# Patient Record
Sex: FEMALE | Race: BLACK OR AFRICAN AMERICAN | Employment: FULL TIME | URBAN - METROPOLITAN AREA
[De-identification: names, ages, dates, MRNs, and addresses within clinical notes are randomized per-mention and may not be internally consistent; named-entity substitution may affect disease eponyms.]

---

## 2017-03-03 ENCOUNTER — PATIENT MESSAGE (OUTPATIENT)
Dept: ENDOCRINOLOGY | Age: 37
End: 2017-03-03

## 2017-03-03 ENCOUNTER — TELEPHONE (OUTPATIENT)
Dept: ENDOCRINOLOGY | Age: 37
End: 2017-03-03

## 2017-03-03 RX ORDER — PHENTERMINE HYDROCHLORIDE 30 MG/1
CAPSULE ORAL
Qty: 30 CAP | Refills: 5 | Status: SHIPPED | OUTPATIENT
Start: 2017-03-03 | End: 2017-04-19

## 2017-03-03 RX ORDER — PHENTERMINE HYDROCHLORIDE 30 MG/1
CAPSULE ORAL
Qty: 30 CAP | Refills: 5 | Status: CANCELLED
Start: 2017-03-03

## 2017-03-03 NOTE — TELEPHONE ENCOUNTER
Patient is requesting a call back in regards to her medication phentermine. She stated that the pharmacy is stating that they never received the fax for phentermine. She can be reached at [phone]. Thank you.        Liza Glass

## 2017-03-03 NOTE — TELEPHONE ENCOUNTER
From: Brian Escobar  To: Susanna Yeung MD  Sent: 3/3/2017 2:59 PM EST  Subject: Prescription Question    Dr. Shazia Diaz,     I have been in touch with the [facility] and they don't have a prescription for me from December. The last one they have in file was written in May and filled in October. I still use the [facility] in [place] if you could and would please send the phentermine over. Thank you so much.      Bubba Hammond

## 2017-03-11 ENCOUNTER — HOSPITAL ENCOUNTER (EMERGENCY)
Age: 37
Discharge: HOME | End: 2017-03-11
Attending: EMERGENCY MEDICINE
Payer: COMMERCIAL

## 2017-03-11 ENCOUNTER — OFFICE VISIT (OUTPATIENT)
Dept: FAMILY MEDICINE CLINIC | Age: 37
End: 2017-03-11

## 2017-03-11 ENCOUNTER — APPOINTMENT (OUTPATIENT)
Dept: CT IMAGING | Age: 37
End: 2017-03-11
Attending: EMERGENCY MEDICINE
Payer: COMMERCIAL

## 2017-03-11 VITALS
OXYGEN SATURATION: 100 % | BODY MASS INDEX: 34.55 KG/M2 | RESPIRATION RATE: 18 BRPM | DIASTOLIC BLOOD PRESSURE: 96 MMHG | HEART RATE: 115 BPM | HEIGHT: 61 IN | SYSTOLIC BLOOD PRESSURE: 149 MMHG | TEMPERATURE: 98.1 F | WEIGHT: 183 LBS

## 2017-03-11 VITALS
HEIGHT: 61 IN | OXYGEN SATURATION: 100 % | TEMPERATURE: 98 F | HEART RATE: 102 BPM | DIASTOLIC BLOOD PRESSURE: 79 MMHG | SYSTOLIC BLOOD PRESSURE: 114 MMHG | BODY MASS INDEX: 34.36 KG/M2 | RESPIRATION RATE: 18 BRPM | WEIGHT: 182 LBS

## 2017-03-11 DIAGNOSIS — R10.9 FLANK PAIN: Primary | ICD-10-CM

## 2017-03-11 DIAGNOSIS — N20.0 KIDNEY STONE: Primary | ICD-10-CM

## 2017-03-11 DIAGNOSIS — R10.32 LLQ ABDOMINAL PAIN: ICD-10-CM

## 2017-03-11 LAB
ALBUMIN SERPL BCP-MCNC: 3.8 G/DL (ref 3.5–5)
ALBUMIN/GLOB SERPL: 0.9 {RATIO} (ref 1.1–2.2)
ALP SERPL-CCNC: 82 U/L (ref 45–117)
ALT SERPL-CCNC: 22 U/L (ref 12–78)
AMYLASE SERPL-CCNC: 57 U/L (ref 25–115)
ANION GAP BLD CALC-SCNC: 8 MMOL/L (ref 5–15)
AST SERPL W P-5'-P-CCNC: 15 U/L (ref 15–37)
BASOPHILS # BLD AUTO: 0 K/UL (ref 0–0.1)
BASOPHILS # BLD: 0 % (ref 0–1)
BILIRUB SERPL-MCNC: 0.3 MG/DL (ref 0.2–1)
BILIRUB UR QL STRIP: NORMAL
BUN SERPL-MCNC: 10 MG/DL (ref 6–20)
BUN/CREAT SERPL: 10 (ref 12–20)
CALCIUM SERPL-MCNC: 8.5 MG/DL (ref 8.5–10.1)
CHLORIDE SERPL-SCNC: 107 MMOL/L (ref 97–108)
CO2 SERPL-SCNC: 22 MMOL/L (ref 21–32)
CREAT SERPL-MCNC: 0.99 MG/DL (ref 0.55–1.02)
EOSINOPHIL # BLD: 0 K/UL (ref 0–0.4)
EOSINOPHIL NFR BLD: 0 % (ref 0–7)
ERYTHROCYTE [DISTWIDTH] IN BLOOD BY AUTOMATED COUNT: 13.1 % (ref 11.5–14.5)
GLOBULIN SER CALC-MCNC: 4.2 G/DL (ref 2–4)
GLUCOSE SERPL-MCNC: 162 MG/DL (ref 65–100)
GLUCOSE UR-MCNC: NEGATIVE MG/DL
HCG UR QL: NEGATIVE
HCT VFR BLD AUTO: 38.8 % (ref 35–47)
HGB BLD-MCNC: 12.8 G/DL (ref 11.5–16)
KETONES P FAST UR STRIP-MCNC: NORMAL MG/DL
LIPASE SERPL-CCNC: 151 U/L (ref 73–393)
LYMPHOCYTES # BLD AUTO: 9 % (ref 12–49)
LYMPHOCYTES # BLD: 1.4 K/UL (ref 0.8–3.5)
MCH RBC QN AUTO: 29.4 PG (ref 26–34)
MCHC RBC AUTO-ENTMCNC: 33 G/DL (ref 30–36.5)
MCV RBC AUTO: 89 FL (ref 80–99)
MONOCYTES # BLD: 0.9 K/UL (ref 0–1)
MONOCYTES NFR BLD AUTO: 6 % (ref 5–13)
NEUTS SEG # BLD: 13.4 K/UL (ref 1.8–8)
NEUTS SEG NFR BLD AUTO: 85 % (ref 32–75)
PH UR STRIP: 5.5 [PH] (ref 4.6–8)
PLATELET # BLD AUTO: 290 K/UL (ref 150–400)
POTASSIUM SERPL-SCNC: 3.4 MMOL/L (ref 3.5–5.1)
PROT SERPL-MCNC: 8 G/DL (ref 6.4–8.2)
PROT UR QL STRIP: NORMAL MG/DL
RBC # BLD AUTO: 4.36 M/UL (ref 3.8–5.2)
SODIUM SERPL-SCNC: 137 MMOL/L (ref 136–145)
SP GR UR STRIP: 1.03 (ref 1–1.03)
UA UROBILINOGEN AMB POC: NORMAL (ref 0.2–1)
URINALYSIS CLARITY POC: CLEAR
URINALYSIS COLOR POC: NORMAL
URINE BLOOD POC: NORMAL
URINE LEUKOCYTES POC: NEGATIVE
URINE NITRITES POC: NEGATIVE
WBC # BLD AUTO: 15.7 K/UL (ref 3.6–11)

## 2017-03-11 PROCEDURE — 80053 COMPREHEN METABOLIC PANEL: CPT | Performed by: EMERGENCY MEDICINE

## 2017-03-11 PROCEDURE — 96375 TX/PRO/DX INJ NEW DRUG ADDON: CPT

## 2017-03-11 PROCEDURE — 74011250637 HC RX REV CODE- 250/637: Performed by: EMERGENCY MEDICINE

## 2017-03-11 PROCEDURE — 87147 CULTURE TYPE IMMUNOLOGIC: CPT | Performed by: EMERGENCY MEDICINE

## 2017-03-11 PROCEDURE — 74176 CT ABD & PELVIS W/O CONTRAST: CPT

## 2017-03-11 PROCEDURE — 82150 ASSAY OF AMYLASE: CPT | Performed by: EMERGENCY MEDICINE

## 2017-03-11 PROCEDURE — 36415 COLL VENOUS BLD VENIPUNCTURE: CPT | Performed by: EMERGENCY MEDICINE

## 2017-03-11 PROCEDURE — 83690 ASSAY OF LIPASE: CPT | Performed by: EMERGENCY MEDICINE

## 2017-03-11 PROCEDURE — 85025 COMPLETE CBC W/AUTO DIFF WBC: CPT | Performed by: EMERGENCY MEDICINE

## 2017-03-11 PROCEDURE — 99284 EMERGENCY DEPT VISIT MOD MDM: CPT

## 2017-03-11 PROCEDURE — 74011250636 HC RX REV CODE- 250/636: Performed by: EMERGENCY MEDICINE

## 2017-03-11 PROCEDURE — 81025 URINE PREGNANCY TEST: CPT

## 2017-03-11 PROCEDURE — 96361 HYDRATE IV INFUSION ADD-ON: CPT

## 2017-03-11 PROCEDURE — 87086 URINE CULTURE/COLONY COUNT: CPT | Performed by: EMERGENCY MEDICINE

## 2017-03-11 PROCEDURE — 96374 THER/PROPH/DIAG INJ IV PUSH: CPT

## 2017-03-11 RX ORDER — IBUPROFEN 800 MG/1
800 TABLET ORAL
Qty: 20 TAB | Refills: 0 | Status: SHIPPED | OUTPATIENT
Start: 2017-03-11 | End: 2017-09-13

## 2017-03-11 RX ORDER — TAMSULOSIN HYDROCHLORIDE 0.4 MG/1
0.4 CAPSULE ORAL DAILY
Qty: 15 CAP | Refills: 0 | Status: SHIPPED | OUTPATIENT
Start: 2017-03-11 | End: 2017-03-26

## 2017-03-11 RX ORDER — TAMSULOSIN HYDROCHLORIDE 0.4 MG/1
0.4 CAPSULE ORAL
Status: COMPLETED | OUTPATIENT
Start: 2017-03-11 | End: 2017-03-11

## 2017-03-11 RX ORDER — TAMSULOSIN HYDROCHLORIDE 0.4 MG/1
0.4 CAPSULE ORAL DAILY
Qty: 15 CAP | Refills: 0 | Status: SHIPPED | OUTPATIENT
Start: 2017-03-11 | End: 2017-03-11

## 2017-03-11 RX ORDER — ONDANSETRON 2 MG/ML
8 INJECTION INTRAMUSCULAR; INTRAVENOUS
Status: COMPLETED | OUTPATIENT
Start: 2017-03-11 | End: 2017-03-11

## 2017-03-11 RX ORDER — KETOROLAC TROMETHAMINE 30 MG/ML
30 INJECTION, SOLUTION INTRAMUSCULAR; INTRAVENOUS
Status: COMPLETED | OUTPATIENT
Start: 2017-03-11 | End: 2017-03-11

## 2017-03-11 RX ORDER — HYDROCODONE BITARTRATE AND ACETAMINOPHEN 10; 325 MG/1; MG/1
1-2 TABLET ORAL
Qty: 20 TAB | Refills: 0 | Status: SHIPPED | OUTPATIENT
Start: 2017-03-11 | End: 2017-04-19

## 2017-03-11 RX ORDER — HYDROMORPHONE HYDROCHLORIDE 1 MG/ML
1 INJECTION, SOLUTION INTRAMUSCULAR; INTRAVENOUS; SUBCUTANEOUS
Status: COMPLETED | OUTPATIENT
Start: 2017-03-11 | End: 2017-03-11

## 2017-03-11 RX ORDER — ONDANSETRON 8 MG/1
8 TABLET, ORALLY DISINTEGRATING ORAL
Qty: 15 TAB | Refills: 0 | Status: SHIPPED | OUTPATIENT
Start: 2017-03-11 | End: 2017-04-19

## 2017-03-11 RX ADMIN — TAMSULOSIN HYDROCHLORIDE 0.4 MG: 0.4 CAPSULE ORAL at 15:23

## 2017-03-11 RX ADMIN — KETOROLAC TROMETHAMINE 30 MG: 30 INJECTION, SOLUTION INTRAMUSCULAR at 13:50

## 2017-03-11 RX ADMIN — ONDANSETRON 8 MG: 2 INJECTION INTRAMUSCULAR; INTRAVENOUS at 13:47

## 2017-03-11 RX ADMIN — HYDROMORPHONE HYDROCHLORIDE 1 MG: 1 INJECTION, SOLUTION INTRAMUSCULAR; INTRAVENOUS; SUBCUTANEOUS at 13:51

## 2017-03-11 RX ADMIN — SODIUM CHLORIDE 1000 ML: 900 INJECTION, SOLUTION INTRAVENOUS at 13:46

## 2017-03-11 NOTE — ED PROVIDER NOTES
HPI Comments: The patient is a 80-year-old female with a past medical history significant for diabetes, thyroid disease, dysphagia, GERD, chronic low back pain, anxiety and depression presents to the ED with a complaint of attempt at present of left flank pain x3 days, dull and throbbing in nature, severity 10, radiating to the left lower quadrant abdominal area, without any aggravating or live-in factors, accompanied by 2 episodes of nausea and vomiting this morning. The patient was seen by her gynecologist and sent to the ER for further evaluation. The patient denies any fever, cough, congestion, headache, neck pain, chest pain, shortness of breath, diarrhea, constipation, dysuria, hematuria, dizziness, weakness and numbness. Patient is a 29 y.o. female presenting with abdominal pain and flank pain. Abdominal Pain      Flank Pain    Associated symptoms include abdominal pain.         Past Medical History:   Diagnosis Date    Anxiety and depression 10/9/2014    10/2014; resolved as of 8/24/15 per pt    Chronic low back pain 10/9/2014    Muscular     Diabetes mellitus type II 10/07    Consulted w/ Dr. Ryan Chaparro    Diabetic eye exam ([facility] [address] 2016    Micheline Spaulding MD, [facility]    Dysphagia 2010    GERD (gastroesophageal reflux disease) 2011    H. pylori infection 2016    Dr Sree Byers    History of peptic ulcer disease 2016    16 yo    History of sinus tachycardia 10/5/2012    as of 8/24/15 pt states followed by PCP    Hypercholesterolemia 2016    ~     (normal spontaneous vaginal delivery) 1998    Son  9yo    Post partum depression 10/9/2014    10/2013: counseling and prn Valium    Spondylosis of lumbar & thoracic region without myelopathy or radiculopathy 10/26/2016    xrays 2010    Spondylosis, Thoracic & Lumbar 3/5/2015    xrays 2010    Thyromegaly 2010    US negative    Vitamin D deficiency        Past Surgical History: Procedure Laterality Date    HX CARPAL TUNNEL RELEASE Bilateral     HX  SECTION      HX CHOLECYSTECTOMY  10/09    LAP-Gallstones    HX GYN  2015    IUD placed    HX ORTHOPAEDIC Right 7/10/14    trigger thumb    HX ORTHOPAEDIC Left 7/10/14    wrist         Family History:   Problem Relation Age of Onset    Diabetes Mother       ESRD 45 yo in     Hypertension Mother     Stroke Mother      TIA    Heart Disease Mother     High Cholesterol Paternal Grandmother     Hypertension Maternal Grandfather          Diabetes Maternal Grandmother     Hypertension Maternal Grandmother     High Cholesterol Maternal Grandmother     Cancer Maternal Grandmother      Breast CA     Heart Disease Maternal Grandmother       age 79    Colon Cancer Maternal Uncle       at 62yo       Social History     Social History    Marital status:      Spouse name: N/A    Number of children: 1    Years of education: N/A     Occupational History    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[address], Elementary Education    [address] [facility]     Social History Main Topics    Smoking status: Never Smoker    Smokeless tobacco: Never Used    Alcohol use 0.0 oz/week     0 Standard drinks or equivalent per week      Comment: not weekly, every couple of months per pt    Drug use: No    Sexual activity: Yes     Partners: Male     Birth control/ protection: IUD     Other Topics Concern    Caffeine Concern No     seldom any at all    Special Diet No     has attended diabetes classes  and personal dietician in     Exercise Yes     5 x a week: 1 hr of cardio w/  since 3-2016     Social History Narrative    Got  ~ . Has 1 daughter; Long term relationship and has custody of her stepson; her biological son  at age 7yo in [birth year]. 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          ALLERGIES: Naprosyn [naproxen]    Review of Systems   Gastrointestinal: Positive for abdominal pain. Genitourinary: Positive for flank pain. All other systems reviewed and are negative. Vitals:    03/11/17 1132   BP: (!) 128/94   Pulse: (!) 127   Resp: 18   Temp: 98 °F (36.7 °C)   SpO2: 100%   Weight: 82.6 kg (182 lb)   Height: 5' 1\" (1.549 m)            Physical Exam   Nursing note and vitals reviewed. CONSTITUTIONAL: Well-appearing; well-nourished; in mild distress  HEAD: Normocephalic; atraumatic  EYES: PERRL; EOM intact; conjunctiva and sclera are clear bilaterally. ENT: No rhinorrhea; normal pharynx with no tonsillar hypertrophy; mucous membranes pink/moist, no erythema, no exudate. NECK: Supple; non-tender; no cervical lymphadenopathy  CARD: Normal S1, S2; no murmurs, rubs, or gallops. Regular rate and rhythm. RESP: Normal respiratory effort; breath sounds clear and equal bilaterally; no wheezes, rhonchi, or rales. ABD: Normal bowel sounds; non-distended; non-tender; no palpable organomegaly, no masses, no bruits. Back Exam: Normal inspection; no vertebral point tenderness, left CVA tenderness. Normal range of motion. EXT: Normal ROM in all four extremities; non-tender to palpation; no swelling or deformity; distal pulses are normal, no edema. SKIN: Warm; dry; no rash. NEURO:Alert and oriented x 3, coherent, GERMAN-XII grossly intact, sensory and motor are non-focal.      MDM  Number of Diagnoses or Management Options  Kidney stone:   Diagnosis management comments: Assessment: acute onset of left flank pain with hematuria. Differential diagnoses consist of obstructive uropathy/ UTI/ pyelonephritis/ diverticulitis/ GERD/ myofascial strain. Plan: lab/ IV fluid/ antiemetics and analgesia/ CT scan of the abdomen and pelvis/ Monitor and Reevaluate.          Amount and/or Complexity of Data Reviewed  Clinical lab tests: ordered and reviewed  Tests in the radiology section of CPT®: ordered and reviewed  Tests in the medicine section of CPT®: reviewed and ordered  Discussion of test results with the performing providers: yes  Decide to obtain previous medical records or to obtain history from someone other than the patient: yes  Obtain history from someone other than the patient: yes  Review and summarize past medical records: yes  Discuss the patient with other providers: yes  Independent visualization of images, tracings, or specimens: yes    Risk of Complications, Morbidity, and/or Mortality  Presenting problems: moderate  Diagnostic procedures: moderate  Management options: moderate    Patient Progress  Patient progress: stable    ED Course       Procedures     PROGRESS NOTE:  I discussed the radiographic findings with the oncall radiologist Dr. Bishop Loyd, states that findings in the left kidney are consistent with kidney stones. Will discussed with Urology 02:55 PM    .     CONSULT NOTE:  Tye Weber MD spoke with Dr. Mikayla George of [facility] Discussed patient's presentation, history, physical assessment, and available diagnostic results. Wants patient discharged home and will follow up in the office for outpatient reevaluation and work-up as deemed appropriate. 03:00 PM    Progress Note:   Pt has been reexamined by Tye Weber MD. Pt is feeling much better. Symptoms have improved. All available results have been reviewed with pt and any available family. Leukocytosis is likely the result of stress. The patient remained afebrile and does not have any urinary symptoms. Pt understands sx, dx, and tx in ED. Care plan has been outlined and questions have been answered. Pt is ready to go home. Will send home on kidney stone instructions. Prescription of Norco, Flomax, Zofran and Motrin. The patient was given an appointment with [facility] on Monday morning at 8 AM at the [facility] location. Outpatient referral with PCP as needed. Written by Tye Weber MD,3:17 PM    .   .

## 2017-03-11 NOTE — PROGRESS NOTES
HISTORY OF PRESENT ILLNESS  Niya Dailey is a 29 y.o. female here c/o back and abdominal pain. Blood pressure (!) 149/96, pulse (!) 115, temperature 98.1 °F (36.7 °C), temperature source Oral, resp. rate 18, height 5' 1\" (1.549 m), weight 183 lb (83 kg), SpO2 100 %. Body mass index is 34.58 kg/(m^2). Chief Complaint   Patient presents with    Back Pain    Abdominal Pain      Back Pain    Associated symptoms include abdominal pain. Pertinent negatives include no chest pain, no fever and no headaches. Abdominal Pain   Associated symptoms include abdominal pain. Pertinent negatives include no chest pain, no headaches and no shortness of breath. Back and Abdominal pain: Pt c/o back pain and abdomen 8/10 x yesterday. Pt states the pain is dull and shooting. Pt endorses taking Advil and Tyelnol. Pt reports emesis at 12am and 7 am. Pt notes she has not eaten anything. Pt claims her menstrual cycle stopped yesterday, but the back and flank pain has increased since then. Pt's UA was reviewed today noting trace hematuria without bacteria. Pt reports having an IUD. Pt notes she has not had a menstrual cycle in two years. Pt reports heavy menstrual cycle x 2 days. Pt denies constipation. Current Outpatient Prescriptions   Medication Sig Dispense Refill    phentermine 30 mg capsule TAKE ONE CAPSULE BY MOUTH ONCE DAILY IN THE MORNING 30 Cap 5    DEXILANT 60 mg CpDB TAKE ONE CAPSULE BY MOUTH ONCE DAILY FOR  REFLUX 30 Cap 5    metaxalone (SKELAXIN) 800 mg tablet Take 1 Tab by mouth every eight (8) hours as needed (muscle spasms in back). 30 Tab 2    Liraglutide (VICTOZA) 0.6 mg/0.1 mL (18 mg/3 mL) sub-q pen Inject 1.8 mg daily 3 Syringe 11    NOVOFINE PLUS 32 gauge x 1/6\" ndle 1 Each by SubCUTAneous route daily.  With victoza 100 Pen Needle 11    levothyroxine (SYNTHROID) 75 mcg tablet Take 1 tablet daily 90 Tab 3    topiramate (TOPAMAX) 50 mg tablet Take 2 tabs with breakfast 90 Tab 3    metFORMIN (GLUCOPHAGE) 1,000 mg tablet TAKE ONE TABLET BY MOUTH TWICE DAILY WITH MEALS 180 Tab 3    cholecalciferol, vitamin D3, 2,000 unit tab Take  by mouth daily.  levonorgestrel (MIRENA) 20 mcg/24 hr (5 years) IUD 1 Each by IntraUTERine route once.  ONE TOUCH ULTRA TEST strip Test 3 times daily due to fluctuating sugars.   Dx: 250.00 100 Strip 11    ONE TOUCH ULTRAMINI monitoring kit Use as directed 1 Kit 0     Facility-Administered Medications Ordered in Other Visits   Medication Dose Route Frequency Provider Last Rate Last Dose    HYDROmorphone (PF) (DILAUDID) injection 1 mg  1 mg IntraVENous NOW Valeria Quintana MD        ketorolac (TORADOL) injection 30 mg  30 mg IntraVENous NOW Valeria Quintana MD        ondansetron [facility]) injection 8 mg  8 mg IntraVENous NOW Valeria Quintana MD        sodium chloride 0.9 % bolus infusion 1,000 mL  1,000 mL IntraVENous NOW Valeria Quintana MD         Allergies   Allergen Reactions    Naprosyn [Naproxen] Nausea Only     Past Medical History:   Diagnosis Date    Anxiety and depression 10/9/2014    10/2014; resolved as of 8/24/15 per pt    Chronic low back pain 10/9/2014    Muscular     Diabetes mellitus type II 10/07    Consulted w/ Dr. Keila Shore    Diabetic eye exam ([facility] [address] 2016    Tamia Alcala MD, [facility]    Dysphagia 2010    GERD (gastroesophageal reflux disease) 2011    H. pylori infection 2016    Dr Tang Beltran    History of peptic ulcer disease 2016    17 yo    History of sinus tachycardia 10/5/2012    as of 8/24/15 pt states followed by PCP    Hypercholesterolemia 2016    ~     (normal spontaneous vaginal delivery) 1998    Son  9yo    Post partum depression 10/9/2014    10/2013: counseling and prn Valium    Spondylosis of lumbar & thoracic region without myelopathy or radiculopathy 10/26/2016    xrays 2010    Spondylosis, Thoracic & Lumbar 3/5/2015    xrays 2010    Thyromegaly 2010    US negative    Vitamin D deficiency      Past Surgical History:   Procedure Laterality Date    HX CARPAL TUNNEL RELEASE Bilateral     HX  SECTION      HX CHOLECYSTECTOMY  10/09    LAP-Gallstones    HX GYN  2015    IUD placed    HX ORTHOPAEDIC Right 7/10/14    trigger thumb    HX ORTHOPAEDIC Left 7/10/14    wrist     Family History   Problem Relation Age of Onset    Diabetes Mother       ESRD 45 yo in     Hypertension Mother     Stroke Mother      TIA    Heart Disease Mother     High Cholesterol Paternal Grandmother     Hypertension Maternal Grandfather          Diabetes Maternal Grandmother     Hypertension Maternal Grandmother     High Cholesterol Maternal Grandmother     Cancer Maternal Grandmother      Breast CA     Heart Disease Maternal Grandmother       age 79    Colon Cancer Maternal Uncle       at 64yo     Social History   Substance Use Topics    Smoking status: Never Smoker    Smokeless tobacco: Never Used    Alcohol use 0.0 oz/week     0 Standard drinks or equivalent per week      Comment: not weekly, every couple of months per pt      Review of Systems   Constitutional: Negative. Negative for chills, fever and malaise/fatigue. Eyes: Negative for blurred vision. Respiratory: Negative for shortness of breath. Cardiovascular: Negative for chest pain and leg swelling. Gastrointestinal: Positive for abdominal pain. Negative for blood in stool, constipation, diarrhea, heartburn, melena, nausea and vomiting. Musculoskeletal: Positive for back pain. Neurological: Negative. Negative for dizziness and headaches. All other systems reviewed and are negative. Physical Exam   Constitutional: She is oriented to person, place, and time. She appears well-developed and well-nourished. No distress. HENT:   Head: Normocephalic and atraumatic. Neck: Carotid bruit is not present.    Cardiovascular: Normal rate, regular rhythm, normal heart sounds and intact distal pulses. Exam reveals no gallop and no friction rub. No murmur heard. Pulmonary/Chest: Effort normal and breath sounds normal. No respiratory distress. She has no wheezes. She has no rales. Abdominal: She exhibits no distension and no mass. There is tenderness in the left lower quadrant. There is rebound and CVA tenderness. There is no guarding. Musculoskeletal: She exhibits no edema. Neurological: She is alert and oriented to person, place, and time. Skin: She is not diaphoretic. Psychiatric: She has a normal mood and affect. Her behavior is normal. Judgment and thought content normal.   Nursing note and vitals reviewed. ASSESSMENT and PLAN  Daphne Jackson was seen today for back pain and abdominal pain. Diagnoses and all orders for this visit:    Flank pain  -     AMB POC URINALYSIS DIP STICK AUTO W/O MICRO  Results for orders placed or performed in visit on 03/11/17   AMB POC URINALYSIS DIP STICK AUTO W/O MICRO   Result Value Ref Range    Color (UA POC) Dark Yellow     Clarity (UA POC) Clear     Glucose (UA POC) Negative Negative    Bilirubin (UA POC) 1+ Negative    Ketones (UA POC) Trace Negative    Specific gravity (UA POC) 1.030 1.001 - 1.035    Blood (UA POC) 2+ Negative    pH (UA POC) 5.5 4.6 - 8.0    Protein (UA POC) 2+ Negative mg/dL    Urobilinogen (UA POC) 0.2 mg/dL 0.2 - 1    Nitrites (UA POC) Negative Negative    Leukocyte esterase (UA POC) Negative Negative       LLQ abdominal pain  Likely renal stone, advised pt that she needs to go to the ER to get a CT and/pelvis. I ordered a POC UA for investigation of flank pain. Pt was instructed to go to the ED for immediate imaging and pain medication. Pt states she can call someone to take her to the ED. Derrek Qureshi was called to let them know pt is heading over. Follow-up Disposition:  Return if symptoms worsen or fail to improve, for  advised to go to ER.      Medication risks/benefits/costs/interactions/alternatives discussed with patient. Advised patient to call back or return to office if symptoms worsen/change/persist.  If patient cannot reach us or should anything more severe/urgent arise he/she should proceed directly to the nearest emergency department. Discussed expected course/resolution/complications of diagnosis in detail with patient. Patient given a written after visit summary which includes her diagnoses, current medications and vitals. Patient expressed understanding with the diagnosis and plan. Written by Ingrid Dunham, as dictated by Dr. Gertrude Rojas.

## 2017-03-11 NOTE — PROGRESS NOTES
Chief Complaint   Patient presents with    Back Pain    Abdominal Pain     Pt presents in office today with c/o left flank pain and lower abdominal pain,   Pt states flank pain started on 03/08/2017, pain radiates to her LUQ,    Pt states she woke up yesterday morning with lower abdominal pain  Reports urinary urgency, denies urinary pain or frequency     Pt has IUD in place, states she had heavy bleeding x2 days on 03/08/2017    1. Have you been to the ER, urgent care clinic since your last visit? Hospitalized since your last visit? No    2. Have you seen or consulted any other health care providers outside of the [address] [place] [address] since your last visit? Include any pap smears or colon screening.  No

## 2017-03-11 NOTE — ED TRIAGE NOTES
Pt arrives with L sided abd pain & L sided flank pain that started on Wednesday. + nausea/vomiiting twice today.

## 2017-03-11 NOTE — MR AVS SNAPSHOT
Visit Information Date & Time Provider Department Dept. Phone Encounter #  
 3/11/2017  9:45 AM Shweta Wellington MD [address] [place] [address] [phone] [id number] Follow-up Instructions Return if symptoms worsen or fail to improve, for  advised to go to ER. Your Appointments 4/19/2017 11:30 AM  
ROUTINE CARE with Robyn Monet MD  
[facility]) Appt Note: 4m f/u  
 [address]  
[address] [facility] [address] [id number] Upcoming Health Maintenance Date Due  
 PAP AKA CERVICAL CYTOLOGY 3/3/2017 MICROALBUMIN Q1 3/18/2017 HEMOGLOBIN A1C Q6M 6/16/2017 LIPID PANEL Q1 7/12/2017 FOOT EXAM Q1 7/15/2017 EYE EXAM RETINAL OR DILATED Q1 8/29/2017 DTaP/Tdap/Td series (2 - Td) 9/4/2023 Allergies as of 3/11/2017  Review Complete On: 12/21/2016 By: Robyn Monet MD  
  
 Severity Noted Reaction Type Reactions Naprosyn [Naproxen]  03/25/2010   Intolerance Nausea Only Current Immunizations  Reviewed on 11/20/2013 Name Date DTaP 9/4/2013 Hepatitis B Vaccine 2/16/2010, 9/25/2009, 8/25/2009 Influenza Vaccine 10/16/2015, 10/23/2014, 10/9/2013 Influenza Vaccine (Quad) PF 10/26/2016 Influenza Vaccine Split 10/5/2012, 9/21/2011, 9/23/2010 Pneumococcal Vaccine (Unspecified Type) 9/18/2013, 3/25/2007 TB Skin Test (PPD) Intradermal 8/29/2013 TD Vaccine 3/25/2007 Not reviewed this visit You Were Diagnosed With   
  
 Codes Comments Flank pain    -  Primary ICD-10-CM: R10.9 ICD-9-CM: 789.09   
 LLQ abdominal pain     ICD-10-CM: R10.32 
ICD-9-CM: 789.04 Vitals BP Pulse Temp Resp Height(growth percentile) Weight(growth percentile) (!) 149/96 (!) 115 98.1 °F (36.7 °C) (Oral) 18 5' 1\" (1.549 m) 183 lb (83 kg) SpO2 BMI OB Status Smoking Status 100% 34.58 kg/m2 IUD Never Smoker Vitals History BMI and BSA Data Body Mass Index Body Surface Area 34.58 kg/m 2 1.89 m 2 Preferred Pharmacy Pharmacy Name [facility] [address] [address] Your Updated Medication List  
  
   
This list is accurate as of: 3/11/17 10:26 AM.  Always use your most recent med list.  
  
  
  
  
 cholecalciferol (vitamin D3) 2,000 unit Tab Take  by mouth daily. DEXILANT 60 mg Cpdb Generic drug:  Dexlansoprazole TAKE ONE CAPSULE BY MOUTH ONCE DAILY FOR  REFLUX  
  
 levothyroxine 75 mcg tablet Commonly known as:  SYNTHROID Take 1 tablet daily Liraglutide 0.6 mg/0.1 mL (18 mg/3 mL) sub-q pen Commonly known as:  Lemond Damme Inject 1.8 mg daily  
  
 metaxalone 800 mg tablet Commonly known as:  SKELAXIN Take 1 Tab by mouth every eight (8) hours as needed (muscle spasms in back). metFORMIN 1,000 mg tablet Commonly known as:  GLUCOPHAGE  
TAKE ONE TABLET BY MOUTH TWICE DAILY WITH MEALS  
  
 MIRENA 20 mcg/24 hr (5 years) IUD Generic drug:  levonorgestrel 1 Each by IntraUTERine route once. NOVOFINE PLUS 32 gauge x 1/6\" Ndle Generic drug:  pen needle, diabetic 1 Each by SubCUTAneous route daily. With victoza ONETOUCH ULTRA TEST strip Generic drug:  glucose blood VI test strips Test 3 times daily due to fluctuating sugars. Dx: 250.00 ONETOUCH ULTRAMINI monitoring kit Generic drug:  Blood-Glucose Meter Use as directed  
  
 phentermine 30 mg capsule TAKE ONE CAPSULE BY MOUTH ONCE DAILY IN THE MORNING  
  
 topiramate 50 mg tablet Commonly known as:  TOPAMAX Take 2 tabs with breakfast  
  
  
  
  
We Performed the Following AMB POC URINALYSIS DIP STICK AUTO W/O MICRO [28971 CPT(R)] Follow-up Instructions Return if symptoms worsen or fail to improve, for  advised to go to ER. [facility]! Dear Veda Robles: Thank you for requesting a [employer] account. Our records indicate that you already have an active [employer] account. You can access your account anytime at [email or web address] Did you know that you can access your hospital and ER discharge instructions at any time in [employer]? You can also review all of your test results from your hospital stay or ER visit. Additional Information If you have questions, please visit the Frequently Asked Questions section of the [employer] website at [email or web address] Remember, [employer] is NOT to be used for urgent needs. For medical emergencies, dial 911. Now available from your iPhone and Android! Please provide this summary of care documentation to your next provider. Your primary care clinician is listed as VIDHI ORELLANA. If you have any questions after today's visit, please call [phone].

## 2017-03-11 NOTE — DISCHARGE INSTRUCTIONS
Kidney Stone: Care Instructions  Your Care Instructions    Kidney stones are formed when salts, minerals, and other substances normally found in the urine clump together. They can be as small as grains of sand or, rarely, as large as golf balls. While the stone is traveling through the ureter, which is the tube that carries urine from the kidney to the bladder, you will probably feel pain. The pain may be mild or very severe. You may also have some blood in your urine. As soon as the stone reaches the bladder, any intense pain should go away. If a stone is too large to pass on its own, you may need a medical procedure to help you pass the stone. The doctor has checked you carefully, but problems can develop later. If you notice any problems or new symptoms, get medical treatment right away. Follow-up care is a key part of your treatment and safety. Be sure to make and go to all appointments, and call your doctor if you are having problems. It's also a good idea to know your test results and keep a list of the medicines you take. How can you care for yourself at home? · Drink plenty of fluids, enough so that your urine is light yellow or clear like water. If you have kidney, heart, or liver disease and have to limit fluids, talk with your doctor before you increase the amount of fluids you drink. · Take pain medicines exactly as directed. Call your doctor if you think you are having a problem with your medicine. ¨ If the doctor gave you a prescription medicine for pain, take it as prescribed. ¨ If you are not taking a prescription pain medicine, ask your doctor if you can take an over-the-counter medicine. Read and follow all instructions on the label. · Your doctor may ask you to strain your urine so that you can collect your kidney stone when it passes. You can use a kitchen strainer or a tea strainer to catch the stone. Store it in a plastic bag until you see your doctor again.   Preventing future kidney stones  Some changes in your diet may help prevent kidney stones. Depending on the cause of your stones, your doctor may recommend that you:  · Drink plenty of fluids, enough so that your urine is light yellow or clear like water. If you have kidney, heart, or liver disease and have to limit fluids, talk with your doctor before you increase the amount of fluids you drink. · Limit coffee, tea, and alcohol. Also avoid grapefruit juice. · Do not take more than the recommended daily dose of vitamins C and D.  · Avoid antacids such as Gaviscon, Maalox, Mylanta, or Tums. · Limit the amount of salt (sodium) in your diet. · Eat a balanced diet that is not too high in protein. · Limit foods that are high in a substance called oxalate, which can cause kidney stones. These foods include dark green vegetables, rhubarb, chocolate, wheat bran, nuts, cranberries, and beans. When should you call for help? Call your doctor now or seek immediate medical care if:  · You cannot keep down fluids. · Your pain gets worse. · You have a fever or chills. · You have new or worse pain in your back just below your rib cage (the flank area). · You have new or more blood in your urine. Watch closely for changes in your health, and be sure to contact your doctor if:  · You do not get better as expected. Where can you learn more? Go to http://elizabeth-korina.info/. Enter B571 in the search box to learn more about \"Kidney Stone: Care Instructions. \"  Current as of: November 20, 2015  Content Version: 11.1  © 0365-8996 [employer]. Care instructions adapted under license by [employer] (which disclaims liability or warranty for this information). If you have questions about a medical condition or this instruction, always ask your healthcare professional. [address] any warranty or liability for your use of this information.          We hope that we have addressed all of your medical concerns. The examination and treatment you received in the Emergency Department were for an emergent problem and were not intended as complete care. It is important that you follow up with your healthcare provider(s) for ongoing care. If your symptoms worsen or do not improve as expected, and you are unable to reach your usual health care provider(s), you should return to the Emergency Department. Today's healthcare is undergoing tremendous change, and patient satisfaction surveys are one of the many tools to assess the quality of medical care. You may receive a survey from the [employer] regarding your experience in the Emergency Department. I hope that your experience has been completely positive, particularly the medical care that I provided. As such, please participate in the survey; anything less than excellent does not meet my expectations or intentions. [address] [place] [address] and [address] [place] [address] participate in nationally recognized quality of care measures. If your blood pressure is greater than 120/80, as reported below, we urge that you seek medical care to address the potential of high blood pressure, commonly known as hypertension. Hypertension can be hereditary or can be caused by certain medical conditions, pain, stress, or \"white coat syndrome. \"       Please make an appointment with your health care provider(s) for follow up of your Emergency Department visit. VITALS:   Patient Vitals for the past 8 hrs:   Temp Pulse Resp BP SpO2   03/11/17 1132 98 °F (36.7 °C) (!) 127 18 (!) 128/94 100 %          Thank you for allowing us to provide you with medical care today. We realize that you have many choices for your emergency care needs. Please choose us in the future for any continued health care needs. Maya Soto MD    [facility].   Office: [phone]            Recent Results (from the past 24 hour(s))   AMB POC URINALYSIS DIP STICK AUTO W/O MICRO    Collection Time: 03/11/17 10:04 AM   Result Value Ref Range    Color (UA POC) Dark Yellow     Clarity (UA POC) Clear     Glucose (UA POC) Negative Negative    Bilirubin (UA POC) 1+ Negative    Ketones (UA POC) Trace Negative    Specific gravity (UA POC) 1.030 1.001 - 1.035    Blood (UA POC) 2+ Negative    pH (UA POC) 5.5 4.6 - 8.0    Protein (UA POC) 2+ Negative mg/dL    Urobilinogen (UA POC) 0.2 mg/dL 0.2 - 1    Nitrites (UA POC) Negative Negative    Leukocyte esterase (UA POC) Negative Negative   CBC WITH AUTOMATED DIFF    Collection Time: 03/11/17 12:58 PM   Result Value Ref Range    WBC 15.7 (H) 3.6 - 11.0 K/uL    RBC 4.36 3.80 - 5.20 M/uL    HGB 12.8 11.5 - 16.0 g/dL    HCT 38.8 35.0 - 47.0 %    MCV 89.0 80.0 - 99.0 FL    MCH 29.4 26.0 - 34.0 PG    MCHC 33.0 30.0 - 36.5 g/dL    RDW 13.1 11.5 - 14.5 %    PLATELET 589 480 - 618 K/uL    NEUTROPHILS 85 (H) 32 - 75 %    LYMPHOCYTES 9 (L) 12 - 49 %    MONOCYTES 6 5 - 13 %    EOSINOPHILS 0 0 - 7 %    BASOPHILS 0 0 - 1 %    ABS. NEUTROPHILS 13.4 (H) 1.8 - 8.0 K/UL    ABS. LYMPHOCYTES 1.4 0.8 - 3.5 K/UL    ABS. MONOCYTES 0.9 0.0 - 1.0 K/UL    ABS. EOSINOPHILS 0.0 0.0 - 0.4 K/UL    ABS. BASOPHILS 0.0 0.0 - 0.1 K/UL   METABOLIC PANEL, COMPREHENSIVE    Collection Time: 03/11/17 12:58 PM   Result Value Ref Range    Sodium 137 136 - 145 mmol/L    Potassium 3.4 (L) 3.5 - 5.1 mmol/L    Chloride 107 97 - 108 mmol/L    CO2 22 21 - 32 mmol/L    Anion gap 8 5 - 15 mmol/L    Glucose 162 (H) 65 - 100 mg/dL    BUN 10 6 - 20 MG/DL    Creatinine 0.99 0.55 - 1.02 MG/DL    BUN/Creatinine ratio 10 (L) 12 - 20      GFR est AA >60 >60 ml/min/1.73m2    GFR est non-AA >60 >60 ml/min/1.73m2    Calcium 8.5 8.5 - 10.1 MG/DL    Bilirubin, total 0.3 0.2 - 1.0 MG/DL    ALT (SGPT) 22 12 - 78 U/L    AST (SGOT) 15 15 - 37 U/L    Alk.  phosphatase 82 45 - 117 U/L    Protein, total 8.0 6.4 - 8.2 g/dL    Albumin 3.8 3.5 - 5.0 g/dL    Globulin 4.2 (H) 2.0 - 4.0 g/dL    A-G Ratio 0.9 (L) 1.1 - 2.2     LIPASE    Collection Time: 03/11/17 12:58 PM   Result Value Ref Range    Lipase 151 73 - 393 U/L   AMYLASE    Collection Time: 03/11/17 12:58 PM   Result Value Ref Range    Amylase 57 25 - 115 U/L   HCG URINE, QL. - POC    Collection Time: 03/11/17  1:44 PM   Result Value Ref Range    Pregnancy test,urine (POC) NEGATIVE  NEG         Ct Abd Pelv Wo Cont    Result Date: 3/11/2017  INDICATION: Left abdominal/flank pain x 3 days COMPARISON: CT angiography of chest 11/16/2007 TECHNIQUE: Thin axial images were obtained through the abdomen and pelvis. Coronal and sagittal reconstructions were generated. Oral contrast was not administered. CT dose reduction was achieved through use of a standardized protocol tailored for this examination and automatic exposure control for dose modulation. The absence of intravenous contrast material reduces the sensitivity for evaluation of the solid parenchymal organs of the abdomen. FINDINGS: BREASTS: Stable since 11/16/2007, 10 years, 1.2 x 2.3 cm right breast nodule possible fibroadenoma. LUNG BASES: Clear. INCIDENTALLY IMAGED HEART AND MEDIASTINUM: Unremarkable. LIVER: Unremarkable GALLBLADDER: Surgically absent. SPLEEN: Normal size. PANCREAS: Unremarkable. ADRENALS: Unremarkable. KIDNEYS: Swollen left kidney with left pelvocaliectasis/hydronephrosis with slight perinephric fluid secondary to a 0.7 x 0.8 x 0.8 cm proximal ureteral calculus. No right hydronephrosis STOMACH: Unremarkable. SMALL BOWEL: Normal in caliber. COLON: Fecal stasis ascending colon. Scattered sigmoid and left colonic diverticula. APPENDIX: Normal in configuration. PERITONEUM: No pneumoperitoneum. RETROPERITONEUM: Normal caliber abdominal aorta. No lymphadenopathy. REPRODUCTIVE ORGANS: Normal uterine size with intrauterine device. URINARY BLADDER: Nondistended.  BONES: Stable slightly bifid compression deformity T11 vertebral body may be development. Facet arthropathy L5-S1. No destructive bone lesion. ADDITIONAL COMMENTS: N/A     IMPRESSION: Swollen left kidney with left pelvocaliectasis/hydronephrosis and with slight perinephric fluid secondary to large 0.7 x 0.8 x 0.8 cm left proximal ureteral calculus.

## 2017-03-13 LAB
BACTERIA SPEC CULT: ABNORMAL
CC UR VC: ABNORMAL
SERVICE CMNT-IMP: ABNORMAL

## 2017-04-19 ENCOUNTER — OFFICE VISIT (OUTPATIENT)
Dept: ENDOCRINOLOGY | Age: 37
End: 2017-04-19

## 2017-04-19 ENCOUNTER — TELEPHONE (OUTPATIENT)
Dept: ENDOCRINOLOGY | Age: 37
End: 2017-04-19

## 2017-04-19 VITALS
DIASTOLIC BLOOD PRESSURE: 87 MMHG | BODY MASS INDEX: 35.16 KG/M2 | WEIGHT: 186.2 LBS | HEART RATE: 115 BPM | HEIGHT: 61 IN | SYSTOLIC BLOOD PRESSURE: 133 MMHG

## 2017-04-19 DIAGNOSIS — E66.9 OBESITY, CLASS I, BMI 30-34.9: ICD-10-CM

## 2017-04-19 DIAGNOSIS — E78.00 HYPERCHOLESTEROLEMIA: ICD-10-CM

## 2017-04-19 DIAGNOSIS — E04.9 GOITER: ICD-10-CM

## 2017-04-19 DIAGNOSIS — E55.9 VITAMIN D DEFICIENCY: ICD-10-CM

## 2017-04-19 RX ORDER — LEVOTHYROXINE SODIUM 88 UG/1
TABLET ORAL
Qty: 90 TAB | Refills: 3 | Status: SHIPPED | OUTPATIENT
Start: 2017-04-19 | End: 2018-06-15 | Stop reason: SDUPTHER

## 2017-04-19 NOTE — MR AVS SNAPSHOT
Visit Information Date & Time Provider Department Dept. Phone Encounter #  
 4/19/2017 11:30 AM Katie Cuellar, [facility] [phone] Follow-up Instructions Return in about 5 months (around 9/19/2017). Upcoming Health Maintenance Date Due  
 PAP AKA CERVICAL CYTOLOGY 3/3/2017 FOOT EXAM Q1 7/15/2017 EYE EXAM RETINAL OR DILATED Q1 8/29/2017 HEMOGLOBIN A1C Q6M 10/13/2017 MICROALBUMIN Q1 4/13/2018 LIPID PANEL Q1 4/13/2018 DTaP/Tdap/Td series (2 - Td) 9/4/2023 Allergies as of 4/19/2017  Review Complete On: 4/19/2017 By: Katie Cuellar MD  
  
 Severity Noted Reaction Type Reactions Naprosyn [Naproxen]  03/25/2010   Intolerance Nausea Only Topamax [Topiramate]  03/15/2017    Other (comments) Kidney stone Current Immunizations  Reviewed on 11/20/2013 Name Date DTaP 9/4/2013 Hepatitis B Vaccine 2/16/2010, 9/25/2009, 8/25/2009 Influenza Vaccine 10/16/2015, 10/23/2014, 10/9/2013 Influenza Vaccine (Quad) PF 10/26/2016 Influenza Vaccine Split 10/5/2012, 9/21/2011, 9/23/2010 Pneumococcal Vaccine (Unspecified Type) 9/18/2013, 3/25/2007 TB Skin Test (PPD) Intradermal 8/29/2013 TD Vaccine 3/25/2007 Not reviewed this visit You Were Diagnosed With   
  
 Codes Comments Uncontrolled type 2 diabetes mellitus without complication, without long-term current use of insulin ([facility] [address]    -  Primary ICD-10-CM: E11.65 ICD-9-CM: 250.02 Goiter     ICD-10-CM: E04.9 ICD-9-CM: 240.9 Obesity, Class I, BMI 30-34.9     ICD-10-CM: E66.9 ICD-9-CM: 278.00 Vitamin D deficiency     ICD-10-CM: E55.9 ICD-9-CM: 268.9 Hypercholesterolemia     ICD-10-CM: E78.00 ICD-9-CM: 272.0 Vitals BP Pulse Height(growth percentile) Weight(growth percentile) BMI OB Status  133/87 (BP 1 Location: Right arm, BP Patient Position: Sitting) (!) 115 5' 1\" (1.549 m) 186 lb 3.2 oz (84.5 kg) 35.18 kg/m2 IUD Smoking Status Never Smoker Vitals History BMI and BSA Data Body Mass Index Body Surface Area  
 35.18 kg/m 2 1.91 m 2 Preferred Pharmacy Pharmacy Name Phone [facility] [phone] Your Updated Medication List  
  
   
This list is accurate as of: 4/19/17  1:08 PM.  Always use your most recent med list.  
  
  
  
  
 cholecalciferol (vitamin D3) 2,000 unit Tab Take  by mouth daily. DEXILANT 60 mg Cpdb Generic drug:  Dexlansoprazole TAKE ONE CAPSULE BY MOUTH ONCE DAILY FOR  REFLUX  
  
 ibuprofen 800 mg tablet Commonly known as:  MOTRIN Take 1 Tab by mouth every six (6) hours as needed for Pain.  
  
 levothyroxine 88 mcg tablet Commonly known as:  SYNTHROID Take 1 tablet daily Liraglutide 0.6 mg/0.1 mL (18 mg/3 mL) sub-q pen Commonly known as:  [employer] Inject 1.8 mg daily  
  
 metaxalone 800 mg tablet Commonly known as:  SKELAXIN Take 1 Tab by mouth every eight (8) hours as needed (muscle spasms in back). metFORMIN 1,000 mg tablet Commonly known as:  GLUCOPHAGE  
TAKE ONE TABLET BY MOUTH TWICE DAILY WITH MEALS  
  
 MIRENA 20 mcg/24 hr (5 years) IUD Generic drug:  levonorgestrel 1 Each by IntraUTERine route once. NOVOFINE PLUS 32 gauge x 1/6\" Ndle Generic drug:  pen needle, diabetic 1 Each by SubCUTAneous route daily. With victoza ONETOUCH ULTRA TEST strip Generic drug:  glucose blood VI test strips Test 3 times daily due to fluctuating sugars. Dx: 250.00 ONETOUCH ULTRAMINI monitoring kit Generic drug:  Blood-Glucose Meter Use as directed PROBIOTIC PO Take  by mouth. VITAMIN B-12 PO Take  by mouth. Prescriptions Sent to Pharmacy Refills  
 levothyroxine (SYNTHROID) 88 mcg tablet 3 Sig: Take 1 tablet daily  Class: Normal  
 Pharmacy: [address], [address] [place] [address] #: [phone] We Performed the Following HEMOGLOBIN A1C WITH EAG [36899 CPT(R)] LIPID PANEL [31874 CPT(R)] METABOLIC PANEL, COMPREHENSIVE [06350 CPT(R)] T4, FREE [id number] CPT(R)] TSH 3RD GENERATION [08197 CPT(R)] VITAMIN D, 25 HYDROXY [id number] CPT(R)] Follow-up Instructions Return in about 5 months (around 9/19/2017). Patient Instructions 1) Stop the phentermine. 2) Increase the victoza back up to 1.8 mg at night. 3) I think it's fine to restart the advocare and the protein shakes as I truly think the topamax was more likely the reason for the kidney stone. 4) We'll increase your levothyroxine to 88 mcg daily to get your TSH back down closer to 1 or less. I'll send this to mail order. Feel free to use up the 75 mcg tabs by taking 1 tab on Monday-Saturday and 2 tabs on Sunday. Introducing [facility]! Dear David Hall: Thank you for requesting a [employer] account. Our records indicate that you already have an active [employer] account. You can access your account anytime at [email or web address]. [email or web address] Did you know that you can access your hospital and ER discharge instructions at any time in [employer]? You can also review all of your test results from your hospital stay or ER visit. Additional Information If you have questions, please visit the Frequently Asked Questions section of the [employer] website at [email or web address] Remember, [employer] is NOT to be used for urgent needs. For medical emergencies, dial 911. Now available from your iPhone and Android! Please provide this summary of care documentation to your next provider. Your primary care clinician is listed as VIDHI ORELLANA. If you have any questions after today's visit, please call [phone].

## 2017-04-19 NOTE — TELEPHONE ENCOUNTER
4/19/2017  10:25 AM      I spoke with MsChasity David Pak and she stated that she will arrive around 12:15ish.         Thanks

## 2017-04-19 NOTE — PATIENT INSTRUCTIONS
1) Stop the phentermine. 2) Increase the victoza back up to 1.8 mg at night. 3) I think it's fine to restart the advocare and the protein shakes as I truly think the topamax was more likely the reason for the kidney stone. 4) We'll increase your levothyroxine to 88 mcg daily to get your TSH back down closer to 1 or less. I'll send this to mail order. Feel free to use up the 75 mcg tabs by taking 1 tab on Monday-Saturday and 2 tabs on Sunday.

## 2017-04-19 NOTE — PROGRESS NOTES
Chief Complaint   Patient presents with    Diabetes     pcp and pharmacy confirmed     History of Present Illness: Segundo De La Paz is a 29 y.o. female here for follow up of diabetes. Weight up 6 lbs since last visit in 12/16. We had restarted topamax but then she developed a kidney stone in 3/17 and needed lithoripsy and I told her that she should stop this and she did. She states the urologist thought that maybe the stone was from using advocare spark powder and a protein shake that she has previously used a year ago to help with weight loss but had only been on these again for about 7-10 days before she developed the stone and was told it was possible she had the stone in the past and then finally started to pass. Has remained on the phentermine and victoza 1.2 mg daily. Currently her GERD is doing better and has been watching her coffee intake and other foods that may be trigger her symptoms and has continued the dexilant and the GERD hasn't been as much an issue. Has has more constipation since the lithotripsy. Has started gummy probiotics to help and is having BMs every 2 days when she was going everyday. Still taking metformin 1g bid. Has been prescribed a topical steroid for her scalp alopecia and is using this daily. Checking sugars in the morning and are up to 115 at the highest but after meals can be 160-185 range. When she ate pizza it did go to 250. Still taking levothyroxine 75 mcg daily and hasn't missed any doses. Having some fatigue. Current Outpatient Prescriptions   Medication Sig    LACTOBACILLUS ACIDOPHILUS (PROBIOTIC PO) Take  by mouth.  CYANOCOBALAMIN, VITAMIN B-12, (VITAMIN B-12 PO) Take  by mouth.  ibuprofen (MOTRIN) 800 mg tablet Take 1 Tab by mouth every six (6) hours as needed for Pain.     phentermine 30 mg capsule TAKE ONE CAPSULE BY MOUTH ONCE DAILY IN THE MORNING    DEXILANT 60 mg CpDB TAKE ONE CAPSULE BY MOUTH ONCE DAILY FOR  REFLUX    metaxalone (SKELAXIN) 800 mg tablet Take 1 Tab by mouth every eight (8) hours as needed (muscle spasms in back).  Liraglutide (VICTOZA) 0.6 mg/0.1 mL (18 mg/3 mL) sub-q pen Inject 1.8 mg daily    NOVOFINE PLUS 32 gauge x 1/6\" ndle 1 Each by SubCUTAneous route daily. With victoza    levothyroxine (SYNTHROID) 75 mcg tablet Take 1 tablet daily    metFORMIN (GLUCOPHAGE) 1,000 mg tablet TAKE ONE TABLET BY MOUTH TWICE DAILY WITH MEALS    cholecalciferol, vitamin D3, 2,000 unit tab Take  by mouth daily.  levonorgestrel (MIRENA) 20 mcg/24 hr (5 years) IUD 1 Each by IntraUTERine route once.  ONE TOUCH ULTRA TEST strip Test 3 times daily due to fluctuating sugars. Dx: 250.00    ONE TOUCH ULTRAMINI monitoring kit Use as directed     No current facility-administered medications for this visit. Allergies   Allergen Reactions    Naprosyn [Naproxen] Nausea Only    Topamax [Topiramate] Other (comments)     Kidney stone     Review of Systems:  - Eyes: no blurry vision or double vision  - Cardiovascular: no chest pain  - Respiratory: no shortness of breath  - Musculoskeletal: no myalgias  - Neurological: no numbness/tingling in extremities    Physical Examination:  Blood pressure 133/87, pulse (!) 115, height 5' 1\" (1.549 m), weight 186 lb 3.2 oz (84.5 kg).   - General: pleasant, no distress, good eye contact   - Neck: no carotid bruits  - Cardiovascular: regular, (+) tachycardic, nl s1 and s2, no m/r/g,   - Respiratory: clear bilaterally  - Integumentary: no edema,   - Psychiatric: normal mood and affect    Data Reviewed:   Component      Latest Ref Rng & Units 4/13/2017 4/13/2017 4/13/2017 4/13/2017           7:58 AM  7:58 AM  7:58 AM  7:58 AM   Glucose      65 - 99 mg/dL    111 (H)   BUN      6 - 20 mg/dL    10   Creatinine      0.57 - 1.00 mg/dL    0.66   GFR est non-AA      >59 mL/min/1.73    115   GFR est AA      >59 mL/min/1.73    132   BUN/Creatinine ratio      9 - 23    15   Sodium      134 - 144 mmol/L    138   Potassium 3.5 - 5.2 mmol/L    4.1   Chloride      96 - 106 mmol/L    96   CO2      18 - 29 mmol/L    24   Calcium      8.7 - 10.2 mg/dL    9.3   Protein, total      6.0 - 8.5 g/dL    7.6   Albumin      3.5 - 5.5 g/dL    4.6   GLOBULIN, TOTAL      1.5 - 4.5 g/dL    3.0   A-G Ratio      1.2 - 2.2    1.5   Bilirubin, total      0.0 - 1.2 mg/dL    0.3   Alk. phosphatase      39 - 117 IU/L    83   AST      0 - 40 IU/L    12   ALT      0 - 32 IU/L    17   Creatinine, urine      Not Estab. mg/dL 92.4      Microalbumin, urine      Not Estab. ug/mL 15.4      Microalbumin/Creat. Ratio      0.0 - 30.0 mg/g creat 16.7      T4, Free      0.82 - 1.77 ng/dL   1.59    TSH      0.450 - 4.500 uIU/mL  2.110       Component      Latest Ref Rng & Units 4/13/2017 4/13/2017 4/13/2017           7:58 AM  7:58 AM  7:58 AM   Cholesterol, total      100 - 199 mg/dL 191     Triglyceride      0 - 149 mg/dL 88     HDL Cholesterol      >39 mg/dL 59     VLDL, calculated      5 - 40 mg/dL 18     LDL, calculated      0 - 99 mg/dL 114 (H)     Hemoglobin A1c, (calculated)      4.8 - 5.6 %   7.9 (H)   Estimated average glucose      mg/dL   180   VITAMIN D, 25-HYDROXY      30.0 - 100.0 ng/mL  35.1        Assessment/Plan:     1. Diabetes mellitus type II: her most recent Hgb A1c was 7.9% in 4/17 down from 8.1% in 12/16 up from 7% in 7/16 down from 7.7% in 3/16 down from 7.8% in 11/15 down from 8.3% in 7/15 down from 8.8% in 3/15 up from 7.6% in 11/14 up from 7.3% in 7/14 down from 8% in 3/14 up from 6.1% in 11/13 down from 6.7% in 9/13 up from 6.3% in 8/13 up slightly from 6.1% in 7/13 down from 6.3% in 5/13 down from 6.7% in 1/13 up from 6.5% in Oct 2012. A1c is still up so will max out the victoza.   - increase victoza to 1.8 mg daily  - cont metformin 1g bid  - check bs up to 3 times daily due to fluctuating sugars  - foot exam done 7/16  - microalbumin nl 10/12--was taken off lisinopril 10 mg daily due to trying to conceive and repeat normal in 3/14 and 3/15 and 3/16  - optho UTD 8/16  - check Hgb A1c and cmp prior to next visit     2. Goiter: Had a TSH of 2.68 in May 2012. Level was 2.4 in 1/13 and I started her on a a low dose of levothyroxine 25 mcg daily to keep her TSH < 2.5 to improve her chance of conception and interestingly she became pregnant shortly after starting this. TSH 2.1 in 5/13 and 1.6 in 7/13 and 1.39 in 9/13. Stopped levothyroxine after delivery in 10/13 and TSH up to 5.5 in 3/14 so restarted low dose of 25 mcg daily to keep her TSH < 2.5 as she has hypothyroid symptoms and high cholesterol. However only 2.2 in 7/14 so increased to 50 mcg daily and TSH 2.4 in 11/14 but had missed a dose on the weekends on occasion so increased to 62.5 mcg daily and TSH 1.87 in 3/15 and 1.46 in 11/15 and 1.04 in 3/16. Up to 2.06 in 7/16 so increased to 75 mcg daily and down to 1.5 in 12/16. Up to 2.11 in 4/17 so will increase her dose to 88 mcg daily  - increase levothyroxine to 88 mcg daily  - check TSH and free T4 prior to next visit      3. Hypercholesterolemia: Given DM, Goal LDL < 100, non-HDL < 130, and TG < 150.  in 10/12 off simva 10 due to trying to conceive, down to 109 in 1/13 and 92 in 5/13. Up to 127 in 11/13 and down to 111 in 3/14. Up to 124 in 7/14. Down to 114 in 11/14. Up to 140 in 3/15 possibly due to [address] as with stopping this it was down to 92 in 7/15. Up to 114 in 11/15 with diet. Was started on lipitor in 1/16 but had more nausea with this so stopped this and with 17 lb wt loss, LDL down to 84 in 3/16 and still 98 in 7/16. Up to 114 in 4/17 but will hold on any other meds for now. - diet control  - check lipids prior to next visit      4. Obesity: I started topamax in 11/15 and Dr. Arianna Bull added phentermine in 1/16 and changed her trulicity to [address] and her weight had come down 26 lbs by 7/16. Had more nausea and GERD and ended up stopping all her meds in 10/16 and weight up 7 lbs by 12/16.   Restarted topamax for 2 weeks and then added back victoza at a lower dose of 1.2 mg daily. Eventually added back phentermine in 3/17. Then developed a kidney stone in 3/17 and stopped the topamax and weight up 6 lbs by 4/17. Will stay off the topamax due to the kidney stone and stop the phentermine as I don't think this is helping her weight. I told her it's fine to restart the advocare spark and protein shakes as these are not as likely to cause the kidney stone as the topamax was. Will max out the victoza as above. - stop phentermine   - stay off topamax   - increase victoza as above    5. Vitamin D deficiency: level was 24 in 12/15 and is currently taking 2000 units of D3 and level up to 32 in 7/16 and 35 in 4/17  - cont vitamin D 2000 units daily  - check Vitamin D 25-OH level prior to next visit       Patient Instructions   1) Stop the phentermine. 2) Increase the victoza back up to 1.8 mg at night. 3) I think it's fine to restart the advocare and the protein shakes as I truly think the topamax was more likely the reason for the kidney stone. 4) We'll increase your levothyroxine to 88 mcg daily to get your TSH back down closer to 1 or less. I'll send this to mail order. Feel free to use up the 75 mcg tabs by taking 1 tab on Monday-Saturday and 2 tabs on Sunday. Follow-up Disposition:  Return in about 5 months (around 9/19/2017).     Copy sent to:  Dr. Ida Swain via [facility] care  Dr. Abner Mcpherson

## 2017-08-06 ENCOUNTER — HOSPITAL ENCOUNTER (EMERGENCY)
Age: 37
Discharge: HOME | End: 2017-08-07
Attending: STUDENT IN AN ORGANIZED HEALTH CARE EDUCATION/TRAINING PROGRAM
Payer: COMMERCIAL

## 2017-08-06 ENCOUNTER — APPOINTMENT (OUTPATIENT)
Dept: CT IMAGING | Age: 37
End: 2017-08-06
Attending: EMERGENCY MEDICINE
Payer: COMMERCIAL

## 2017-08-06 DIAGNOSIS — R19.7 DIARRHEA OF PRESUMED INFECTIOUS ORIGIN: Primary | ICD-10-CM

## 2017-08-06 LAB
ALBUMIN SERPL BCP-MCNC: 3.5 G/DL (ref 3.5–5)
ALBUMIN/GLOB SERPL: 0.8 {RATIO} (ref 1.1–2.2)
ALP SERPL-CCNC: 84 U/L (ref 45–117)
ALT SERPL-CCNC: 22 U/L (ref 12–78)
ANION GAP BLD CALC-SCNC: 10 MMOL/L (ref 5–15)
APPEARANCE UR: ABNORMAL
AST SERPL W P-5'-P-CCNC: 11 U/L (ref 15–37)
BACTERIA URNS QL MICRO: NEGATIVE /HPF
BASOPHILS # BLD AUTO: 0 K/UL (ref 0–0.1)
BASOPHILS # BLD: 0 % (ref 0–1)
BILIRUB SERPL-MCNC: 0.4 MG/DL (ref 0.2–1)
BILIRUB UR QL CFM: POSITIVE
BUN SERPL-MCNC: 7 MG/DL (ref 6–20)
BUN/CREAT SERPL: 8 (ref 12–20)
CALCIUM SERPL-MCNC: 8.4 MG/DL (ref 8.5–10.1)
CHLORIDE SERPL-SCNC: 105 MMOL/L (ref 97–108)
CO2 SERPL-SCNC: 22 MMOL/L (ref 21–32)
COLOR UR: ABNORMAL
CREAT SERPL-MCNC: 0.87 MG/DL (ref 0.55–1.02)
EOSINOPHIL # BLD: 0 K/UL (ref 0–0.4)
EOSINOPHIL NFR BLD: 0 % (ref 0–7)
EPITH CASTS URNS QL MICRO: ABNORMAL /LPF
ERYTHROCYTE [DISTWIDTH] IN BLOOD BY AUTOMATED COUNT: 13 % (ref 11.5–14.5)
GLOBULIN SER CALC-MCNC: 4.5 G/DL (ref 2–4)
GLUCOSE SERPL-MCNC: 198 MG/DL (ref 65–100)
GLUCOSE UR STRIP.AUTO-MCNC: NEGATIVE MG/DL
HCG UR QL: NEGATIVE
HCT VFR BLD AUTO: 38.3 % (ref 35–47)
HGB BLD-MCNC: 13 G/DL (ref 11.5–16)
HGB UR QL STRIP: ABNORMAL
HYALINE CASTS URNS QL MICRO: ABNORMAL /LPF (ref 0–5)
KETONES UR QL STRIP.AUTO: ABNORMAL MG/DL
LACTATE SERPL-SCNC: 0.9 MMOL/L (ref 0.4–2)
LEUKOCYTE ESTERASE UR QL STRIP.AUTO: NEGATIVE
LIPASE SERPL-CCNC: 110 U/L (ref 73–393)
LYMPHOCYTES # BLD AUTO: 8 % (ref 12–49)
LYMPHOCYTES # BLD: 1 K/UL (ref 0.8–3.5)
MCH RBC QN AUTO: 30.1 PG (ref 26–34)
MCHC RBC AUTO-ENTMCNC: 33.9 G/DL (ref 30–36.5)
MCV RBC AUTO: 88.7 FL (ref 80–99)
MONOCYTES # BLD: 0.9 K/UL (ref 0–1)
MONOCYTES NFR BLD AUTO: 7 % (ref 5–13)
MUCOUS THREADS URNS QL MICRO: ABNORMAL /LPF
NEUTS SEG # BLD: 11 K/UL (ref 1.8–8)
NEUTS SEG NFR BLD AUTO: 85 % (ref 32–75)
NITRITE UR QL STRIP.AUTO: NEGATIVE
PH UR STRIP: 6 [PH] (ref 5–8)
PLATELET # BLD AUTO: 227 K/UL (ref 150–400)
POTASSIUM SERPL-SCNC: 3.2 MMOL/L (ref 3.5–5.1)
PROT SERPL-MCNC: 8 G/DL (ref 6.4–8.2)
PROT UR STRIP-MCNC: 100 MG/DL
RBC # BLD AUTO: 4.32 M/UL (ref 3.8–5.2)
RBC #/AREA URNS HPF: ABNORMAL /HPF (ref 0–5)
SODIUM SERPL-SCNC: 137 MMOL/L (ref 136–145)
SP GR UR REFRACTOMETRY: 1.03 (ref 1–1.03)
UR CULT HOLD, URHOLD: NORMAL
UROBILINOGEN UR QL STRIP.AUTO: 0.2 EU/DL (ref 0.2–1)
WBC # BLD AUTO: 12.9 K/UL (ref 3.6–11)
WBC URNS QL MICRO: ABNORMAL /HPF (ref 0–4)

## 2017-08-06 PROCEDURE — 83605 ASSAY OF LACTIC ACID: CPT | Performed by: STUDENT IN AN ORGANIZED HEALTH CARE EDUCATION/TRAINING PROGRAM

## 2017-08-06 PROCEDURE — 87045 FECES CULTURE AEROBIC BACT: CPT | Performed by: EMERGENCY MEDICINE

## 2017-08-06 PROCEDURE — 74011000258 HC RX REV CODE- 258: Performed by: STUDENT IN AN ORGANIZED HEALTH CARE EDUCATION/TRAINING PROGRAM

## 2017-08-06 PROCEDURE — 81025 URINE PREGNANCY TEST: CPT

## 2017-08-06 PROCEDURE — 83690 ASSAY OF LIPASE: CPT | Performed by: EMERGENCY MEDICINE

## 2017-08-06 PROCEDURE — 74011250637 HC RX REV CODE- 250/637: Performed by: STUDENT IN AN ORGANIZED HEALTH CARE EDUCATION/TRAINING PROGRAM

## 2017-08-06 PROCEDURE — 96375 TX/PRO/DX INJ NEW DRUG ADDON: CPT

## 2017-08-06 PROCEDURE — 87493 C DIFF AMPLIFIED PROBE: CPT | Performed by: EMERGENCY MEDICINE

## 2017-08-06 PROCEDURE — 81001 URINALYSIS AUTO W/SCOPE: CPT | Performed by: EMERGENCY MEDICINE

## 2017-08-06 PROCEDURE — 87186 SC STD MICRODIL/AGAR DIL: CPT | Performed by: EMERGENCY MEDICINE

## 2017-08-06 PROCEDURE — 87077 CULTURE AEROBIC IDENTIFY: CPT | Performed by: EMERGENCY MEDICINE

## 2017-08-06 PROCEDURE — 87040 BLOOD CULTURE FOR BACTERIA: CPT | Performed by: STUDENT IN AN ORGANIZED HEALTH CARE EDUCATION/TRAINING PROGRAM

## 2017-08-06 PROCEDURE — 74011636320 HC RX REV CODE- 636/320: Performed by: STUDENT IN AN ORGANIZED HEALTH CARE EDUCATION/TRAINING PROGRAM

## 2017-08-06 PROCEDURE — 80053 COMPREHEN METABOLIC PANEL: CPT | Performed by: EMERGENCY MEDICINE

## 2017-08-06 PROCEDURE — 74011250637 HC RX REV CODE- 250/637: Performed by: EMERGENCY MEDICINE

## 2017-08-06 PROCEDURE — 85025 COMPLETE CBC W/AUTO DIFF WBC: CPT | Performed by: EMERGENCY MEDICINE

## 2017-08-06 PROCEDURE — 96361 HYDRATE IV INFUSION ADD-ON: CPT

## 2017-08-06 PROCEDURE — 74011250636 HC RX REV CODE- 250/636: Performed by: EMERGENCY MEDICINE

## 2017-08-06 PROCEDURE — 36415 COLL VENOUS BLD VENIPUNCTURE: CPT | Performed by: STUDENT IN AN ORGANIZED HEALTH CARE EDUCATION/TRAINING PROGRAM

## 2017-08-06 PROCEDURE — 96376 TX/PRO/DX INJ SAME DRUG ADON: CPT

## 2017-08-06 PROCEDURE — 96374 THER/PROPH/DIAG INJ IV PUSH: CPT

## 2017-08-06 PROCEDURE — 93005 ELECTROCARDIOGRAM TRACING: CPT

## 2017-08-06 PROCEDURE — 99285 EMERGENCY DEPT VISIT HI MDM: CPT

## 2017-08-06 PROCEDURE — 74177 CT ABD & PELVIS W/CONTRAST: CPT

## 2017-08-06 RX ORDER — ACETAMINOPHEN 500 MG
1000 TABLET ORAL
Status: COMPLETED | OUTPATIENT
Start: 2017-08-06 | End: 2017-08-06

## 2017-08-06 RX ORDER — CIPROFLOXACIN 500 MG/1
500 TABLET ORAL
Status: COMPLETED | OUTPATIENT
Start: 2017-08-06 | End: 2017-08-06

## 2017-08-06 RX ORDER — KETOROLAC TROMETHAMINE 30 MG/ML
30 INJECTION, SOLUTION INTRAMUSCULAR; INTRAVENOUS
Status: COMPLETED | OUTPATIENT
Start: 2017-08-06 | End: 2017-08-06

## 2017-08-06 RX ORDER — ONDANSETRON 2 MG/ML
4 INJECTION INTRAMUSCULAR; INTRAVENOUS ONCE
Status: COMPLETED | OUTPATIENT
Start: 2017-08-06 | End: 2017-08-06

## 2017-08-06 RX ORDER — SODIUM CHLORIDE 0.9 % (FLUSH) 0.9 %
10 SYRINGE (ML) INJECTION
Status: COMPLETED | OUTPATIENT
Start: 2017-08-06 | End: 2017-08-06

## 2017-08-06 RX ADMIN — ACETAMINOPHEN 1000 MG: 500 TABLET, FILM COATED ORAL at 20:37

## 2017-08-06 RX ADMIN — Medication 10 ML: at 21:53

## 2017-08-06 RX ADMIN — ONDANSETRON 4 MG: 2 INJECTION INTRAMUSCULAR; INTRAVENOUS at 23:27

## 2017-08-06 RX ADMIN — SODIUM CHLORIDE 100 ML: 900 INJECTION, SOLUTION INTRAVENOUS at 21:53

## 2017-08-06 RX ADMIN — KETOROLAC TROMETHAMINE 30 MG: 30 INJECTION, SOLUTION INTRAMUSCULAR at 21:13

## 2017-08-06 RX ADMIN — CIPROFLOXACIN HYDROCHLORIDE 500 MG: 500 TABLET, FILM COATED ORAL at 23:44

## 2017-08-06 RX ADMIN — IOPAMIDOL 100 ML: 755 INJECTION, SOLUTION INTRAVENOUS at 21:53

## 2017-08-06 RX ADMIN — ONDANSETRON 4 MG: 2 INJECTION INTRAMUSCULAR; INTRAVENOUS at 21:11

## 2017-08-07 VITALS
TEMPERATURE: 98.9 F | DIASTOLIC BLOOD PRESSURE: 75 MMHG | RESPIRATION RATE: 20 BRPM | OXYGEN SATURATION: 99 % | HEART RATE: 124 BPM | SYSTOLIC BLOOD PRESSURE: 113 MMHG

## 2017-08-07 LAB
ATRIAL RATE: 153 BPM
C DIFF TOX GENS STL QL NAA+PROBE: NEGATIVE
CALCULATED P AXIS, ECG09: 56 DEGREES
CALCULATED R AXIS, ECG10: -9 DEGREES
CALCULATED T AXIS, ECG11: 41 DEGREES
DIAGNOSIS, 93000: NORMAL
P-R INTERVAL, ECG05: 118 MS
Q-T INTERVAL, ECG07: 246 MS
QRS DURATION, ECG06: 68 MS
QTC CALCULATION (BEZET), ECG08: 392 MS
VENTRICULAR RATE, ECG03: 153 BPM

## 2017-08-07 PROCEDURE — 74011250636 HC RX REV CODE- 250/636: Performed by: EMERGENCY MEDICINE

## 2017-08-07 PROCEDURE — 74011250637 HC RX REV CODE- 250/637: Performed by: EMERGENCY MEDICINE

## 2017-08-07 RX ORDER — CIPROFLOXACIN 500 MG/1
500 TABLET ORAL 2 TIMES DAILY
Qty: 14 TAB | Refills: 0 | Status: SHIPPED | OUTPATIENT
Start: 2017-08-07 | End: 2017-08-14

## 2017-08-07 RX ORDER — LOPERAMIDE HYDROCHLORIDE 2 MG/1
4 CAPSULE ORAL
Status: COMPLETED | OUTPATIENT
Start: 2017-08-07 | End: 2017-08-07

## 2017-08-07 RX ADMIN — SODIUM CHLORIDE 1000 ML: 900 INJECTION, SOLUTION INTRAVENOUS at 00:42

## 2017-08-07 RX ADMIN — LOPERAMIDE HYDROCHLORIDE 4 MG: 2 CAPSULE ORAL at 00:51

## 2017-08-07 NOTE — ED PROVIDER NOTES
HPI Comments: 78-year-old female presents to the emergency room for evaluation of abdominal pain, nausea, diarrhea, fever. Patient began with symptoms yesterday. Patient returned from [facility] on Friday. Patient describes a sharp epigastric pain that does not radiate. Patient has had too numerous to count episodes of diarrhea. Diarrhea is watery describes as yellow and green. This evening the patient had an episode of blood in her stool. Patient reports associated fever of 102. She has no dysuria frequency or urgency. No cough congestion runny nose. Patient does admit to a headache. Denies dizziness and lightheadedness. She has not taken anything for her symptoms. No alleviating factors. Patient has been also was sick. Pain rated 6/10. Social hx  Nonsmoker     Occasional alcohol    The history is provided by the patient.         Past Medical History:   Diagnosis Date    Anxiety and depression 10/9/2014    10/2014; resolved as of 8/24/15 per pt    Chronic low back pain 10/9/2014    Muscular     Diabetes mellitus type II 10/07    Consulted w/ Dr. José Santana    Diabetic eye exam ([facility] [address] 2016    Prabhu Kelly MD, [facility]    Dysphagia 2010    GERD (gastroesophageal reflux disease) 2011    H. pylori infection 2016    Dr Margarita العلي    History of peptic ulcer disease 2016    18 yo    History of sinus tachycardia 10/5/2012    as of 8/24/15 pt states followed by PCP    Hypercholesterolemia 2016    ~2007    Kidney stone 2017     (normal spontaneous vaginal delivery) 1998    Son  7yo    Post partum depression 10/9/2014    10/2013: counseling and prn Valium    Spondylosis of lumbar & thoracic region without myelopathy or radiculopathy 10/26/2016    xrays 2010    Spondylosis, Thoracic & Lumbar 3/5/2015    xrays 2010    Thyromegaly 2010    US negative    Vitamin D deficiency        Past Surgical History:   Procedure Laterality Date    HX CARPAL TUNNEL RELEASE Bilateral     HX  SECTION      HX CHOLECYSTECTOMY  10/09    LAP-Gallstones    HX GYN  2015    IUD placed    HX LITHOTRIPSY  2017    HX ORTHOPAEDIC Right 7/10/14    trigger thumb    HX ORTHOPAEDIC Left 7/10/14    wrist         Family History:   Problem Relation Age of Onset    Diabetes Mother       ESRD 43 yo in     Hypertension Mother     Stroke Mother      TIA    Heart Disease Mother     High Cholesterol Paternal Grandmother     Hypertension Maternal Grandfather          Diabetes Maternal Grandmother     Hypertension Maternal Grandmother     High Cholesterol Maternal Grandmother     Cancer Maternal Grandmother      Breast CA     Heart Disease Maternal Grandmother       age 79    Colon Cancer Maternal Uncle       at 64yo       Social History     Social History    Marital status:      Spouse name: N/A    Number of children: 1    Years of education: N/A     Occupational History    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[employer], Elementary Education    [address] [facility]     Social History Main Topics    Smoking status: Never Smoker    Smokeless tobacco: Never Used    Alcohol use 0.0 oz/week     0 Standard drinks or equivalent per week      Comment: not weekly, every couple of months per pt    Drug use: No    Sexual activity: Yes     Partners: Male     Birth control/ protection: IUD     Other Topics Concern    Caffeine Concern No     seldom any at all    Special Diet No     has attended diabetes classes  and personal dietician in     Exercise Yes     5 x a week: 1 hr of cardio w/  since 3-2016     Social History Narrative    Got  ~ . Has 1 daughter; Long term relationship and has custody of her stepson; her biological son  at age 9yo in 66. 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          ALLERGIES: Naprosyn [naproxen] and Topamax [topiramate]    Review of Systems   Constitutional: Positive for chills and fever. Negative for appetite change. HENT: Negative for congestion, rhinorrhea and sore throat. Respiratory: Negative for cough and shortness of breath. Cardiovascular: Negative for chest pain and palpitations. Gastrointestinal: Positive for abdominal pain, diarrhea and nausea. Negative for blood in stool and vomiting. Genitourinary: Negative for dysuria, flank pain, frequency and urgency. Musculoskeletal: Negative for arthralgias, back pain, myalgias, neck pain and neck stiffness. Skin: Negative for color change, rash and wound. Neurological: Positive for dizziness, light-headedness and headaches. Negative for numbness. All other systems reviewed and are negative. Vitals:    08/06/17 2022   BP: 118/84   Pulse: (!) 152   Resp: 18   Temp: (!) 101.2 °F (38.4 °C)   SpO2: 97%            Physical Exam   Constitutional: She is oriented to person, place, and time. She appears well-developed and well-nourished. No distress. HENT:   Head: Normocephalic and atraumatic. Right Ear: External ear normal.   Left Ear: External ear normal.   Eyes: Conjunctivae and EOM are normal. Pupils are equal, round, and reactive to light. Neck: Normal range of motion. Neck supple. Cardiovascular: Regular rhythm. Tachycardia present. Pulmonary/Chest: Effort normal and breath sounds normal. No respiratory distress. She has no wheezes. Abdominal: Soft. Normal appearance and bowel sounds are normal. She exhibits no shifting dullness, no distension, no pulsatile liver, no abdominal bruit, no pulsatile midline mass and no mass. There is no hepatosplenomegaly, splenomegaly or hepatomegaly. There is tenderness. There is no rigidity, no rebound, no guarding, no CVA tenderness, no tenderness at McBurney's point and negative Del Valle's sign. Abdomen exposed for exam.  Soft, no peritoneal signs. Pt tender epigastrically.    Musculoskeletal: Normal range of motion. She exhibits no edema or tenderness. Neurological: She is alert and oriented to person, place, and time. She has normal reflexes. Coordination normal.   Skin: Skin is warm and dry. No rash noted. No erythema. Psychiatric: She has a normal mood and affect. Her behavior is normal. Judgment and thought content normal.   Nursing note and vitals reviewed. MDM  Number of Diagnoses or Management Options  Diarrhea of presumed infectious origin:   Diagnosis management comments: 28 yo female presents to ER for evaluation of diarrhea with 1 episode of blood, abdominal pain, fever, nausea and headache. Pt tender epigastrically. No peritoneal signs. Pt is febrile in ER. Lungs clear bilaterally. P: iv fluid, pain control, CT, labs, stool culture. 1:57 AM  CT unremarkable. Nausea has improved. Pt continues with diarrhea. Pt tolerating po fluids. With travel, fever and diarrhea. Will treat with cipro as presumed infectious diarrhea pending stool cultures. Patient's results have been reviewed with them. Patient and/or family have verbally conveyed their understanding and agreement of the patient's signs, symptoms, diagnosis, treatment and prognosis and additionally agree to follow up as recommended or return to the Emergency Room should their condition change prior to follow-up. Discharge instructions have also been provided to the patient with some educational information regarding their diagnosis as well a list of reasons why they would want to return to the ER prior to their follow-up appointment should their condition change. Amount and/or Complexity of Data Reviewed  Discuss the patient with other providers: yes (ER attending-Lazaro)    Patient Progress  Patient progress: stable    ED Course       Procedures      Pt has been seen and evaluated by attending physician who has reviewed lab work and imaging tests. He agrees with discharge and prescription plan.

## 2017-08-07 NOTE — DISCHARGE INSTRUCTIONS
We hope that we have addressed all of your medical concerns. The examination and treatment you received in the Emergency Department were for an emergent problem and were not intended as complete care. It is important that you follow up with your healthcare provider(s) for ongoing care. If your symptoms worsen or do not improve as expected, and you are unable to reach your usual health care provider(s), you should return to the Emergency Department. Today's healthcare is undergoing tremendous change, and patient satisfaction surveys are one of the many tools to assess the quality of medical care. You may receive a survey from the [employer] regarding your experience in the Emergency Department. I hope that your experience has been completely positive, particularly the medical care that I provided. As such, please participate in the survey; anything less than excellent does not meet my expectations or intentions. [facility] and 8 [facility] participate in nationally recognized quality of care measures. If your blood pressure is greater than 120/80, as reported below, we urge that you seek medical care to address the potential of high blood pressure, commonly known as hypertension. Hypertension can be hereditary or can be caused by certain medical conditions, pain, stress, or \"white coat syndrome. \"       Please make an appointment with your health care provider(s) for follow up of your Emergency Department visit. VITALS:   Patient Vitals for the past 8 hrs:   Temp Pulse Resp BP SpO2   08/06/17 2330 - (!) 128 17 124/82 99 %   08/06/17 2130 - - - 113/68 -   08/06/17 2100 - (!) 138 20 122/79 98 %   08/06/17 2022 (!) 101.2 °F (38.4 °C) (!) 152 18 118/84 97 %          Thank you for allowing us to provide you with medical care today. We realize that you have many choices for your emergency care needs.   Please choose us in the future for any continued health care needs. [facility], [address]   Office: [phone]            Recent Results (from the past 24 hour(s))   EKG, 12 LEAD, INITIAL    Collection Time: 08/06/17  8:28 PM   Result Value Ref Range    Ventricular Rate 153 BPM    Atrial Rate 153 BPM    P-R Interval 118 ms    QRS Duration 68 ms    Q-T Interval 246 ms    QTC Calculation (Bezet) 392 ms    Calculated P Axis 56 degrees    Calculated R Axis -9 degrees    Calculated T Axis 41 degrees    Diagnosis Sinus tachycardia  No previous ECGs available      LACTIC ACID    Collection Time: 08/06/17  8:34 PM   Result Value Ref Range    Lactic acid 0.9 0.4 - 2.0 MMOL/L   CBC WITH AUTOMATED DIFF    Collection Time: 08/06/17  8:34 PM   Result Value Ref Range    WBC 12.9 (H) 3.6 - 11.0 K/uL    RBC 4.32 3.80 - 5.20 M/uL    HGB 13.0 11.5 - 16.0 g/dL    HCT 38.3 35.0 - 47.0 %    MCV 88.7 80.0 - 99.0 FL    MCH 30.1 26.0 - 34.0 PG    MCHC 33.9 30.0 - 36.5 g/dL    RDW 13.0 11.5 - 14.5 %    PLATELET 146 884 - 807 K/uL    NEUTROPHILS 85 (H) 32 - 75 %    LYMPHOCYTES 8 (L) 12 - 49 %    MONOCYTES 7 5 - 13 %    EOSINOPHILS 0 0 - 7 %    BASOPHILS 0 0 - 1 %    ABS. NEUTROPHILS 11.0 (H) 1.8 - 8.0 K/UL    ABS. LYMPHOCYTES 1.0 0.8 - 3.5 K/UL    ABS. MONOCYTES 0.9 0.0 - 1.0 K/UL    ABS. EOSINOPHILS 0.0 0.0 - 0.4 K/UL    ABS.  BASOPHILS 0.0 0.0 - 0.1 K/UL   METABOLIC PANEL, COMPREHENSIVE    Collection Time: 08/06/17  8:34 PM   Result Value Ref Range    Sodium 137 136 - 145 mmol/L    Potassium 3.2 (L) 3.5 - 5.1 mmol/L    Chloride 105 97 - 108 mmol/L    CO2 22 21 - 32 mmol/L    Anion gap 10 5 - 15 mmol/L    Glucose 198 (H) 65 - 100 mg/dL    BUN 7 6 - 20 MG/DL    Creatinine 0.87 0.55 - 1.02 MG/DL    BUN/Creatinine ratio 8 (L) 12 - 20      GFR est AA >60 >60 ml/min/1.73m2    GFR est non-AA >60 >60 ml/min/1.73m2    Calcium 8.4 (L) 8.5 - 10.1 MG/DL    Bilirubin, total 0.4 0.2 - 1.0 MG/DL    ALT (SGPT) 22 12 - 78 U/L AST (SGOT) 11 (L) 15 - 37 U/L    Alk. phosphatase 84 45 - 117 U/L    Protein, total 8.0 6.4 - 8.2 g/dL    Albumin 3.5 3.5 - 5.0 g/dL    Globulin 4.5 (H) 2.0 - 4.0 g/dL    A-G Ratio 0.8 (L) 1.1 - 2.2     LIPASE    Collection Time: 08/06/17  8:34 PM   Result Value Ref Range    Lipase 110 73 - 393 U/L   HCG URINE, QL. - POC    Collection Time: 08/06/17  9:31 PM   Result Value Ref Range    Pregnancy test,urine (POC) NEGATIVE  NEG     URINALYSIS W/MICROSCOPIC    Collection Time: 08/06/17 10:40 PM   Result Value Ref Range    Color DARK YELLOW      Appearance CLOUDY (A) CLEAR      Specific gravity 1.030 1.003 - 1.030      pH (UA) 6.0 5.0 - 8.0      Protein 100 (A) NEG mg/dL    Glucose NEGATIVE  NEG mg/dL    Ketone TRACE (A) NEG mg/dL    Blood SMALL (A) NEG      Urobilinogen 0.2 0.2 - 1.0 EU/dL    Nitrites NEGATIVE  NEG      Leukocyte Esterase NEGATIVE  NEG      WBC 0-4 0 - 4 /hpf    RBC 0-5 0 - 5 /hpf    Epithelial cells MODERATE (A) FEW /lpf    Bacteria NEGATIVE  NEG /hpf    Mucus 2+ (A) NEG /lpf    Hyaline cast 2-5 0 - 5 /lpf   URINE CULTURE HOLD SAMPLE    Collection Time: 08/06/17 10:40 PM   Result Value Ref Range    Urine culture hold URINE ON HOLD IN MICROBIOLOGY DEPT FOR 3 DAYS     BILIRUBIN, CONFIRM    Collection Time: 08/06/17 10:40 PM   Result Value Ref Range    Bilirubin UA, confirm POSITIVE (A) NEG         Ct Abd Pelv W Cont    Result Date: 8/6/2017  EXAM:  CT ABD PELV W CONT INDICATION: epigastric pain, bloody diarrhea COMPARISON: CT 3/11/2017 CONTRAST:  100 mL of Isovue-370. TECHNIQUE: Following the uneventful intravenous administration of contrast, thin axial images were obtained through the abdomen and pelvis. Coronal and sagittal reconstructions were generated. Oral contrast was not administered. CT dose reduction was achieved through use of a standardized protocol tailored for this examination and automatic exposure control for dose modulation. FINDINGS: LUNG BASES: Clear.  INCIDENTALLY IMAGED HEART AND MEDIASTINUM: Unremarkable. A mass in the right breast has not significantly changed, measuring approximately 2 cm. LIVER: No mass or biliary dilatation. GALLBLADDER: Status post hysterectomy SPLEEN: No mass. PANCREAS: No mass or ductal dilatation. ADRENALS: Unremarkable. KIDNEYS: No mass, calculus, or hydronephrosis. STOMACH: Unremarkable. SMALL BOWEL: No dilatation or wall thickening. COLON: No dilatation or wall thickening. APPENDIX: Unremarkable. PERITONEUM: No ascites or pneumoperitoneum. RETROPERITONEUM: No lymphadenopathy or aortic aneurysm. REPRODUCTIVE ORGANS: Uterus is noted, and contains an intrauterine device. There is trace free fluid in the pelvis. URINARY BLADDER: No mass or calculus. BONES: No destructive bone lesion. ADDITIONAL COMMENTS: There is a chronic compression deformity involving a lower thoracic spine vertebral body. IMPRESSION: No acute abdominal or pelvic pathology. Traveler's Diarrhea: Care Instructions  Your Care Instructions  Traveler's diarrhea is loose, watery bowel movements you can get when you travel. It also can cause vomiting and belly cramps. This kind of diarrhea is usually caused by bacteria. But sometimes it is caused by a parasite or virus. Most people get it when they eat undercooked, raw, or contaminated foods. You can also get it if you drink contaminated water or if you drink something that has contaminated ice cubes in it. In some cases, new foods can cause diarrhea. In other cases, the stress and anxiety of travel can cause it. Traveler's diarrhea usually isn't serious. Most of the time, bowel movements return to normal quickly. The most important thing is to prevent dehydration. Make sure to drink a lot of fluids. Follow-up care is a key part of your treatment and safety. Be sure to make and go to all appointments, and call your doctor if you are having problems.  It's also a good idea to know your test results and keep a list of the medicines you take.  How can you care for yourself at home? · Watch for signs of dehydration. This means your body has lost too much water. Dehydration is serious and needs to be treated right away. Signs of dehydration are:  ¨ Feeling more thirsty than usual.  ¨ Dry eyes and mouth. ¨ Feeling faint or lightheaded. ¨ Darker urine, and a smaller amount of urine than normal.  · To prevent dehydration, drink plenty of fluids, enough so that your urine is light yellow or clear like water. Choose water and other caffeine-free clear liquids until you feel better. If you have kidney, heart, or liver disease and have to limit fluids, talk with your doctor before you increase the amount of fluids you drink. · Start to eat small amounts of mild foods the next day, if you feel like it. ¨ Avoid spicy foods, fruits, alcohol, and caffeine until 48 hours after all symptoms go away. ¨ Avoid chewing gum that has sorbitol. ¨ Try yogurt that has live cultures of Lactobacillus. You can check the label for this. Avoid other dairy products while you have diarrhea and for 3 days after symptoms go away. · Your doctor may recommend an over-the-counter medicine. These may include bismuth subsalicylate (Pepto-Bismol) or loperamide (Imodium). Read and follow all instructions on the label. Do not use these medicines if your doctor does not recommend them. · Be safe with medicines. If your doctor recommends prescription medicine, take it as prescribed. Call your doctor if you think you are having a problem with your medicine. You will get more details on the specific medicines your doctor prescribes. · If your doctor prescribes antibiotics, take them as directed. Do not stop taking them just because you feel better. You need to take the full course of antibiotics. When should you call for help? Call 911 anytime you think you may need emergency care. For example, call if:  · You passed out (lost consciousness).   · Your stools are maroon or very bloody. Call your doctor now or seek immediate medical care if:  · You are dizzy or lightheaded, or you feel like you may faint. · Your stools are black and look like tar, or they have streaks of blood. · You have diarrhea and your belly pain or cramps are worse. · You have signs of needing more fluids. You have sunken eyes, a dry mouth, and pass only a little dark urine. Watch closely for changes in your health, and be sure to contact your doctor if:  · You have 12 or more loose stools in 24 hours. · You see pus in the diarrhea. · You have a new or higher fever. · Your diarrhea does not get better or is more frequent. Where can you learn more? Go to http://elizabeth-korina.info/. Enter L368 in the search box to learn more about \"Traveler's Diarrhea: Care Instructions. \"  Current as of: March 3, 2017  Content Version: 11.3  © 4537-5908 [employer]. Care instructions adapted under license by [employer] (which disclaims liability or warranty for this information). If you have questions about a medical condition or this instruction, always ask your healthcare professional. [address] any warranty or liability for your use of this information.

## 2017-08-07 NOTE — ED TRIAGE NOTES
Arrived via ems from [facility] reporting fever, bloody diarrhea, and nausea that started yesterday. Abdominal pain 6/10 with a twisting sensation. Patient came back from [facility], on Friday.

## 2017-08-09 LAB
BACTERIA SPEC CULT: ABNORMAL
C JEJUNI+C COLI AG STL QL: NEGATIVE
E COLI SXT1+2 STL IA: NEGATIVE
Lab: NORMAL
REFERENCE LAB,REFLB: NORMAL
SERVICE CMNT-IMP: ABNORMAL
TEST DESCRIPTION:,ATST: NORMAL

## 2017-08-09 NOTE — PROGRESS NOTES
Pt treated with cipro. Attempted to reach patient.  Message left for call back concerning culture result

## 2017-08-09 NOTE — PROGRESS NOTES
Pt returned call. Informed patient of culture result. She is feeling better. She has returned to work.

## 2017-08-11 LAB
BACTERIA SPEC CULT: NORMAL
SERVICE CMNT-IMP: NORMAL

## 2017-08-14 ENCOUNTER — TELEPHONE (OUTPATIENT)
Dept: FAMILY MEDICINE CLINIC | Age: 37
End: 2017-08-14

## 2017-08-15 NOTE — TELEPHONE ENCOUNTER
Looks like pt is already scheduled, see below:  Future Appointments:  8/16/2017  12:15 PM   Fortunato Bland MD  [facility]  9/13/2017  11:30 AM   Jeremías Rider MD RDE [facility]

## 2017-08-15 NOTE — TELEPHONE ENCOUNTER
----- Message from Vanda Montero LPN sent at 7/40/4038  3:20 PM EDT -----  Regarding: FW: Prescription Question  Contact: [phone]  Let me know if you want me to work her in tomorrow @ 10:30  ----- Message -----     From: Fely Arreguin     Sent: 8/14/2017   1:30 PM       To: [facility] Nurse Pool  Subject: Prescription Question                            Dr. Amish Marks,     I have a follow up the schedule with you on Wednesday following an ER visit. The doctors are concerned about sinus tachycardia which I have now drawn concern because this past weekend or maybe the last four or five days I have been having really bad heart palpitations. I attempted to call to get an appointment with you today or tomorrow and nothing is available until I'm scheduled on Wednesday. My EKG was normal at the ER so I assume I'll be OK until I can see you Wednesday. If you think I need to get in sooner if someone would please call me back with an appointment before Wednesday. [employer] please let me know if there is anything I can do in the meantime.

## 2017-08-16 ENCOUNTER — OFFICE VISIT (OUTPATIENT)
Dept: FAMILY MEDICINE CLINIC | Age: 37
End: 2017-08-16

## 2017-08-16 VITALS
HEIGHT: 61 IN | BODY MASS INDEX: 36.59 KG/M2 | DIASTOLIC BLOOD PRESSURE: 90 MMHG | RESPIRATION RATE: 18 BRPM | WEIGHT: 193.8 LBS | OXYGEN SATURATION: 97 % | TEMPERATURE: 98.2 F | HEART RATE: 110 BPM | SYSTOLIC BLOOD PRESSURE: 120 MMHG

## 2017-08-16 DIAGNOSIS — F41.0 PANIC ATTACKS: ICD-10-CM

## 2017-08-16 DIAGNOSIS — R00.0 SINUS TACHYCARDIA: ICD-10-CM

## 2017-08-16 DIAGNOSIS — F41.8 DEPRESSION WITH ANXIETY: Primary | ICD-10-CM

## 2017-08-16 DIAGNOSIS — R00.2 HEART PALPITATIONS: ICD-10-CM

## 2017-08-16 RX ORDER — MOMETASONE FUROATE 1 MG/G
CREAM TOPICAL
COMMUNITY
Start: 2017-07-05 | End: 2017-09-13

## 2017-08-16 RX ORDER — FLUOXETINE 10 MG/1
10 TABLET ORAL DAILY
Qty: 30 TAB | Refills: 1 | Status: SHIPPED | OUTPATIENT
Start: 2017-08-16 | End: 2017-11-19 | Stop reason: SDUPTHER

## 2017-08-16 RX ORDER — DICLOFENAC POTASSIUM 25 MG/1
25 CAPSULE, LIQUID FILLED ORAL AS NEEDED
COMMUNITY
End: 2017-11-07 | Stop reason: SDUPTHER

## 2017-08-16 RX ORDER — DILTIAZEM HYDROCHLORIDE 120 MG/1
120 CAPSULE, COATED, EXTENDED RELEASE ORAL DAILY
Qty: 30 CAP | Refills: 1 | Status: SHIPPED | OUTPATIENT
Start: 2017-08-16 | End: 2017-10-03 | Stop reason: SDUPTHER

## 2017-08-16 RX ORDER — FLUCONAZOLE 150 MG/1
TABLET ORAL
COMMUNITY
Start: 2017-07-15 | End: 2017-09-13

## 2017-08-16 RX ORDER — VALACYCLOVIR HYDROCHLORIDE 500 MG/1
TABLET, FILM COATED ORAL
COMMUNITY
Start: 2017-07-15 | End: 2017-09-13

## 2017-08-16 NOTE — PROGRESS NOTES
HISTORY OF PRESENT ILLNESS   HPI  Patient presents to discuss anxiety and panic attacks. She has h/o depression w/ anxiety which has been recurrent the past few years. She was treated for mild post partum depression and anxiety after her daughter was born back in ~ . She went through counseling during that time and thinks she was treated briefly w/ Zoloft and prn Valium  She thinks the Zoloft made her sleepy, so she stopped it after a short time. Then back in 2014 she was treated w/ Lexapro which she took for about 2-3 months. But that started making her feel really groggy and tired as well and caused her wt to go up significantly. So she weaned off that and hasnt really been on anything regularly since then. She had been trying to just maintain on her own up until things started getting stressful for her again and her feelings of sadness, being overwhelmed and depression/anxiety started to re-surface. She states she has been having some increased stress and anxiety since ~ May. She started seeing a counselor in June and has been going ~ q 2 weeks. She often times feels stressed at work. When her  talks to her about why she is feeling the way she is feeling, she states she cant quite explain it. Her  seems to be very supportive and she feels things on the home front are overall going pretty well. She feels her anxiety was then compounded by a recent illness which she feels but her over the edge. She recently traveled back from [facility] on 8/4. The next day she began having generalized abd cramps, nausea, dry heaves, fever 104, and bloody diarrhea. She initially went to [facility]. She was told that her BP was mildly elevated but her heart rate was in the 160's. That made her extremely upset and anxious hearing that. Her EKG was negative otherwise. They called EMS and had her transported to [facility] [place] [facility].  In the ED she states she started having feelings of panic and nervousness in addition to being ill from the GI infection. Her HR's were initially in the 150-160 range. She was given Tylenol, Toradol and IVF's and her heart rate came down to the 130's. Her EKG was stable there as well. She was held there for several hours under observation and receiving IVF's. She was dc'd home prophylactically on Cipro and her final stool cultures came back + for Salmonella. Since treatment, the diarrhea has completely dissipated and her stools have been back to normal x the past 5 days. She physically is feeling much better, but the nervousness and anxiety persist, now even more so than before. She now feels like even the slightest of things make her feel anxious. She has been having panic attacks periodically ever since then. Aside from the panic attack she had in the ED, she can also feel them when she feels stressed/overwhelmed at work. While waiting in the lobby here, she could feel her anxiety rise as she was checking in. She describes the feeling as a tightness/lump in her throat, heart racing, pounding, sweaty, nervous. Her mind often times wont shut down. Racing thoughts and nervousness at night. Has to take Tylenol PM qhs lately which does help some. REVIEW OF SYMPTOMS     Review of Systems   Constitutional: Negative. Respiratory: Negative. Cardiovascular: Negative for chest pain. Gastrointestinal: Negative for vomiting. Doing well on Dexilant   Neurological: Negative. Psychiatric/Behavioral: Positive for depression. Negative for suicidal ideas.  The patient is nervous/anxious and has insomnia.            PROBLEM LIST/MEDICAL HISTORY      Problem List  Date Reviewed: 8/16/2017          Codes Class Noted    Heart palpitations ICD-10-CM: R00.2  ICD-9-CM: 785.1  8/16/2017        Spondylosis of lumbar & thoracic region without myelopathy or radiculopathy ICD-10-CM: M47.816  ICD-9-CM: 721.3  10/26/2016    Overview Signed 10/26/2016  8:50 AM by Erinn Cage MD xrays 2010             Hypercholesterolemia ICD-10-CM: E78.00  ICD-9-CM: 272.0  8/18/2016        History of peptic ulcer disease ICD-10-CM: Z87.11  ICD-9-CM: V12.71  8/18/2016    Overview Signed 8/18/2016 10:16 AM by Rina Rosario MD     16 yo             Vitamin D deficiency ICD-10-CM: E55.9  ICD-9-CM: 268.9  Unknown        Obesity, Class I, BMI 30-34.9 ICD-10-CM: E66.9  ICD-9-CM: 278.00  11/18/2015        Post partum depression ICD-10-CM: F53  ICD-9-CM: 648.44, 949  10/9/2014    Overview Signed 10/9/2014 12:24 PM by Rina Rosario MD     10/2013: counseling and prn Valium             Chronic low back pain & Muscle Spasms ICD-10-CM: M54.5, G89.29  ICD-9-CM: 724.2, 338.29  10/9/2014    Overview Signed 10/9/2014 12:36 PM by Rina Rosario MD     Muscular              Anxiety and depression ICD-10-CM: F41.9, F32.9  ICD-9-CM: 300.00, 311  10/9/2014    Overview Signed 10/9/2014  1:22 PM by Rina Rosario MD     10/2014             Bilateral carpal tunnel syndrome ICD-10-CM: G56.03  ICD-9-CM: 354.0  12/31/2013    Overview Addendum 12/31/2013 10:28 AM by Rina Rosario MD     10/2013; wrist splints               Goiter ICD-10-CM: E04.9  ICD-9-CM: 240.9  1/11/2013        History of sinus tachycardia ICD-10-CM: Z86.79  ICD-9-CM: V12.59  10/5/2012    Overview Signed 10/5/2012  9:13 AM by Rina Rosario MD     Since her early 19's             GERD (gastroesophageal reflux disease) ICD-10-CM: K21.9  ICD-9-CM: 530.81  6/23/2011    Overview Signed 6/23/2011  9:20 AM by Rina Rosario MD     2011             Dysphagia, workup negative except GERD ICD-10-CM: R13.10  ICD-9-CM: 787.20  12/16/2010    Overview Addendum 6/23/2011  9:21 AM by Rina Rosario MD     7/2010: Thyroid US normal  ENT eval;   Barium Swallow =GERD only             Anemia ICD-10-CM: D64.9  ICD-9-CM: 285.9  6/24/2010        Type II diabetes mellitus, uncontrolled ([facility] ICD-10-CM: E11.65  ICD-9-CM: 250.02  Unknown    Overview Signed 10/5/2012  9:18 AM by Katiana Jenkins MD     Eye exams: YINGI  Podiatrist: anabel Velez             Gallstones ICD-10-CM: K80.20  ICD-9-CM: 574.20  Unknown    Overview Signed 3/25/2010 10:31 AM by Katiana Jenkins MD     Lap Cholecystectomy-Dr. Keys [facility] (normal spontaneous vaginal delivery) ICD-10-CM: O80  ICD-9-CM: 196  Unknown    Overview Signed 3/25/2010 10:31 AM by Katiana Jenkins MD     Son  7yo                       PAST SURGICAL HISTORY       Past Surgical History:   Procedure Laterality Date    HX CARPAL TUNNEL RELEASE Bilateral     HX  SECTION      HX CHOLECYSTECTOMY  10/09    LAP-Gallstones    HX GYN  2015    IUD placed    HX LITHOTRIPSY  2017    HX ORTHOPAEDIC Right 7/10/14    trigger thumb    HX ORTHOPAEDIC Left 7/10/14    wrist         MEDICATIONS      Current Outpatient Prescriptions   Medication Sig    fluconazole (DIFLUCAN) 150 mg tablet     mometasone (ELOCON) 0.1 % topical cream     valACYclovir (VALTREX) 500 mg tablet     diclofenac potassium (ZIPSOR) 25 mg capsule Take 25 mg by mouth as needed.  ACETAMINOPHEN/DIPHENHYDRAMINE (TYLENOL PM PO) Take  by mouth.  DEXILANT 60 mg CpDB TAKE ONE CAPSULE BY MOUTH ONCE DAILY FOR  REFLUX    CYANOCOBALAMIN, VITAMIN B-12, (VITAMIN B-12 PO) Take  by mouth.  levothyroxine (SYNTHROID) 88 mcg tablet Take 1 tablet daily    ibuprofen (MOTRIN) 800 mg tablet Take 1 Tab by mouth every six (6) hours as needed for Pain.  metaxalone (SKELAXIN) 800 mg tablet Take 1 Tab by mouth every eight (8) hours as needed (muscle spasms in back).  Liraglutide (VICTOZA) 0.6 mg/0.1 mL (18 mg/3 mL) sub-q pen Inject 1.8 mg daily    NOVOFINE PLUS 32 gauge x \" ndle 1 Each by SubCUTAneous route daily.  With victoza    metFORMIN (GLUCOPHAGE) 1,000 mg tablet TAKE ONE TABLET BY MOUTH TWICE DAILY WITH MEALS    cholecalciferol, vitamin D3, 2,000 unit tab Take  by mouth daily.  levonorgestrel (MIRENA) 20 mcg/24 hr (5 years) IUD 1 Each by IntraUTERine route once.  ONE TOUCH ULTRA TEST strip Test 3 times daily due to fluctuating sugars. Dx: 250.00    ONE TOUCH ULTRAMINI monitoring kit Use as directed     No current facility-administered medications for this visit. 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[employer],[address], Elementary Education    [address] [facility]     Social History Main Topics    Smoking status: Never Smoker    Smokeless tobacco: Never Used    Alcohol use 0.0 oz/week     0 Standard drinks or equivalent per week      Comment: not weekly, every couple of months per pt    Drug use: No    Sexual activity: Yes     Partners: Male     Birth control/ protection: IUD     Other Topics Concern    Caffeine Concern No     seldom any at all    Special Diet No     has attended diabetes classes  and personal dietician in     Exercise No     Social History Narrative    Got  ~ . Has 1 daughter; Long term relationship and has custody of her stepson; her biological son  at age 10yo in 66.   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        IMMUNIZATIONS  Immunization History   Administered Date(s) Administered    DTaP 2013    Hepatitis B Vaccine 2009, 2009, 2010    Influenza Vaccine 10/09/2013, 10/23/2014, 10/16/2015    Influenza Vaccine (Quad) PF 10/26/2016    Influenza Vaccine Split 2010, 2011, 10/05/2012    Pneumococcal Vaccine (Unspecified Type) 2013    TB Skin Test (PPD) Intradermal 2013    TD Vaccine 2007    ZZZ-RETIRED (DO NOT USE) Pneumococcal Vaccine (Unspecified Type) 2007         FAMILY HISTORY     Family History   Problem Relation Age of Onset    Diabetes Mother       ESRD 43 yo in     Hypertension Mother     Stroke Mother      TIA    Heart Disease Mother     High Cholesterol Paternal Grandmother     Hypertension Maternal Grandfather          Diabetes Maternal Grandmother     Hypertension Maternal Grandmother     High Cholesterol Maternal Grandmother     Cancer Maternal Grandmother      Breast CA     Heart Disease Maternal Grandmother       age 79    Colon Cancer Maternal Uncle       at 64yo         VITALS     Visit Vitals    /90 (BP 1 Location: Left arm, BP Patient Position: Sitting)    Pulse (!) 110    Temp 98.2 °F (36.8 °C) (Oral)    Resp 18    Ht 5' 1\" (1.549 m)    Wt 193 lb 12.8 oz (87.9 kg)    SpO2 97%    BMI 36.62 kg/m2          PHYSICAL EXAMINATION     Physical Exam   Constitutional: She is oriented to person, place, and time and well-developed, well-nourished, and in no distress. Cardiovascular: Regular rhythm. Exam reveals no gallop and no friction rub. No murmur heard. Rate 110   Pulmonary/Chest: Effort normal and breath sounds normal.   Abdominal: Soft. There is no tenderness. Neurological: She is alert and oriented to person, place, and time. Psychiatric: Mood, affect and judgment normal.   Vitals reviewed.              ASSESSMENT & PLAN       ICD-10-CM ICD-9-CM    1. Depression with anxiety F41.8 300.4 FLUoxetine (PROZAC) 10 mg tablet   2. Panic attacks F41.0 300.01 FLUoxetine (PROZAC) 10 mg tablet   3. Heart palpitations R00.2 785.1 dilTIAZem CD (CARDIZEM CD) 120 mg ER capsule   4.  Sinus tachycardia R00.0 427.89 dilTIAZem CD (CARDIZEM CD) 120 mg ER capsule     Add trial of low dose Prozac 10 mg daily for now  Diltiazem  mg daily w/ interim BP/heart rate monitoring  Reviewed medications, effects, risks/benefits, indications and side effects in detail  Continue counseling w/ her therapist which she has been doing since ~ June every few weeks.   Follow up w/ me in ~ 6 weeks, sooner prn

## 2017-08-16 NOTE — PROGRESS NOTES
Chief Complaint   Patient presents with   [facility] Follow Up and anxiety     [facility] from 08/06/17 - diarrhea due to Salmonella     1. Have you been to the ER, urgent care clinic since your last visit? Hospitalized since your last visit? Yes, [facility] ER 08/06/17 for diarrhea after a trip to [facility].    2. Have you seen or consulted any other health care providers outside of the [facility] [place] [address] since your last visit? Include any pap smears or colon screening. Yes, see above.

## 2017-08-16 NOTE — PATIENT INSTRUCTIONS
Anxiety Disorder: Care Instructions  Your Care Instructions  Anxiety is a normal reaction to stress. Difficult situations can cause you to have symptoms such as sweaty palms and a nervous feeling. In an anxiety disorder, the symptoms are far more severe. Constant worry, muscle tension, trouble sleeping, nausea and diarrhea, and other symptoms can make normal daily activities difficult or impossible. These symptoms may occur for no reason, and they can affect your work, school, or social life. Medicines, counseling, and self-care can all help. Follow-up care is a key part of your treatment and safety. Be sure to make and go to all appointments, and call your doctor if you are having problems. It's also a good idea to know your test results and keep a list of the medicines you take. How can you care for yourself at home? · Take medicines exactly as directed. Call your doctor if you think you are having a problem with your medicine. · Go to your counseling sessions and follow-up appointments. · Recognize and accept your anxiety. Then, when you are in a situation that makes you anxious, say to yourself, \"This is not an emergency. I feel uncomfortable, but I am not in danger. I can keep going even if I feel anxious. \"  · Be kind to your body:  ¨ Relieve tension with exercise or a massage. ¨ Get enough rest.  ¨ Avoid alcohol, caffeine, nicotine, and illegal drugs. They can increase your anxiety level and cause sleep problems. ¨ Learn and do relaxation techniques. See below for more about these techniques. · Engage your mind. Get out and do something you enjoy. Go to a funny movie, or take a walk or hike. Plan your day. Having too much or too little to do can make you anxious. · Keep a record of your symptoms. Discuss your fears with a good friend or family member, or join a support group for people with similar problems. Talking to others sometimes relieves stress.   · Get involved in social groups, or volunteer to help others. Being alone sometimes makes things seem worse than they are. · Get at least 30 minutes of exercise on most days of the week to relieve stress. Walking is a good choice. You also may want to do other activities, such as running, swimming, cycling, or playing tennis or team sports. Relaxation techniques  Do relaxation exercises 10 to 20 minutes a day. You can play soothing, relaxing music while you do them, if you wish. · Tell others in your house that you are going to do your relaxation exercises. Ask them not to disturb you. · Find a comfortable place, away from all distractions and noise. · Lie down on your back, or sit with your back straight. · Focus on your breathing. Make it slow and steady. · Breathe in through your nose. Breathe out through either your nose or mouth. · Breathe deeply, filling up the area between your navel and your rib cage. Breathe so that your belly goes up and down. · Do not hold your breath. · Breathe like this for 5 to 10 minutes. Notice the feeling of calmness throughout your whole body. As you continue to breathe slowly and deeply, relax by doing the following for another 5 to 10 minutes:  · Tighten and relax each muscle group in your body. You can begin at your toes and work your way up to your head. · Imagine your muscle groups relaxing and becoming heavy. · Empty your mind of all thoughts. · Let yourself relax more and more deeply. · Become aware of the state of calmness that surrounds you. · When your relaxation time is over, you can bring yourself back to alertness by moving your fingers and toes and then your hands and feet and then stretching and moving your entire body. Sometimes people fall asleep during relaxation, but they usually wake up shortly afterward. · Always give yourself time to return to full alertness before you drive a car or do anything that might cause an accident if you are not fully alert.  Never play a relaxation tape while you drive a car. When should you call for help? Call 911 anytime you think you may need emergency care. For example, call if:  · You feel you cannot stop from hurting yourself or someone else. Keep the numbers for these national suicide hotlines: 2-726-450-TALK (8-566.967.2106) and 5-321-CJXBUBQ (3-271.548.2618). If you or someone you know talks about suicide or feeling hopeless, get help right away. Watch closely for changes in your health, and be sure to contact your doctor if:  · You have anxiety or fear that affects your life. · You have symptoms of anxiety that are new or different from those you had before. Where can you learn more? Go to http://elizabeth5211gamekorina.info/. Enter P754 in the search box to learn more about \"Anxiety Disorder: Care Instructions. \"  Current as of: July 26, 2016  Content Version: 11.3  © 8483-2971 [employer]. Care instructions adapted under license by [employer] (which disclaims liability or warranty for this information). If you have questions about a medical condition or this instruction, always ask your healthcare professional. [place] [address] any warranty or liability for your use of this information. Palpitations: Care Instructions  Your Care Instructions    Heart palpitations are the uncomfortable sensation that your heart is beating fast or irregularly. You might feel pounding or fluttering in your chest. It might feel like your heart is skipping a beat. Although palpitations may be caused by a heart problem, they also occur because of stress, fatigue, or use of alcohol, caffeine, or nicotine. Many medicines, including diet pills, antihistamines, decongestants, and some herbal products, can cause heart palpitations. Nearly everyone has palpitations from time to time. Depending on your symptoms, your doctor may need to do more tests to try to find the cause of your palpitations.   Follow-up care is a key part of your treatment and safety. Be sure to make and go to all appointments, and call your doctor if you are having problems. It's also a good idea to know your test results and keep a list of the medicines you take. How can you care for yourself at home? · Avoid caffeine, nicotine, and excess alcohol. · Do not take illegal drugs, such as methamphetamines and cocaine. · Do not take weight loss or diet medicines unless you talk with your doctor first.  · Get plenty of sleep. · Do not overeat. · If you have palpitations again, take deep breaths and try to relax. This may slow a racing heart. · If you start to feel lightheaded, lie down to avoid injuries that might result if you pass out and fall down. · Keep a record of your palpitations and bring it to your next doctor's appointment. Write down:  ¨ The date and time. ¨ Your pulse. (If your heart is beating fast, it may be hard to count your pulse.)  ¨ What you were doing when the palpitations started. ¨ How long the palpitations lasted. ¨ Any other symptoms. · If an activity causes palpitations, slow down or stop. Talk to your doctor before you do that activity again. · Take your medicines exactly as prescribed. Call your doctor if you think you are having a problem with your medicine. When should you call for help? Call 911 anytime you think you may need emergency care. For example, call if:  · You passed out (lost consciousness). · You have symptoms of a heart attack. These may include:  ¨ Chest pain or pressure, or a strange feeling in the chest.  ¨ Sweating. ¨ Shortness of breath. ¨ Pain, pressure, or a strange feeling in the back, neck, jaw, or upper belly or in one or both shoulders or arms. ¨ Lightheadedness or sudden weakness. ¨ A fast or irregular heartbeat. After you call 911, the  may tell you to chew 1 adult-strength or 2 to 4 low-dose aspirin. Wait for an ambulance. Do not try to drive yourself.   · You have symptoms of a stroke. These may include:  ¨ Sudden numbness, tingling, weakness, or loss of movement in your face, arm, or leg, especially on only one side of your body. ¨ Sudden vision changes. ¨ Sudden trouble speaking. ¨ Sudden confusion or trouble understanding simple statements. ¨ Sudden problems with walking or balance. ¨ A sudden, severe headache that is different from past headaches. Call your doctor now or seek immediate medical care if:  · You have heart palpitations and:  ¨ Are dizzy or lightheaded, or you feel like you may faint. ¨ Have new or increased shortness of breath. Watch closely for changes in your health, and be sure to contact your doctor if:  · You continue to have heart palpitations. Where can you learn more? Go to http://elizabeth-korina.info/. Enter R508 in the search box to learn more about \"Palpitations: Care Instructions. \"  Current as of: April 3, 2017  Content Version: 11.3  © 8192-8123 [employer]. Care instructions adapted under license by [employer] (which disclaims liability or warranty for this information). If you have questions about a medical condition or this instruction, always ask your healthcare professional. [address] any warranty or liability for your use of this information.

## 2017-08-28 ENCOUNTER — TELEPHONE (OUTPATIENT)
Dept: FAMILY MEDICINE CLINIC | Age: 37
End: 2017-08-28

## 2017-08-28 DIAGNOSIS — M47.816 LUMBAR FACET ARTHROPATHY: ICD-10-CM

## 2017-08-28 DIAGNOSIS — M47.816 SPONDYLOSIS OF LUMBAR REGION WITHOUT MYELOPATHY OR RADICULOPATHY: ICD-10-CM

## 2017-08-28 DIAGNOSIS — G89.29 CHRONIC LOW BACK PAIN WITHOUT SCIATICA, UNSPECIFIED BACK PAIN LATERALITY: Primary | ICD-10-CM

## 2017-08-28 DIAGNOSIS — M54.50 CHRONIC LOW BACK PAIN WITHOUT SCIATICA, UNSPECIFIED BACK PAIN LATERALITY: Primary | ICD-10-CM

## 2017-08-29 NOTE — TELEPHONE ENCOUNTER
----- Message from Dinesh Moore LPN sent at 8/88/7423  9:23 AM EDT -----  Regarding: FW: Non-Urgent Medical Question  Contact: [phone]  I was going to tell her she needs OV. Wanted to run it by you.    ----- Message -----     From: Tito Muro     Sent: 8/28/2017   9:10 AM       To: Sandra Leon Pool  Subject: Non-Urgent Medical Question                      Dr. Connie George,    May I have an order for physical therapy for my low back. [facility]. But I would prefer to see the [facility] group here at [facility]. My CT scan from my last 2 ER visits shows facet arthropy at L5-S1. But I really think it's muscular. Please let me know if we are able to get this going.

## 2017-08-29 NOTE — TELEPHONE ENCOUNTER
I placed order for PT. Make sure went through.  I will let pt know that it has been done and to follow up w/ me after PT.

## 2017-09-06 LAB
25(OH)D3+25(OH)D2 SERPL-MCNC: 30.2 NG/ML (ref 30–100)
ALBUMIN SERPL-MCNC: 3.9 G/DL (ref 3.5–5.5)
ALBUMIN/GLOB SERPL: 1.4 {RATIO} (ref 1.2–2.2)
ALP SERPL-CCNC: 76 IU/L (ref 39–117)
ALT SERPL-CCNC: 12 IU/L (ref 0–32)
AST SERPL-CCNC: 14 IU/L (ref 0–40)
BILIRUB SERPL-MCNC: 0.2 MG/DL (ref 0–1.2)
BUN SERPL-MCNC: 8 MG/DL (ref 6–20)
BUN/CREAT SERPL: 15 (ref 9–23)
CALCIUM SERPL-MCNC: 8.8 MG/DL (ref 8.7–10.2)
CHLORIDE SERPL-SCNC: 101 MMOL/L (ref 96–106)
CHOLEST SERPL-MCNC: 183 MG/DL (ref 100–199)
CO2 SERPL-SCNC: 22 MMOL/L (ref 18–29)
CREAT SERPL-MCNC: 0.54 MG/DL (ref 0.57–1)
EST. AVERAGE GLUCOSE BLD GHB EST-MCNC: 186 MG/DL
GLOBULIN SER CALC-MCNC: 2.8 G/DL (ref 1.5–4.5)
GLUCOSE SERPL-MCNC: 123 MG/DL (ref 65–99)
HBA1C MFR BLD: 8.1 % (ref 4.8–5.6)
HDLC SERPL-MCNC: 51 MG/DL
LDLC SERPL CALC-MCNC: 119 MG/DL (ref 0–99)
POTASSIUM SERPL-SCNC: 4.2 MMOL/L (ref 3.5–5.2)
PROT SERPL-MCNC: 6.7 G/DL (ref 6–8.5)
SODIUM SERPL-SCNC: 138 MMOL/L (ref 134–144)
T4 FREE SERPL-MCNC: 1.49 NG/DL (ref 0.82–1.77)
TRIGL SERPL-MCNC: 67 MG/DL (ref 0–149)
TSH SERPL DL<=0.005 MIU/L-ACNC: 0.97 UIU/ML (ref 0.45–4.5)
VLDLC SERPL CALC-MCNC: 13 MG/DL (ref 5–40)

## 2017-09-13 ENCOUNTER — OFFICE VISIT (OUTPATIENT)
Dept: ENDOCRINOLOGY | Age: 37
End: 2017-09-13

## 2017-09-13 VITALS
HEIGHT: 61 IN | WEIGHT: 193.3 LBS | SYSTOLIC BLOOD PRESSURE: 112 MMHG | DIASTOLIC BLOOD PRESSURE: 80 MMHG | HEART RATE: 96 BPM | BODY MASS INDEX: 36.5 KG/M2

## 2017-09-13 DIAGNOSIS — E55.9 VITAMIN D DEFICIENCY: ICD-10-CM

## 2017-09-13 DIAGNOSIS — E66.9 OBESITY, CLASS I, BMI 30-34.9: ICD-10-CM

## 2017-09-13 DIAGNOSIS — E04.9 GOITER: ICD-10-CM

## 2017-09-13 DIAGNOSIS — E78.00 HYPERCHOLESTEROLEMIA: ICD-10-CM

## 2017-09-13 RX ORDER — DIAPER,BRIEF,INFANT-TODD,DISP
EACH MISCELLANEOUS
COMMUNITY
End: 2018-11-28

## 2017-09-13 NOTE — PATIENT INSTRUCTIONS
1) I will make a referral to the diabetes treatment center who will contact you for an appointment for further education. 2) TSH is a thyroid test.  Your level is 0.96 which is normal and at goal of 0.5-2.0. This test goes opposite of your thyroid dose and suggests your dose of levothyroxine is perfect so I will keep your dose the same. 3) I think it's reasonable to see the effect of the prozac and diltiazem on your blood sugars and stress level over the next few months. 4) Your vitamin D level is 30 which is normal.  Goal is over 30.   Please continue to take 2000 units of vitamin D daily to keep your levels at goal.

## 2017-09-13 NOTE — PROGRESS NOTES
Chief Complaint   Patient presents with    Diabetes     pcp and pharmacy confirmed    Diabetic Foot Exam     due     History of Present Illness: Klever Osborne is a 29 y.o. female here for follow up of diabetes. Weight up 7 lbs since last visit in 4/17. Had salmonella poisoning when she was in [facility] earlier this month and had an ER visit for this. Her HR was elevated in the 130-150s and she was seen by Dr. Hermelindo Morris on 8/16/17 and was started on diltiazem and also was started on low dose prozac 10 mg daily to help with any underlying anxiety that could be rising her pulse and blood sugar and over the past 3-4 weeks since starting this regimen, has started feeling better and resting HR on her fit bit has been in the 80-90s at times. Has been taking 1.8 mg daily in the morning of victoza and is tolerating without nausea. Still on metformin 1g bid. Fasting sugars are in the 100-120 range. After lunch and after dinner are frequently close to 180 but has seen some down to the 140s. 4-7 nights per week will take tylenol PM to help with sleep. No further kidney stones. Has been off the phentermine. Has been on the higher dose of levothyroxine 88 mcg daily. Not necessarily more energy on the higher dose of med. Will be starting PT tomorrow for low back pain which has limited her ability to exercise. Constipation is a little better on the higher dose of thyroid. Still drinking plenty of water to help. Taking biotin to help with hair loss and has noticed some improvement. Nails are very strong. Would like to see a nutritionist for further education so will refer to DTC at [facility]    Current Outpatient Prescriptions   Medication Sig    biotin 10,000 mcg cap Take  by mouth.  diclofenac potassium (ZIPSOR) 25 mg capsule Take 25 mg by mouth as needed.  ACETAMINOPHEN/DIPHENHYDRAMINE (TYLENOL PM PO) Take  by mouth.  dilTIAZem CD (CARDIZEM CD) 120 mg ER capsule Take 1 Cap by mouth daily.     FLUoxetine (PROZAC) 10 mg tablet Take 1 Tab by mouth daily. Indications: ANXIETY WITH DEPRESSION, PANIC DISORDER    DEXILANT 60 mg CpDB TAKE ONE CAPSULE BY MOUTH ONCE DAILY FOR  REFLUX    CYANOCOBALAMIN, VITAMIN B-12, (VITAMIN B-12 PO) Take  by mouth.  levothyroxine (SYNTHROID) 88 mcg tablet Take 1 tablet daily    metaxalone (SKELAXIN) 800 mg tablet Take 1 Tab by mouth every eight (8) hours as needed (muscle spasms in back).  Liraglutide (VICTOZA) 0.6 mg/0.1 mL (18 mg/3 mL) sub-q pen Inject 1.8 mg daily    NOVOFINE PLUS 32 gauge x 1/6\" ndle 1 Each by SubCUTAneous route daily. With victoza    metFORMIN (GLUCOPHAGE) 1,000 mg tablet TAKE ONE TABLET BY MOUTH TWICE DAILY WITH MEALS    cholecalciferol, vitamin D3, 2,000 unit tab Take  by mouth daily.  levonorgestrel (MIRENA) 20 mcg/24 hr (5 years) IUD 1 Each by IntraUTERine route once.  ONE TOUCH ULTRA TEST strip Test 3 times daily due to fluctuating sugars. Dx: 250.00    ONE TOUCH ULTRAMINI monitoring kit Use as directed     No current facility-administered medications for this visit. Allergies   Allergen Reactions    Lexapro [Escitalopram] Other (comments)     Fatigue, wt gain    Naprosyn [Naproxen] Nausea Only    Topamax [Topiramate] Other (comments)     Kidney stone    Zoloft [Sertraline] Other (comments)     Fatigue      Review of Systems:  - Eyes: no blurry vision or double vision  - Cardiovascular: no chest pain  - Respiratory: no shortness of breath  - Musculoskeletal: no myalgias  - Neurological: no numbness/tingling in extremities    Physical Examination:  Blood pressure 112/80, pulse 96, height 5' 1\" (1.549 m), weight 193 lb 4.8 oz (87.7 kg).   - General: pleasant, no distress, good eye contact   - Neck: no carotid bruits, small goiter  - Cardiovascular: regular, normal rate, nl s1 and s2, no m/r/g,   - Respiratory: clear bilaterally  - Integumentary: no edema,   - Psychiatric: normal mood and affect         Diabetic foot exam performed by Arsalan Claudio MD     Measurement  Response Nurse Comment Physician Comment   Monofilament  R - normal sensation with micro filament  L - normal sensation with micro filament     Pulse DP R - 2+ (normal)  L - 2+ (normal)     Pulse TP R - normal  L - normal     Structural deformity R - None  L - None     Skin Integrity / Deformity R - Mild - callus  L - Mild - callus        Reviewed by:                   Data Reviewed: Component      Latest Ref Rng & Units 9/5/2017 9/5/2017 9/5/2017 9/5/2017           7:58 AM  7:58 AM  7:58 AM  7:58 AM   Glucose      65 - 99 mg/dL   123 (H)    BUN      6 - 20 mg/dL   8    Creatinine      0.57 - 1.00 mg/dL   0.54 (L)    GFR est non-AA      >59 mL/min/1.73   123    GFR est AA      >59 mL/min/1.73   141    BUN/Creatinine ratio      9 - 23   15    Sodium      134 - 144 mmol/L   138    Potassium      3.5 - 5.2 mmol/L   4.2    Chloride      96 - 106 mmol/L   101    CO2      18 - 29 mmol/L   22    Calcium      8.7 - 10.2 mg/dL   8.8    Protein, total      6.0 - 8.5 g/dL   6.7    Albumin      3.5 - 5.5 g/dL   3.9    GLOBULIN, TOTAL      1.5 - 4.5 g/dL   2.8    A-G Ratio      1.2 - 2.2   1.4    Bilirubin, total      0.0 - 1.2 mg/dL   0.2    Alk. phosphatase      39 - 117 IU/L   76    AST      0 - 40 IU/L   14    ALT (SGPT)      0 - 32 IU/L   12    Cholesterol, total      100 - 199 mg/dL    183   Triglyceride      0 - 149 mg/dL    67   HDL Cholesterol      >39 mg/dL    51   VLDL, calculated      5 - 40 mg/dL    13   LDL, calculated      0 - 99 mg/dL    119 (H)   Hemoglobin A1c, (calculated)      4.8 - 5.6 %  8.1 (H)     Estimated average glucose      mg/dL  186     VITAMIN D, 25-HYDROXY      30.0 - 100.0 ng/mL 30.2        Component      Latest Ref Rng & Units 9/5/2017 9/5/2017           7:58 AM  7:58 AM   T4, Free      0.82 - 1.77 ng/dL  1.49   TSH      0.450 - 4.500 uIU/mL 0.968        Assessment/Plan:     1.  Diabetes mellitus type II: her most recent Hgb A1c was 8.1% in 9/17 up from 7.9% in 4/17 down from 8.1% in 12/16 up from 7% in 7/16 down from 7.7% in 3/16 down from 7.8% in 11/15 down from 8.3% in 7/15 down from 8.8% in 3/15 up from 7.6% in 11/14 up from 7.3% in 7/14 down from 8% in 3/14 up from 6.1% in 11/13 down from 6.7% in 9/13 up from 6.3% in 8/13 up slightly from 6.1% in 7/13 down from 6.3% in 5/13 down from 6.7% in 1/13 up from 6.5% in Oct 2012. A1c is still up but I agree with Dr. Connie George that some of her higher post-meal sugars could actually be from stress rather than food so will not make any changes to her regimen but will refer to DTC per her request for further education. - cont victoza 1.8 mg daily  - cont metformin 1g bid  - check bs up to 3 times daily due to fluctuating sugars  - foot exam done 9/17  - microalbumin nl 10/12--was taken off lisinopril 10 mg daily due to trying to conceive and repeat normal ever since, most recently in 4/17  - optho UTD 4/17  - check Hgb A1c and cmp and microalbumin prior to next visit     2. Goiter: Had a TSH of 2.68 in May 2012. Level was 2.4 in 1/13 and I started her on a a low dose of levothyroxine 25 mcg daily to keep her TSH < 2.5 to improve her chance of conception and interestingly she became pregnant shortly after starting this. TSH 2.1 in 5/13 and 1.6 in 7/13 and 1.39 in 9/13. Stopped levothyroxine after delivery in 10/13 and TSH up to 5.5 in 3/14 so restarted low dose of 25 mcg daily to keep her TSH < 2.5 as she has hypothyroid symptoms and high cholesterol. However only 2.2 in 7/14 so increased to 50 mcg daily and TSH 2.4 in 11/14 but had missed a dose on the weekends on occasion so increased to 62.5 mcg daily and TSH 1.87 in 3/15 and 1.46 in 11/15 and 1.04 in 3/16. Up to 2.06 in 7/16 so increased to 75 mcg daily and down to 1.5 in 12/16. Up to 2.11 in 4/17 so increased her dose to 88 mcg daily and down to 0.96 in 9/17 so will keep her dose the same. - cont levothyroxine 88 mcg daily  - check TSH prior to next visit      3. Hypercholesterolemia: Given DM, Goal LDL < 100, non-HDL < 130, and TG < 150.  in 10/12 off simva 10 due to trying to conceive, down to 109 in 1/13 and 92 in 5/13. Up to 127 in 11/13 and down to 111 in 3/14. Up to 124 in 7/14. Down to 114 in 11/14. Up to 140 in 3/15 possibly due to [address] as with stopping this it was down to 92 in 7/15. Up to 114 in 11/15 with diet. Was started on lipitor in 1/16 but had more nausea with this so stopped this and with 17 lb wt loss, LDL down to 84 in 3/16 and still 98 in 7/16. Up to 114 in 4/17 and still 119 in 9/17 but will hold on any other meds for now. - diet control  - check lipids prior to next visit      4. Obesity: I started topamax in 11/15 and Dr. Dede Gómez added phentermine in 1/16 and changed her trulicity to [address] [facility] [address] and her weight had come down 26 lbs by 7/16. Had more nausea and GERD and ended up stopping all her meds in 10/16 and weight up 7 lbs by 12/16. Restarted topamax for 2 weeks and then added back victoza at a lower dose of 1.2 mg daily. Eventually added back phentermine in 3/17. Then developed a kidney stone in 3/17 and stopped the topamax and weight up 6 lbs by 4/17. Will stay off the topamax due to the kidney stone and stop the phentermine as I don't think this is helping her weight. I told her it's fine to restart the advocare spark and protein shakes as these are not as likely to cause the kidney stone as the topamax was. Weight up 7 lbs by 9/17 possibly due to stress  - cont victoza as above    5. Vitamin D deficiency: level was 24 in 12/15 and is currently taking 2000 units of D3 and level up to 32 in 7/16 and 35 in 4/17 and 30 in 9/17  - cont vitamin D 2000 units daily  - check Vitamin D 25-OH level in 9/18    Patient Instructions   1) I will make a referral to the diabetes treatment center who will contact you for an appointment for further education.     2) TSH is a thyroid test.  Your level is 0.96 which is normal and at goal of 0.5-2.0. This test goes opposite of your thyroid dose and suggests your dose of levothyroxine is perfect so I will keep your dose the same. 3) I think it's reasonable to see the effect of the prozac and diltiazem on your blood sugars and stress level over the next few months. 4) Your vitamin D level is 30 which is normal.  Goal is over 30. Please continue to take 2000 units of vitamin D daily to keep your levels at goal.      Follow-up Disposition:  Return in about 5 months (around 2/13/2018).     Copy sent to:  Dr. Courtney Wing via [facility]  Dr. Romayne Bennetts

## 2017-09-13 NOTE — MR AVS SNAPSHOT
Visit Information Date & Time Provider Department Dept. Phone Encounter #  
 9/13/2017 11:30 AM Cristy Barakat, [address] [facility] [phone] [id number] Follow-up Instructions Return in about 5 months (around 2/13/2018). Your Appointments 9/28/2017 12:00 PM  
SAME DAY with Edel Chicas MD  
[facility]) Appt Note: f/u  
 [address] [phone]  
[phone]  
  
   
 [address] [address] Upcoming Health Maintenance Date Due  
 PAP AKA CERVICAL CYTOLOGY 3/3/2017 FOOT EXAM Q1 7/15/2017 INFLUENZA AGE 9 TO ADULT 8/1/2017 HEMOGLOBIN A1C Q6M 3/5/2018 MICROALBUMIN Q1 4/13/2018 EYE EXAM RETINAL OR DILATED Q1 4/21/2018 LIPID PANEL Q1 9/5/2018 DTaP/Tdap/Td series (2 - Td) 9/4/2023 Allergies as of 9/13/2017  Review Complete On: 9/13/2017 By: Cristy Barakat MD  
  
 Severity Noted Reaction Type Reactions Lexapro [Escitalopram]  08/16/2017    Other (comments) Fatigue, wt gain Naprosyn [Naproxen]  03/25/2010   Intolerance Nausea Only Topamax [Topiramate]  03/15/2017    Other (comments) Kidney stone Zoloft [Sertraline]  08/16/2017    Other (comments) Fatigue Current Immunizations  Reviewed on 11/20/2013 Name Date DTaP 9/4/2013 Hepatitis B Vaccine 2/16/2010, 9/25/2009, 8/25/2009 Influenza Vaccine 10/16/2015, 10/23/2014, 10/9/2013 Influenza Vaccine (Quad) PF 10/26/2016 Influenza Vaccine Split 10/5/2012, 9/21/2011, 9/23/2010 Pneumococcal Vaccine (Unspecified Type) 9/18/2013 TB Skin Test (PPD) Intradermal 8/29/2013 TD Vaccine 3/25/2007 ZZZ-RETIRED (DO NOT USE) Pneumococcal Vaccine (Unspecified Type) 3/25/2007 Not reviewed this visit You Were Diagnosed With   
  
 Codes Comments  Uncontrolled type 2 diabetes mellitus without complication, without long-term current use of insulin ([facility]    -  Primary ICD-10-CM: E11.65 ICD-9-CM: 250.02 Goiter     ICD-10-CM: E04.9 ICD-9-CM: 240.9 Hypercholesterolemia     ICD-10-CM: E78.00 ICD-9-CM: 272.0 Vitamin D deficiency     ICD-10-CM: E55.9 ICD-9-CM: 268.9 Obesity, Class I, BMI 30-34.9     ICD-10-CM: E66.9 ICD-9-CM: 278.00 Vitals BP Pulse Height(growth percentile) Weight(growth percentile) BMI OB Status 112/80 96 5' 1\" (1.549 m) 193 lb 4.8 oz (87.7 kg) 36.52 kg/m2 IUD Smoking Status Never Smoker Vitals History BMI and BSA Data Body Mass Index Body Surface Area  
 36.52 kg/m 2 1.94 m 2 Preferred Pharmacy Pharmacy Name Phone [facility] [address] [place] [address] Your Updated Medication List  
  
   
This list is accurate as of: 9/13/17 12:58 PM.  Always use your most recent med list.  
  
  
  
  
 biotin 10,000 mcg Cap Take  by mouth. cholecalciferol (vitamin D3) 2,000 unit Tab Take  by mouth daily. DEXILANT 60 mg Cpdb Generic drug:  Dexlansoprazole TAKE ONE CAPSULE BY MOUTH ONCE DAILY FOR  REFLUX  
  
 dilTIAZem  mg ER capsule Commonly known as:  CARDIZEM CD Take 1 Cap by mouth daily. FLUoxetine 10 mg tablet Commonly known as:  PROzac Take 1 Tab by mouth daily. Indications: ANXIETY WITH DEPRESSION, PANIC DISORDER  
  
 levothyroxine 88 mcg tablet Commonly known as:  SYNTHROID Take 1 tablet daily Liraglutide 0.6 mg/0.1 mL (18 mg/3 mL) Pnij Commonly known as:  [place] Inject 1.8 mg daily  
  
 metaxalone 800 mg tablet Commonly known as:  SKELAXIN Take 1 Tab by mouth every eight (8) hours as needed (muscle spasms in back). metFORMIN 1,000 mg tablet Commonly known as:  GLUCOPHAGE  
TAKE ONE TABLET BY MOUTH TWICE DAILY WITH MEALS  
  
 MIRENA 20 mcg/24 hr (5 years) IUD Generic drug:  levonorgestrel 1 Each by IntraUTERine route once. NOVOFINE PLUS 32 gauge x 1/6\" Ndle Generic drug:  pen needle, diabetic 1 Each by SubCUTAneous route daily. With victoza ONETOUCH ULTRA TEST strip Generic drug:  glucose blood VI test strips Test 3 times daily due to fluctuating sugars. Dx: 250.00 ONETOUCH ULTRAMINI monitoring kit Generic drug:  Blood-Glucose Meter Use as directed TYLENOL PM PO Take  by mouth. VITAMIN B-12 PO Take  by mouth. ZIPSOR 25 mg capsule Generic drug:  diclofenac potassium Take 25 mg by mouth as needed. We Performed the Following HEMOGLOBIN A1C WITH EAG [58929 CPT(R)] LIPID PANEL [27766 CPT(R)] METABOLIC PANEL, COMPREHENSIVE [54155 CPT(R)] MICROALBUMIN, UR, RAND W/ MICROALBUMIN/CREA RATIO [id number] CPT(R)] TSH 3RD GENERATION [85579 CPT(R)] Follow-up Instructions Return in about 5 months (around 2/13/2018). To-Do List   
 09/14/2017 1:00 PM  
  Appointment with Kayden Stoll PT at [facility] ([phone]) Patient Instructions 1) I will make a referral to the diabetes treatment center who will contact you for an appointment for further education. 2) TSH is a thyroid test.  Your level is 0.96 which is normal and at goal of 0.5-2.0. This test goes opposite of your thyroid dose and suggests your dose of levothyroxine is perfect so I will keep your dose the same. 3) I think it's reasonable to see the effect of the prozac and diltiazem on your blood sugars and stress level over the next few months. 4) Your vitamin D level is 30 which is normal.  Goal is over 30. Please continue to take 2000 units of vitamin D daily to keep your levels at goal. 
 
 
 
  
Introducing [facility]! Dear Jerrica Womack: Thank you for requesting a [email or web address] account. Our records indicate that you already have an active [email or web address] account. You can access your account anytime at [email or web address] Did you know that you can access your hospital and ER discharge instructions at any time in [employer]? You can also review all of your test results from your hospital stay or ER visit. Additional Information If you have questions, please visit the Frequently Asked Questions section of the [employer] website at [email or web address] Remember, [employer] is NOT to be used for urgent needs. For medical emergencies, dial 911. Now available from your iPhone and Android! Please provide this summary of care documentation to your next provider. Your primary care clinician is listed as VIDHI ORELLANA. If you have any questions after today's visit, please call [phone].

## 2017-09-14 ENCOUNTER — TELEPHONE (OUTPATIENT)
Dept: DIABETES SERVICES | Age: 37
End: 2017-09-14

## 2017-09-14 ENCOUNTER — HOSPITAL ENCOUNTER (OUTPATIENT)
Dept: PHYSICAL THERAPY | Age: 37
Discharge: HOME | End: 2017-09-14
Payer: COMMERCIAL

## 2017-09-14 PROCEDURE — 97162 PT EVAL MOD COMPLEX 30 MIN: CPT | Performed by: PHYSICAL THERAPIST

## 2017-09-14 PROCEDURE — 97140 MANUAL THERAPY 1/> REGIONS: CPT | Performed by: PHYSICAL THERAPIST

## 2017-09-14 NOTE — PROGRESS NOTES
[facility] [place] [facility]  [address], [place], [address]  Phone: [phone]  Fax: [phone]    PT INITIAL EVALUATION NOTE - [facility] 2-15    Patient Name: Katherine Alcazar  Date:2017  : 1980  [x]  Patient  Verified   Payor: [employer] / Plan: Vishal Melgar / Product Type: HMO /    In time:1:00 PM  Out time: 1:50 PM  Total Treatment Time (min): 50  Total Timed Codes (min): 15  1:1 Treatment Time ([facility] only): --   Visit #: 1     Treatment Area: Low back pain [M54.5]    SUBJECTIVE    Any medication changes, allergies to medications, adverse drug reactions, diagnosis change, or new procedure performed?: [] No    [x] Yes (see summary sheet for update)    Current symptoms/chief complaint:    Pt presents with low back, hip pain. She had this same pain during pregnancy 4 years ago- \"I went to [address] [place] [address] close to my office and they treated me up until I had my daughter. \" Pain has come back recently and is worse than before. She is no longer exercising. She states pain is worse with lifting daughter, bending, standing, and pain with lying supine. She has had to sleep sidelying with a pillow between her legs at night which relieves the pain. She has LSO brace that she wears when the pain is bad- \"I'll put it on in the morning, wear it for a while, and when I take it off its better. \" She states putting pressure over spot helps relieve the pain at work. She had kidney stones in March-- had CT scan which showed L5/S1 facet arthropathy. X-ray in - \"looked okay. \" Ice>heat, no relief. 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 She wants to resume exercise, however pain is limiting factor at this time. Prior level of function: Pt states she was very active until recently. She had same pain while pregnant 4 years ago    Patient goal(s): \"decrease pain and increase strength\"     Objective:      Posture:   Kyphosis: [] Increased [] Decreased   [x]  WNL  Lordosis:  [] Increased [] Decreased   [] WNL     Active Movements:  ROM  AROM Comments:pain, area   Forward flexion  Fingers to ankles Symptoms when returning to upright position   Extension  WNL p! Seated Rotation right NT --   Seated Rotation left NT --   SB right Superior to patella No change   SB left Superior to patella p! Strength:      L(0-5) R (0-5) Comments   Hip Flexion (L1,2) 4 4    Knee Extension (L3,4) 5 5    Knee Flexion (S1,2) 5 5    Ankle Dorsiflexion (L4) 5 5          SLR 4- 4- P! On L   Sidelying hip abduction 4- 4-        Palpation: Mod to severe TTP L PSIS    Special Tests:    Dural Mobility:  SLR Supine: [] R    [x] L    [x] +    [] -    Crossed SLR  [] R    [] L    [] +    [x] -       Long axis hip distraction on L + relief    Joint mobility:  Complete relief of symptoms with PA mob bilat LUDIN         Outcome Measure: Patient presents with a FOTO score of 61/100.      5 min Therapeutic Exercise:  [x] See flow sheet : TrA brace, piriformis stretch   Rationale: increase ROM and increase strength to improve the patients ability to bend, lift    10 min Manual Therapy: Long axis hip distraction on L  PA teresas bilat LUDIN to correct sacral torsion  STM L piriformis  MET to correct L anterior pelvic rotation    Rationale: decrease pain and increase tissue extensibility to improve the patients ability to bend, lift, stand    10 min-- ice-- SI joint/lumbar spine. Supine, LE's elevated.           With   [x] TE   [] TA   [] neuro   [] other: Patient Education: [x] Review HEP    [] Progressed/Changed HEP based on:   [] positioning   [] body mechanics   [] transfers   [x] heat/ice application    [x] other: PT POC: mikhail/phys; pt to avoid strenuous activity and ice 2-3x/day for next 24-48 hrs. Pain Level (0-10 scale) post treatment: \"a little achy, but much better.  It's not pinching anymore\"    Assessment:   [x] See POC  [] Other:  Plan:   [x] See POC  [] Other  [] Discharge due to:     Fatou Rasheed, PT, DPT     9/14/2017     12:54 PM

## 2017-09-14 NOTE — PROGRESS NOTES
[facility]  [address]  [place], [address]  Phone: [phone]  Fax: [phone]    Plan of Care/Statement of Necessity for Physical Therapy Services  2-15    Patient name: Shari Orr  : 1980  Provider#: [id number]  Referral source: Evita Thompson,*      Medical/Treatment Diagnosis: Low back pain [M54.5]     Prior Hospitalization: see medical history     Comorbidities: see evaluation  Prior Level of Function: see evaluation  Medications: Verified on Patient Summary List    Start of Care: 18      Onset Date: Onset 4 years ago during pregnancy; recent episode of worsening symptoms       The Plan of Care and following information is based on the information from the initial evaluation. Assessment/ key information: Pt is a 29year old female presenting with signs and symptoms consistent with L sided SI pain and will benefit from PT to address problem list below. Evaluation Complexity History MEDIUM  Complexity : 1-2 comorbidities / personal factors will impact the outcome/ POC ; Examination MEDIUM Complexity : 3 Standardized tests and measures addressing body structure, function, activity limitation and / or participation in recreation  ;Presentation MEDIUM Complexity : Evolving with changing characteristics  ; Clinical Decision Making MEDIUM Complexity : FOTO score of 26-74  Overall Complexity Rating: MEDIUM    Problem List: pain affecting function, decrease ROM, decrease strength, decrease ADL/ functional abilitiies, decrease activity tolerance and decrease flexibility/ joint mobility   Treatment Plan may include any combination of the following: Therapeutic exercise, Therapeutic activities, Neuromuscular re-education, Physical agent/modality, Gait/balance training, Manual therapy, Patient education, Self Care training and Functional mobility training  Patient / Family readiness to learn indicated by: asking questions, trying to perform skills and interest  Persons(s) to be included in education: patient (P)  Barriers to Learning/Limitations: None  Patient Goal (s): see evaluation  Patient Self Reported Health Status: fair  Rehabilitation Potential: good    Short Term Goals: To be accomplished in 2-3 weeks:  1) Pt will be independent in initial HEP  2) Pt will report > or =25% decrease in exacerbation of symptoms    Long Term Goals: To be accomplished in 6-8 weeks:  1) Pt will improve bilat hip abd strength > or = 4/5 in order to aide in exericse  2) Pt will perform R SLS for at least 10 sec without hip drop and without symptoms  3) Pt will increase FOTO score to at least 73/100 in order to demonstrate improvement in functional mobility  4) Pt will report return to exercising     Frequency / Duration: Patient to be seen 1-2 times per week for 6-8 weeks. Patient/ Caregiver education and instruction: self care, activity modification and exercises    [x]  Plan of care has been reviewed with YENNI Kim, PT 9/14/2017 12:54 PM    ________________________________________________________________________    I certify that the above Therapy Services are being furnished while the patient is under my care. I agree with the treatment plan and certify that this therapy is necessary.     [de-identified] Signature:____________________  Date:____________Time: _________

## 2017-09-18 RX ORDER — PEN NEEDLE, DIABETIC 32 GX 1/6"
NEEDLE, DISPOSABLE MISCELLANEOUS
Qty: 100 PEN NEEDLE | Refills: 11 | Status: SHIPPED | OUTPATIENT
Start: 2017-09-18 | End: 2017-09-28

## 2017-09-22 ENCOUNTER — HOSPITAL ENCOUNTER (OUTPATIENT)
Dept: PHYSICAL THERAPY | Age: 37
Discharge: HOME | End: 2017-09-22
Payer: COMMERCIAL

## 2017-09-22 ENCOUNTER — PATIENT MESSAGE (OUTPATIENT)
Dept: ENDOCRINOLOGY | Age: 37
End: 2017-09-22

## 2017-09-22 PROCEDURE — 97140 MANUAL THERAPY 1/> REGIONS: CPT | Performed by: PHYSICAL THERAPIST

## 2017-09-22 PROCEDURE — 97110 THERAPEUTIC EXERCISES: CPT | Performed by: PHYSICAL THERAPIST

## 2017-09-22 RX ORDER — BLOOD SUGAR DIAGNOSTIC
STRIP MISCELLANEOUS
Qty: 300 STRIP | Refills: 3 | Status: SHIPPED | OUTPATIENT
Start: 2017-09-22 | End: 2017-09-28

## 2017-09-22 RX ORDER — LANCETS 33 GAUGE
EACH MISCELLANEOUS
Qty: 300 LANCET | Refills: 3 | Status: SHIPPED | OUTPATIENT
Start: 2017-09-22 | End: 2017-09-28

## 2017-09-22 NOTE — PROGRESS NOTES
PT DAILY TREATMENT NOTE 2-15    Patient Name: John Hall  Date:2017  : 1980  [x]  Patient  Verified  Payor: Juani Valverde / Plan: Annabella Raines / Product Type: HMO /    In time:7:55 AM  Out time:8:40 AM  Total Treatment Time (min): 45 (35 timed)  Visit #: 2     Treatment Area: Low back pain [M54.5]    SUBJECTIVE  Pain Level (0-10 scale): 4/10  Any medication changes, allergies to medications, adverse drug reactions, diagnosis change, or new procedure performed?: [x] No    [] Yes (see summary sheet for update)  Subjective functional status/changes:   [] No changes reported  \"I felt so good when I left here! The pain was completely gone until 2 days ago it started coming back. I think this is really helping\"  She has been doing her exercises in the evening and then icing     OBJECTIVE    Modality rationale: decrease inflammation and decrease pain to improve the patients ability to lift, exercise   Min Type Additional Details    [] Estim: []Att   []Unatt        []TENS instruct                  []IFC  []Premod   []NMES                     []Other:  []w/US   []w/ice   []w/heat  Position:  Location:    []  Traction: [] Cervical       []Lumbar                       [] Prone          []Supine                       []Intermittent   []Continuous Lbs:  [] before manual  [] after manual  []w/heat    []  Ultrasound: []Continuous   [] Pulsed at:                            []1MHz   []3MHz Location:  W/cm2:    []  Paraffin         Location:  []w/heat   10 [x]  Ice     []  Heat  []  Ice massage Position: supine, LE's elevated  Location: lumbar spine    []  Laser  []  Other: Position:  Location:    []  Vasopneumatic Device Pressure:       [] lo [] med [] hi   Temperature:    [x] Skin assessment post-treatment:  [x]intact []redness- no adverse reaction    []redness  adverse reaction:     25 min Therapeutic Exercise:  [x] See flow sheet : Reviewed HEP.  Added seated HS stretch, clamshells, iso hip add; TB hip abd Rationale: increase ROM and increase strength to improve the patients ability to perform ADL's without LBP      10 min Manual Therapy:  Long axis hip distraction on L  PA mobs bilat LUDIN to correct sacral torsion  STM L piriformis  MET to correct L anterior pelvic rotation    Rationale: decrease pain, increase tissue extensibility and decrease trigger points  to improve the patients ability to exercise          With   [x] TE   [] TA   [] neuro   [] other: Patient Education: [x] Review HEP    [] Progressed/Changed HEP based on:   [] positioning   [] body mechanics   [] transfers   [x] heat/ice application    [] other:      Other Objective/Functional Measures: n/a     Pain Level (0-10 scale) post treatment: 0/10    ASSESSMENT/Changes in Function:   Dec'd symptoms after manual techniques. Pt encouraged to continue with HEP, updated to include clamshells and seated HS stretch. Patient will continue to benefit from skilled PT services to modify and progress therapeutic interventions, address functional mobility deficits, address ROM deficits, address strength deficits, analyze and address soft tissue restrictions, analyze and cue movement patterns and analyze and modify body mechanics/ergonomics to attain remaining goals. []  See Plan of Care  []  See progress note/recertification  []  See Discharge Summary         Progress towards goals / Updated goals:  Short Term Goals: To be accomplished in 2-3 weeks:  1) Pt will be independent in initial HEP  2) Pt will report > or =25% decrease in exacerbation of symptoms     Long Term Goals:  To be accomplished in 6-8 weeks:  1) Pt will improve bilat hip abd strength > or = 4/5 in order to aide in exericse  2) Pt will perform R SLS for at least 10 sec without hip drop and without symptoms  3) Pt will increase FOTO score to at least 73/100 in order to demonstrate improvement in functional mobility  4) Pt will report return to exercising     PLAN  []  Upgrade activities as tolerated     [x]  Continue plan of care  []  Update interventions per flow sheet       []  Discharge due to:_  []  Other:_      Taylor Tompkins, SARAH 9/22/2017  8:09 AM

## 2017-09-22 NOTE — TELEPHONE ENCOUNTER
From: Jesica Escobar  To: Tj Ornelas MD  Sent: 9/22/2017 2:17 PM EDT  Subject: Prescription Question    Dr. Ruel Saldana. Can I have you send me a 90day supply of One Touch Ultra test strips and lancets to [employer] please and thank you.

## 2017-09-27 ENCOUNTER — HOSPITAL ENCOUNTER (OUTPATIENT)
Dept: PHYSICAL THERAPY | Age: 37
Discharge: HOME | End: 2017-09-27
Payer: COMMERCIAL

## 2017-09-27 PROCEDURE — 97140 MANUAL THERAPY 1/> REGIONS: CPT | Performed by: PHYSICAL THERAPIST

## 2017-09-27 PROCEDURE — 97110 THERAPEUTIC EXERCISES: CPT | Performed by: PHYSICAL THERAPIST

## 2017-09-27 NOTE — PROGRESS NOTES
PT DAILY TREATMENT NOTE 2-15    Patient Name: Cori Primer  Date:2017  : 1980  [x]  Patient  Verified  Payor: Quique Oliveira / Plan: Marilyn Mirza / Product Type: HMO /    In time: 5:30 PM  Out time: 620 PM  Total Treatment Time (min): 50 (40 timed)  Visit #: 3    Treatment Area: Low back pain [M54.5]    SUBJECTIVE  Pain Level (0-10 scale): 6/10  Any medication changes, allergies to medications, adverse drug reactions, diagnosis change, or new procedure performed?: [x] No    [] Yes (see summary sheet for update)  Subjective functional status/changes:   [] No changes reported  Pt states she felt great when leaving PT on Friday. She went to the gym and cleaned around the house over the weekend-- \"it was killing me on Monday night and its been hurting since\"  Pt stats f/u with MD tomorrow     OBJECTIVE    Modality rationale: decrease inflammation and decrease pain to improve the patients ability to lift, exercise   Min Type Additional Details    [] Estim: []Att   []Unatt        []TENS instruct                  []IFC  []Premod   []NMES                     []Other:  []w/US   []w/ice   []w/heat  Position:  Location:    []  Traction: [] Cervical       []Lumbar                       [] Prone          []Supine                       []Intermittent   []Continuous Lbs:  [] before manual  [] after manual  []w/heat    []  Ultrasound: []Continuous   [] Pulsed at:                            []1MHz   []3MHz Location:  W/cm2:    []  Paraffin         Location:  []w/heat   10 [x]  Ice     []  Heat  []  Ice massage Position: supine, LE's elevated  Location: lumbar spine    []  Laser  []  Other: Position:  Location:    []  Vasopneumatic Device Pressure:       [] lo [] med [] hi   Temperature:    [x] Skin assessment post-treatment:  [x]intact []redness- no adverse reaction    []redness  adverse reaction:     25 min Therapeutic Exercise:  [x] See flow sheet : Reviewed HEP.  Added LTR   Rationale: increase ROM and increase strength to improve the patients ability to perform ADL's without LBP      15 min Manual Therapy:  Long axis hip distraction on L  PA mobs bilat LUDIN to correct sacral torsion  STM L QL   [id number]: decrease pain, increase tissue extensibility and decrease trigger points  to improve the patients ability to exercise          With   [x] TE   [] TA   [] neuro   [] other: Patient Education: [x] Review HEP    [] Progressed/Changed HEP based on:   [] positioning   [] body mechanics   [] transfers   [x] heat/ice application    [] other:      Other Objective/Functional Measures: Mod TTP L>R QL  P! Lumbar AROM L SBing     Pain Level (0-10 scale) post treatment: 1-2/10    ASSESSMENT/Changes in Function:   Dec'd symptoms after manual techniques-- mod TTP over L QL today. Pain seems to be originating from SI joint at this time. Patient will continue to benefit from skilled PT services to modify and progress therapeutic interventions, address functional mobility deficits, address ROM deficits, address strength deficits, analyze and address soft tissue restrictions, analyze and cue movement patterns and analyze and modify body mechanics/ergonomics to attain remaining goals. []  See Plan of Care  []  See progress note/recertification  []  See Discharge Summary         Progress towards goals / Updated goals:  Short Term Goals: To be accomplished in 2-3 weeks:  1) Pt will be independent in initial HEP  2) Pt will report > or =25% decrease in exacerbation of symptoms     Long Term Goals:  To be accomplished in 6-8 weeks:  1) Pt will improve bilat hip abd strength > or = 4/5 in order to aide in exericse  2) Pt will perform R SLS for at least 10 sec without hip drop and without symptoms  3) Pt will increase FOTO score to at least 73/100 in order to demonstrate improvement in functional mobility  4) Pt will report return to exercising     PLAN  []  Upgrade activities as tolerated     [x]  Continue plan of care  [] Update interventions per flow sheet       []  Discharge due to:_  []  Other:_      Alysia Found, PT 9/27/2017  5:37 PM

## 2017-09-28 ENCOUNTER — TELEPHONE (OUTPATIENT)
Dept: FAMILY MEDICINE CLINIC | Age: 37
End: 2017-09-28

## 2017-09-28 ENCOUNTER — OFFICE VISIT (OUTPATIENT)
Dept: FAMILY MEDICINE CLINIC | Age: 37
End: 2017-09-28

## 2017-09-28 VITALS
HEART RATE: 91 BPM | RESPIRATION RATE: 18 BRPM | SYSTOLIC BLOOD PRESSURE: 116 MMHG | BODY MASS INDEX: 37.04 KG/M2 | WEIGHT: 196.2 LBS | TEMPERATURE: 98.4 F | HEIGHT: 61 IN | OXYGEN SATURATION: 98 % | DIASTOLIC BLOOD PRESSURE: 82 MMHG

## 2017-09-28 DIAGNOSIS — R00.0 SINUS TACHYCARDIA: ICD-10-CM

## 2017-09-28 DIAGNOSIS — F41.0 PANIC ATTACKS: ICD-10-CM

## 2017-09-28 DIAGNOSIS — R00.2 HEART PALPITATIONS: ICD-10-CM

## 2017-09-28 DIAGNOSIS — F41.8 DEPRESSION WITH ANXIETY: Primary | ICD-10-CM

## 2017-09-28 RX ORDER — CLOTRIMAZOLE AND BETAMETHASONE DIPROPIONATE 10; .64 MG/G; MG/G
CREAM TOPICAL
COMMUNITY
Start: 2017-09-18 | End: 2018-01-08

## 2017-09-28 RX ORDER — PIMECROLIMUS 10 MG/G
CREAM TOPICAL AS NEEDED
COMMUNITY
Start: 2017-09-19 | End: 2021-08-04 | Stop reason: ALTCHOICE

## 2017-09-28 RX ORDER — CLINDAMYCIN PHOSPHATE 20 MG/G
1 CREAM VAGINAL AS NEEDED
COMMUNITY
Start: 2017-09-19 | End: 2018-01-08

## 2017-09-28 RX ORDER — CYCLOBENZAPRINE HCL 10 MG
5-10 TABLET ORAL
Qty: 90 TAB | Refills: 0 | Status: SHIPPED | OUTPATIENT
Start: 2017-09-28 | End: 2018-02-03 | Stop reason: SDUPTHER

## 2017-09-28 NOTE — TELEPHONE ENCOUNTER
I called [employer] to check status of the dexilant authorization request from 9/22. Spoke to Jeremy Romero who says that he doesn't know what happened but tried running a calim and it wouldn't go thru. He has placed a call to the  to find out what is happening. He said that the manager will have to call me back and I gave my direct #.  I also gave him the case # from last year [id number]. He thinks that effective 10/1 that a prior auth may not be needed but he will let me know about that. PI of above. Call from [employer] in the prior auth dept and the dexiliant should go thru know with out any problem. I let pt know.

## 2017-09-28 NOTE — PROGRESS NOTES
HISTORY OF PRESENT ILLNESS   HPI   6 week follow up depression, anxiety, panic attacks. At her last visit 8-16-17, started her on low dose Prozac 10 mg daily and Diltiazem for sinus tachycardia & heart palpitations triggered by panic attacks. She started on the Diltiazem the day after her visit and tolerated that well, then added the Prozac ~ 9/13/17 and has also been tolerating that well. She has been feeling about 80% better and in general feels she is getting along much better. Decreased mood swings, irritability. Not having the crying spells. Sleeping better. Not feeling as bhargavi, racy or restless. People around her have noticed a more pleasant demeanor. Does not get as emotional or stressed out over things like she was before. No more panic attacks. Heart not feeling racy anymore. Her resting heart rates have come down from the 110-120 range to the 80-90 range. REVIEW OF SYMPTOMS     Review of Systems   Constitutional: Negative. Respiratory: Negative. Cardiovascular: Negative for chest pain and palpitations. Gastrointestinal: Negative. Musculoskeletal:        She is doing PT 2 x a week for her back and she feels it is helping so far. Her insurance will no longer cover the Skelaxin so she is requesting Flexeril which was suggested as an alternative. She only wants it to use at bedtime if needed for the back spasms. Fortunately they are getting a bit better overall w/ PT.  We will reassess this after completion of therapy   Neurological: Negative.            PROBLEM LIST/MEDICAL HISTORY      Problem List  Date Reviewed: 9/28/2017          Codes Class Noted    Depression with anxiety ICD-10-CM: F41.8  ICD-9-CM: 300.4  9/28/2017    Overview Signed 9/28/2017 12:42 PM by Ludivina Torres MD     10/2014             Lumbar facet arthropathy ICD-10-CM: M12.88  ICD-9-CM: 721.3  8/28/2017    Overview Signed 8/28/2017  9:20 PM by Ludivina Torres MD     On CT scan             Heart palpitations ICD-10-CM: R00.2  ICD-9-CM: 785.1  8/16/2017        Spondylosis of lumbar & thoracic region without myelopathy or radiculopathy ICD-10-CM: M47.816  ICD-9-CM: 721.3  10/26/2016    Overview Signed 10/26/2016  8:50 AM by Tristan Andersen MD     xrays 2010             Hypercholesterolemia ICD-10-CM: E78.00  ICD-9-CM: 272.0  8/18/2016        History of peptic ulcer disease ICD-10-CM: Z87.11  ICD-9-CM: V12.71  8/18/2016    Overview Signed 8/18/2016 10:16 AM by Tristan Andersen MD     18 yo             Vitamin D deficiency ICD-10-CM: E55.9  ICD-9-CM: 268.9  Unknown        Obesity, Class I, BMI 30-34.9 ICD-10-CM: E66.9  ICD-9-CM: 278.00  11/18/2015        Post partum depression ICD-10-CM: F53  ICD-9-CM: 648.44, 268  10/9/2014    Overview Signed 10/9/2014 12:24 PM by Tristan Andersen MD     10/2013: counseling and prn Valium             Chronic low back pain & Muscle Spasms ICD-10-CM: M54.5, G89.29  ICD-9-CM: 724.2, 338.29  10/9/2014    Overview Signed 10/9/2014 12:36 PM by Tristan Andersen MD     Muscular              Bilateral carpal tunnel syndrome ICD-10-CM: G56.03  ICD-9-CM: 354.0  12/31/2013    Overview Addendum 12/31/2013 10:28 AM by Tristan Andersen MD     10/2013; wrist splints               Goiter ICD-10-CM: E04.9  ICD-9-CM: 240.9  1/11/2013        History of sinus tachycardia ICD-10-CM: Z86.79  ICD-9-CM: V12.59  10/5/2012    Overview Signed 10/5/2012  9:13 AM by Tristan Andersen MD     Since her early 19's             GERD (gastroesophageal reflux disease) ICD-10-CM: K21.9  ICD-9-CM: 530.81  6/23/2011    Overview Signed 6/23/2011  9:20 AM by Tristan Andersen MD     2011             Dysphagia, workup negative except GERD ICD-10-CM: R13.10  ICD-9-CM: 787.20  12/16/2010    Overview Addendum 6/23/2011  9:21 AM by Tristan Andersen MD     7/2010: Thyroid US normal  ENT eval;   Barium Swallow =GERD only             Anemia ICD-10-CM: D64.9  ICD-9-CM: 285.9 2010        Type II diabetes mellitus, uncontrolled ([facility] [address] ICD-10-CM: E11.65  ICD-9-CM: 250.02  Unknown    Overview Signed 10/5/2012  9:18 AM by Cristi Nicholas MD     Eye exams: VEI  Podiatrist: anabel Velez             Gallstones ICD-10-CM: K80.20  ICD-9-CM: 574.20  Unknown    Overview Signed 3/25/2010 10:31 AM by Cristi Nicholas MD     [facility]normal spontaneous [facility] delivery) ICD-10-CM: O80  ICD-9-CM: 0  Unknown    Overview Signed 3/25/2010 10:31 AM by Cristi Nicholas MD     Son  7yo                       PAST SURGICAL HISTORY       Past Surgical History:   Procedure Laterality Date    HX CARPAL TUNNEL RELEASE Bilateral     HX  SECTION      HX CHOLECYSTECTOMY  10/09    LAP-Gallstones    HX GYN  2015    IUD placed    HX LITHOTRIPSY  2017    HX ORTHOPAEDIC Right 7/10/14    trigger thumb    HX ORTHOPAEDIC Left 7/10/14    wrist         MEDICATIONS      Current Outpatient Prescriptions   Medication Sig    clotrimazole-betamethasone (LOTRISONE) topical cream     ELIDEL 1 % topical cream Apply  to affected area as needed.  Liraglutide (VICTOZA) 0.6 mg/0.1 mL (18 mg/3 mL) pnij 1.8 mg by SubCUTAneous route daily.  biotin 10,000 mcg cap Take  by mouth.  diclofenac potassium (ZIPSOR) 25 mg capsule Take 25 mg by mouth as needed.  ACETAMINOPHEN/DIPHENHYDRAMINE (TYLENOL PM PO) Take  by mouth as needed.  dilTIAZem CD (CARDIZEM CD) 120 mg ER capsule Take 1 Cap by mouth daily.  FLUoxetine (PROZAC) 10 mg tablet Take 1 Tab by mouth daily. Indications: ANXIETY WITH DEPRESSION, PANIC DISORDER    DEXILANT 60 mg CpDB TAKE ONE CAPSULE BY MOUTH ONCE DAILY FOR  REFLUX    levothyroxine (SYNTHROID) 88 mcg tablet Take 1 tablet daily    metFORMIN (GLUCOPHAGE) 1,000 mg tablet TAKE ONE TABLET BY MOUTH TWICE DAILY WITH MEALS    cholecalciferol, vitamin D3, 2,000 unit tab Take  by mouth daily.     levonorgestrel (MIRENA) 20 mcg/24 hr (5 years) IUD 1 Each by IntraUTERine route once.  clindamycin (CLEOCIN) 2 % vaginal cream Insert 1 Applicator into vagina as needed.  CYANOCOBALAMIN, VITAMIN B-12, (VITAMIN B-12 PO) Take  by mouth. No current facility-administered medications for this visit. 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[employer], [employer]    [id number] [place] [address]     Social History Main Topics    Smoking status: Never Smoker    Smokeless tobacco: Never Used    Alcohol use 0.0 oz/week     0 Standard drinks or equivalent per week      Comment: not weekly, every couple of months per pt    Drug use: No    Sexual activity: Yes     Partners: Male     Birth control/ protection: IUD     Other Topics Concern    Caffeine Concern No     seldom any at all    Special Diet No     has attended diabetes classes  and personal dietician in     Exercise No     Social History Narrative    Got  ~ . Has 1 daughter; Long term relationship and has custody of her stepson; her biological son  at age 7yo in 66.   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        IMMUNIZATIONS  Immunization History   Administered Date(s) Administered    DTaP 2013    Hepatitis B Vaccine 2009, 2009, 2010    Influenza Vaccine 10/09/2013, 10/23/2014, 10/16/2015    Influenza Vaccine (Quad) PF 10/26/2016    Influenza Vaccine Split 2010, 2011, 10/05/2012    Pneumococcal Vaccine (Unspecified Type) 2013    TB Skin Test (PPD) Intradermal 2013    TD Vaccine 2007    ZZZ-RETIRED (DO NOT USE) Pneumococcal Vaccine (Unspecified Type) 2007         FAMILY HISTORY     Family History   Problem Relation Age of Onset    Diabetes Mother       ESRD 45 yo in     Hypertension Mother     Stroke Mother      TIA    Heart Disease Mother     High Cholesterol Paternal Grandmother     Hypertension Maternal Grandfather          Diabetes Maternal Grandmother     Hypertension Maternal Grandmother     High Cholesterol Maternal Grandmother     Cancer Maternal Grandmother      Breast CA     Heart Disease Maternal Grandmother       age 79    Colon Cancer Maternal Uncle       at 62yo         VITALS     Visit Vitals    /82 (BP 1 Location: Left arm, BP Patient Position: Sitting)    Pulse 91    Temp 98.4 °F (36.9 °C) (Oral)    Resp 18    Ht 5' 1\" (1.549 m)    Wt 196 lb 3.2 oz (89 kg)    SpO2 98%    BMI 37.07 kg/m2          PHYSICAL EXAMINATION     Physical Exam   Constitutional: She is oriented to person, place, and time and well-developed, well-nourished, and in no distress. Cardiovascular: Regular rhythm. No murmur heard. Pulmonary/Chest: Effort normal and breath sounds normal.   Neurological: She is alert and oriented to person, place, and time. Psychiatric: Mood, affect and judgment normal.   Vitals reviewed.              ASSESSMENT & PLAN       ICD-10-CM ICD-9-CM    1. Depression with anxiety F41.8 300.4    2. Panic attacks F41.0 300.01    3. Sinus tachycardia R00.0 427.89    4.  Heart palpitations R00.2 785.1    Doing much better on new regimen of Prozac 10 mg daily and Diltiazem  mg daily  Reviewed medications and side effects, tolerating current regimen well  No changes for now, making good progress  Follow up 4-6 months, sooner prn

## 2017-09-28 NOTE — PATIENT INSTRUCTIONS
Anxiety Disorder: Care Instructions  Your Care Instructions  Anxiety is a normal reaction to stress. Difficult situations can cause you to have symptoms such as sweaty palms and a nervous feeling. In an anxiety disorder, the symptoms are far more severe. Constant worry, muscle tension, trouble sleeping, nausea and diarrhea, and other symptoms can make normal daily activities difficult or impossible. These symptoms may occur for no reason, and they can affect your work, school, or social life. Medicines, counseling, and self-care can all help. Follow-up care is a key part of your treatment and safety. Be sure to make and go to all appointments, and call your doctor if you are having problems. It's also a good idea to know your test results and keep a list of the medicines you take. How can you care for yourself at home? · Take medicines exactly as directed. Call your doctor if you think you are having a problem with your medicine. · Go to your counseling sessions and follow-up appointments. · Recognize and accept your anxiety. Then, when you are in a situation that makes you anxious, say to yourself, \"This is not an emergency. I feel uncomfortable, but I am not in danger. I can keep going even if I feel anxious. \"  · Be kind to your body:  ¨ Relieve tension with exercise or a massage. ¨ Get enough rest.  ¨ Avoid alcohol, caffeine, nicotine, and illegal drugs. They can increase your anxiety level and cause sleep problems. ¨ Learn and do relaxation techniques. See below for more about these techniques. · Engage your mind. Get out and do something you enjoy. Go to a funny movie, or take a walk or hike. Plan your day. Having too much or too little to do can make you anxious. · Keep a record of your symptoms. Discuss your fears with a good friend or family member, or join a support group for people with similar problems. Talking to others sometimes relieves stress.   · Get involved in social groups, or volunteer to help others. Being alone sometimes makes things seem worse than they are. · Get at least 30 minutes of exercise on most days of the week to relieve stress. Walking is a good choice. You also may want to do other activities, such as running, swimming, cycling, or playing tennis or team sports. Relaxation techniques  Do relaxation exercises 10 to 20 minutes a day. You can play soothing, relaxing music while you do them, if you wish. · Tell others in your house that you are going to do your relaxation exercises. Ask them not to disturb you. · Find a comfortable place, away from all distractions and noise. · Lie down on your back, or sit with your back straight. · Focus on your breathing. Make it slow and steady. · Breathe in through your nose. Breathe out through either your nose or mouth. · Breathe deeply, filling up the area between your navel and your rib cage. Breathe so that your belly goes up and down. · Do not hold your breath. · Breathe like this for 5 to 10 minutes. Notice the feeling of calmness throughout your whole body. As you continue to breathe slowly and deeply, relax by doing the following for another 5 to 10 minutes:  · Tighten and relax each muscle group in your body. You can begin at your toes and work your way up to your head. · Imagine your muscle groups relaxing and becoming heavy. · Empty your mind of all thoughts. · Let yourself relax more and more deeply. · Become aware of the state of calmness that surrounds you. · When your relaxation time is over, you can bring yourself back to alertness by moving your fingers and toes and then your hands and feet and then stretching and moving your entire body. Sometimes people fall asleep during relaxation, but they usually wake up shortly afterward. · Always give yourself time to return to full alertness before you drive a car or do anything that might cause an accident if you are not fully alert.  Never play a relaxation tape while you drive a car. When should you call for help? Call 911 anytime you think you may need emergency care. For example, call if:  · You feel you cannot stop from hurting yourself or someone else. Keep the numbers for these national suicide hotlines: 9-876-442-TALK (0-862.537.1687) and 6-561-OZVJXBG (6-128.604.7443). If you or someone you know talks about suicide or feeling hopeless, get help right away. Watch closely for changes in your health, and be sure to contact your doctor if:  · You have anxiety or fear that affects your life. · You have symptoms of anxiety that are new or different from those you had before. Where can you learn more? Go to http://elizabethg-Nosticskorina.info/. Enter P754 in the search box to learn more about \"Anxiety Disorder: Care Instructions. \"  Current as of: July 26, 2016  Content Version: 11.3  © 1634-6751 [email or web address] Care instructions adapted under license by [employer] (which disclaims liability or warranty for this information). If you have questions about a medical condition or this instruction, always ask your healthcare professional. [place] [address] any warranty or liability for your use of this information. Palpitations: Care Instructions  Your Care Instructions    Heart palpitations are the uncomfortable sensation that your heart is beating fast or irregularly. You might feel pounding or fluttering in your chest. It might feel like your heart is skipping a beat. Although palpitations may be caused by a heart problem, they also occur because of stress, fatigue, or use of alcohol, caffeine, or nicotine. Many medicines, including diet pills, antihistamines, decongestants, and some herbal products, can cause heart palpitations. Nearly everyone has palpitations from time to time. Depending on your symptoms, your doctor may need to do more tests to try to find the cause of your palpitations.   Follow-up care is a key part of your treatment and safety. Be sure to make and go to all appointments, and call your doctor if you are having problems. It's also a good idea to know your test results and keep a list of the medicines you take. How can you care for yourself at home? · Avoid caffeine, nicotine, and excess alcohol. · Do not take illegal drugs, such as methamphetamines and cocaine. · Do not take weight loss or diet medicines unless you talk with your doctor first.  · Get plenty of sleep. · Do not overeat. · If you have palpitations again, take deep breaths and try to relax. This may slow a racing heart. · If you start to feel lightheaded, lie down to avoid injuries that might result if you pass out and fall down. · Keep a record of your palpitations and bring it to your next doctor's appointment. Write down:  ¨ The date and time. ¨ Your pulse. (If your heart is beating fast, it may be hard to count your pulse.)  ¨ What you were doing when the palpitations started. ¨ How long the palpitations lasted. ¨ Any other symptoms. · If an activity causes palpitations, slow down or stop. Talk to your doctor before you do that activity again. · Take your medicines exactly as prescribed. Call your doctor if you think you are having a problem with your medicine. When should you call for help? Call 911 anytime you think you may need emergency care. For example, call if:  · You passed out (lost consciousness). · You have symptoms of a heart attack. These may include:  ¨ Chest pain or pressure, or a strange feeling in the chest.  ¨ Sweating. ¨ Shortness of breath. ¨ Pain, pressure, or a strange feeling in the back, neck, jaw, or upper belly or in one or both shoulders or arms. ¨ Lightheadedness or sudden weakness. ¨ A fast or irregular heartbeat. After you call 911, the  may tell you to chew 1 adult-strength or 2 to 4 low-dose aspirin. Wait for an ambulance. Do not try to drive yourself.   · You have symptoms of a stroke. These may include:  ¨ Sudden numbness, tingling, weakness, or loss of movement in your face, arm, or leg, especially on only one side of your body. ¨ Sudden vision changes. ¨ Sudden trouble speaking. ¨ Sudden confusion or trouble understanding simple statements. ¨ Sudden problems with walking or balance. ¨ A sudden, severe headache that is different from past headaches. Call your doctor now or seek immediate medical care if:  · You have heart palpitations and:  ¨ Are dizzy or lightheaded, or you feel like you may faint. ¨ Have new or increased shortness of breath. Watch closely for changes in your health, and be sure to contact your doctor if:  · You continue to have heart palpitations. Where can you learn more? Go to http://elizabeth-korina.info/. Enter R508 in the search box to learn more about \"Palpitations: Care Instructions. \"  Current as of: April 3, 2017  Content Version: 11.3  © 1371-2831 [employer]. Care instructions adapted under license by [employer] (which disclaims liability or warranty for this information). If you have questions about a medical condition or this instruction, always ask your healthcare professional. [address] any warranty or liability for your use of this information.

## 2017-09-28 NOTE — PROGRESS NOTES
Chief Complaint   Patient presents with    Anxiety     6 week follow up - doing well on med - states she has her days but is doing better for the most part    Depression     1. Have you been to the ER, urgent care clinic since your last visit? Hospitalized since your last visit? No    2. Have you seen or consulted any other health care providers outside of the [address] [place] [address] since your last visit? Include any pap smears or colon screening.  No

## 2017-09-29 ENCOUNTER — HOSPITAL ENCOUNTER (OUTPATIENT)
Dept: PHYSICAL THERAPY | Age: 37
Discharge: HOME | End: 2017-09-29
Payer: COMMERCIAL

## 2017-09-29 PROCEDURE — 97140 MANUAL THERAPY 1/> REGIONS: CPT | Performed by: PHYSICAL THERAPIST

## 2017-09-29 PROCEDURE — 97110 THERAPEUTIC EXERCISES: CPT | Performed by: PHYSICAL THERAPIST

## 2017-09-29 NOTE — PROGRESS NOTES
PT DAILY TREATMENT NOTE 2-15    Patient Name: Marie Reeder  Date:2017  : 1980  [x]  Patient  Verified  Payor: Carlitos Henry / Plan: Elif Coulter / Product Type: HMO /    In time: 8:00 AM  Out time: 9:05  AM  Total Treatment Time (min): 65 (55 timed)  Visit #: 4    Treatment Area: Low back pain [M54.5]    SUBJECTIVE  Pain Level (0-10 scale): 2/10  Any medication changes, allergies to medications, adverse drug reactions, diagnosis change, or new procedure performed?: [x] No    [] Yes (see summary sheet for update)  Subjective functional status/changes:   [] No changes reported  Pt states she is feeling better- \"its still bad at night\"- she slept on her back last night which she feels helped. OBJECTIVE    Modality rationale: decrease inflammation and decrease pain to improve the patients ability to lift, exercise   Min Type Additional Details    [] Estim: []Att   []Unatt        []TENS instruct                  []IFC  []Premod   []NMES                     []Other:  []w/US   []w/ice   []w/heat  Position:  Location:    []  Traction: [] Cervical       []Lumbar                       [] Prone          []Supine                       []Intermittent   []Continuous Lbs:  [] before manual  [] after manual  []w/heat    []  Ultrasound: []Continuous   [] Pulsed at:                            []1MHz   []3MHz Location:  W/cm2:    []  Paraffin         Location:  []w/heat   10 [x]  Ice     []  Heat  []  Ice massage Position: supine, LE's elevated  Location: lumbar spine    []  Laser  []  Other: Position:  Location:    []  Vasopneumatic Device Pressure:       [] lo [] med [] hi   Temperature:    [x] Skin assessment post-treatment:  [x]intact []redness- no adverse reaction    []redness  adverse reaction:     40 min Therapeutic Exercise:  [x] See flow sheet : Reviewed HEP.  Added Recumbent elliptical, TG squat with ab bracing, mod lat pulldown, SKTC   Rationale: increase ROM and increase strength to improve the patients ability to perform ADL's without LBP      15 min Manual Therapy:  Long axis hip distraction on L  PA mobs bilat LUDIN to correct sacral torsion  STM L QL, L piriformis   [id number]: decrease pain, increase tissue extensibility and decrease trigger points  to improve the patients ability to exercise          With   [x] TE   [] TA   [] neuro   [] other: Patient Education: [x] Review HEP    [] Progressed/Changed HEP based on:   [] positioning   [] body mechanics   [] transfers   [x] heat/ice application    [] other:      Other Objective/Functional Measures: Mod TTP L>R QL     Pain Level (0-10 scale) post treatment: 2/10    ASSESSMENT/Changes in Function:   Pt with inc'd TTP over piriformis and QL today. Reviewed with pt appropriate exercises to be performing at the gym and which machines to avoid at this time. Patient will continue to benefit from skilled PT services to modify and progress therapeutic interventions, address functional mobility deficits, address ROM deficits, address strength deficits, analyze and address soft tissue restrictions, analyze and cue movement patterns and analyze and modify body mechanics/ergonomics to attain remaining goals. []  See Plan of Care  []  See progress note/recertification  []  See Discharge Summary         Progress towards goals / Updated goals:  Short Term Goals: To be accomplished in 2-3 weeks:  1) Pt will be independent in initial HEP  2) Pt will report > or =25% decrease in exacerbation of symptoms     Long Term Goals:  To be accomplished in 6-8 weeks:  1) Pt will improve bilat hip abd strength > or = 4/5 in order to aide in exericse  2) Pt will perform R SLS for at least 10 sec without hip drop and without symptoms  3) Pt will increase FOTO score to at least 73/100 in order to demonstrate improvement in functional mobility  4) Pt will report return to exercising     PLAN  []  Upgrade activities as tolerated     [x]  Continue plan of care  []  Update interventions per flow sheet       []  Discharge due to:_  []  Other:_      Matthew Julian, PT 9/29/2017  8:02 AM

## 2017-10-03 ENCOUNTER — HOSPITAL ENCOUNTER (OUTPATIENT)
Dept: PHYSICAL THERAPY | Age: 37
Discharge: HOME | End: 2017-10-03
Payer: COMMERCIAL

## 2017-10-03 DIAGNOSIS — R00.2 HEART PALPITATIONS: ICD-10-CM

## 2017-10-03 DIAGNOSIS — R00.0 SINUS TACHYCARDIA: ICD-10-CM

## 2017-10-03 PROCEDURE — 97140 MANUAL THERAPY 1/> REGIONS: CPT | Performed by: PHYSICAL THERAPIST

## 2017-10-03 PROCEDURE — 97110 THERAPEUTIC EXERCISES: CPT | Performed by: PHYSICAL THERAPIST

## 2017-10-03 NOTE — PROGRESS NOTES
PT DAILY TREATMENT NOTE 2-15    Patient Name: Marie Reeder  Date:10/3/2017  : 1980  [x]  Patient  Verified  Payor: Carlitos Henry / Plan: Elif Coulter / Product Type: HMO /    In time: 7:35 AM  Out time: 8:40 AM  Total Treatment Time (min): 65 (55 timed)  Visit #: 5    Treatment Area: Low back pain [M54.5]    SUBJECTIVE  Pain Level (0-10 scale): 1/10  Any medication changes, allergies to medications, adverse drug reactions, diagnosis change, or new procedure performed?: [x] No    [] Yes (see summary sheet for update)  Subjective functional status/changes:   [] No changes reported  \"I've only have two bad nights since I saw you last.\" She states she went to the gym on Saturday- \"I was sore Saturday night but was okay\". She has been doing her stretches prior to going to work which has helped.       OBJECTIVE    Modality rationale: decrease inflammation and decrease pain to improve the patients ability to lift, exercise   Min Type Additional Details    [] Estim: []Att   []Unatt        []TENS instruct                  []IFC  []Premod   []NMES                     []Other:  []w/US   []w/ice   []w/heat  Position:  Location:    []  Traction: [] Cervical       []Lumbar                       [] Prone          []Supine                       []Intermittent   []Continuous Lbs:  [] before manual  [] after manual  []w/heat    []  Ultrasound: []Continuous   [] Pulsed at:                            []1MHz   []3MHz Location:  W/cm2:    []  Paraffin         Location:  []w/heat   10 [x]  Ice     []  Heat  []  Ice massage Position: supine, LE's elevated  Location: lumbar spine    []  Laser  []  Other: Position:  Location:    []  Vasopneumatic Device Pressure:       [] lo [] med [] hi   Temperature:    [x] Skin assessment post-treatment:  [x]intact []redness- no adverse reaction    []redness  adverse reaction:     40 min Therapeutic Exercise:  [x] See flow sheet :    Rationale: increase ROM and increase strength to improve the patients ability to perform ADL's without LBP      15 min Manual Therapy:  Long axis hip distraction on L  PA mobs bilat LUDIN to correct sacral torsion  STM L QL, L piriformis   [id number]: decrease pain, increase tissue extensibility and decrease trigger points  to improve the patients ability to exercise          With   [x] TE   [] TA   [] neuro   [] other: Patient Education: [x] Review HEP    [] Progressed/Changed HEP based on:   [] positioning   [] body mechanics   [] transfers   [x] heat/ice application    [] other:      Other Objective/Functional Measures: Mod TTP L>R QL     Pain Level (0-10 scale) post treatment: 4/10    ASSESSMENT/Changes in Function:   Pt demonstrates soreness after manual therapy over L QL today. Encouraged compliance with ice and stretching. Patient will continue to benefit from skilled PT services to modify and progress therapeutic interventions, address functional mobility deficits, address ROM deficits, address strength deficits, analyze and address soft tissue restrictions, analyze and cue movement patterns and analyze and modify body mechanics/ergonomics to attain remaining goals. []  See Plan of Care  []  See progress note/recertification  []  See Discharge Summary         Progress towards goals / Updated goals:  Short Term Goals: To be accomplished in 2-3 weeks:  1) Pt will be independent in initial HEP  2) Pt will report > or =25% decrease in exacerbation of symptoms     Long Term Goals:  To be accomplished in 6-8 weeks:  1) Pt will improve bilat hip abd strength > or = 4/5 in order to aide in exericse  2) Pt will perform R SLS for at least 10 sec without hip drop and without symptoms  3) Pt will increase FOTO score to at least 73/100 in order to demonstrate improvement in functional mobility  4) Pt will report return to exercising     PLAN  []  Upgrade activities as tolerated     [x]  Continue plan of care  []  Update interventions per flow sheet       [] Discharge due to:_  []  Other:_      Alysia Found, PT 10/3/2017  7:49 AM

## 2017-10-04 RX ORDER — DILTIAZEM HYDROCHLORIDE 120 MG/1
120 CAPSULE, COATED, EXTENDED RELEASE ORAL DAILY
Qty: 90 CAP | Refills: 1 | Status: SHIPPED | OUTPATIENT
Start: 2017-10-04 | End: 2018-02-07 | Stop reason: DRUGHIGH

## 2017-10-06 ENCOUNTER — HOSPITAL ENCOUNTER (OUTPATIENT)
Dept: PHYSICAL THERAPY | Age: 37
Discharge: HOME | End: 2017-10-06
Payer: COMMERCIAL

## 2017-10-06 PROCEDURE — 97140 MANUAL THERAPY 1/> REGIONS: CPT | Performed by: PHYSICAL THERAPIST

## 2017-10-06 PROCEDURE — 97110 THERAPEUTIC EXERCISES: CPT | Performed by: PHYSICAL THERAPIST

## 2017-10-06 NOTE — PROGRESS NOTES
PT DAILY TREATMENT NOTE 2-15    Patient Name: Natalia Crum  Date:10/6/2017  : 1980  [x]  Patient  Verified  Payor: Magdiel Avila / Plan: Norma Benjamin / Product Type: HMO /    In time: 7:35 AM  Out time: 8:25 AM  Total Treatment Time (min): 50 (50 timed)  Visit #: 6    Treatment Area: Low back pain [M54.5]    SUBJECTIVE  Pain Level (0-10 scale): 0/10 \"only when I touch it\"  Any medication changes, allergies to medications, adverse drug reactions, diagnosis change, or new procedure performed?: [x] No    [] Yes (see summary sheet for update)  Subjective functional status/changes:   [] No changes reported  \"I'm so glad she changed the muscle relaxer- it doesn't make me tired\". She states that doing the stretches and icing has helped.  \"It just feels like a muscle ache when I touch it\"    OBJECTIVE    Modality rationale: decrease inflammation and decrease pain to improve the patients ability to lift, exercise   Min Type Additional Details    [] Estim: []Att   []Unatt        []TENS instruct                  []IFC  []Premod   []NMES                     []Other:  []w/US   []w/ice   []w/heat  Position:  Location:    []  Traction: [] Cervical       []Lumbar                       [] Prone          []Supine                       []Intermittent   []Continuous Lbs:  [] before manual  [] after manual  []w/heat    []  Ultrasound: []Continuous   [] Pulsed at:                            []1MHz   []3MHz Location:  W/cm2:    []  Paraffin         Location:  []w/heat   Pt declined [x]  Ice     []  Heat  []  Ice massage Position: supine, LE's elevated  Location: lumbar spine    []  Laser  []  Other: Position:  Location:    []  Vasopneumatic Device Pressure:       [] lo [] med [] hi   Temperature:    [x] Skin assessment post-treatment:  [x]intact []redness- no adverse reaction    []redness  adverse reaction:     40 min Therapeutic Exercise:  [x] See flow sheet :    Rationale: increase ROM and increase strength to improve the patients ability to perform ADL's without LBP      10 min Manual Therapy:  Long axis hip distraction on L  PA mobs bilat LUDIN to correct sacral torsion  STM L QL, L piriformis   [id number]: decrease pain, increase tissue extensibility and decrease trigger points  to improve the patients ability to exercise          With   [x] TE   [] TA   [] neuro   [] other: Patient Education: [x] Review HEP    [] Progressed/Changed HEP based on:   [] positioning   [] body mechanics   [] transfers   [x] heat/ice application    [] other:      Other Objective/Functional Measures: MinTTP L>R QL     Pain Level (0-10 scale) post treatment: 0/10    ASSESSMENT/Changes in Function:   Minimal TTP over L QL today-- significant improvement compared to past visits. She has been compliant with HEP. Reviewed appropriate exercises at the gym. Overall progressing well. Patient will continue to benefit from skilled PT services to modify and progress therapeutic interventions, address functional mobility deficits, address ROM deficits, address strength deficits, analyze and address soft tissue restrictions, analyze and cue movement patterns and analyze and modify body mechanics/ergonomics to attain remaining goals. []  See Plan of Care  []  See progress note/recertification  []  See Discharge Summary         Progress towards goals / Updated goals:  Short Term Goals: To be accomplished in 2-3 weeks:  1) Pt will be independent in initial HEP  2) Pt will report > or =25% decrease in exacerbation of symptoms     Long Term Goals:  To be accomplished in 6-8 weeks:  1) Pt will improve bilat hip abd strength > or = 4/5 in order to aide in exericse  2) Pt will perform R SLS for at least 10 sec without hip drop and without symptoms  3) Pt will increase FOTO score to at least 73/100 in order to demonstrate improvement in functional mobility  4) Pt will report return to exercising     PLAN  []  Upgrade activities as tolerated     [x]  Continue plan of care  []  Update interventions per flow sheet       []  Discharge due to:_  []  Other:_      Jamie Medina PT 10/6/2017  7:39 AM

## 2017-10-10 ENCOUNTER — HOSPITAL ENCOUNTER (OUTPATIENT)
Dept: PHYSICAL THERAPY | Age: 37
Discharge: HOME | End: 2017-10-10
Payer: COMMERCIAL

## 2017-10-10 PROCEDURE — 97140 MANUAL THERAPY 1/> REGIONS: CPT | Performed by: PHYSICAL THERAPIST

## 2017-10-10 PROCEDURE — 97110 THERAPEUTIC EXERCISES: CPT | Performed by: PHYSICAL THERAPIST

## 2017-10-10 NOTE — PROGRESS NOTES
PT DAILY TREATMENT NOTE 2-15    Patient Name: Ben Garcia  Date:10/10/2017  : 1980  [x]  Patient  Verified  Payor: Cecilio Bills / Plan: Sandeep Pat / Product Type: HMO /    In time: 7:30 AM  Out time: 8:25 AM  Total Treatment Time (min): 55 (55 timed)  Visit #: 7    Treatment Area: Low back pain [M54.5]    SUBJECTIVE  Pain Level (0-10 scale): 2/10 \"only when I touch it\"  Any medication changes, allergies to medications, adverse drug reactions, diagnosis change, or new procedure performed?: [x] No    [] Yes (see summary sheet for update)  Subjective functional status/changes:   [] No changes reported  Pt states she has not taken the muscle relaxer. She reports 85% improvement in symptoms.  \"The only bad day I've had since I saw you last was Saturday evening, not sure what I did to aggravate it\"    OBJECTIVE    Modality rationale: decrease inflammation and decrease pain to improve the patients ability to lift, exercise   Min Type Additional Details    [] Estim: []Att   []Unatt        []TENS instruct                  []IFC  []Premod   []NMES                     []Other:  []w/US   []w/ice   []w/heat  Position:  Location:    []  Traction: [] Cervical       []Lumbar                       [] Prone          []Supine                       []Intermittent   []Continuous Lbs:  [] before manual  [] after manual  []w/heat    []  Ultrasound: []Continuous   [] Pulsed at:                            []1MHz   []3MHz Location:  W/cm2:    []  Paraffin         Location:  []w/heat   Pt declined [x]  Ice     []  Heat  []  Ice massage Position: supine, LE's elevated  Location: lumbar spine    []  Laser  []  Other: Position:  Location:    []  Vasopneumatic Device Pressure:       [] lo [] med [] hi   Temperature:    [x] Skin assessment post-treatment:  [x]intact []redness- no adverse reaction    []redness  adverse reaction:     45 min Therapeutic Exercise:  [x] See flow sheet :    Rationale: increase ROM and increase strength to improve the patients ability to perform ADL's without LBP      10 min Manual Therapy:  Long axis hip distraction on L  PA mobs bilat LUDIN to correct sacral torsion  STM L QL, L piriformis   [id number]: decrease pain, increase tissue extensibility and decrease trigger points  to improve the patients ability to exercise          With   [x] TE   [] TA   [] neuro   [] other: Patient Education: [x] Review HEP    [] Progressed/Changed HEP based on:   [] positioning   [] body mechanics   [] transfers   [x] heat/ice application    [] other:      Other Objective/Functional Measures: MinTTP L>R QL     Pain Level (0-10 scale) post treatment: 1/10    ASSESSMENT/Changes in Function:   Dec'd TTP over QL today. Pt tolerated all exercises today without inc'd symptoms. Updated HEP to include sidestepping with yellow TB. Patient will continue to benefit from skilled PT services to modify and progress therapeutic interventions, address functional mobility deficits, address ROM deficits, address strength deficits, analyze and address soft tissue restrictions, analyze and cue movement patterns and analyze and modify body mechanics/ergonomics to attain remaining goals. []  See Plan of Care  []  See progress note/recertification  []  See Discharge Summary         Progress towards goals / Updated goals:  Short Term Goals: To be accomplished in 2-3 weeks:  1) Pt will be independent in initial HEP MET  2) Pt will report > or =25% decrease in exacerbation of symptoms MET     Long Term Goals:  To be accomplished in 6-8 weeks:  1) Pt will improve bilat hip abd strength > or = 4/5 in order to aide in exericse  2) Pt will perform R SLS for at least 10 sec without hip drop and without symptoms  3) Pt will increase FOTO score to at least 73/100 in order to demonstrate improvement in functional mobility  4) Pt will report return to exercising     PLAN  []  Upgrade activities as tolerated     [x]  Continue plan of care  []  Update interventions per flow sheet       []  Discharge due to:_  []  Other:_      Telma Hathaway, SARAH 10/10/2017  7:44 AM

## 2017-10-11 ENCOUNTER — TELEPHONE (OUTPATIENT)
Dept: ENDOCRINOLOGY | Age: 37
End: 2017-10-11

## 2017-10-11 NOTE — TELEPHONE ENCOUNTER
----- Message from Char Mendez MD sent at 10/11/2017  2:27 PM EDT -----  That's perfect. Thanks [facility].  ----- Message -----     From: Vicente Umair     Sent: 10/11/2017   2:03 PM       To: MD Dr. Neli Trujillo,  I am so sorry this patient was not scheduled. Thank you for letting me know. I can see notes that show that [facility] staff left messages for this patient on 9/14/17, 10/2/17, and 10/6/2017 with no response back from the patient. I was able to reach this patient today and she is scheduled at [facility] on 10/16/17 at 3:30pm.  Please let me know if you have any other questions or concerns. [facility]   ----- Message -----     From: Char Mendez MD     Sent: 10/10/2017  10:49 PM       To: Char Mendez MD, [facility],    I had put a referral in the system for this patient to be seen for nutritional counseling and she said she never received a call for an appointment. Can you look into this? I know I put her in for the [facility] location but I don't know who to contact there.      Thanks,  Jered Rawls

## 2017-10-13 ENCOUNTER — APPOINTMENT (OUTPATIENT)
Dept: PHYSICAL THERAPY | Age: 37
End: 2017-10-13
Payer: COMMERCIAL

## 2017-10-17 ENCOUNTER — HOSPITAL ENCOUNTER (OUTPATIENT)
Dept: PHYSICAL THERAPY | Age: 37
Discharge: HOME | End: 2017-10-17
Payer: COMMERCIAL

## 2017-10-17 PROCEDURE — 97110 THERAPEUTIC EXERCISES: CPT | Performed by: PHYSICAL THERAPIST

## 2017-10-17 PROCEDURE — 97140 MANUAL THERAPY 1/> REGIONS: CPT | Performed by: PHYSICAL THERAPIST

## 2017-10-17 NOTE — PROGRESS NOTES
PT DAILY TREATMENT NOTE 2-15    Patient Name: Pradip Rivera  Date:10/17/2017  : 1980  [x]  Patient  Verified  Payor: Miguel Lora  / Plan: Syed Netters / Product Type: HMO /    In time: 7:35 AM  Out time: 8:25 AM  Total Treatment Time (min): 50 (50 timed)  Visit #: 8    Treatment Area: Low back pain [M54.5]    SUBJECTIVE  Pain Level (0-10 scale): \"stiff\"/10  Any medication changes, allergies to medications, adverse drug reactions, diagnosis change, or new procedure performed?: [x] No    [] Yes (see summary sheet for update)  Subjective functional status/changes:   [] No changes reported  \"I shouldn't have missed my appointment on Friday. I was crawling out of bed on Saturday with tears it was hurting so bad. But today its just the normal morning stiffness.  I've been really busy running around the past week so maybe that flared it up\"  \"It's still better than it was\"    OBJECTIVE    Modality rationale: decrease inflammation and decrease pain to improve the patients ability to lift, exercise   Min Type Additional Details    [] Estim: []Att   []Unatt        []TENS instruct                  []IFC  []Premod   []NMES                     []Other:  []w/US   []w/ice   []w/heat  Position:  Location:    []  Traction: [] Cervical       []Lumbar                       [] Prone          []Supine                       []Intermittent   []Continuous Lbs:  [] before manual  [] after manual  []w/heat    []  Ultrasound: []Continuous   [] Pulsed at:                            []1MHz   []3MHz Location:  W/cm2:    []  Paraffin         Location:  []w/heat   Pt declined [x]  Ice     []  Heat  []  Ice massage Position: supine, LE's elevated  Location: lumbar spine    []  Laser  []  Other: Position:  Location:    []  Vasopneumatic Device Pressure:       [] lo [] med [] hi   Temperature:    [x] Skin assessment post-treatment:  [x]intact []redness- no adverse reaction    []redness  adverse reaction:     40 min Therapeutic Exercise:  [x] See flow sheet :    Rationale: increase ROM and increase strength to improve the patients ability to perform ADL's without LBP      10 min Manual Therapy:  Long axis hip distraction on L  PA mobs bilat LUDIN to correct sacral torsion  STM L QL, L piriformis  MET to correct anterior rotation on L   [id number]: decrease pain, increase tissue extensibility and decrease trigger points  to improve the patients ability to exercise          With   [x] TE   [] TA   [] neuro   [] other: Patient Education: [x] Review HEP    [] Progressed/Changed HEP based on:   [] positioning   [] body mechanics   [] transfers   [x] heat/ice application    [] other:      Other Objective/Functional Measures: Mod TTP L>R QL     Pain Level (0-10 scale) post treatment: 0/10    ASSESSMENT/Changes in Function:   Pt with dec'd pain levels after manual therapy. Reassessment next visit. Patient will continue to benefit from skilled PT services to modify and progress therapeutic interventions, address functional mobility deficits, address ROM deficits, address strength deficits, analyze and address soft tissue restrictions, analyze and cue movement patterns and analyze and modify body mechanics/ergonomics to attain remaining goals. []  See Plan of Care  []  See progress note/recertification  []  See Discharge Summary         Progress towards goals / Updated goals:  Short Term Goals: To be accomplished in 2-3 weeks:  1) Pt will be independent in initial HEP MET  2) Pt will report > or =25% decrease in exacerbation of symptoms MET     Long Term Goals:  To be accomplished in 6-8 weeks:  1) Pt will improve bilat hip abd strength > or = 4/5 in order to aide in exericse  2) Pt will perform R SLS for at least 10 sec without hip drop and without symptoms  3) Pt will increase FOTO score to at least 73/100 in order to demonstrate improvement in functional mobility  4) Pt will report return to exercising     PLAN  []  Upgrade activities as tolerated     [x]  Continue plan of care  []  Update interventions per flow sheet       []  Discharge due to:_  []  Other:_      Carlos Hall, SARAH 10/17/2017  7:41 AM

## 2017-10-20 ENCOUNTER — HOSPITAL ENCOUNTER (OUTPATIENT)
Dept: PHYSICAL THERAPY | Age: 37
Discharge: HOME | End: 2017-10-20
Payer: COMMERCIAL

## 2017-10-20 PROCEDURE — 97110 THERAPEUTIC EXERCISES: CPT | Performed by: PHYSICAL THERAPIST

## 2017-10-20 PROCEDURE — 97140 MANUAL THERAPY 1/> REGIONS: CPT | Performed by: PHYSICAL THERAPIST

## 2017-10-20 NOTE — PROGRESS NOTES
PT DAILY TREATMENT/Progress NOTE 2-15    Patient Name: Christiane Mao  Date:10/20/2017  : 1980  [x]  Patient  Verified  Payor: Zach Vinson / Plan: Judge Roberto / Product Type: HMO /    In time: 7:40 AM  Out time: 8:35 AM  Total Treatment Time (min): 55 (55 timed)  Visit #: 9    Treatment Area: Low back pain [M54.5]    SUBJECTIVE  Pain Level (0-10 scale): 2/10  Any medication changes, allergies to medications, adverse drug reactions, diagnosis change, or new procedure performed?: [x] No    [] Yes (see summary sheet for update)  Subjective functional status/changes:   [] No changes reported  \"It's much better than it was. I don't have the pain during work anymore and I haven't had to use the ice at work. \" She states the pain/ache is still there in the morning and she will occasionally wake up in the middle of the night with high pain-- \"on Tuesday night I was lying in bed with my daughter and she has a twin bed so maybe that was the reason but it was really hurting\"    OBJECTIVE    Modality rationale: decrease inflammation and decrease pain to improve the patients ability to lift, exercise   Min Type Additional Details    [] Estim: []Att   []Unatt        []TENS instruct                  []IFC  []Premod   []NMES                     []Other:  []w/US   []w/ice   []w/heat  Position:  Location:    []  Traction: [] Cervical       []Lumbar                       [] Prone          []Supine                       []Intermittent   []Continuous Lbs:  [] before manual  [] after manual  []w/heat    []  Ultrasound: []Continuous   [] Pulsed at:                            []1MHz   []3MHz Location:  W/cm2:    []  Paraffin         Location:  []w/heat   Pt declined [x]  Ice     []  Heat  []  Ice massage Position: supine, LE's elevated  Location: lumbar spine    []  Laser  []  Other: Position:  Location:    []  Vasopneumatic Device Pressure:       [] lo [] med [] hi   Temperature:    [x] Skin assessment post-treatment:  [x]intact []redness- no adverse reaction    []redness  adverse reaction:     45 min Therapeutic Exercise:  [x] See flow sheet : Reassessment performed. Reviewed HEP   Rationale: increase ROM and increase strength to improve the patients ability to perform ADL's without LBP      10 min Manual Therapy:    ERNESTO hauserat LUDIN to correct sacral torsion  STM L QL, L piriformis  MET to correct anterior rotation on L   [id number]: decrease pain, increase tissue extensibility and decrease trigger points  to improve the patients ability to exercise          With   [x] TE   [] TA   [] neuro   [] other: Patient Education: [x] Review HEP    [] Progressed/Changed HEP based on:   [] positioning   [] body mechanics   [] transfers   [x] heat/ice application    [] other:      Other Objective/Functional Measures: Mod TTP L>R QL    Strength:       L(0-5) R (0-5) Comments   Hip Flexion (L1,2) 4+ 4+ P! With R hip flexion    Knee Extension (L3,4) 5 5     Knee Flexion (S1,2) 5 5     Ankle Dorsiflexion (L4) 5 5               SLR 4 4 P! On L   Sidelying hip abduction 4 4        Active Movements:  ROM  AROM Comments:pain, area   Forward flexion  Fingers to ankles \"stretch\"   Extension  WNL No symptoms   Seated Rotation right NT --   Seated Rotation left NT --   SB right Superior to patella No change   SB left Superior to patella p! Pain Level (0-10 scale) post treatment: 0/10    ASSESSMENT/Changes in Function:   After manual therapy, retested lumbar AROM-- no symptoms with forward flexion or L sidebending. Pt has completed 9 skilled PT visits. She reports 80% improvement since beginning therapy, however pain persists AM>PM and at night. Encouraged pt to trial with sleeping on R side and to monitor symptoms. Pt demonstrates improvement in LE strength. Pt to continue therapy 1x/wk for additional 3-4 weeks to meet remaining PT goals. Patient will continue to benefit from skilled PT services to modify and progress therapeutic interventions, address functional mobility deficits, address ROM deficits, address strength deficits, analyze and address soft tissue restrictions, analyze and cue movement patterns and analyze and modify body mechanics/ergonomics to attain remaining goals. []  See Plan of Care  []  See progress note/recertification  []  See Discharge Summary         Progress towards goals / Updated goals:  Short Term Goals: To be accomplished in 2-3 weeks:  1) Pt will be independent in initial HEP MET  2) Pt will report > or =25% decrease in exacerbation of symptoms MET     Long Term Goals:  To be accomplished in 6-8 weeks:  1) Pt will improve bilat hip abd strength > or = 4/5 in order to aide in exericse MET  2) Pt will perform R SLS for at least 10 sec without hip drop and without symptoms not assessed  3) Pt will increase FOTO score to at least 73/100 in order to demonstrate improvement in functional mobility not assessed  4) Pt will report return to exercising  progressing    PLAN  []  Upgrade activities as tolerated     [x]  Continue plan of care  []  Update interventions per flow sheet       []  Discharge due to:_  [x]  Other:_   1x/wk for additional 3-4 weeks    Kanwal Vogel, PT 10/20/2017  8:00 AM

## 2017-10-26 ENCOUNTER — HOSPITAL ENCOUNTER (OUTPATIENT)
Dept: DIABETES SERVICES | Age: 37
Discharge: HOME | End: 2017-10-26
Payer: COMMERCIAL

## 2017-10-26 PROCEDURE — G0108 DIAB MANAGE TRN  PER INDIV: HCPCS | Performed by: DIETITIAN, REGISTERED

## 2017-10-26 NOTE — DIABETES MGMT
DTC Note  10/26/2017      Your patient attended an individual meal planning education session at [facility] where the following topics were covered today:  *Incorporating nutritional management into lifestyle  *Incorporating physical activity into lifestyle  *Developing personal strategies to address psychosocial issues and concerns  *Developing personal strategies to promote health and behavior change  *Monitoring blood glucose and other parametners and interpreting and using the results   for self-management decision making  *Preventing, dectecting and treating acute complications  *Using medication(s) safely and for maximum therapeutic effectiveness    Comments: Pt seen one on one for help with meal planning. Current weight is 201#. Pt reports she is on Victoza once a day and Metformin 1000 mg BID. She had an A1c on 9-5-17 of 8.1%. She is testing blood sugars once a day. She wants help with meal planning for weight loss. She is currently in PT for her back so is limited with her exercise. She is currently eating:  B- breakfast sandwich from a fast food restaurant and coffee with sugar sub  L- usually skips and snacks on chips, chocolate and nuts  D- eat a bowl of sugary cereal with 2% milk and usually eats it late at night  Beverages- coffee with sugar sub, water and diet soda  We discussed the importance of eating 3 meals per day and not skipping meals. We discussed going no longer then 4-5 hours between meals. She is already only drinking sugar free beverages choices. We discussed which foods impact blood sugars and which do not and reviewed how to read a nutrition label. I provided her with a meal planning book which we reviewed to use when a nutrition label is not available. We discussed aiming for about 45 grams of carbs at meals and 15-30 grams if she eats a snack.  We discussed using the plate method to plan meals with 1/2 of the plate as non-starchy vegetables, 1/4 of the plate as lean protein and 1/4 of the plate as starch/carbs. We discussed the importance of having lean protein at every meal.  Offered to schedule pt a follow up appointment but she wants to see how she does and she will call to schedule if she feels like she needs follow. Pt was provided with my name and phone number for questions. We look forward to assisting your patient in meeting their self-management goals. If you have any questions, please do not hesitate to call the [facility] at [phone].   Sincerely,     Chandler Santos RD, [address]  Phone: [phone] Fax: [phone]

## 2017-10-31 ENCOUNTER — HOSPITAL ENCOUNTER (OUTPATIENT)
Dept: PHYSICAL THERAPY | Age: 37
Discharge: HOME | End: 2017-10-31
Payer: COMMERCIAL

## 2017-10-31 PROCEDURE — 97140 MANUAL THERAPY 1/> REGIONS: CPT | Performed by: PHYSICAL THERAPIST

## 2017-10-31 PROCEDURE — 97110 THERAPEUTIC EXERCISES: CPT | Performed by: PHYSICAL THERAPIST

## 2017-10-31 NOTE — PROGRESS NOTES
PT DAILY TREATMENT NOTE 2-15    Patient Name: Christiane Mao  Date:10/31/2017  : 1980  [x]  Patient  Verified  Payor: Zach Vinson / Plan: Judge Roberto / Product Type: HMO /    In time: 8:30 AM  Out time: 9:20 AM  Total Treatment Time (min): 50 (50 timed)  Visit #: 10    Treatment Area: Low back pain [M54.5]    SUBJECTIVE  Pain Level (0-10 scale): 2/10 \"sore\"  Any medication changes, allergies to medications, adverse drug reactions, diagnosis change, or new procedure performed?: [x] No    [] Yes (see summary sheet for update)  Subjective functional status/changes:   [] No changes reported  Pt states that she has had 3 \"bad nights\" since last PT visit. \"I don't know what triggers it, but sometimes I don't have any pain at all\". She states one day she traveled in a car and then walked all day- \"it was bad that night, but yesterday I had the day off of work and barely did anything and I had another bad night last night. \" She states overall the pain is much better than when she initially started PT but it will come and go.     OBJECTIVE    Modality rationale: decrease inflammation and decrease pain to improve the patients ability to lift, exercise   Min Type Additional Details    [] Estim: []Att   []Unatt        []TENS instruct                  []IFC  []Premod   []NMES                     []Other:  []w/US   []w/ice   []w/heat  Position:  Location:    []  Traction: [] Cervical       []Lumbar                       [] Prone          []Supine                       []Intermittent   []Continuous Lbs:  [] before manual  [] after manual  []w/heat    []  Ultrasound: []Continuous   [] Pulsed at:                            []1MHz   []3MHz Location:  W/cm2:    []  Paraffin         Location:  []w/heat   Pt declined [x]  Ice     []  Heat  []  Ice massage Position: supine, LE's elevated  Location: lumbar spine    []  Laser  []  Other: Position:  Location:    []  Vasopneumatic Device Pressure:       [] lo [] med [] hi Temperature:    [x] Skin assessment post-treatment:  [x]intact []redness- no adverse reaction    []redness  adverse reaction:     40 min Therapeutic Exercise:  [x] See flow sheet : added SWB multifidus alt UE, sidelying hip abduction. Reviewed HEP   Rationale: increase ROM and increase strength to improve the patients ability to perform ADL's without LBP      10 min Manual Therapy:    PA mobs bilat LUDIN to correct sacral torsion  STM L QL, L piriformis  MET to correct anterior rotation on L  L hip long axis distraction; L hip lateral mobs    [id number]: decrease pain, increase tissue extensibility and decrease trigger points  to improve the patients ability to exercise          With   [x] TE   [] TA   [] neuro   [] other: Patient Education: [x] Review HEP    [] Progressed/Changed HEP based on:   [] positioning   [] body mechanics   [] transfers   [x] heat/ice application    [] other:      Other Objective/Functional Measures: na    Pain Level (0-10 scale) post treatment: 0/10    ASSESSMENT/Changes in Function:   Dec'd symptoms after manual techniques. Overall tolerated therapy session well. Encouraged pt to keep journal in order to help determine aggravating factors. Patient will continue to benefit from skilled PT services to modify and progress therapeutic interventions, address functional mobility deficits, address ROM deficits, address strength deficits, analyze and address soft tissue restrictions, analyze and cue movement patterns and analyze and modify body mechanics/ergonomics to attain remaining goals. []  See Plan of Care  []  See progress note/recertification  []  See Discharge Summary         Progress towards goals / Updated goals:  Short Term Goals: To be accomplished in 2-3 weeks:  1) Pt will be independent in initial HEP MET  2) Pt will report > or =25% decrease in exacerbation of symptoms MET     Long Term Goals:  To be accomplished in 6-8 weeks:  1) Pt will improve bilat hip abd strength > or = 4/5 in order to aide in exericse MET  2) Pt will perform R SLS for at least 10 sec without hip drop and without symptoms not assessed  3) Pt will increase FOTO score to at least 73/100 in order to demonstrate improvement in functional mobility not assessed  4) Pt will report return to exercising  progressing    PLAN  []  Upgrade activities as tolerated     [x]  Continue plan of care  []  Update interventions per flow sheet       []  Discharge due to:_  [x]  Other:_   1x/wk for additional 3-4 weeks    Bella Mitchell PT 10/31/2017  8:43 AM

## 2017-11-02 ENCOUNTER — OFFICE VISIT (OUTPATIENT)
Dept: FAMILY MEDICINE CLINIC | Age: 37
End: 2017-11-02

## 2017-11-02 VITALS
SYSTOLIC BLOOD PRESSURE: 126 MMHG | RESPIRATION RATE: 15 BRPM | DIASTOLIC BLOOD PRESSURE: 84 MMHG | TEMPERATURE: 98.8 F | BODY MASS INDEX: 37.72 KG/M2 | WEIGHT: 199.8 LBS | HEIGHT: 61 IN | HEART RATE: 99 BPM | OXYGEN SATURATION: 94 %

## 2017-11-02 DIAGNOSIS — Z23 ENCOUNTER FOR IMMUNIZATION: ICD-10-CM

## 2017-11-02 DIAGNOSIS — R22.32 MASS OF ARM, LEFT: Primary | ICD-10-CM

## 2017-11-02 RX ORDER — FLUOCINONIDE 0.5 MG/G
CREAM TOPICAL
COMMUNITY
Start: 2017-11-01 | End: 2017-11-02

## 2017-11-02 RX ORDER — BLOOD SUGAR DIAGNOSTIC
STRIP MISCELLANEOUS
COMMUNITY
Start: 2017-09-22 | End: 2018-01-08

## 2017-11-02 RX ORDER — CICLOPIROX 1 G/100ML
SHAMPOO TOPICAL
COMMUNITY
Start: 2017-10-31 | End: 2018-01-08

## 2017-11-02 RX ORDER — PEN NEEDLE, DIABETIC 32 GX 1/6"
NEEDLE, DISPOSABLE MISCELLANEOUS
COMMUNITY
Start: 2017-09-18 | End: 2018-01-08

## 2017-11-02 RX ORDER — LANCETS 33 GAUGE
EACH MISCELLANEOUS
COMMUNITY
Start: 2017-09-22 | End: 2018-01-08

## 2017-11-02 NOTE — PROGRESS NOTES
HISTORY OF PRESENT ILLNESS   HPI  Lump left arm x 4 days. About 4 days ago mid afternoon, she noticed some pain and swelling above left medial elbow. No injury or trauma that she can recall. She is right handed and cannot think of any precipitating/aggravating factors. The area has been enlarging through the week and is becoming more painful and sore to touch. She rates it a 5/10 but up to a 9/10 when palpated or lying on it. She has been taking Zipsor (Diclofenac) 2 tabs qpm which does help some at night. No redness. The discomfort and puffiness feels like it is migrating up the inside of her arm into her armpit. She does not feel any lumps or masses in the armpit or breast and she reports having a negative mammogram this summer. Denies fevers, chills, sweats. No pets. No bites. REVIEW OF SYMPTOMS     Review of Systems   Constitutional: Negative for chills and fever. Respiratory: Negative.     Cardiovascular: Negative.          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 ICD-10-CM: M47.816  ICD-9-CM: 721.3  10/26/2016    Overview Signed 10/26/2016  8:50 AM by Daisy Suazo MD     xrays 2010             Hypercholesterolemia ICD-10-CM: E78.00  ICD-9-CM: 272.0  8/18/2016        History of peptic ulcer disease ICD-10-CM: Z87.11  ICD-9-CM: V12.71  8/18/2016    Overview Signed 8/18/2016 10:16 AM by Daisy Suazo MD     16 yo             Vitamin D deficiency ICD-10-CM: E55.9  ICD-9-CM: 268.9  Unknown        Obesity, Class I, BMI 30-34.9 ICD-10-CM: E66.9  ICD-9-CM: 278.00  2015        Post partum depression ICD-10-CM: F53  ICD-9-CM: 648.44, 364  10/9/2014    Overview Signed 10/9/2014 12:24 PM by Philip Magaña MD     10/2013: counseling and prn Valium             Chronic low back pain & Muscle Spasms ICD-10-CM: M54.5, G89.29  ICD-9-CM: 724.2, 338.29  10/9/2014    Overview Signed 10/9/2014 12:36 PM by Philip Magaña MD     Muscular              Bilateral carpal tunnel syndrome ICD-10-CM: G56.03  ICD-9-CM: 354.0  2013    Overview Addendum 2013 10:28 AM by Philip Magaña MD     10/2013; wrist splints               Goiter ICD-10-CM: E04.9  ICD-9-CM: 240.9  2013        History of sinus tachycardia ICD-10-CM: Z86.79  ICD-9-CM: V12.59  10/5/2012    Overview Signed 10/5/2012  9:13 AM by Philip Magaña MD     Since her early              GERD (gastroesophageal reflux disease) ICD-10-CM: K21.9  ICD-9-CM: 530.81  2011    Overview Signed 2011  9:20 AM by Philip Magaña MD                  Dysphagia, workup negative except GERD ICD-10-CM: R13.10  ICD-9-CM: 787.20  2010    Overview Addendum 2011  9:21 AM by Philip Magaña MD     2010: Thyroid US normal  ENT eval;   Barium Swallow =GERD only             Anemia ICD-10-CM: D64.9  ICD-9-CM: 285.9  2010        Type II diabetes mellitus, uncontrolled ([address] ICD-10-CM: E11.65  ICD-9-CM: 250.02  Unknown    Overview Signed 10/5/2012  9:18 AM by Philip Magaña MD     Eye exams: VEI  Podiatrist: Rosie, anabel             Gallstones ICD-10-CM: K80.20  ICD-9-CM: 574.20  Unknown    Overview Signed 3/25/2010 10:31 AM by Philip Magaña MD     Lap Cholecystectomy-Dr. Bowman Kayser [facility] (normal spontaneous vaginal delivery) ICD-10-CM: O80  ICD-9-CM: 402  Unknown    Overview Signed 3/25/2010 10:31 AM by Candy Collier Zuly Bosch MD     Son  9yo                       PAST SURGICAL HISTORY       Past Surgical History:   Procedure Laterality Date    HX CARPAL TUNNEL RELEASE Bilateral     HX  SECTION      HX CHOLECYSTECTOMY  10/09    LAP-Gallstones    HX GYN  2015    IUD placed    HX LITHOTRIPSY  2017    HX ORTHOPAEDIC Right 7/10/14    trigger thumb    HX ORTHOPAEDIC Left 7/10/14    wrist         MEDICATIONS      Current Outpatient Prescriptions   Medication Sig    ciclopirox (LOPROX) 1 % sham     ONETOUCH ULTRA TEST strip     ONE TOUCH DELICA 33 gauge misc     NOVOFINE PLUS 32 gauge x \" ndle     dilTIAZem CD (CARDIZEM CD) 120 mg ER capsule Take 1 Cap by mouth daily.  Liraglutide (VICTOZA) 0.6 mg/0.1 mL (18 mg/3 mL) pnij 1.8 mg by SubCUTAneous route daily.  clindamycin (CLEOCIN) 2 % vaginal cream Insert 1 Applicator into vagina as needed.  clotrimazole-betamethasone (LOTRISONE) topical cream     ELIDEL 1 % topical cream Apply  to affected area as needed.  cyclobenzaprine (FLEXERIL) 10 mg tablet Take 0.5-1 Tabs by mouth nightly as needed for Muscle Spasm(s). Indications: MUSCLE SPASM    biotin 10,000 mcg cap Take  by mouth.  diclofenac potassium (ZIPSOR) 25 mg capsule Take 25 mg by mouth as needed.  ACETAMINOPHEN/DIPHENHYDRAMINE (TYLENOL PM PO) Take  by mouth as needed.  FLUoxetine (PROZAC) 10 mg tablet Take 1 Tab by mouth daily. Indications: ANXIETY WITH DEPRESSION, PANIC DISORDER    DEXILANT 60 mg CpDB TAKE ONE CAPSULE BY MOUTH ONCE DAILY FOR  REFLUX    CYANOCOBALAMIN, VITAMIN B-12, (VITAMIN B-12 PO) Take  by mouth.  levothyroxine (SYNTHROID) 88 mcg tablet Take 1 tablet daily    metFORMIN (GLUCOPHAGE) 1,000 mg tablet TAKE ONE TABLET BY MOUTH TWICE DAILY WITH MEALS    cholecalciferol, vitamin D3, 2,000 unit tab Take  by mouth daily.  levonorgestrel (MIRENA) 20 mcg/24 hr (5 years) IUD 1 Each by IntraUTERine route once.      No current facility-administered medications for this visit. 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[employer], Elementary Education    [id number] [facility]     Social History Main Topics    Smoking status: Never Smoker    Smokeless tobacco: Never Used    Alcohol use 0.0 oz/week     0 Standard drinks or equivalent per week      Comment: not weekly, every couple of months per pt    Drug use: No    Sexual activity: Yes     Partners: Male     Birth control/ protection: IUD     Other Topics Concern    Caffeine Concern No     seldom any at all    Special Diet No     has attended diabetes classes  and personal dietician in     Exercise No     Social History Narrative    Got  ~ . Has 1 daughter; Long term relationship and has custody of her stepson; her biological son  at age 9yo in 66.   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        IMMUNIZATIONS  Immunization History   Administered Date(s) Administered    DTaP 2013    Hepatitis B Vaccine 2009, 2009, 2010    Influenza Vaccine 10/09/2013, 10/23/2014, 10/16/2015    Influenza Vaccine (Quad) PF 10/26/2016, 2017    Influenza Vaccine Split 2010, 2011, 10/05/2012    Pneumococcal Vaccine (Unspecified Type) 2013    TB Skin Test (PPD) Intradermal 2013    TD Vaccine 2007    ZZZ-RETIRED (DO NOT USE) Pneumococcal Vaccine (Unspecified Type) 2007         FAMILY HISTORY     Family History   Problem Relation Age of Onset    Diabetes Mother       ESRD 43 yo in     Hypertension Mother     Stroke Mother      TIA    Heart Disease Mother     High Cholesterol Paternal Grandmother     Hypertension Maternal Grandfather          Diabetes Maternal Grandmother     Hypertension Maternal Grandmother     High Cholesterol Maternal Grandmother     Cancer Maternal Grandmother      Breast CA     Heart Disease Maternal Grandmother       age 79    Colon Cancer Maternal Uncle       at 62yo         VITALS     Visit Vitals    /84 (BP 1 Location: Right arm, BP Patient Position: Sitting)    Pulse 99    Temp 98.8 °F (37.1 °C) (Oral)    Resp 15    Ht 5' 1\" (1.549 m)    Wt 199 lb 12.8 oz (90.6 kg)    SpO2 94%    BMI 37.75 kg/m2          PHYSICAL EXAMINATION     Physical Exam   Constitutional: She is well-developed, well-nourished, and in no distress. Cardiovascular: Regular rhythm. No murmur heard. Pulmonary/Chest: Effort normal and breath sounds normal. No respiratory distress. She has no wheezes. She has no rales. Musculoskeletal:        Arms:  Illustration denotes area along left medial upper arm above the elbow: egg sized, smooth, fatty/fleshy sq mass. Warm. Tender. No redness. ROM intact. Lymphadenopathy:     She has no cervical adenopathy. She has no axillary adenopathy. Right: No supraclavicular adenopathy present. Left: No supraclavicular adenopathy present. Vitals reviewed.              ASSESSMENT & PLAN       ICD-10-CM ICD-9-CM    1. Mass of arm, left R22.32 782.2 US EXT NONVAS LT COMP   2. Encounter for immunization Z23 V03.89 INFLUENZA VIRUS VAC QUAD,SPLIT,PRESV FREE SYRINGE IM      IA IMMUNIZ ADMIN,1 SINGLE/COMB VAC/TOXOID     Check US of SQ mass LUE.   Cyst vs Lipoma vs Bursitis/swelling  Alternate ice/heat as directed for symptomatic relief and may cont the Zipsor (Diclofenac prn as directed taken w/ food for now)  Further recs pending US results and how she is coming along clinically

## 2017-11-02 NOTE — PROGRESS NOTES
Chief Complaint   Patient presents with    Elbow Swelling     started 10/30/17- left elbow swelling- pain that radiates under left armpit; 5/10 pain- 9/10 pain when palpated-Tried OTC motrin- slightly effective     1. Have you been to the ER, urgent care clinic since your last visit? Hospitalized since your last visit? No    2. Have you seen or consulted any other health care providers outside of the [address] [place] [address] since your last visit? Include any pap smears or colon screening.  [facility] Dr. Laz Christianson- 10/05/17

## 2017-11-02 NOTE — PATIENT INSTRUCTIONS
Lipoma: Care Instructions  Your Care Instructions  A lipoma is a growth of fat just below the skin. It may feel soft and rubbery. Lipomas can occur anywhere on the body. But they are most common on the torso, neck, upper thighs, upper arms, and armpits. A lipoma does not turn into cancer. Lipomas usually are not treated, because most of them don't hurt or cause problems. But your doctor may remove a lipoma if it is painful, gets infected, or bothers you. Follow-up care is a key part of your treatment and safety. Be sure to make and go to all appointments, and call your doctor if you are having problems. It's also a good idea to know your test results and keep a list of the medicines you take. How can you care for yourself at home? · Lipomas usually need no care at home. · If your doctor removes a lipoma, follow directions for taking care of the cut (incision). When should you call for help? Call your doctor now or seek immediate medical care if:  ? · You have signs of infection, such as:  ¨ Increased pain, swelling, warmth, or redness. ¨ Red streaks leading from the lipoma. ¨ Pus draining from the lipoma. ¨ A fever. ? Watch closely for changes in your health, and be sure to contact your doctor if:  ? · The lipoma is growing or changing. ? · You do not get better as expected. Where can you learn more? Go to http://elizabeth-korina.info/. Enter I187 in the search box to learn more about \"Lipoma: Care Instructions. \"  Current as of: October 13, 2016  Content Version: 11.4  © 9548-0159 [employer]. Care instructions adapted under license by [employer] (which disclaims liability or warranty for this information). If you have questions about a medical condition or this instruction, always ask your healthcare professional. [address] any warranty or liability for your use of this information.

## 2017-11-03 ENCOUNTER — HOSPITAL ENCOUNTER (OUTPATIENT)
Dept: ULTRASOUND IMAGING | Age: 37
Discharge: HOME | End: 2017-11-03
Attending: FAMILY MEDICINE
Payer: COMMERCIAL

## 2017-11-03 ENCOUNTER — TELEPHONE (OUTPATIENT)
Dept: FAMILY MEDICINE CLINIC | Age: 37
End: 2017-11-03

## 2017-11-03 DIAGNOSIS — R22.32 MASS OF ARM, LEFT: ICD-10-CM

## 2017-11-03 PROCEDURE — 76882 US LMTD JT/FCL EVL NVASC XTR: CPT

## 2017-11-04 ENCOUNTER — TELEPHONE (OUTPATIENT)
Dept: FAMILY MEDICINE CLINIC | Age: 37
End: 2017-11-04

## 2017-11-04 NOTE — TELEPHONE ENCOUNTER
Advise pt US shows the following:    IMPRESSION: Inflammatory process with prominent lymph nodes noted in the area of  clinical concern (largest measures 11 x 14 x 8 mm). There is associated soft  tissue edema. Follow-up as indicated by persistence of any palpable abnormality. It is not clear the source of the lymph node that is causing this reaction. Findings are suggestive that it is something inflammatory but she had no obvious sign/source of infection anywhere unless she can ID something that was not mentioned/seen at recent OV. She also reported having a mammogram a few months ago sometime earlier this year and no palpable breast masses or lumps. I do not have her mammogram report but I believe she mentioned she needed to have follow up on the right breast, however her symptoms are on the left side. Please review w/ pt and see if she is having any new signs or symptoms of infection? Skin? Fevers? How is the area doing since her last visit? Where was her mammogram done so we can get report and is she supposed to be having any follow up on it?

## 2017-11-06 ENCOUNTER — TELEPHONE (OUTPATIENT)
Dept: FAMILY MEDICINE CLINIC | Age: 37
End: 2017-11-06

## 2017-11-06 RX ORDER — DICLOFENAC SODIUM 50 MG/1
50 TABLET, DELAYED RELEASE ORAL 2 TIMES DAILY
Qty: 30 TAB | Refills: 0 | Status: CANCELLED | OUTPATIENT
Start: 2017-11-06

## 2017-11-06 RX ORDER — AMOXICILLIN AND CLAVULANATE POTASSIUM 875; 125 MG/1; MG/1
1 TABLET, FILM COATED ORAL 2 TIMES DAILY
Qty: 20 TAB | Refills: 0 | Status: SHIPPED | OUTPATIENT
Start: 2017-11-06 | End: 2017-11-16

## 2017-11-06 NOTE — TELEPHONE ENCOUNTER
PI of Dr Stella Witt note/rec. She isn't running a fever ans hasn't run one. She has been taking OTC motrin and would like diclofenac sent to pharmacy.

## 2017-11-06 NOTE — TELEPHONE ENCOUNTER
Patient would like to know the results from her ultrasound, the patient states her left arm is swelling and its painful.       Best call back # for patient until 4:30  [phone]  After 4:30 [phone]

## 2017-11-06 NOTE — TELEPHONE ENCOUNTER
ROSETTE for return call   Spoke to pt ans gave her results. She states that [place] into Sat the area got really hard more painful, it was red hot and swollen. Her  had 2- 500mg tabs of amoxicillin she took both of them Sat. The pain is the same area but worse. The nodule is harder. It hurts to left her arm. The area isn't as red today. She is going to call the [facility] and have them fax the mammo.

## 2017-11-06 NOTE — TELEPHONE ENCOUNTER
Patient is calling in regards to a missed call from Ariel Hearn, tried contacting nurse, no success.     Best call back # for patient: [phone] (please only call this number to avoid playing phone tag with the patient)

## 2017-11-06 NOTE — TELEPHONE ENCOUNTER
Is she running any fevers? Advise I will empirically treat her w/ high dose Amox as Augmentin bid x 10 days (sent to pharm on file). I want her to use moist heat 2-3 x a day, keep arm elevated as much as possible and take the Diclofenac bid for now. I will want to repeat the US in 2-3 weeks but she needs to let me know if it is getting worse before then.

## 2017-11-07 ENCOUNTER — HOSPITAL ENCOUNTER (OUTPATIENT)
Dept: PHYSICAL THERAPY | Age: 37
Discharge: HOME | End: 2017-11-07
Payer: COMMERCIAL

## 2017-11-07 PROCEDURE — 97035 APP MDLTY 1+ULTRASOUND EA 15: CPT | Performed by: PHYSICAL THERAPIST

## 2017-11-07 PROCEDURE — 97110 THERAPEUTIC EXERCISES: CPT | Performed by: PHYSICAL THERAPIST

## 2017-11-07 RX ORDER — DICLOFENAC POTASSIUM 25 MG/1
25 CAPSULE, LIQUID FILLED ORAL
Qty: 30 CAP | Refills: 2 | Status: SHIPPED | OUTPATIENT
Start: 2017-11-07 | End: 2018-01-08

## 2017-11-07 NOTE — TELEPHONE ENCOUNTER
[id number] Yana Eugene for her to use the Zipsor in place of the Motrin. I believe she had been using samples of this from Dr Roberson [facility] office and it has been working well.

## 2017-11-07 NOTE — PROGRESS NOTES
PT DAILY TREATMENT NOTE 2-15    Patient Name: Tiffani Escoto  Date:2017  : 1980  [x]  Patient  Verified  Payor: Ramo Fraser / Plan: Jamar Greene / Product Type: HMO /    In time: 7:55 AM  Out time: 9:00 AM  Total Treatment Time (min): 65 (65 timed)  Visit #: 11    Treatment Area: Low back pain [M54.5]    SUBJECTIVE  Pain Level (0-10 scale): 1/10  Any medication changes, allergies to medications, adverse drug reactions, diagnosis change, or new procedure performed?: [x] No    [] Yes (see summary sheet for update)  Subjective functional status/changes:   [] No changes reported  Pt has large nodule in medial, distal portion of L upper arm. She states she noticed a small bump in the area on Wednesday night and when she woke up in the next day it had gotten much bigger and it was painful to touch. She states it was red and \"hot\". She called MD office and left messages to be called back however did not hear from MD. \"I had to do something so I took antibiotics that my  had\" she states the \"hotness\" decreased. She heard back from MD office on Thursday and Dr. Mali Spears ordered an ultrasound for Friday. Shellie Fuentes said it wasn't malignant but they don't know what it is. \" She was given an antibiotic and states that she no longer has pain with raising her arm overhead, it is no longer painful to touch, however it has not decreased in size.      \"With this going on I haven't noticed my back very much\"    OBJECTIVE    Modality rationale: decrease inflammation and decrease pain to improve the patients ability to lift, exercise   Min Type Additional Details    [] Estim: []Att   []Unatt        []TENS instruct                  []IFC  []Premod   []NMES                     []Other:  []w/US   []w/ice   []w/heat  Position:  Location:    []  Traction: [] Cervical       []Lumbar                       [] Prone          []Supine                       []Intermittent   []Continuous Lbs:  [] before manual  [] after manual  []w/heat   10 [x]  Ultrasound: [x]Continuous   [] Pulsed at:                            [x]1MHz   []3MHz Location: L QL  W/cm2: 1.2    []  Paraffin         Location:  []w/heat   Pt declined [x]  Ice     []  Heat  []  Ice massage Position: supine, LE's elevated  Location: lumbar spine    []  Laser  []  Other: Position:  Location:    []  Vasopneumatic Device Pressure:       [] lo [] med [] hi   Temperature:    [x] Skin assessment post-treatment:  [x]intact []redness- no adverse reaction    []redness  adverse reaction:     50 min Therapeutic Exercise:  [x] See flow sheet : added SWB multifidus alt UE, sidelying hip abduction. Reviewed HEP   Rationale: increase ROM and increase strength to improve the patients ability to perform ADL's without LBP      5 min Manual Therapy:    PA mobs bilat LUDIN to correct sacral torsion   [id number]: decrease pain, increase tissue extensibility and decrease trigger points  to improve the patients ability to exercise          With   [x] TE   [] TA   [] neuro   [] other: Patient Education: [x] Review HEP    [] Progressed/Changed HEP based on:   [] positioning   [] body mechanics   [] transfers   [x] heat/ice application    [] other:      Other Objective/Functional Measures: na    Pain Level (0-10 scale) post treatment: 0/10    ASSESSMENT/Changes in Function:   Strongly encouraged pt to call MD office if any worsening change in L UE nodule. Trialed with ultrasound today. Progressed core strengthening. Patient will continue to benefit from skilled PT services to modify and progress therapeutic interventions, address functional mobility deficits, address ROM deficits, address strength deficits, analyze and address soft tissue restrictions, analyze and cue movement patterns and analyze and modify body mechanics/ergonomics to attain remaining goals.      []  See Plan of Care  []  See progress note/recertification  []  See Discharge Summary         Progress towards goals / Updated goals:  Short Term Goals: To be accomplished in 2-3 weeks:  1) Pt will be independent in initial HEP MET  2) Pt will report > or =25% decrease in exacerbation of symptoms MET     Long Term Goals:  To be accomplished in 6-8 weeks:  1) Pt will improve bilat hip abd strength > or = 4/5 in order to aide in exericse MET  2) Pt will perform R SLS for at least 10 sec without hip drop and without symptoms not assessed  3) Pt will increase FOTO score to at least 73/100 in order to demonstrate improvement in functional mobility not assessed  4) Pt will report return to exercising  progressing    PLAN  []  Upgrade activities as tolerated     [x]  Continue plan of care  []  Update interventions per flow sheet       []  Discharge due to:_  [x]  Other:_   1x/wk for additional 3-4 weeks    Jackie Alex, PT 11/7/2017  8:41 AM

## 2017-11-09 ENCOUNTER — TELEPHONE (OUTPATIENT)
Dept: FAMILY MEDICINE CLINIC | Age: 37
End: 2017-11-09

## 2017-11-09 DIAGNOSIS — R22.32 MASS OF LEFT UPPER EXTREMITY: Primary | ICD-10-CM

## 2017-11-10 NOTE — TELEPHONE ENCOUNTER
She can either cont to get samples where she was before or I can send in the low dose Diclofenac which is going to be a little different.  Up to her

## 2017-11-10 NOTE — TELEPHONE ENCOUNTER
Called pt to let her know that zipsor not covered. Pt states that she has been taking OTC motrin 800 mg tid and that has been working for her. She said that since being on the ABX her pain level has been decreasing.

## 2017-11-14 ENCOUNTER — HOSPITAL ENCOUNTER (OUTPATIENT)
Dept: PHYSICAL THERAPY | Age: 37
Discharge: HOME | End: 2017-11-14
Payer: COMMERCIAL

## 2017-11-14 PROCEDURE — 97110 THERAPEUTIC EXERCISES: CPT | Performed by: PHYSICAL THERAPIST

## 2017-11-14 PROCEDURE — 97140 MANUAL THERAPY 1/> REGIONS: CPT | Performed by: PHYSICAL THERAPIST

## 2017-11-14 NOTE — PROGRESS NOTES
PT DAILY TREATMENT/Progress NOTE 2-15    Patient Name: Gianni Mckeon  Date:2017  : 1980  [x]  Patient  Verified  Payor: Darryle Blander / Plan: Luis Enrique Yu / Product Type: HMO /    In time: 7:55 AM  Out time: 8:35 AM  Total Treatment Time (min): 40 (40 timed)  Visit #: 12    Treatment Area: Low back pain [M54.5]    SUBJECTIVE  Pain Level (0-10 scale): 1/10  Any medication changes, allergies to medications, adverse drug reactions, diagnosis change, or new procedure performed?: [x] No    [] Yes (see summary sheet for update)  Subjective functional status/changes:   [] No changes reported  Pt states 95% improvement since beginning therapy. She states \"the ultrasound didn't help at all last time\". She continues to only have pain at night-- she states approx 3-4 \"bad nights\" per week. \"last night I had no pain and I woke up this morning feeling completely fine\". She is unsure what aggravates her symptoms  She states the nodule on her arm is better.  It doesn't hurt to touch or lift her arm anymore    OBJECTIVE    Modality rationale: decrease inflammation and decrease pain to improve the patients ability to lift, exercise   Min Type Additional Details    [] Estim: []Att   []Unatt        []TENS instruct                  []IFC  []Premod   []NMES                     []Other:  []w/US   []w/ice   []w/heat  Position:  Location:    []  Traction: [] Cervical       []Lumbar                       [] Prone          []Supine                       []Intermittent   []Continuous Lbs:  [] before manual  [] after manual  []w/heat    []  Ultrasound: []Continuous   [] Pulsed at:                            []1MHz   []3MHz Location:  W/cm2:     []  Paraffin         Location:  []w/heat   Pt declined [x]  Ice     []  Heat  []  Ice massage Position: supine, LE's elevated  Location: lumbar spine    []  Laser  []  Other: Position:  Location:    []  Vasopneumatic Device Pressure:       [] lo [] med [] hi   Temperature: [x] Skin assessment post-treatment:  [x]intact []redness- no adverse reaction    []redness  adverse reaction:     30 min Therapeutic Exercise:  [x] See flow sheet : Reviewed HEP. Rationale: increase ROM and increase strength to improve the patients ability to perform ADL's without LBP      10 min Manual Therapy:    PA mobs bilat LUDIN to correct sacral torsion  L hip long axis distraction  STM L QL   [id number]: decrease pain, increase tissue extensibility and decrease trigger points  to improve the patients ability to exercise          With   [x] TE   [] TA   [] neuro   [] other: Patient Education: [x] Review HEP    [] Progressed/Changed HEP based on:   [] positioning   [] body mechanics   [] transfers   [x] heat/ice application    [] other:      Other Objective/Functional Measures:   Slight p! With L SBing    Pain Level (0-10 scale) post treatment: 0/10    ASSESSMENT/Changes in Function:   Pt has completed 12 skilled PT visits. She has met 2/2 STG's and 2/4 LTG's. She reports 95% improvement in symptoms. She has returned to working out at the gym 3x/week. Her pain continues to persist at night>during the day and is intermittent-- reports approx 3 nights/wk of pain. She is compliant with HEP. Encouraged pt to trial independently with HEP and to f/u with MD if symptoms persist. Pt ready for D/C at this time. []  See Plan of Care  []  See progress note/recertification  [x]  See Discharge Summary         Progress towards goals / Updated goals:  Short Term Goals: To be accomplished in 2-3 weeks:  1) Pt will be independent in initial HEP MET  2) Pt will report > or =25% decrease in exacerbation of symptoms MET     Long Term Goals:  To be accomplished in 6-8 weeks:  1) Pt will improve bilat hip abd strength > or = 4/5 in order to aide in exericse MET  2) Pt will perform R SLS for at least 10 sec without hip drop and without symptoms not assessed  3) Pt will increase FOTO score to at least 73/100 in order to demonstrate improvement in functional mobility not assessed  4) Pt will report return to exercising  MET    PLAN  D/C to HEP.     Ninfa Casey, PT 11/14/2017  8:42 AM

## 2017-12-01 ENCOUNTER — HOSPITAL ENCOUNTER (OUTPATIENT)
Dept: ULTRASOUND IMAGING | Age: 37
Discharge: HOME | End: 2017-12-01
Attending: FAMILY MEDICINE
Payer: COMMERCIAL

## 2017-12-01 DIAGNOSIS — R22.32 MASS OF LEFT UPPER EXTREMITY: ICD-10-CM

## 2017-12-01 PROCEDURE — 76882 US LMTD JT/FCL EVL NVASC XTR: CPT

## 2017-12-02 ENCOUNTER — TELEPHONE (OUTPATIENT)
Dept: FAMILY MEDICINE CLINIC | Age: 37
End: 2017-12-02

## 2017-12-02 DIAGNOSIS — R22.32 MASS OF LEFT UPPER EXTREMITY: Primary | ICD-10-CM

## 2017-12-03 NOTE — TELEPHONE ENCOUNTER
Advise pt follow up US shows persistence of soft tissue mass in the left arm. I would like to refer her to [facility] [place] [facility] for excisional biopsy.

## 2017-12-13 ENCOUNTER — OFFICE VISIT (OUTPATIENT)
Dept: SURGERY | Age: 37
End: 2017-12-13

## 2017-12-13 VITALS
DIASTOLIC BLOOD PRESSURE: 90 MMHG | RESPIRATION RATE: 18 BRPM | TEMPERATURE: 97.8 F | HEART RATE: 114 BPM | HEIGHT: 61 IN | OXYGEN SATURATION: 98 % | BODY MASS INDEX: 37.57 KG/M2 | WEIGHT: 199 LBS | SYSTOLIC BLOOD PRESSURE: 130 MMHG

## 2017-12-13 DIAGNOSIS — L04.2: Primary | ICD-10-CM

## 2017-12-13 NOTE — MR AVS SNAPSHOT
Visit Information Date & Time Provider Department Dept. Phone Encounter #  
 12/13/2017  3:40 PM Abbiraymon TobarSavi [address] [phone] [id number] Your Appointments 2/7/2018 11:50 AM  
ROUTINE CARE with MD [facility]) Appt Note: F/U DIABETES CP0.00  
 [address] [place] [address]  
[id number] [place] [id number] Upcoming Health Maintenance Date Due  
 PAP AKA CERVICAL CYTOLOGY 4/18/2018* HEMOGLOBIN A1C Q6M 3/5/2018 MICROALBUMIN Q1 4/13/2018 EYE EXAM RETINAL OR DILATED Q1 4/21/2018 LIPID PANEL Q1 9/5/2018 FOOT EXAM Q1 9/13/2018 DTaP/Tdap/Td series (2 - Td) 9/4/2023 *Topic was postponed. The date shown is not the original due date. Allergies as of 12/13/2017  Review Complete On: 12/13/2017 By: Michelet Tobar MD  
  
 Severity Noted Reaction Type Reactions Lexapro [Escitalopram]  08/16/2017    Other (comments) Fatigue, wt gain Naprosyn [Naproxen]  03/25/2010   Intolerance Nausea Only Topamax [Topiramate]  03/15/2017    Other (comments) Kidney stone Zoloft [Sertraline]  08/16/2017    Other (comments) Fatigue Current Immunizations  Reviewed on 11/2/2017 Name Date DTaP 9/4/2013 Hepatitis B Vaccine 2/16/2010, 9/25/2009, 8/25/2009 Influenza Vaccine 10/16/2015, 10/23/2014, 10/9/2013 Influenza Vaccine (Quad) PF 11/2/2017, 10/26/2016 Influenza Vaccine Split 10/5/2012, 9/21/2011, 9/23/2010 Pneumococcal Vaccine (Unspecified Type) 9/18/2013 TB Skin Test (PPD) Intradermal 8/29/2013 TD Vaccine 3/25/2007 ZZZ-RETIRED (DO NOT USE) Pneumococcal Vaccine (Unspecified Type) 3/25/2007 Not reviewed this visit You Were Diagnosed With   
  
 Codes Comments Acute lymphadenitis of arm    -  Primary ICD-10-CM: L04.2 ICD-9-CM: 520 Vitals BP Pulse Temp Resp Height(growth percentile) Weight(growth percentile) 130/90 (BP 1 Location: Right arm, BP Patient Position: Sitting) (!) 114 97.8 °F (36.6 °C) (Oral) 18 5' 1\" (1.549 m) 199 lb (90.3 kg) SpO2 BMI OB Status Smoking Status 98% 37.6 kg/m2 IUD Never Smoker Vitals History BMI and BSA Data Body Mass Index Body Surface Area  
 37.6 kg/m 2 1.97 m 2 Preferred Pharmacy Pharmacy Name Phone [facility] [address] [place] [address] Your Updated Medication List  
  
   
This list is accurate as of: 12/13/17  4:57 PM.  Always use your most recent med list.  
  
  
  
  
 biotin 10,000 mcg Cap Take  by mouth. cholecalciferol (vitamin D3) 2,000 unit Tab Take  by mouth daily. ciclopirox 1 % Sham  
Commonly known as:  LOPROX  
  
 clindamycin 2 % vaginal cream  
Commonly known as:  CLEOCIN Insert 1 Applicator into vagina as needed. clotrimazole-betamethasone topical cream  
Commonly known as:  LOTRISONE  
  
 cyclobenzaprine 10 mg tablet Commonly known as:  FLEXERIL Take 0.5-1 Tabs by mouth nightly as needed for Muscle Spasm(s). Indications: MUSCLE SPASM DEXILANT 60 mg Cpdb Generic drug:  Dexlansoprazole TAKE ONE CAPSULE BY MOUTH ONCE DAILY FOR  REFLUX  
  
 diclofenac potassium 25 mg capsule Commonly known as:  [employer] Take 1 Cap by mouth daily as needed. Take with food  
  
 dilTIAZem  mg ER capsule Commonly known as:  CARDIZEM CD Take 1 Cap by mouth daily. ELIDEL 1 % topical cream  
Generic drug:  pimecrolimus Apply  to affected area as needed. FLUoxetine 10 mg tablet Commonly known as:  PROzac TAKE ONE TABLET BY MOUTH ONCE DAILY  
  
 levothyroxine 88 mcg tablet Commonly known as:  SYNTHROID Take 1 tablet daily Liraglutide 0.6 mg/0.1 mL (18 mg/3 mL) Pnij Commonly known as:  VICTOZA  
1.8 mg by SubCUTAneous route daily. metFORMIN 1,000 mg tablet Commonly known as:  GLUCOPHAGE  
TAKE ONE TABLET BY MOUTH TWICE DAILY WITH MEALS  
  
 MIRENA 20 mcg/24 hr (5 years) Iud  
Generic drug:  levonorgestrel 1 Each by IntraUTERine route once. NOVOFINE PLUS 32 gauge x 1/6\" Ndle Generic drug:  pen needle, diabetic One Touch Delica 33 gauge Misc Generic drug:  lancets ONETOUCH ULTRA TEST strip Generic drug:  glucose blood VI test strips TYLENOL PM PO Take  by mouth as needed. VITAMIN B-12 PO Take  by mouth. Introducing [facility]! Dear Bubba Hammond: Thank you for requesting a [employer] account. Our records indicate that you already have an active [employer]Match account. You can access your account anytime at [email or web address] Did you know that you can access your hospital and ER discharge instructions at any time in [employer]Match? You can also review all of your test results from your hospital stay or ER visit. Additional Information If you have questions, please visit the Frequently Asked Questions section of the RealMatch website at [email or web address] Remember, RealMatch is NOT to be used for urgent needs. For medical emergencies, dial 911. Now available from your iPhone and Android! Please provide this summary of care documentation to your next provider. Your primary care clinician is listed as VIDHI ORELLANA. If you have any questions after today's visit, please call [phone].

## 2017-12-13 NOTE — PROGRESS NOTES
Surgery Consult    Subjective:      Remedios Tijerina is a 40 y.o.  female who was referred by Dr Iliana Cole for evaluation of left upper extremity mass. A small mess on the interior aspect of her left brachium came about a couple of weeks ago associated with pain, tightness in the area, swelling, and some discoloration of the overlying skin. She saw her PCP who put her on antibiotics and it was slowly getting better--it immediately felt better upon starting the antibiotics. It continues to resolve. US EXT NONVAS LT LTD from 2017:    US EXT NONVAS LT LTD from 2017: I independently reviewed these images.        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 10/26/2016    Hypercholesterolemia 2016    History of peptic ulcer disease 2016    Vitamin D deficiency     Obesity, Class I, BMI 30-34.9 2015    Post partum depression 10/09/2014    Chronic low back pain & Muscle Spasms 10/09/2014    Bilateral carpal tunnel syndrome 2013    Goiter 2013    History of sinus tachycardia 10/05/2012    GERD (gastroesophageal reflux disease) 2011    Dysphagia, workup negative except GERD 2010    Anemia 2010    Type II diabetes mellitus, uncontrolled ([address]     Gallstones      (normal spontaneous vaginal delivery)      Past Medical History:   Diagnosis Date    Acute lymphadenitis of arm, left epitrochlear 2017    Anxiety and depression 10/9/2014    10/2014; resolved as of 8/24/15 per pt    Chronic low back pain 10/9/2014    Muscular     Depression with anxiety 2017    10/2014    Diabetes mellitus type II 10/07    Consulted w/ Dr. Jeanette Kumar Diabetic eye exam ([facility] [address] 2016    Suha Ramos MD, [facility]    Dysphagia 2010    GERD (gastroesophageal reflux disease) 2011    H. pylori infection 2016    Dr Savage     History of peptic ulcer disease 2016    18 yo    History of sinus tachycardia 10/5/2012    as of 8/24/15 pt states followed by PCP    Hypercholesterolemia 2016    ~    Kidney stone 2017     (normal spontaneous vaginal delivery) 1998    Son  7yo    Post partum depression 10/9/2014    10/2013: counseling and prn Valium    Spondylosis of lumbar & thoracic region without myelopathy or radiculopathy 10/26/2016    xrays 2010    Spondylosis, Thoracic & Lumbar 3/5/2015    xrays     Thyromegaly 2010    US negative    Vitamin D deficiency       Past Surgical History:   Procedure Laterality Date    HX CARPAL TUNNEL RELEASE Bilateral     HX  SECTION      HX CHOLECYSTECTOMY  10/09    LAP-Gallstones    HX GYN  2015    IUD placed    HX LITHOTRIPSY  2017    HX ORTHOPAEDIC Right 7/10/14    trigger thumb    HX ORTHOPAEDIC Left 7/10/14    wrist      Social History   Substance Use Topics    Smoking status: Never Smoker    Smokeless tobacco: Never Used    Alcohol use 0.0 oz/week     0 Standard drinks or equivalent per week      Comment: not weekly, every couple of months per pt      Family History   Problem Relation Age of Onset    Diabetes Mother       ESRD 43 yo in     Hypertension Mother     Stroke Mother      TIA    Heart Disease Mother     High Cholesterol Paternal Grandmother     Hypertension Maternal Grandfather          Diabetes Maternal Grandmother     Hypertension Maternal Grandmother     High Cholesterol Maternal Grandmother     Cancer Maternal Grandmother      Breast CA     Heart Disease Maternal Grandmother       age 70    Colon Cancer Maternal Uncle       at 64yo      Current Outpatient Prescriptions   Medication Sig    ONETOUCH ULTRA TEST strip     ONE TOUCH DELICA 33 gauge misc     NOVOFINE PLUS 32 gauge x /\" ndle     dilTIAZem CD (CARDIZEM CD) 120 mg ER capsule Take 1 Cap by mouth daily.  Liraglutide (VICTOZA) 0.6 mg/0.1 mL (18 mg/3 mL) pnij 1.8 mg by SubCUTAneous route daily.  clindamycin (CLEOCIN) 2 % vaginal cream Insert 1 Applicator into vagina as needed.  clotrimazole-betamethasone (LOTRISONE) topical cream     ELIDEL 1 % topical cream Apply  to affected area as needed.  cyclobenzaprine (FLEXERIL) 10 mg tablet Take 0.5-1 Tabs by mouth nightly as needed for Muscle Spasm(s). Indications: MUSCLE SPASM    biotin 10,000 mcg cap Take  by mouth.  ACETAMINOPHEN/DIPHENHYDRAMINE (TYLENOL PM PO) Take  by mouth as needed.  DEXILANT 60 mg CpDB TAKE ONE CAPSULE BY MOUTH ONCE DAILY FOR  REFLUX    CYANOCOBALAMIN, VITAMIN B-12, (VITAMIN B-12 PO) Take  by mouth.  levothyroxine (SYNTHROID) 88 mcg tablet Take 1 tablet daily    metFORMIN (GLUCOPHAGE) 1,000 mg tablet TAKE ONE TABLET BY MOUTH TWICE DAILY WITH MEALS    cholecalciferol, vitamin D3, 2,000 unit tab Take  by mouth daily.  levonorgestrel (MIRENA) 20 mcg/24 hr (5 years) IUD 1 Each by IntraUTERine route once.  FLUoxetine (PROZAC) 10 mg tablet TAKE ONE TABLET BY MOUTH ONCE DAILY    diclofenac potassium (ZIPSOR) 25 mg capsule Take 1 Cap by mouth daily as needed. Take with food    ciclopirox (LOPROX) 1 % sham      No current facility-administered medications for this visit.        Allergies   Allergen Reactions    Lexapro [Escitalopram] Other (comments)     Fatigue, wt gain    Naprosyn [Naproxen] Nausea Only    Topamax [Topiramate] Other (comments)     Kidney stone    Zoloft [Sertraline] Other (comments)     Fatigue         Review of Systems:    A comprehensive review of systems was negative except for that written in the History of Present Illness. Objective:     Visit Vitals    /90 (BP 1 Location: Right arm, BP Patient Position: Sitting)    Pulse (!) 114    Temp 97.8 °F (36.6 °C) (Oral)    Resp 18    Ht 5' 1\" (1.549 m)    Wt 199 lb (90.3 kg)    SpO2 98%    BMI 37.6 kg/m2         Physical Exam:  General appearance: alert, cooperative, no distress, appears stated age  Head: Normocephalic, without obvious abnormality, atraumatic  Neurologic: Grossly normal  Lymph nodes: I could feel two lymph nodes, one about a 1.5 cm and one about 1 cm in the epitrochlear space. No axillary adenopathy. IMPRESSION: Resolving, acute lymphadenitis of uncertain etiology. Assessment:       ICD-10-CM ICD-9-CM    1. Acute lymphadenitis of arm, left epitrochlear L04.2 683          Plan:     Pt will f/u PRN. Written by brittaney Uribe, as dictated by Robert Fenton MD.    Total face to face time with patient: 10 minutes. Greater than 50% of the time was spent in counseling. Signed By: Robert Fenton MD     December 13, 2017

## 2017-12-13 NOTE — PROGRESS NOTES
1. Have you been to the ER, urgent care clinic since your last visit? Hospitalized since your last visit? No    2. Have you seen or consulted any other health care providers outside of the [address] [place] [address] since your last visit? Include any pap smears or colon screening. No    Patient states she was put on antibiotic and the pain stopped and the area decreased in size. After that bruising started.

## 2018-01-04 NOTE — ANCILLARY DISCHARGE INSTRUCTIONS
[facility]  [address]  [place], [address]  Phone: [phone]  Fax: [phone]    Discharge Summary  2-15    Patient name: Lenin Shelby  : 1980  Provider#:[id number]  Referral source: Víctor Murguia      Medical/Treatment Diagnosis: Low back pain [M54.5]     Prior Hospitalization: see medical history     Comorbidities: see evaluation  Prior Level of Function:see evaluation  Medications: Verified on Patient Summary List    Start of Care: 17     Onset Date: see evaluation   Visits from Start of Care: 12     Missed Visits: see CC  Reporting Period : 17 to 17    Progress towards goals  Short Term Goals:   1) Pt will be independent in initial HEP MET  2) Pt will report > or =25% decrease in exacerbation of symptoms MET      Long Term Goals:   1) Pt will improve bilat hip abd strength > or = 4/5 in order to aide in exericse MET  2) Pt will perform R SLS for at least 10 sec without hip drop and without symptoms not assessed  3) Pt will increase FOTO score to at least 73/100 in order to demonstrate improvement in functional mobility not assessed  4) Pt will report return to exercising  MET    ASSESSMENT  Pt has completed 12 skilled PT visits. She has met 2/2 STG's and 2/4 LTG's. She reports 95% improvement in symptoms. She has returned to working out at the gym 3x/week. Her pain continues to persist at night>during the day and is intermittent-- reports approx 3 nights/wk of pain. She is compliant with HEP. Encouraged pt to trial independently with HEP and to f/u with MD if symptoms persist. Pt ready for D/C at this time.       RECOMMENDATIONS:  [x]Discontinue therapy: [x]Patient has reached or is progressing toward set goals      []Patient is non-compliant or has abdicated      []Due to lack of appreciable progress towards set [address], PT 2018 12:29 PM

## 2018-01-08 ENCOUNTER — HOSPITAL ENCOUNTER (OUTPATIENT)
Age: 38
Setting detail: OBSERVATION
Discharge: HOME | DRG: 871 | End: 2018-01-11
Attending: EMERGENCY MEDICINE | Admitting: INTERNAL MEDICINE
Payer: COMMERCIAL

## 2018-01-08 ENCOUNTER — APPOINTMENT (OUTPATIENT)
Dept: CT IMAGING | Age: 38
DRG: 871 | End: 2018-01-08
Attending: EMERGENCY MEDICINE
Payer: COMMERCIAL

## 2018-01-08 ENCOUNTER — APPOINTMENT (OUTPATIENT)
Dept: GENERAL RADIOLOGY | Age: 38
DRG: 871 | End: 2018-01-08
Attending: EMERGENCY MEDICINE
Payer: COMMERCIAL

## 2018-01-08 DIAGNOSIS — J18.9 PNEUMONIA OF LEFT LOWER LOBE DUE TO INFECTIOUS ORGANISM: Primary | ICD-10-CM

## 2018-01-08 DIAGNOSIS — S39.012A BACK STRAIN, INITIAL ENCOUNTER: ICD-10-CM

## 2018-01-08 DIAGNOSIS — R51.9 NONINTRACTABLE EPISODIC HEADACHE, UNSPECIFIED HEADACHE TYPE: ICD-10-CM

## 2018-01-08 PROBLEM — N39.0 UTI (URINARY TRACT INFECTION): Status: ACTIVE | Noted: 2018-01-08

## 2018-01-08 PROBLEM — M51.36 BULGING OF INTERVERTEBRAL DISC BETWEEN L4 AND L5: Status: ACTIVE | Noted: 2018-01-08

## 2018-01-08 PROBLEM — A41.9 SEPSIS (HCC): Status: ACTIVE | Noted: 2018-01-08

## 2018-01-08 LAB
ALBUMIN SERPL-MCNC: 3.5 G/DL (ref 3.5–5)
ALBUMIN/GLOB SERPL: 0.7 {RATIO} (ref 1.1–2.2)
ALP SERPL-CCNC: 158 U/L (ref 45–117)
ALT SERPL-CCNC: 71 U/L (ref 12–78)
ANION GAP SERPL CALC-SCNC: 10 MMOL/L (ref 5–15)
APPEARANCE UR: CLEAR
AST SERPL-CCNC: 48 U/L (ref 15–37)
BACTERIA URNS QL MICRO: ABNORMAL /HPF
BASOPHILS # BLD: 0 K/UL (ref 0–0.1)
BASOPHILS NFR BLD: 0 % (ref 0–1)
BILIRUB SERPL-MCNC: 0.5 MG/DL (ref 0.2–1)
BILIRUB UR QL: NEGATIVE
BUN SERPL-MCNC: 8 MG/DL (ref 6–20)
BUN/CREAT SERPL: 9 (ref 12–20)
CALCIUM SERPL-MCNC: 8.8 MG/DL (ref 8.5–10.1)
CHLORIDE SERPL-SCNC: 98 MMOL/L (ref 97–108)
CO2 SERPL-SCNC: 23 MMOL/L (ref 21–32)
COLOR UR: ABNORMAL
CREAT SERPL-MCNC: 0.86 MG/DL (ref 0.55–1.02)
EOSINOPHIL # BLD: 0 K/UL (ref 0–0.4)
EOSINOPHIL NFR BLD: 0 % (ref 0–7)
EPITH CASTS URNS QL MICRO: ABNORMAL /LPF
ERYTHROCYTE [DISTWIDTH] IN BLOOD BY AUTOMATED COUNT: 12.9 % (ref 11.5–14.5)
FLUAV AG NPH QL IA: NEGATIVE
FLUBV AG NOSE QL IA: NEGATIVE
GLOBULIN SER CALC-MCNC: 5.2 G/DL (ref 2–4)
GLUCOSE BLD STRIP.AUTO-MCNC: 247 MG/DL (ref 65–100)
GLUCOSE SERPL-MCNC: 285 MG/DL (ref 65–100)
GLUCOSE UR STRIP.AUTO-MCNC: >1000 MG/DL
HCT VFR BLD AUTO: 40.4 % (ref 35–47)
HGB BLD-MCNC: 13.8 G/DL (ref 11.5–16)
HGB UR QL STRIP: NEGATIVE
KETONES UR QL STRIP.AUTO: 40 MG/DL
LACTATE SERPL-SCNC: 1.8 MMOL/L (ref 0.4–2)
LEUKOCYTE ESTERASE UR QL STRIP.AUTO: NEGATIVE
LYMPHOCYTES # BLD: 1.6 K/UL (ref 0.8–3.5)
LYMPHOCYTES NFR BLD: 12 % (ref 12–49)
MCH RBC QN AUTO: 29.7 PG (ref 26–34)
MCHC RBC AUTO-ENTMCNC: 34.2 G/DL (ref 30–36.5)
MCV RBC AUTO: 86.9 FL (ref 80–99)
MONOCYTES # BLD: 1 K/UL (ref 0–1)
MONOCYTES NFR BLD: 7 % (ref 5–13)
MUCOUS THREADS URNS QL MICRO: ABNORMAL /LPF
NEUTS SEG # BLD: 11.1 K/UL (ref 1.8–8)
NEUTS SEG NFR BLD: 81 % (ref 32–75)
NITRITE UR QL STRIP.AUTO: NEGATIVE
PH UR STRIP: 6 [PH] (ref 5–8)
PLATELET # BLD AUTO: 262 K/UL (ref 150–400)
POTASSIUM SERPL-SCNC: 3.5 MMOL/L (ref 3.5–5.1)
PROT SERPL-MCNC: 8.7 G/DL (ref 6.4–8.2)
PROT UR STRIP-MCNC: 100 MG/DL
RBC # BLD AUTO: 4.65 M/UL (ref 3.8–5.2)
RBC #/AREA URNS HPF: ABNORMAL /HPF (ref 0–5)
SERVICE CMNT-IMP: ABNORMAL
SODIUM SERPL-SCNC: 131 MMOL/L (ref 136–145)
SP GR UR REFRACTOMETRY: 1.02 (ref 1–1.03)
UA: UC IF INDICATED,UAUC: ABNORMAL
UROBILINOGEN UR QL STRIP.AUTO: 1 EU/DL (ref 0.2–1)
WBC # BLD AUTO: 13.8 K/UL (ref 3.6–11)
WBC URNS QL MICRO: ABNORMAL /HPF (ref 0–4)

## 2018-01-08 PROCEDURE — 96375 TX/PRO/DX INJ NEW DRUG ADDON: CPT

## 2018-01-08 PROCEDURE — 99218 HC RM OBSERVATION: CPT

## 2018-01-08 PROCEDURE — 96361 HYDRATE IV INFUSION ADD-ON: CPT

## 2018-01-08 PROCEDURE — 71046 X-RAY EXAM CHEST 2 VIEWS: CPT

## 2018-01-08 PROCEDURE — 96374 THER/PROPH/DIAG INJ IV PUSH: CPT

## 2018-01-08 PROCEDURE — 87040 BLOOD CULTURE FOR BACTERIA: CPT | Performed by: EMERGENCY MEDICINE

## 2018-01-08 PROCEDURE — 83605 ASSAY OF LACTIC ACID: CPT | Performed by: EMERGENCY MEDICINE

## 2018-01-08 PROCEDURE — 74011250637 HC RX REV CODE- 250/637: Performed by: INTERNAL MEDICINE

## 2018-01-08 PROCEDURE — 74011250636 HC RX REV CODE- 250/636: Performed by: EMERGENCY MEDICINE

## 2018-01-08 PROCEDURE — 96367 TX/PROPH/DG ADDL SEQ IV INF: CPT

## 2018-01-08 PROCEDURE — 72131 CT LUMBAR SPINE W/O DYE: CPT

## 2018-01-08 PROCEDURE — 87804 INFLUENZA ASSAY W/OPTIC: CPT | Performed by: EMERGENCY MEDICINE

## 2018-01-08 PROCEDURE — 96365 THER/PROPH/DIAG IV INF INIT: CPT

## 2018-01-08 PROCEDURE — 65660000000 HC RM CCU STEPDOWN

## 2018-01-08 PROCEDURE — 74011636637 HC RX REV CODE- 636/637: Performed by: EMERGENCY MEDICINE

## 2018-01-08 PROCEDURE — 87086 URINE CULTURE/COLONY COUNT: CPT | Performed by: EMERGENCY MEDICINE

## 2018-01-08 PROCEDURE — 96376 TX/PRO/DX INJ SAME DRUG ADON: CPT

## 2018-01-08 PROCEDURE — 81001 URINALYSIS AUTO W/SCOPE: CPT | Performed by: EMERGENCY MEDICINE

## 2018-01-08 PROCEDURE — 96372 THER/PROPH/DIAG INJ SC/IM: CPT

## 2018-01-08 PROCEDURE — 74011636637 HC RX REV CODE- 636/637: Performed by: INTERNAL MEDICINE

## 2018-01-08 PROCEDURE — 93041 RHYTHM ECG TRACING: CPT

## 2018-01-08 PROCEDURE — 36415 COLL VENOUS BLD VENIPUNCTURE: CPT | Performed by: EMERGENCY MEDICINE

## 2018-01-08 PROCEDURE — 85025 COMPLETE CBC W/AUTO DIFF WBC: CPT | Performed by: EMERGENCY MEDICINE

## 2018-01-08 PROCEDURE — 82962 GLUCOSE BLOOD TEST: CPT

## 2018-01-08 PROCEDURE — 99285 EMERGENCY DEPT VISIT HI MDM: CPT

## 2018-01-08 PROCEDURE — 74011250636 HC RX REV CODE- 250/636: Performed by: INTERNAL MEDICINE

## 2018-01-08 PROCEDURE — 80053 COMPREHEN METABOLIC PANEL: CPT | Performed by: EMERGENCY MEDICINE

## 2018-01-08 PROCEDURE — 74011000250 HC RX REV CODE- 250: Performed by: INTERNAL MEDICINE

## 2018-01-08 RX ORDER — MAGNESIUM SULFATE 100 %
4 CRYSTALS MISCELLANEOUS AS NEEDED
Status: DISCONTINUED | OUTPATIENT
Start: 2018-01-08 | End: 2018-01-11 | Stop reason: HOSPADM

## 2018-01-08 RX ORDER — SODIUM CHLORIDE 0.9 % (FLUSH) 0.9 %
5-10 SYRINGE (ML) INJECTION AS NEEDED
Status: DISCONTINUED | OUTPATIENT
Start: 2018-01-08 | End: 2018-01-11 | Stop reason: HOSPADM

## 2018-01-08 RX ORDER — DILTIAZEM HYDROCHLORIDE 120 MG/1
120 CAPSULE, COATED, EXTENDED RELEASE ORAL DAILY
Status: DISCONTINUED | OUTPATIENT
Start: 2018-01-08 | End: 2018-01-11 | Stop reason: HOSPADM

## 2018-01-08 RX ORDER — DILTIAZEM HYDROCHLORIDE 120 MG/1
120 CAPSULE, COATED, EXTENDED RELEASE ORAL DAILY
Status: DISCONTINUED | OUTPATIENT
Start: 2018-01-09 | End: 2018-01-08

## 2018-01-08 RX ORDER — DEXTROSE 50 % IN WATER (D50W) INTRAVENOUS SYRINGE
12.5-25 AS NEEDED
Status: DISCONTINUED | OUTPATIENT
Start: 2018-01-08 | End: 2018-01-11 | Stop reason: HOSPADM

## 2018-01-08 RX ORDER — DEXLANSOPRAZOLE 60 MG/1
60 CAPSULE, DELAYED RELEASE ORAL DAILY
Status: DISCONTINUED | OUTPATIENT
Start: 2018-01-09 | End: 2018-01-08 | Stop reason: CLARIF

## 2018-01-08 RX ORDER — OXYCODONE AND ACETAMINOPHEN 5; 325 MG/1; MG/1
1 TABLET ORAL
Status: DISCONTINUED | OUTPATIENT
Start: 2018-01-08 | End: 2018-01-11 | Stop reason: HOSPADM

## 2018-01-08 RX ORDER — PANTOPRAZOLE SODIUM 40 MG/1
40 TABLET, DELAYED RELEASE ORAL
Status: DISCONTINUED | OUTPATIENT
Start: 2018-01-09 | End: 2018-01-11 | Stop reason: HOSPADM

## 2018-01-08 RX ORDER — CYCLOBENZAPRINE HCL 10 MG
5 TABLET ORAL
Status: DISCONTINUED | OUTPATIENT
Start: 2018-01-08 | End: 2018-01-11 | Stop reason: HOSPADM

## 2018-01-08 RX ORDER — LEVOTHYROXINE SODIUM 88 UG/1
88 TABLET ORAL
Status: DISCONTINUED | OUTPATIENT
Start: 2018-01-09 | End: 2018-01-11 | Stop reason: HOSPADM

## 2018-01-08 RX ORDER — MORPHINE SULFATE 4 MG/ML
4 INJECTION, SOLUTION INTRAMUSCULAR; INTRAVENOUS ONCE
Status: COMPLETED | OUTPATIENT
Start: 2018-01-08 | End: 2018-01-08

## 2018-01-08 RX ORDER — ENOXAPARIN SODIUM 100 MG/ML
40 INJECTION SUBCUTANEOUS EVERY 24 HOURS
Status: DISCONTINUED | OUTPATIENT
Start: 2018-01-08 | End: 2018-01-11 | Stop reason: HOSPADM

## 2018-01-08 RX ORDER — SODIUM CHLORIDE 9 MG/ML
150 INJECTION, SOLUTION INTRAVENOUS CONTINUOUS
Status: DISCONTINUED | OUTPATIENT
Start: 2018-01-08 | End: 2018-01-11 | Stop reason: HOSPADM

## 2018-01-08 RX ORDER — ONDANSETRON 2 MG/ML
4 INJECTION INTRAMUSCULAR; INTRAVENOUS
Status: COMPLETED | OUTPATIENT
Start: 2018-01-08 | End: 2018-01-08

## 2018-01-08 RX ORDER — SODIUM CHLORIDE 0.9 % (FLUSH) 0.9 %
5-10 SYRINGE (ML) INJECTION EVERY 8 HOURS
Status: DISCONTINUED | OUTPATIENT
Start: 2018-01-08 | End: 2018-01-11 | Stop reason: HOSPADM

## 2018-01-08 RX ORDER — ACETAMINOPHEN 325 MG/1
650 TABLET ORAL
Status: DISCONTINUED | OUTPATIENT
Start: 2018-01-08 | End: 2018-01-11 | Stop reason: HOSPADM

## 2018-01-08 RX ORDER — MELATONIN
2000 DAILY
Status: DISCONTINUED | OUTPATIENT
Start: 2018-01-09 | End: 2018-01-11 | Stop reason: HOSPADM

## 2018-01-08 RX ORDER — INSULIN LISPRO 100 [IU]/ML
INJECTION, SOLUTION INTRAVENOUS; SUBCUTANEOUS
Status: DISCONTINUED | OUTPATIENT
Start: 2018-01-08 | End: 2018-01-11 | Stop reason: HOSPADM

## 2018-01-08 RX ORDER — THERA TABS 400 MCG
1 TAB ORAL DAILY
Status: DISCONTINUED | OUTPATIENT
Start: 2018-01-09 | End: 2018-01-11 | Stop reason: HOSPADM

## 2018-01-08 RX ORDER — THERA TABS 400 MCG
1 TAB ORAL DAILY
COMMUNITY
End: 2018-01-22

## 2018-01-08 RX ORDER — DILTIAZEM HYDROCHLORIDE 5 MG/ML
2.5 INJECTION INTRAVENOUS ONCE
Status: COMPLETED | OUTPATIENT
Start: 2018-01-08 | End: 2018-01-08

## 2018-01-08 RX ADMIN — MORPHINE SULFATE 4 MG: 4 INJECTION, SOLUTION INTRAMUSCULAR; INTRAVENOUS at 11:29

## 2018-01-08 RX ADMIN — Medication 10 ML: at 18:28

## 2018-01-08 RX ADMIN — SODIUM CHLORIDE 1000 ML: 900 INJECTION, SOLUTION INTRAVENOUS at 13:47

## 2018-01-08 RX ADMIN — OXYCODONE HYDROCHLORIDE AND ACETAMINOPHEN 1 TABLET: 5; 325 TABLET ORAL at 20:59

## 2018-01-08 RX ADMIN — ENOXAPARIN SODIUM 40 MG: 40 INJECTION SUBCUTANEOUS at 18:27

## 2018-01-08 RX ADMIN — INSULIN LISPRO 2 UNITS: 100 INJECTION, SOLUTION INTRAVENOUS; SUBCUTANEOUS at 21:11

## 2018-01-08 RX ADMIN — ACETAMINOPHEN 650 MG: 325 TABLET ORAL at 17:28

## 2018-01-08 RX ADMIN — SODIUM CHLORIDE 1000 ML: 900 INJECTION, SOLUTION INTRAVENOUS at 10:24

## 2018-01-08 RX ADMIN — SODIUM CHLORIDE 100 ML/HR: 900 INJECTION, SOLUTION INTRAVENOUS at 18:27

## 2018-01-08 RX ADMIN — ACETAMINOPHEN 325 MG: 325 TABLET ORAL at 20:59

## 2018-01-08 RX ADMIN — CEFTRIAXONE SODIUM 1 G: 1 INJECTION, POWDER, FOR SOLUTION INTRAMUSCULAR; INTRAVENOUS at 16:08

## 2018-01-08 RX ADMIN — DILTIAZEM HYDROCHLORIDE 2.5 MG: 5 INJECTION INTRAVENOUS at 18:24

## 2018-01-08 RX ADMIN — SODIUM CHLORIDE 500 ML: 900 INJECTION, SOLUTION INTRAVENOUS at 21:00

## 2018-01-08 RX ADMIN — Medication 10 ML: at 22:00

## 2018-01-08 RX ADMIN — HUMAN INSULIN 7 UNITS: 100 INJECTION, SOLUTION SUBCUTANEOUS at 16:08

## 2018-01-08 RX ADMIN — ONDANSETRON 4 MG: 2 INJECTION INTRAMUSCULAR; INTRAVENOUS at 11:29

## 2018-01-08 RX ADMIN — MORPHINE SULFATE 4 MG: 4 INJECTION, SOLUTION INTRAMUSCULAR; INTRAVENOUS at 13:54

## 2018-01-08 RX ADMIN — DILTIAZEM HYDROCHLORIDE 120 MG: 120 CAPSULE, COATED, EXTENDED RELEASE ORAL at 18:26

## 2018-01-08 RX ADMIN — AZITHROMYCIN 500 MG: 500 INJECTION, POWDER, LYOPHILIZED, FOR SOLUTION INTRAVENOUS at 16:41

## 2018-01-08 NOTE — IP AVS SNAPSHOT
[address] [facility] [address] 
[phone] Patient: Julio Cesar Amaya MRN: [id number] :1980 About your hospitalization You were admitted on:  2018 You last received care in the:  [facility] 5 OBSERVATION You were discharged on:  2018 Why you were hospitalized Your primary diagnosis was:  Cap (Community Acquired Pneumonia) Your diagnoses also included:  Sepsis (Hcc), Bulging Of Intervertebral Disc Between L4 And L5, Heart Palpitations, Obesity, Class I, Bmi 30-34.9, Type Ii Diabetes Mellitus, Uncontrolled (Hcc), Gerd (Gastroesophageal Reflux Disease), Uti (Urinary Tract Infection) Follow-up Information Follow up With Details Comments Contact Info Jeanine Blanchard MD On 2018 For discharge follow up at 9:30AM  222 [facility] [address] 
[phone] Carlos Eduardo Pro MD Schedule an appointment as soon as possible for a visit As needed, If symptoms worsen 935 [facility] [address] 
[phone] Your Scheduled Appointments 2018  9:30 AM EST TRANSITIONAL CARE MANAGEMENT with Poli Cai NP [facility] ([address]) [address] [facility] [address]  
[phone] 2018 11:50 AM EST  
ROUTINE CARE with Maame Moreno MD  
[facility] [address] [place] [address]) [address] [place] [address] [address] [place] [address]  
[phone] Discharge Orders None A check brenda indicates which time of day the medication should be taken. My Medications START taking these medications Instructions Each Dose to Equal  
 Morning Noon Evening Bedtime  
 amoxicillin-clavulanate 875-125 mg per tablet Commonly known as:  AUGMENTIN Your last dose was: Your next dose is: Take 1 Tab by mouth every twelve (12) hours. 1 Tab  
    
   
   
   
  
 azithromycin 500 mg Tab Commonly known as:  Yobany Caro Your last dose was: Your next dose is: Take 1 Tab by mouth daily for 3 days. 500 mg  
    
   
   
   
  
 benzonatate 100 mg capsule Commonly known as:  TESSALON Your last dose was: Your next dose is: Take 1 Cap by mouth three (3) times daily as needed for Cough for up to 7 days. 100 mg  
    
   
   
   
  
 guaiFENesin 100 mg/5 mL liquid Commonly known as:  ROBITUSSIN Your last dose was: Your next dose is: Take 5 mL by mouth every four (4) hours as needed for Cough. 100 mg CONTINUE taking these medications Instructions Each Dose to Equal  
 Morning Noon Evening Bedtime  
 biotin 10,000 mcg Cap Your last dose was: Your next dose is: Take  by mouth. cholecalciferol (vitamin D3) 2,000 unit Tab Your last dose was: Your next dose is: Take 2,000 Units by mouth daily. 2000 Units  
    
   
   
   
  
 cyclobenzaprine 10 mg tablet Commonly known as:  FLEXERIL Your last dose was: Your next dose is: Take 0.5-1 Tabs by mouth nightly as needed for Muscle Spasm(s). Indications: MUSCLE SPASM  
 5-10 mg DEXILANT 60 mg Cpdb Generic drug:  Dexlansoprazole Your last dose was: Your next dose is: TAKE ONE CAPSULE BY MOUTH ONCE DAILY FOR  REFLUX  
     
   
   
   
  
 dilTIAZem  mg ER capsule Commonly known as:  CARDIZEM CD Your last dose was: Your next dose is: Take 1 Cap by mouth daily. 120 mg  
    
   
   
   
  
 ELIDEL 1 % topical cream  
Generic drug:  pimecrolimus Your last dose was: Your next dose is:    
   
   
 Apply  to affected area as needed (Eczema). levothyroxine 88 mcg tablet Commonly known as:  SYNTHROID Your last dose was: Your next dose is: Take 1 tablet daily Liraglutide 0.6 mg/0.1 mL (18 mg/3 mL) Pnij Commonly known as:  [place] Your last dose was: Your next dose is:    
   
   
 1.8 mg by SubCUTAneous route daily. 1.8 mg  
    
   
   
   
  
 metFORMIN 1,000 mg tablet Commonly known as:  GLUCOPHAGE Your last dose was: Your next dose is: TAKE ONE TABLET BY MOUTH TWICE DAILY WITH MEALS  
     
   
   
   
  
 MIRENA 20 mcg/24 hr (5 years) Iud  
Generic drug:  levonorgestrel Your last dose was: Your next dose is:    
   
   
 1 Each by IntraUTERine route once. 1 Each THERA tablet Generic drug:  therapeutic multivitamin Your last dose was: Your next dose is: Take 1 Tab by mouth daily. MVI Gummies 1 Tab Where to Get Your Medications Information on where to get these meds will be given to you by the nurse or doctor. ! Ask your nurse or doctor about these medications  
  amoxicillin-clavulanate 875-125 mg per tablet  
 azithromycin 500 mg Tab  
 benzonatate 100 mg capsule  
 guaiFENesin 100 mg/5 mL liquid Discharge Instructions Please bring this form with you to show your primary care provider at your follow-up appointment. Primary care provider:  Dr. Bin Landry MD 
 
Discharging provider:  Andrew Sweet MD 
 
You have been admitted to the hospital with the following diagnoses: 
· CAP (community acquired pneumonia) FOLLOW-UP CARE RECOMMENDATIONS: 
 
APPOINTMENTS: 
Follow-up Information Follow up With Details Comments Contact Info Bin Landry MD On 1/19/2018 For discharge follow up at 9:30AM  [address] 
[phone] Christ Vallejo MD Schedule an appointment as soon as possible for a visit As needed, If symptoms worsen [address] 
[phone] FOLLOW-UP TESTS recommended:  
- you will take 2 antibiotics: Azithromycin for 3 more days and Augmentin for 6 more days. - please complete all antibiotics as prescribed PENDING TEST RESULTS: 
At the time of your discharge the following test results are still pending: NONE Please make sure you review these results with your outpatient follow-up provider(s). SYMPTOMS to watch for: chest pain, shortness of breath, fever, chills, nausea, vomiting, diarrhea, change in mentation, falling, weakness, bleeding. DIET/what to eat:  Diabetic Diet ACTIVITY:  Activity as tolerated WOUND CARE: NONE 
 
EQUIPMENT needed:  NONE What to do if new or unexpected symptoms occur? If you experience any of the above symptoms (or should other concerns or questions arise after discharge) please call your primary care physician. Return to the emergency room if you cannot get hold of your doctor. · It is very important that you keep your follow-up appointment(s). · Please bring discharge papers, medication list (and/or medication bottles) to your follow-up appointments for review by your outpatient provider(s). · Please check the list of medications and be sure it includes every medication (even non-prescription medications) that your provider wants you to take. · It is important that you take the medication exactly as they are prescribed. · Keep your medication in the bottles provided by the pharmacist and keep a list of the medication names, dosages, and times to be taken in your wallet. · Do not take other medications without consulting your doctor.   
· If you have any questions about your medications or other instructions, please talk to your nurse or care provider before you leave the hospital. 
 
 I understand that if any problems occur once I am at home I am to contact my physician. These instructions were explained to me and I had the opportunity to ask questions. Introducing [facility]! Dear Papito Sharma: Thank you for requesting a [employer] account. Our records indicate that you already have an active [employer] account. You can access your account anytime at [email or web address] Did you know that you can access your hospital and ER discharge instructions at any time in [employer]? You can also review all of your test results from your hospital stay or ER visit. Additional Information If you have questions, please visit the Frequently Asked Questions section of the [employer] website at [email or web address] Remember, [employer] is NOT to be used for urgent needs. For medical emergencies, dial 911. Now available from your iPhone and Android! Unresulted Labs-Please follow up with your PCP about these lab tests Order Current Status CULTURE, BLOOD, PAIRED Preliminary result CULTURE, RESPIRATORY/SPUTUM/BRONCH W GRAM STAIN Preliminary result Providers Seen During Your Hospitalization Provider Specialty Primary office phone Leia Perez MD Emergency Medicine [phone] Rosa Fernando MD Internal Medicine [phone] Darion Menendez MD Internal Medicine [phone] Randy Dunn MD Internal Medicine [phone] Your Primary Care Physician (PCP) Primary Care Physician Office Phone Office Fax Shamar Mohan [phone] You are allergic to the following Allergen Reactions Lexapro (Escitalopram) Other (comments) Fatigue, wt gain Naprosyn (Naproxen) Nausea and Vomiting Tolerates Advil, Toradol, etc  
    
 Topamax (Topiramate) Other (comments) Kidney stone Zoloft (Sertraline) Other (comments) Fatigue Recent Documentation Height Weight BMI OB Status Smoking Status 1.524 m 88.5 kg 38.08 kg/m2 IUD Never Smoker Emergency Contacts Name Discharge Info Relation Home Work Mobile Rosa Arellano DISCHARGE CAREGIVER [3] Other Relative [6] [phone] Kenton Escobar DISCHARGE CAREGIVER [3] Spouse [3] [phone] Patient Belongings The following personal items are in your possession at time of discharge: 
  Dental Appliances: None  Visual Aid: None      Home Medications: None   Jewelry: None  Clothing: At bedside    Other Valuables: Cell Phone Please provide this summary of care documentation to your next provider. Signatures-by signing, you are acknowledging that this After Visit Summary has been reviewed with you and you have received a copy. Patient Signature:  ____________________________________________________________ Date:  ____________________________________________________________  
  
[facility] Provider Signature:  ____________________________________________________________ Date:  ____________________________________________________________

## 2018-01-08 NOTE — PROGRESS NOTES
Admission Medication Reconciliation:    Information obtained from:  Patient/RxQuery    Comments/Recommendations: Updated PTA meds/reviewed patient's allergies. 1)  Medication changes  Added  - MVI Gummies    Removed  - Loprox 1% shampoo  - Clindamycin 2% vaginal cream  - Lotrisone cream  - vit B-12  - Diclofenac 25 mg caps  - Prozac    2)  Last doses Saturday     3)  Mirena placed 2014    4)  Allergies reviewed       Significant PMH/Disease States:   Past Medical History:   Diagnosis Date    Acute lymphadenitis of arm, left epitrochlear 2017    Anxiety and depression 10/9/2014    10/2014; resolved as of 8/24/15 per pt    Chronic low back pain 10/9/2014    Muscular     Depression with anxiety 2017    10/2014    Diabetes mellitus type II 10/07    Consulted w/ Dr. Tony Cordova    Diabetic eye exam ([facility] [address] 2016    Dereck Eisenmenger, MD, [employer]    Dysphagia 2010    GERD (gastroesophageal reflux disease) 2011    H. pylori infection 2016    Dr Olu Boothe    History of peptic ulcer disease 2016    16 yo    History of sinus tachycardia 10/5/2012    as of 8/24/15 pt states followed by PCP    Hypercholesterolemia 2016    ~2007    Kidney stone 2017     (normal spontaneous vaginal delivery) 1998    Son  9yo    Post partum depression 10/9/2014    10/2013: counseling and prn Valium    Spondylosis of lumbar & thoracic region without myelopathy or radiculopathy 10/26/2016    xrays 2010    Spondylosis, Thoracic & Lumbar 3/5/2015    xrays 2010    Thyromegaly 2010    US negative    Vitamin D deficiency      Chief Complaint for this Admission:    Chief Complaint   Patient presents with    Fever    Headache    Back Pain     Allergies:  Lexapro [escitalopram]; Naprosyn [naproxen];  Topamax [topiramate]; and Zoloft [sertraline]    Prior to Admission Medications:   Prior to Admission Medications   Prescriptions Last Dose Informant Patient Reported? Taking? DEXILANT 60 mg CpDB 2018 at [facility]  No Yes   Sig: TAKE ONE CAPSULE BY MOUTH ONCE DAILY FOR  REFLUX   ELIDEL 1 % topical cream 2018  Yes Yes   Sig: Apply  to affected area as needed (Eczema). Liraglutide (VICTOZA) 0.6 mg/0.1 mL (18 mg/3 mL) pnij 2018 at 0800  No Yes   Si.8 mg by SubCUTAneous route daily. biotin 10,000 mcg cap 2018 at 0800  Yes Yes   Sig: Take  by mouth. cholecalciferol, vitamin D3, 2,000 unit tab 2018 at 0800  Yes Yes   Sig: Take 2,000 Units by mouth daily. cyclobenzaprine (FLEXERIL) 10 mg tablet 2018  No Yes   Sig: Take 0.5-1 Tabs by mouth nightly as needed for Muscle Spasm(s). Indications: MUSCLE SPASM   dilTIAZem CD (CARDIZEM CD) 120 mg ER capsule 2018 at 0800  No Yes   Sig: Take 1 Cap by mouth daily. levonorgestrel (MIRENA) 20 mcg/24 hr (5 years) IUD 2014  Yes Yes   Si Each by IntraUTERine route once.    levothyroxine (SYNTHROID) 88 mcg tablet 2018 at 0600  No Yes   Sig: Take 1 tablet daily   metFORMIN (GLUCOPHAGE) 1,000 mg tablet 2018 at 1800  No Yes   Sig: TAKE ONE TABLET BY MOUTH TWICE DAILY WITH MEALS      Facility-Administered Medications: None

## 2018-01-08 NOTE — H&P
History & Physical  Quinton Lagos MD, [facility]    Date of admission: 2018    Patient name: Opal Jasmine  MRN: [id number]  YOB: 1980  Age: 40 y.o. Primary care provider:  Cristi Nicholas MD     Source of Information: patient, medical records                              Chief complaint: fevers, chills    History of present illness  Opal Jasmine is a 40 y.o. female with DM2, obesity, chronic back pain, anxiety/depression, GERD, HLD, h/o nephrolithiasis, and palpitations who presents to the ED from home with fevers and chills. Patient reports fevers up to 103F for the last 2 days not relieved with OTC pain relievers, chills, persistent headache, and b/l back/flank pain. She denies sick contacts and received her influenza vaccine this season. She denies CP, SOB, cough, abdominal pain, diarrhea/constipation, dysuria, n/v.     ED triage VS were temp 99.2F, , /85, RR 17, SpO2 95% on RA. In the ED, she received azithromycin 500mg IVx1, ceftriaxone 1g IVx1, regular insulin 7 units SCx1, morphine 4mg IVx2, Zofran 4mg IVx1, and IVNS x2L bolus.     Past Medical History:   Diagnosis Date    Acute lymphadenitis of arm, left epitrochlear 2017    Anxiety and depression 10/9/2014    10/2014; resolved as of 8/24/15 per pt    Chronic low back pain 10/9/2014    Muscular     Depression with anxiety 2017    10/2014    Diabetes mellitus type II 10/07    Consulted w/ Dr. Sherlyn Russell    Diabetic eye exam ([facility] [address] 2016    Luba Roy MD, [facility]    Dysphagia 2010    GERD (gastroesophageal reflux disease) 2011    H. pylori infection 2016    Dr Demetrio Mei    History of peptic ulcer disease 2016    16 yo    History of sinus tachycardia 10/5/2012    as of 8/24/15 pt states followed by PCP    Hypercholesterolemia 2016    ~2007    Kidney stone 2017     (normal spontaneous vaginal delivery)     Son  7yo    Post partum depression 10/9/2014    10/2013: counseling and prn Valium    Spondylosis of lumbar & thoracic region without myelopathy or radiculopathy 10/26/2016    xrays     Spondylosis, Thoracic & Lumbar 3/5/2015    xrays 2010    Thyromegaly 2010    US negative    Vitamin D deficiency       Past Surgical History:   Procedure Laterality Date    HX CARPAL TUNNEL RELEASE Bilateral     HX  SECTION      HX CHOLECYSTECTOMY  10/09    LAP-Gallstones    HX GYN  2015    IUD placed    HX LITHOTRIPSY  2017    HX ORTHOPAEDIC Right 7/10/14    trigger thumb    HX ORTHOPAEDIC Left 7/10/14    wrist     Prior to Admission medications    Medication Sig Start Date End Date Taking? Authorizing Provider   therapeutic multivitamin (THERA) tablet Take 1 Tab by mouth daily. MVI Gummies   Yes Historical Provider   dilTIAZem CD (CARDIZEM CD) 120 mg ER capsule Take 1 Cap by mouth daily. 10/4/17  Yes Reece Olson MD   Liraglutide (VICTOZA) 0.6 mg/0.1 mL (18 mg/3 mL) pnij 1.8 mg by SubCUTAneous route daily. 17  Yes Roya Turner MD   ELIDEL 1 % topical cream Apply  to affected area as needed (Eczema). 17  Yes Historical Provider   cyclobenzaprine (FLEXERIL) 10 mg tablet Take 0.5-1 Tabs by mouth nightly as needed for Muscle Spasm(s). Indications: MUSCLE SPASM 17  Yes Reece Olson MD   biotin 10,000 mcg cap Take  by mouth. Yes Historical Provider   DEXILANT 60 mg CpDB TAKE ONE CAPSULE BY MOUTH ONCE DAILY FOR  REFLUX 17  Yes Reece Olson MD   levothyroxine (SYNTHROID) 88 mcg tablet Take 1 tablet daily 17  Yes Bee Victor MD   metFORMIN (GLUCOPHAGE) 1,000 mg tablet TAKE ONE TABLET BY MOUTH TWICE DAILY WITH MEALS 7/15/16  Yes Bee Victor MD   cholecalciferol, vitamin D3, 2,000 unit tab Take 2,000 Units by mouth daily.    Yes Historical Provider   levonorgestrel (MIRENA) 20 mcg/24 hr (5 years) IUD 1 Each by IntraUTERine route once. Yes Historical Provider     Allergies   Allergen Reactions    Lexapro [Escitalopram] Other (comments)     Fatigue, wt gain    Naprosyn [Naproxen] Nausea and Vomiting     Tolerates Advil, Toradol, etc    Topamax [Topiramate] Other (comments)     Kidney stone    Zoloft [Sertraline] Other (comments)     Fatigue       Family History   Problem Relation Age of Onset    Diabetes Mother       ESRD 43 yo in     Hypertension Mother     Stroke Mother      TIA    Heart Disease Mother     High Cholesterol Paternal [de-identified]     Hypertension Maternal Grandfather          Diabetes Maternal Grandmother     Hypertension Maternal Grandmother     High Cholesterol Maternal Grandmother     Cancer Maternal Grandmother      Breast CA     Heart Disease Maternal Grandmother       age 79    Colon Cancer Maternal Uncle       at 64yo         Social history  Patient resides  x  Independently      With family care      Assisted living      SNF    Ambulates  x  Independently      With cane       Assisted walker         Alcohol history     None   x  Social: last drink was 19     Chronic   Smoking history  x  None     Former smoker     Current smoker     Denies illicit drug use. Works as nurse for outpatient practice. History   Smoking Status    Never Smoker   Smokeless Tobacco    Never Used       Code status  x  Full code     DNR/DNI        Code status Full by default. Review of systems  A comprehensive review of systems was negative except for that written in the History of Present Illness. The remainder of the review of systems was reviewed and is noncontributory.     Physical Examination   Visit Vitals    /81 (BP 1 Location: Left arm, BP Patient Position: At rest)    Pulse (!) 119    Temp (!) 102.1 °F (38.9 °C)    Resp 18    Ht 5' (1.524 m)    Wt 88.5 kg (195 lb)    SpO2 100%    BMI 38.08 kg/m2          O2 Device: Room air    Gen: awake, NAD, cooperative, pleasant  Head: NCAT  EENT: PERRL, EOMI, MMM, oropharynx benign  Neck: supple, no masses  Lungs: CTAB, no w/r/r  CVS: regular rhythm, normal rate, S1S2, no m/r/g appreciated, no peripheral edema, +pulses  Abd: +BS, soft, NT, ND  MSK: moves all extremities  Neuro: AOx3, CN II-XII grossly intact, NFD, responds appropriately to questions and commands  Skin: warm, dry; no rashes, lesions, ulcers noted    Data Review    EK Hour Results:  Recent Results (from the past 24 hour(s))   CBC WITH AUTOMATED DIFF    Collection Time: 18 10:25 AM   Result Value Ref Range    WBC 13.8 (H) 3.6 - 11.0 K/uL    RBC 4.65 3.80 - 5.20 M/uL    HGB 13.8 11.5 - 16.0 g/dL    HCT 40.4 35.0 - 47.0 %    MCV 86.9 80.0 - 99.0 FL    MCH 29.7 26.0 - 34.0 PG    MCHC 34.2 30.0 - 36.5 g/dL    RDW 12.9 11.5 - 14.5 %    PLATELET 883 514 - 757 K/uL    NEUTROPHILS 81 (H) 32 - 75 %    LYMPHOCYTES 12 12 - 49 %    MONOCYTES 7 5 - 13 %    EOSINOPHILS 0 0 - 7 %    BASOPHILS 0 0 - 1 %    ABS. NEUTROPHILS 11.1 (H) 1.8 - 8.0 K/UL    ABS. LYMPHOCYTES 1.6 0.8 - 3.5 K/UL    ABS. MONOCYTES 1.0 0.0 - 1.0 K/UL    ABS. EOSINOPHILS 0.0 0.0 - 0.4 K/UL    ABS. BASOPHILS 0.0 0.0 - 0.1 K/UL   METABOLIC PANEL, COMPREHENSIVE    Collection Time: 18 10:25 AM   Result Value Ref Range    Sodium 131 (L) 136 - 145 mmol/L    Potassium 3.5 3.5 - 5.1 mmol/L    Chloride 98 97 - 108 mmol/L    CO2 23 21 - 32 mmol/L    Anion gap 10 5 - 15 mmol/L    Glucose 285 (H) 65 - 100 mg/dL    BUN 8 6 - 20 MG/DL    Creatinine 0.86 0.55 - 1.02 MG/DL    BUN/Creatinine ratio 9 (L) 12 - 20      GFR est AA >60 >60 ml/min/1.73m2    GFR est non-AA >60 >60 ml/min/1.73m2    Calcium 8.8 8.5 - 10.1 MG/DL    Bilirubin, total 0.5 0.2 - 1.0 MG/DL    ALT (SGPT) 71 12 - 78 U/L    AST (SGOT) 48 (H) 15 - 37 U/L    Alk.  phosphatase 158 (H) 45 - 117 U/L    Protein, total 8.7 (H) 6.4 - 8.2 g/dL    Albumin 3.5 3.5 - 5.0 g/dL    Globulin 5.2 (H) 2.0 - 4.0 g/dL    A-G Ratio 0.7 (L) 1.1 - 2.2     INFLUENZA A & B AG (RAPID TEST)    Collection Time: 01/08/18 10:25 AM   Result Value Ref Range    Influenza A Antigen NEGATIVE  NEG      Influenza B Antigen NEGATIVE  NEG     URINALYSIS W/ REFLEX CULTURE    Collection Time: 01/08/18 10:25 AM   Result Value Ref Range    Color YELLOW/STRAW      Appearance CLEAR CLEAR      Specific gravity 1.020 1.003 - 1.030      pH (UA) 6.0 5.0 - 8.0      Protein 100 (A) NEG mg/dL    Glucose >1000 (A) NEG mg/dL    Ketone 40 (A) NEG mg/dL    Bilirubin NEGATIVE  NEG      Blood NEGATIVE  NEG      Urobilinogen 1.0 0.2 - 1.0 EU/dL    Nitrites NEGATIVE  NEG      Leukocyte Esterase NEGATIVE  NEG      WBC 5-10 0 - 4 /hpf    RBC 5-10 0 - 5 /hpf    Epithelial cells MODERATE (A) FEW /lpf    Bacteria 1+ (A) NEG /hpf    UA:UC IF INDICATED URINE CULTURE ORDERED (A) CNI      Mucus 1+ (A) NEG /lpf   LACTIC ACID    Collection Time: 01/08/18  3:39 PM   Result Value Ref Range    Lactic acid 1.8 0.4 - 2.0 MMOL/L     Recent Labs      01/08/18   1025   WBC  13.8*   HGB  13.8   HCT  40.4   PLT  262     Recent Labs      01/08/18   1025   NA  131*   K  3.5   CL  98   CO2  23   GLU  285*   BUN  8   CREA  0.86   CA  8.8   ALB  3.5   SGOT  48*   ALT  71       Imaging  CXR PA/lat - no acute cardiopulmonary proces    CT Results  (Last 48 hours)               01/08/18 1354  CT SPINE LUMB WO CONT Final result    Impression:  IMPRESSION: No acute fracture or subluxation. Broad-based disc bulge at L4-5   produces mild bilateral neural foraminal narrowing. 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 Left lower lobe airspace disease likely represents   pneumonia; radiographic follow-up is recommended to assure resolution. Narrative:  INDICATION: Back pain radiating to legs. TECHNIQUE: 2.5 mm axial images were obtained through the lumbar spine and   reconstructed in the sagittal and coronal planes.  CT dose reduction was achieved   through use of a standardized protocol tailored for this examination and   automatic exposure control for dose modulation. Adaptive statistical iterative   reconstruction (ASIR) was utilized. FINDINGS: The lumbar spine alignment is normal. There is no evidence of acute   fracture. 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 There is incomplete   fusion of the posterior elements at T12. There is partial sacralization of L5. The intervertebral disc spaces are well maintained. There is no evidence of   spinal stenosis at any level. There is a broad-based disc bulge at L4-5; this   produces mild bilateral neural foraminal narrowing. No focal disc herniation is   seen. Bilateral facet arthropathy is seen in the lower lumbar spine. Extensive   airspace disease is seen in the left lower lobe. Intrauterine device is present   within the retroverted uterus. Assessment and Plan   Principal Problem:    CAP with sepsis (POA)  - pt seen/examined in ED on 1/8/18; admit inpatient remote telemetry under sepsis protocol  - tachycardia, leucocytosis, tachypnea on admission  - lactic acid WNL  - BCx 1/8 pending  - influenza swab negative and received her influenza vaccine this season  - s/p ceftrixone and azithromycin in ED; will continue and monitor for arrhythmias with azithromycin  - check urine strep/legionella antigens  - IVF    Active Problems:  Chronic heart palpitations and tachycardia (POA)  - persistent tachycardia with occasional PVCs on tele  - check ECG  - continue diltiazem   - may need Cardiology consult  - will check TTE  - denies CP; no indication for troponin    Bulging of intervertebral disc between L4 and L5 (POA)  - likely cause of chronic back pain  - seen on CT  - due to fever, will check MRI L-spine  - pain control    UTI (urinary tract infection) (POA)  - asymptomatic but does c/o b/l flank pain  - UCx 1/8 pending  - ceftriaxone should cover    DM2, uncontrolled  - check A1c (last was 8.1 in 9/2017);  FBSs, SSI  - hold Victoza, metformin inpatient        GERD - continue PPI    Obesity, Body mass index is 38.08 kg/(m^2). Diet: cardiac consistent carb  Activity: ambulate with assistance  DVT prophylaxis: enoxaparin  Isolation precautions: none  Consultations: none  Anticipated disposition: TBD.  Came from home       Signed by: Dwayne Dean MD    January 8, 2018 at 5:14 PM

## 2018-01-08 NOTE — ED PROVIDER NOTES
HPI Comments: 40 y.o. female with past medical history significant for diabetes, thyromegaly, GERD, depression, anxiety, and hypercholesterolemia who presents from home via private vehicle with chief complaint of fever. Pt reports a fever for the last 2 days and a productive cough with thick yellow sputum and a headache. Pt states she has been fatigued and was \"bed ridden\" yesterday. Pt states yesterday she went to change positions in bed and felt a \"tight pull\" from hip to hip that lasted about 5 minutes and has had lower back pain since, but is able to walk. Pt reports she has a history of tachycardia which is monitored by her PCP. Pt reports a recent Salmonella infection. Pt denies any sore throat or rhinorrhea. There are no other acute medical concerns at this time. Social hx: Nonsmoker; Occasional EtOH use   PCP: Vida Gaviria MD     Note written by Patricio Messer. Katie You, as dictated by Crow Strauss MD 9:58 AM      The history is provided by the patient. No  was used.         Past Medical History:   Diagnosis Date    Acute lymphadenitis of arm, left epitrochlear 2017    Anxiety and depression 10/9/2014    10/2014; resolved as of 8/24/15 per pt    Chronic low back pain 10/9/2014    Muscular     Depression with anxiety 2017    10/2014    Diabetes mellitus type II 10/07    Consulted w/ Dr. Geovanna Arroyo    Diabetic eye exam ([facility] [address] 2016    Xavier Rose MD, [facility]    Dysphagia 2010    GERD (gastroesophageal reflux disease) 2011    H. pylori infection 2016    Dr Joanne Colorado    History of peptic ulcer disease 2016    19 yo    History of sinus tachycardia 10/5/2012    as of 8/24/15 pt states followed by PCP    Hypercholesterolemia 2016    ~    Kidney stone 2017     (normal spontaneous vaginal delivery) 1998    Son  9yo    Post partum depression 10/9/2014    10/2013: counseling and prn Valium  Spondylosis of lumbar & thoracic region without myelopathy or radiculopathy 10/26/2016    xrays 2010    Spondylosis, Thoracic & Lumbar 3/5/2015    xrays 2010    Thyromegaly 2010    US negative    Vitamin D deficiency        Past Surgical History:   Procedure Laterality Date    HX CARPAL TUNNEL RELEASE Bilateral     HX  SECTION      HX CHOLECYSTECTOMY  10/09    LAP-Gallstones    HX GYN  2015    IUD placed    HX LITHOTRIPSY  2017    HX ORTHOPAEDIC Right 7/10/14    trigger thumb    HX ORTHOPAEDIC Left 7/10/14    wrist         Family History:   Problem Relation Age of Onset    Diabetes Mother       ESRD 43 yo in     Hypertension Mother     Stroke Mother      TIA    Heart Disease Mother     High Cholesterol Paternal Grandmother     Hypertension Maternal Grandfather          Diabetes Maternal Grandmother     Hypertension Maternal Grandmother     High Cholesterol Maternal Grandmother     Cancer Maternal Grandmother      Breast CA     Heart Disease Maternal Grandmother       age 79    Colon Cancer Maternal Uncle       at 64yo       Social History     Social History    Marital status:      Spouse name: N/A    Number of children: 1    Years of education: N/A     Occupational History    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[email or web address], Elementary Education    [address] [employer]     Social History Main Topics    Smoking status: Never Smoker    Smokeless tobacco: Never Used    Alcohol use 0.0 oz/week     0 Standard drinks or equivalent per week      Comment: not weekly, every couple of months per pt    Drug use: No    Sexual activity: Yes     Partners: Male     Birth control/ protection: IUD     Other Topics Concern    Caffeine Concern No     seldom any at all    Special Diet No     has attended diabetes classes  and personal dietician in     Exercise No     Social History Narrative    Got  ~ . Has 1 daughter; Long term relationship and has custody of her stepson; her biological son  at age 8yo in 65. 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 ALLERGIES: Lexapro [escitalopram]; Naprosyn [naproxen]; Topamax [topiramate]; and Zoloft [sertraline]    Review of Systems   Constitutional: Positive for fatigue and fever. Negative for appetite change and chills. HENT: Negative for rhinorrhea, sore throat and trouble swallowing. Eyes: Negative for photophobia. Respiratory: Positive for cough. Negative for shortness of breath. Cardiovascular: Negative for chest pain and palpitations. Gastrointestinal: Negative for abdominal pain, nausea and vomiting. Genitourinary: Negative for dysuria, frequency and hematuria. Musculoskeletal: Positive for arthralgias and back pain. Neurological: Positive for headaches. Negative for dizziness, syncope and weakness. Psychiatric/Behavioral: Negative for behavioral problems. The patient is not nervous/anxious. All other systems reviewed and are negative. Vitals:    18 0857   BP: 148/85   Pulse: (!) 158   Resp: 17   Temp: 99.2 °F (37.3 °C)   SpO2: 95%   Weight: 88.5 kg (195 lb)   Height: 5' (1.524 m)            Physical Exam   Constitutional: She appears well-developed and well-nourished. HENT:   Head: Normocephalic and atraumatic. Mouth/Throat: Oropharynx is clear and moist.   Eyes: EOM are normal. Pupils are equal, round, and reactive to light. Neck: Normal range of motion. Neck supple. Supple neck. Cardiovascular: Regular rhythm, normal heart sounds and intact distal pulses. Tachycardia present. Exam reveals no gallop and no friction rub. No murmur heard. Pulse rate of 160. Pulmonary/Chest: Effort normal. No respiratory distress. She has no wheezes. She has no rales. Abdominal: Soft. There is no tenderness. There is no rebound. Musculoskeletal: Normal range of motion. She exhibits no tenderness. No CVA tenderness. Pain around hips and sacral joints. Neurological: She is alert. No cranial nerve deficit. Motor; symmetric   Skin: No erythema. Psychiatric: She has a normal mood and affect. Her behavior is normal.   Nursing note and vitals reviewed. Note written by Boris Tobar, as dictated by Isaac Livingston MD 9:58 AM       [facility]  ED Course       Procedures         CONSULT NOTE:  Spoke to Dr Caren Nelson concerning the patient. The patient's history, presentation, physical findings, and results were all discussed. 2:58 PM    Note: Patient has had almost 2 L of normal saline. Pulse rate continues to be elevated at 1:30 to 140. She is somewhat more comfortable after opiates for her headache. Her neck continues to be supple. CT scan of the lower back to evaluate her recent injury reveals pneumonia. Patient should be admitted for IV antibiotics and more IV fluids.   Isaac Livingston MD  3:00 PM

## 2018-01-08 NOTE — ROUTINE PROCESS
TRANSFER - OUT REPORT:    Verbal report given to [facility] RN(name) on Evelin Karimi  being transferred to OBS(unit) for routine progression of care       Report consisted of patients Situation, Background, Assessment and   Recommendations(SBAR). Information from the following report(s) SBAR, Kardex, ED Summary and MAR was reviewed with the receiving nurse. Lines:   Peripheral IV 01/08/18 Right Antecubital (Active)   Site Assessment Clean, dry, & intact 1/8/2018 10:24 AM   Phlebitis Assessment 0 1/8/2018 10:24 AM   Infiltration Assessment 0 1/8/2018 10:24 AM   Dressing Status Clean, dry, & intact 1/8/2018 10:24 AM   Dressing Type Transparent 1/8/2018 10:24 AM   Hub Color/Line Status Pink;Flushed;Patent 1/8/2018 10:24 AM   Action Taken Blood drawn 1/8/2018 10:24 AM       Peripheral IV 01/08/18 Right Antecubital (Active)   Site Assessment Clean, dry, & intact 1/8/2018  3:38 PM   Phlebitis Assessment 0 1/8/2018  3:38 PM   Infiltration Assessment 0 1/8/2018  3:38 PM   Dressing Status Clean, dry, & intact 1/8/2018  3:38 PM   Dressing Type Transparent 1/8/2018  3:38 PM   Hub Color/Line Status Blue;Flushed;Patent 1/8/2018  3:38 PM   Action Taken Blood drawn 1/8/2018  3:38 PM        Opportunity for questions and clarification was provided.       Patient transported with:   [employer]

## 2018-01-08 NOTE — ED TRIAGE NOTES
\"I spiked a fever of 103 and it's been running that way since. Comes back up after ibuprofen. I couldn't get out of the bed yesterday because I was so fatigued. I tried to get up about 2230 last night and felt a \"pop\" across by hips and lower back. My  had to push me to get me up. \"

## 2018-01-09 ENCOUNTER — APPOINTMENT (OUTPATIENT)
Dept: MRI IMAGING | Age: 38
DRG: 871 | End: 2018-01-09
Attending: INTERNAL MEDICINE
Payer: COMMERCIAL

## 2018-01-09 LAB
ALBUMIN SERPL-MCNC: 2.9 G/DL (ref 3.5–5)
ALBUMIN/GLOB SERPL: 0.7 {RATIO} (ref 1.1–2.2)
ALP SERPL-CCNC: 124 U/L (ref 45–117)
ALT SERPL-CCNC: 47 U/L (ref 12–78)
ANION GAP SERPL CALC-SCNC: 11 MMOL/L (ref 5–15)
AST SERPL-CCNC: 27 U/L (ref 15–37)
ATRIAL RATE: 139 BPM
BACTERIA SPEC CULT: NORMAL
BASOPHILS # BLD: 0 K/UL (ref 0–0.1)
BASOPHILS NFR BLD: 0 % (ref 0–1)
BILIRUB SERPL-MCNC: 0.6 MG/DL (ref 0.2–1)
BUN SERPL-MCNC: 4 MG/DL (ref 6–20)
BUN/CREAT SERPL: 7 (ref 12–20)
CALCIUM SERPL-MCNC: 7.8 MG/DL (ref 8.5–10.1)
CALCULATED P AXIS, ECG09: 55 DEGREES
CALCULATED R AXIS, ECG10: -12 DEGREES
CALCULATED T AXIS, ECG11: 17 DEGREES
CC UR VC: NORMAL
CHLORIDE SERPL-SCNC: 104 MMOL/L (ref 97–108)
CO2 SERPL-SCNC: 19 MMOL/L (ref 21–32)
CREAT SERPL-MCNC: 0.6 MG/DL (ref 0.55–1.02)
DIAGNOSIS, 93000: NORMAL
EOSINOPHIL # BLD: 0 K/UL (ref 0–0.4)
EOSINOPHIL NFR BLD: 0 % (ref 0–7)
ERYTHROCYTE [DISTWIDTH] IN BLOOD BY AUTOMATED COUNT: 13.1 % (ref 11.5–14.5)
EST. AVERAGE GLUCOSE BLD GHB EST-MCNC: 240 MG/DL
GLOBULIN SER CALC-MCNC: 4.1 G/DL (ref 2–4)
GLUCOSE BLD STRIP.AUTO-MCNC: 152 MG/DL (ref 65–100)
GLUCOSE BLD STRIP.AUTO-MCNC: 178 MG/DL (ref 65–100)
GLUCOSE BLD STRIP.AUTO-MCNC: 213 MG/DL (ref 65–100)
GLUCOSE BLD STRIP.AUTO-MCNC: 286 MG/DL (ref 65–100)
GLUCOSE SERPL-MCNC: 164 MG/DL (ref 65–100)
HBA1C MFR BLD: 10 % (ref 4.2–6.3)
HCT VFR BLD AUTO: 34.5 % (ref 35–47)
HGB BLD-MCNC: 11.4 G/DL (ref 11.5–16)
LYMPHOCYTES # BLD: 1.5 K/UL (ref 0.8–3.5)
LYMPHOCYTES NFR BLD: 10 % (ref 12–49)
MAGNESIUM SERPL-MCNC: 1.6 MG/DL (ref 1.6–2.4)
MCH RBC QN AUTO: 28.9 PG (ref 26–34)
MCHC RBC AUTO-ENTMCNC: 33 G/DL (ref 30–36.5)
MCV RBC AUTO: 87.6 FL (ref 80–99)
MONOCYTES # BLD: 1.5 K/UL (ref 0–1)
MONOCYTES NFR BLD: 11 % (ref 5–13)
NEUTS SEG # BLD: 11.4 K/UL (ref 1.8–8)
NEUTS SEG NFR BLD: 79 % (ref 32–75)
P-R INTERVAL, ECG05: 126 MS
PHOSPHATE SERPL-MCNC: 1.5 MG/DL (ref 2.6–4.7)
PLATELET # BLD AUTO: 217 K/UL (ref 150–400)
POTASSIUM SERPL-SCNC: 3.3 MMOL/L (ref 3.5–5.1)
PROT SERPL-MCNC: 7 G/DL (ref 6.4–8.2)
Q-T INTERVAL, ECG07: 284 MS
QRS DURATION, ECG06: 68 MS
QTC CALCULATION (BEZET), ECG08: 432 MS
RBC # BLD AUTO: 3.94 M/UL (ref 3.8–5.2)
SERVICE CMNT-IMP: ABNORMAL
SERVICE CMNT-IMP: NORMAL
SODIUM SERPL-SCNC: 134 MMOL/L (ref 136–145)
VENTRICULAR RATE, ECG03: 139 BPM
WBC # BLD AUTO: 14.4 K/UL (ref 3.6–11)

## 2018-01-09 PROCEDURE — 87899 AGENT NOS ASSAY W/OPTIC: CPT | Performed by: HOSPITALIST

## 2018-01-09 PROCEDURE — 82962 GLUCOSE BLOOD TEST: CPT

## 2018-01-09 PROCEDURE — 74011250637 HC RX REV CODE- 250/637: Performed by: INTERNAL MEDICINE

## 2018-01-09 PROCEDURE — 74011250636 HC RX REV CODE- 250/636: Performed by: INTERNAL MEDICINE

## 2018-01-09 PROCEDURE — 80053 COMPREHEN METABOLIC PANEL: CPT | Performed by: INTERNAL MEDICINE

## 2018-01-09 PROCEDURE — 97161 PT EVAL LOW COMPLEX 20 MIN: CPT

## 2018-01-09 PROCEDURE — 74011250636 HC RX REV CODE- 250/636: Performed by: HOSPITALIST

## 2018-01-09 PROCEDURE — 87070 CULTURE OTHR SPECIMN AEROBIC: CPT | Performed by: HOSPITALIST

## 2018-01-09 PROCEDURE — 84100 ASSAY OF PHOSPHORUS: CPT | Performed by: INTERNAL MEDICINE

## 2018-01-09 PROCEDURE — 85025 COMPLETE CBC W/AUTO DIFF WBC: CPT | Performed by: INTERNAL MEDICINE

## 2018-01-09 PROCEDURE — 86738 MYCOPLASMA ANTIBODY: CPT | Performed by: HOSPITALIST

## 2018-01-09 PROCEDURE — 96366 THER/PROPH/DIAG IV INF ADDON: CPT

## 2018-01-09 PROCEDURE — 96367 TX/PROPH/DG ADDL SEQ IV INF: CPT

## 2018-01-09 PROCEDURE — 36415 COLL VENOUS BLD VENIPUNCTURE: CPT | Performed by: INTERNAL MEDICINE

## 2018-01-09 PROCEDURE — 83036 HEMOGLOBIN GLYCOSYLATED A1C: CPT | Performed by: INTERNAL MEDICINE

## 2018-01-09 PROCEDURE — 83735 ASSAY OF MAGNESIUM: CPT | Performed by: INTERNAL MEDICINE

## 2018-01-09 PROCEDURE — 96372 THER/PROPH/DIAG INJ SC/IM: CPT

## 2018-01-09 PROCEDURE — 96361 HYDRATE IV INFUSION ADD-ON: CPT

## 2018-01-09 PROCEDURE — 74011250637 HC RX REV CODE- 250/637: Performed by: HOSPITALIST

## 2018-01-09 PROCEDURE — 72148 MRI LUMBAR SPINE W/O DYE: CPT

## 2018-01-09 PROCEDURE — 74011636637 HC RX REV CODE- 636/637: Performed by: INTERNAL MEDICINE

## 2018-01-09 PROCEDURE — 99218 HC RM OBSERVATION: CPT

## 2018-01-09 PROCEDURE — 65660000000 HC RM CCU STEPDOWN

## 2018-01-09 PROCEDURE — 87449 NOS EACH ORGANISM AG IA: CPT | Performed by: HOSPITALIST

## 2018-01-09 RX ORDER — SODIUM,POTASSIUM PHOSPHATES 280-250MG
1 POWDER IN PACKET (EA) ORAL 4 TIMES DAILY
Status: DISCONTINUED | OUTPATIENT
Start: 2018-01-09 | End: 2018-01-11 | Stop reason: HOSPADM

## 2018-01-09 RX ORDER — POTASSIUM CHLORIDE 750 MG/1
40 TABLET, FILM COATED, EXTENDED RELEASE ORAL
Status: COMPLETED | OUTPATIENT
Start: 2018-01-09 | End: 2018-01-09

## 2018-01-09 RX ORDER — IBUPROFEN 400 MG/1
400 TABLET ORAL
Status: DISCONTINUED | OUTPATIENT
Start: 2018-01-09 | End: 2018-01-11 | Stop reason: HOSPADM

## 2018-01-09 RX ORDER — METFORMIN HYDROCHLORIDE 500 MG/1
1000 TABLET ORAL 2 TIMES DAILY WITH MEALS
Status: DISCONTINUED | OUTPATIENT
Start: 2018-01-09 | End: 2018-01-11 | Stop reason: HOSPADM

## 2018-01-09 RX ORDER — IBUPROFEN 600 MG/1
600 TABLET ORAL ONCE
Status: COMPLETED | OUTPATIENT
Start: 2018-01-09 | End: 2018-01-09

## 2018-01-09 RX ADMIN — LEVOTHYROXINE SODIUM 88 MCG: 88 TABLET ORAL at 06:40

## 2018-01-09 RX ADMIN — POTASSIUM CHLORIDE 40 MEQ: 750 TABLET, FILM COATED, EXTENDED RELEASE ORAL at 08:50

## 2018-01-09 RX ADMIN — INSULIN LISPRO 2 UNITS: 100 INJECTION, SOLUTION INTRAVENOUS; SUBCUTANEOUS at 06:48

## 2018-01-09 RX ADMIN — ENOXAPARIN SODIUM 40 MG: 40 INJECTION SUBCUTANEOUS at 18:27

## 2018-01-09 RX ADMIN — OXYCODONE HYDROCHLORIDE AND ACETAMINOPHEN 1 TABLET: 5; 325 TABLET ORAL at 06:40

## 2018-01-09 RX ADMIN — ACETAMINOPHEN 650 MG: 325 TABLET ORAL at 14:00

## 2018-01-09 RX ADMIN — POTASSIUM & SODIUM PHOSPHATES POWDER PACK 280-160-250 MG 1 PACKET: 280-160-250 PACK at 18:27

## 2018-01-09 RX ADMIN — INSULIN LISPRO 3 UNITS: 100 INJECTION, SOLUTION INTRAVENOUS; SUBCUTANEOUS at 12:11

## 2018-01-09 RX ADMIN — Medication 10 ML: at 06:41

## 2018-01-09 RX ADMIN — DILTIAZEM HYDROCHLORIDE 120 MG: 120 CAPSULE, COATED, EXTENDED RELEASE ORAL at 08:50

## 2018-01-09 RX ADMIN — PIPERACILLIN SODIUM AND TAZOBACTAM SODIUM 3.38 G: .375; 3 INJECTION, POWDER, LYOPHILIZED, FOR SOLUTION INTRAVENOUS at 15:36

## 2018-01-09 RX ADMIN — ACETAMINOPHEN 650 MG: 325 TABLET ORAL at 06:40

## 2018-01-09 RX ADMIN — SODIUM CHLORIDE 500 MG: 900 INJECTION, SOLUTION INTRAVENOUS at 15:04

## 2018-01-09 RX ADMIN — METFORMIN HYDROCHLORIDE 1000 MG: 500 TABLET, FILM COATED ORAL at 18:26

## 2018-01-09 RX ADMIN — ACETAMINOPHEN 325 MG: 325 TABLET ORAL at 03:12

## 2018-01-09 RX ADMIN — INSULIN LISPRO 5 UNITS: 100 INJECTION, SOLUTION INTRAVENOUS; SUBCUTANEOUS at 18:27

## 2018-01-09 RX ADMIN — POTASSIUM & SODIUM PHOSPHATES POWDER PACK 280-160-250 MG 1 PACKET: 280-160-250 PACK at 21:29

## 2018-01-09 RX ADMIN — OXYCODONE HYDROCHLORIDE AND ACETAMINOPHEN 1 TABLET: 5; 325 TABLET ORAL at 21:28

## 2018-01-09 RX ADMIN — CHOLECALCIFEROL TAB 10 MCG (400 UNIT) 2000 UNITS: 10 TAB at 08:50

## 2018-01-09 RX ADMIN — OXYCODONE HYDROCHLORIDE AND ACETAMINOPHEN 1 TABLET: 5; 325 TABLET ORAL at 03:12

## 2018-01-09 RX ADMIN — PIPERACILLIN AND TAZOBACTAM 10.12 G: 3; .375 INJECTION, POWDER, FOR SOLUTION INTRAVENOUS at 15:36

## 2018-01-09 RX ADMIN — PANTOPRAZOLE SODIUM 40 MG: 40 TABLET, DELAYED RELEASE ORAL at 06:40

## 2018-01-09 RX ADMIN — POTASSIUM & SODIUM PHOSPHATES POWDER PACK 280-160-250 MG 1 PACKET: 280-160-250 PACK at 12:15

## 2018-01-09 RX ADMIN — IBUPROFEN 600 MG: 600 TABLET, FILM COATED ORAL at 15:04

## 2018-01-09 RX ADMIN — SODIUM CHLORIDE 150 ML/HR: 900 INJECTION, SOLUTION INTRAVENOUS at 06:49

## 2018-01-09 RX ADMIN — SODIUM CHLORIDE 150 ML/HR: 900 INJECTION, SOLUTION INTRAVENOUS at 22:59

## 2018-01-09 RX ADMIN — POTASSIUM & SODIUM PHOSPHATES POWDER PACK 280-160-250 MG 1 PACKET: 280-160-250 PACK at 08:51

## 2018-01-09 RX ADMIN — Medication 10 ML: at 21:28

## 2018-01-09 RX ADMIN — THERA TABS 1 TABLET: TAB at 08:50

## 2018-01-09 NOTE — PROGRESS NOTES
Hospitalist Progress Note  Aniket Glez MD  Answering service: [phone] OR [phone] from in house phone  Cell: [phone]      Date of Service:  2018  NAME:  Suzy Shah  :  1980  MRN:  [id number]      Admission Summary:   41 yo female with PMhx of DM, poorly controlled, back pain, GERD, Admitted for fever and chills. Pt had dry cough and low back pain. Pain do not radiate to legs.      Interval history / Subjective:     Pt seen and examined  Still febrile and dry cough, not productive  Having back pain     Assessment & Plan:     Sepsis due to Pneumonia, likely bacterial  - change abx to Zosyn and Azithromycin  - F/u urine Legionella and Strep ag  - Send Mycoplasma ab  - IVF  - Oxygen prn    Sinus tachycardia due to above  - monitor    L4-L5 disc bulge   - pain management  - MRI lumbar pending  - monitor     Hyponatremia/Hypokalemia  - replace and monitor    DM type 2, uncontrolled  - restart Metformin  - c/w Sliding scale  - holding Victoza    Palpitations  - on Cardizem     Code status: FULL  DVT prophylaxis: Lovenox    Care Plan discussed with: Patient/Family and Nurse  Disposition: Home w/Family     Hospital Problems  Date Reviewed: 2018          Codes Class Noted POA    * (Principal)CAP (community acquired pneumonia) ICD-10-CM: J18.9  ICD-9-CM: 646  2018 Yes        Sepsis ([address] ICD-10-CM: A41.9  ICD-9-CM: 038.9, 995.91  2018 Yes        Bulging of intervertebral disc between L4 and L5 ICD-10-CM: M51.26  ICD-9-CM: 722.10  2018 Yes        UTI (urinary tract infection) ICD-10-CM: N39.0  ICD-9-CM: 599.0  2018 Yes        Heart palpitations ICD-10-CM: R00.2  ICD-9-CM: 785.1  2017 Yes        Obesity, Class I, BMI 30-34.9 ICD-10-CM: E66.9  ICD-9-CM: 278.00  2015 Yes        GERD (gastroesophageal reflux disease) ICD-10-CM: K21.9  ICD-9-CM: 530.81  2011 Yes    Overview Signed 2011  9:20 AM by Eli Bear Dafne Leach MD     2011             Type II diabetes mellitus, uncontrolled ([facility] [address] ICD-10-CM: E11.65  ICD-9-CM: 250.02  Unknown Yes    Overview Signed 10/5/2012  9:18 AM by Edel Chicas MD     Eye exams: VEI  Podiatrist: Rosie, anabel                     Review of Systems:   A comprehensive review of systems was negative except for that written in the HPI. Vital Signs:    Last 24hrs VS reviewed since prior progress note. Most recent are:  Visit Vitals    /69    Pulse (!) 138    Temp 99.3 °F (37.4 °C)    Resp 16    Ht 5' (1.524 m)    Wt 88.5 kg (195 lb)    SpO2 97%    BMI 38.08 kg/m2         Intake/Output Summary (Last 24 hours) at 01/09/18 1615  Last data filed at 01/09/18 0513   Gross per 24 hour   Intake          1754.17 ml   Output             1200 ml   Net           554.17 ml        Physical Examination:             Constitutional:  No acute distress, cooperative, pleasant    ENT:  Oral mucous moist, oropharynx benign. Resp:  rales left base   CV:  Regular rhythm, normal rate,     GI:  Soft, non distended, non tender. normoactive bowel sounds,     Musculoskeletal:  No edema, warm, 2+ pulses throughout    Neurologic:  Moves all extremities.   AAOx3,     Skin:  Good turgor, no rashes or ulcers       Data Review:    Review and/or order of clinical lab test  Review and/or order of tests in the radiology section of CPT  Review and/or order of tests in the medicine section of CPT      Labs:     Recent Labs      01/09/18   0332  01/08/18   1025   WBC  14.4*  13.8*   HGB  11.4*  13.8   HCT  34.5*  40.4   PLT  217  262     Recent Labs      01/09/18   0332  01/08/18   1025   NA  134*  131*   K  3.3*  3.5   CL  104  98   CO2  19*  23   BUN  4*  8   CREA  0.60  0.86   GLU  164*  285*   CA  7.8*  8.8   MG  1.6   --    PHOS  1.5*   --      Recent Labs      01/09/18   0332  01/08/18   1025   SGOT  27  48*   ALT  47  71   AP  124*  158*   TBILI  0.6  0.5   TP  7.0  8.7*   ALB  2.9* 3. 5   GLOB  4.1*  5.2*     No results for input(s): INR, PTP, APTT in the last 72 hours. No lab exists for component: INREXT   No results for input(s): FE, TIBC, PSAT, FERR in the last 72 hours. No results found for: FOL, RBCF   No results for input(s): PH, PCO2, PO2 in the last 72 hours. No results for input(s): CPK, CKNDX, TROIQ in the last 72 hours.     No lab exists for component: CPKMB  Lab Results   Component Value Date/Time    Cholesterol, total 183 09/05/2017 07:58 AM    HDL Cholesterol 51 09/05/2017 07:58 AM    LDL, calculated 119 09/05/2017 07:58 AM    Triglyceride 67 09/05/2017 07:58 AM     Lab Results   Component Value Date/Time    Glucose (POC) 286 01/09/2018 04:05 PM    Glucose (POC) 213 01/09/2018 12:07 PM    Glucose (POC) 152 01/09/2018 06:39 AM    Glucose (POC) 247 01/08/2018 09:06 PM     Lab Results   Component Value Date/Time    Color YELLOW/STRAW 01/08/2018 10:25 AM    Appearance CLEAR 01/08/2018 10:25 AM    Specific gravity 1.020 01/08/2018 10:25 AM    Specific gravity 1.030 08/06/2017 10:40 PM    pH (UA) 6.0 01/08/2018 10:25 AM    Protein 100 01/08/2018 10:25 AM    Glucose >1000 01/08/2018 10:25 AM    Ketone 40 01/08/2018 10:25 AM    Bilirubin NEGATIVE  01/08/2018 10:25 AM    Urobilinogen 1.0 01/08/2018 10:25 AM    Nitrites NEGATIVE  01/08/2018 10:25 AM    Leukocyte Esterase NEGATIVE  01/08/2018 10:25 AM    Epithelial cells MODERATE 01/08/2018 10:25 AM    Bacteria 1+ 01/08/2018 10:25 AM    WBC 5-10 01/08/2018 10:25 AM    RBC 5-10 01/08/2018 10:25 AM         Medications Reviewed:     Current Facility-Administered Medications   Medication Dose Route Frequency    potassium, sodium phosphates (NEUTRA-PHOS) packet 1 Packet  1 Packet Oral QID    piperacillin-tazobactam (ZOSYN) 10.125 g in  mL continuous 24 hr infusion  10.125 g IntraVENous Q24H    ibuprofen (MOTRIN) tablet 400 mg  400 mg Oral Q6H PRN    cholecalciferol (VITAMIN D3) tablet 2,000 Units  2,000 Units Oral DAILY    cyclobenzaprine (FLEXERIL) tablet 5 mg  5 mg Oral QHS PRN    levothyroxine (SYNTHROID) tablet 88 mcg  88 mcg Oral 6am    therapeutic multivitamin (THERAGRAN) tablet 1 Tab  1 Tab Oral DAILY    sodium chloride (NS) flush 5-10 mL  5-10 mL IntraVENous Q8H    sodium chloride (NS) flush 5-10 mL  5-10 mL IntraVENous PRN    sodium chloride (NS) flush 5-10 mL  5-10 mL IntraVENous PRN    acetaminophen (TYLENOL) tablet 650 mg  650 mg Oral Q4H PRN    enoxaparin (LOVENOX) injection 40 mg  40 mg SubCUTAneous Q24H    0.9% sodium chloride infusion  150 mL/hr IntraVENous CONTINUOUS    glucose chewable tablet 16 g  4 Tab Oral PRN    dextrose (D50W) injection syrg 12.5-25 g  12.5-25 g IntraVENous PRN    glucagon (GLUCAGEN) injection 1 mg  1 mg IntraMUSCular PRN    insulin lispro (HUMALOG) injection   SubCUTAneous AC&HS    azithromycin (ZITHROMAX) 500 mg in 0.9% sodium chloride (MBP/ADV) 250 mL  500 mg IntraVENous Q24H    pantoprazole (PROTONIX) tablet 40 mg  40 mg Oral ACB    dilTIAZem CD (CARDIZEM CD) capsule 120 mg  120 mg Oral DAILY    oxyCODONE-acetaminophen (PERCOCET) 5-325 mg per tablet 1 Tab  1 Tab Oral Q4H PRN     ______________________________________________________________________  EXPECTED LENGTH OF STAY: 4d 21h  ACTUAL LENGTH OF STAY:          1                 Nirali Craig MD

## 2018-01-09 NOTE — PROGRESS NOTES
Day #1 of zosyn  Indication:  CAP  Current regimen:  3.375 g q6h    Recent Labs      18   0332  18   1025   WBC  14.4*  13.8*   CREA  0.60  0.86   BUN  4*  8     Temp (24hrs), Av.5 °F (38.1 °C), Min:98.4 °F (36.9 °C), Max:103.1 °F (39.5 °C)    Est CrCl: >100 ml/min; UO: >1 ml/kg/hr [12 hr measurement]    Plan: Change to 3.75 g loading dose followed by 10.125g/24hr infusion per protocol.

## 2018-01-09 NOTE — PROGRESS NOTES
Assumed care of pt this am. Pt is alert and oriented with no known needs at this time. Pt MEWS is elevated at 3 at this time, MD aware of pt condition and HR which is driving elevated score. MEWS score is elevated at 5, pt temp is 103 hr is elevated, MD notified and new orders in. Temperature down to 99.3 post oral medication, HR still elevated, ABT now infusing.

## 2018-01-09 NOTE — PROGRESS NOTES
1950 Called Doctor Will Reyes about pt heart rate reaching 160s and remaining in 140. Ordered stat ekg. 2006 Ekg completed  2014 Hospitalist jenni  2015  Spoke to Andrew the nursing supervisor about pt heart rate. She suggested to talk to the doctor about putting this pt on tele floor as transfer. 2025 Doctor Will Reyes gave orders for 500 ml bolus, ns to be increased to 150 ml, and transfer to telemetry bed.

## 2018-01-09 NOTE — PROGRESS NOTES
physical Therapy EVALUATION/DISCHARGE  Patient: Taurus Mackenzie (44 y.o. female)  Date: 1/9/2018  Primary Diagnosis: CAP (community acquired pneumonia)        Precautions:      ASSESSMENT :  Based on the objective data described below, the patient presents with normal strength, complete independence with functional mobility, and steady gait following admission for CAP. PTA patient was independent with all functional mobility, is very active and takes care of her 14 y/o and 5 y/o children. Patient lives in a two level home with her  and did not require DME or assistance PTA. Currently, patient is independent with all aspects of functional mobility. Patient ambulated 48' while moving about her room, packing up her belongings and per her and RN has been up in hallway adlib without difficulty. Patient does report chronic LBP that recently flared right before admission but not limiting her functional mobility at this time. Would recommend patient be given a prescription to resume O/P PT as this has been helpful for her in the past.  Patient left standing at window in NAD at the conclusion of PT evaluation. Patient presents at baseline level of function at this time and skilled PT services are not indicated at this time. Will complete order. .    Skilled physical therapy is not indicated at this time. PLAN :  Discharge Recommendations: Outpatient (for chronic LBP)  Further Equipment Recommendations for Discharge: None     SUBJECTIVE:   Patient stated I'm doing fine, much better.     OBJECTIVE DATA SUMMARY:   HISTORY:    Past Medical History:   Diagnosis Date    Acute lymphadenitis of arm, left epitrochlear 12/13/2017    Anxiety and depression 10/9/2014    10/2014; resolved as of 8/24/15 per pt    Chronic low back pain 10/9/2014    Muscular     Depression with anxiety 9/28/2017    10/2014    Diabetes mellitus type II 10/07    Consulted w/ Dr. Stefanie Hall    Diabetic eye exam ([facility] [address] 02/23/2016    Karen Mccray Dwight De Guzman MD, [facility]    Dysphagia 2010    GERD (gastroesophageal reflux disease) 2011    H. pylori infection 2016    Dr Louisa Josue    History of peptic ulcer disease 2016    18 yo    History of sinus tachycardia 10/5/2012    as of 8/24/15 pt states followed by PCP    Hypercholesterolemia 2016    ~2007    Kidney stone 2017     (normal spontaneous vaginal delivery) 1998    Son  9yo    Post partum depression 10/9/2014    10/2013: counseling and prn Valium    Spondylosis of lumbar & thoracic region without myelopathy or radiculopathy 10/26/2016    xrays 2010    Spondylosis, Thoracic & Lumbar 3/5/2015    xrays     Thyromegaly 2010    US negative    Vitamin D deficiency      Past Surgical History:   Procedure Laterality Date    HX CARPAL TUNNEL RELEASE Bilateral     HX  SECTION      HX CHOLECYSTECTOMY  10/09    LAP-Gallstones    HX GYN  2015    IUD placed    HX LITHOTRIPSY  2017    HX ORTHOPAEDIC Right 7/10/14    trigger thumb    HX ORTHOPAEDIC Left 7/10/14    wrist     Prior Level of Function/Home Situation:   Personal factors and/or comorbidities impacting plan of care:     Home Situation  Home Environment: Private residence  # Steps to Enter: 0  One/Two Story Residence: Other (Comment)  Living Alone: No  Support Systems: Spouse/Significant Other/Partner  Patient Expects to be Discharged to[de-identified] Private residence  Current DME Used/Available at Home: None    EXAMINATION/PRESENTATION/DECISION MAKING:   Critical Behavior:  Neurologic State: Alert, Appropriate for age  Orientation Level: Oriented X4  Cognition: Appropriate decision making, Appropriate for age attention/concentration, Appropriate safety awareness, Follows commands     Hearing:   Auditory  Auditory Impairment: None  Skin:  Intact  Edema: none  Range Of Motion:  AROM: Within functional limits           PROM: Within functional limits           Strength:    Strength: Within functional limits                    Tone & Sensation:   Tone: Normal              Sensation: Intact               Coordination:  Coordination: Within functional limits  Vision:      Functional Mobility:  Bed Mobility:  Rolling: Independent  Supine to Sit: Independent  Sit to Supine: Independent  Scooting: Independent  Transfers:  Sit to Stand: Independent  Stand to Sit: Independent        Bed to Chair: Independent              Balance:   Sitting: Intact; Without support  Standing: Intact; Without support  Ambulation/Gait Training:  Distance (ft): 50 Feet (ft)     Ambulation - Level of Assistance: Independent                                                    Stairs: Therapeutic Exercises:   Deferred    Functional Measure:  Timed up and go:    Timed Get Up And Go Test: 10     Timed Up and Go and G-code impairment scale:  Percentage of Impairment CH    0%   CI    1-19% CJ    20-39% CK    40-59% CL    60-79% CM    80-99% CN     100%   Timed   Score 0-56 10 11-12 13-14 15-16 17-18 19 20       < than 10 seconds=Normal  Greater then 13.5 seconds (in elderly)=Increased fall risk   Young Call [place] M. Predicting the probability for falls in community dwelling older adults using the Timed Up and Go Test. Phys Ther. 2000;80:896-903. G codes: In compliance with CMSs Claims Based Outcome Reporting, the following G-code set was chosen for this patient based on their primary functional limitation being treated: The outcome measure chosen to determine the severity of the functional limitation was the TUG with a score of 10 sec which was correlated with the impairment scale.     ? Mobility - Walking and Moving Around:     - CURRENT STATUS: CH - 0% impaired, limited or restricted    - GOAL STATUS: CH - 0% impaired, limited or restricted    - D/C STATUS:  CH - 0% impaired, limited or restricted        Physical Therapy Evaluation Charge Determination   History Examination Presentation Decision-Making   LOW Complexity : Zero comorbidities / personal factors that will impact the outcome / POC LOW Complexity : 1-2 Standardized tests and measures addressing body structure, function, activity limitation and / or participation in recreation  LOW Complexity : Stable, uncomplicated  Other outcome measures TUG 10 sec  LOW       Based on the above components, the patient evaluation is determined to be of the following complexity level: LOW     Pain:  Pain Scale 1: Numeric (0 - 10)  Pain Intensity 1: 0     Activity Tolerance:   Baseline, functional ambulation and activity endurance    Please refer to the flowsheet for vital signs taken during this treatment. After treatment:   [x]   Patient left in no apparent distress sitting up in chair  []   Patient left in no apparent distress in bed  [x]   Call bell left within reach  [x]   Nursing notified  []   Caregiver present  []   Bed alarm activated    COMMUNICATION/EDUCATION:   Communication/Collaboration:  []   Fall prevention education was provided and the patient/caregiver indicated understanding. []   Patient/family have participated as able and agree with findings and recommendations. []   Patient is unable to participate in plan of care at this time.   Findings and recommendations were discussed with: Registered Nurse    Thank you for this referral.  Dorian Torrez, PT   Time Calculation: 10 mins

## 2018-01-09 NOTE — PROGRESS NOTES
Reviewed medical chart; met with the patient at the bedside. Patient lives with her  and two children, ages 13and 3years old. Patient does not use any DME. She has a PCP - Dr. Heather Goodman and gets her prescriptions at [facility] or via [facility]. Patient is employed by [employer]. Care Management will continue to follow her disposition. ERENDIRA Herron    Care Management Interventions  PCP Verified by CM:  Yes  Palliative Care Criteria Met (RRAT>21 & CHF Dx)?: No  Mode of Transport at Discharge:  (Patient will travel via car at discharge.  )  Transition of Care Consult (CM Consult): Discharge Planning  MyChart Signup: No  Discharge Durable Medical Equipment: No  Physical Therapy Consult: Yes  Occupational Therapy Consult: No  Speech Therapy Consult: No  Current Support Network: Lives with Spouse  Confirm Follow Up Transport:  (Patient will travel via car at discharge.  )  Plan discussed with Pt/Family/Caregiver: Yes  Freedom of Choice Offered: Yes  Discharge Location  Discharge Placement: Home

## 2018-01-10 ENCOUNTER — APPOINTMENT (OUTPATIENT)
Dept: GENERAL RADIOLOGY | Age: 38
DRG: 871 | End: 2018-01-10
Attending: HOSPITALIST
Payer: COMMERCIAL

## 2018-01-10 LAB
ANION GAP SERPL CALC-SCNC: 13 MMOL/L (ref 5–15)
BASOPHILS # BLD: 0 K/UL (ref 0–0.1)
BASOPHILS NFR BLD: 0 % (ref 0–1)
BUN SERPL-MCNC: 4 MG/DL (ref 6–20)
BUN/CREAT SERPL: 10 (ref 12–20)
CALCIUM SERPL-MCNC: 7.4 MG/DL (ref 8.5–10.1)
CHLORIDE SERPL-SCNC: 106 MMOL/L (ref 97–108)
CO2 SERPL-SCNC: 18 MMOL/L (ref 21–32)
CREAT SERPL-MCNC: 0.41 MG/DL (ref 0.55–1.02)
EOSINOPHIL # BLD: 0 K/UL (ref 0–0.4)
EOSINOPHIL NFR BLD: 0 % (ref 0–7)
ERYTHROCYTE [DISTWIDTH] IN BLOOD BY AUTOMATED COUNT: 12.8 % (ref 11.5–14.5)
FLUID CULTURE, SPNG2: NORMAL
GLUCOSE BLD STRIP.AUTO-MCNC: 151 MG/DL (ref 65–100)
GLUCOSE BLD STRIP.AUTO-MCNC: 153 MG/DL (ref 65–100)
GLUCOSE BLD STRIP.AUTO-MCNC: 195 MG/DL (ref 65–100)
GLUCOSE BLD STRIP.AUTO-MCNC: 231 MG/DL (ref 65–100)
GLUCOSE SERPL-MCNC: 170 MG/DL (ref 65–100)
HCT VFR BLD AUTO: 30.9 % (ref 35–47)
HGB BLD-MCNC: 10.1 G/DL (ref 11.5–16)
L PNEUMO1 AG UR QL IA: NEGATIVE
LYMPHOCYTES # BLD: 1.3 K/UL (ref 0.8–3.5)
LYMPHOCYTES NFR BLD: 17 % (ref 12–49)
M PNEUMO IGG SER IA-ACNC: 315 U/ML (ref 0–99)
M PNEUMO IGM SER IA-ACNC: <770 U/ML (ref 0–769)
MAGNESIUM SERPL-MCNC: 1.5 MG/DL (ref 1.6–2.4)
MCH RBC QN AUTO: 28.1 PG (ref 26–34)
MCHC RBC AUTO-ENTMCNC: 32.7 G/DL (ref 30–36.5)
MCV RBC AUTO: 86.1 FL (ref 80–99)
MONOCYTES # BLD: 0.7 K/UL (ref 0–1)
MONOCYTES NFR BLD: 9 % (ref 5–13)
NEUTS SEG # BLD: 5.5 K/UL (ref 1.8–8)
NEUTS SEG NFR BLD: 74 % (ref 32–75)
ORGANISM ID, SPNG3: NORMAL
PLATELET # BLD AUTO: 200 K/UL (ref 150–400)
PLEASE NOTE, SPNG4: NORMAL
POTASSIUM SERPL-SCNC: 3.6 MMOL/L (ref 3.5–5.1)
RBC # BLD AUTO: 3.59 M/UL (ref 3.8–5.2)
S PNEUM AG SPEC QL LA: NEGATIVE
SERVICE CMNT-IMP: ABNORMAL
SODIUM SERPL-SCNC: 137 MMOL/L (ref 136–145)
SPECIMEN SOURCE: NORMAL
SPECIMEN SOURCE: NORMAL
SPECIMEN, SPNG1: NORMAL
WBC # BLD AUTO: 7.5 K/UL (ref 3.6–11)

## 2018-01-10 PROCEDURE — 36415 COLL VENOUS BLD VENIPUNCTURE: CPT | Performed by: HOSPITALIST

## 2018-01-10 PROCEDURE — 65270000029 HC RM PRIVATE

## 2018-01-10 PROCEDURE — 99218 HC RM OBSERVATION: CPT

## 2018-01-10 PROCEDURE — 71045 X-RAY EXAM CHEST 1 VIEW: CPT

## 2018-01-10 PROCEDURE — 74011250637 HC RX REV CODE- 250/637: Performed by: HOSPITALIST

## 2018-01-10 PROCEDURE — 83735 ASSAY OF MAGNESIUM: CPT | Performed by: HOSPITALIST

## 2018-01-10 PROCEDURE — 85025 COMPLETE CBC W/AUTO DIFF WBC: CPT | Performed by: HOSPITALIST

## 2018-01-10 PROCEDURE — 74011250636 HC RX REV CODE- 250/636: Performed by: HOSPITALIST

## 2018-01-10 PROCEDURE — 74011250637 HC RX REV CODE- 250/637: Performed by: INTERNAL MEDICINE

## 2018-01-10 PROCEDURE — 74011636637 HC RX REV CODE- 636/637: Performed by: INTERNAL MEDICINE

## 2018-01-10 PROCEDURE — 74011250637 HC RX REV CODE- 250/637: Performed by: NURSE PRACTITIONER

## 2018-01-10 PROCEDURE — 96366 THER/PROPH/DIAG IV INF ADDON: CPT

## 2018-01-10 PROCEDURE — 96372 THER/PROPH/DIAG INJ SC/IM: CPT

## 2018-01-10 PROCEDURE — 74011250636 HC RX REV CODE- 250/636: Performed by: INTERNAL MEDICINE

## 2018-01-10 PROCEDURE — 80048 BASIC METABOLIC PNL TOTAL CA: CPT | Performed by: HOSPITALIST

## 2018-01-10 PROCEDURE — 82962 GLUCOSE BLOOD TEST: CPT

## 2018-01-10 RX ORDER — AMOXICILLIN AND CLAVULANATE POTASSIUM 875; 125 MG/1; MG/1
1 TABLET, FILM COATED ORAL EVERY 12 HOURS
Qty: 12 TAB | Refills: 0 | Status: SHIPPED | OUTPATIENT
Start: 2018-01-10 | End: 2018-01-22 | Stop reason: ALTCHOICE

## 2018-01-10 RX ORDER — MAGNESIUM SULFATE HEPTAHYDRATE 40 MG/ML
2 INJECTION, SOLUTION INTRAVENOUS ONCE
Status: COMPLETED | OUTPATIENT
Start: 2018-01-10 | End: 2018-01-10

## 2018-01-10 RX ORDER — GUAIFENESIN 100 MG/5ML
100 SOLUTION ORAL
Status: DISCONTINUED | OUTPATIENT
Start: 2018-01-10 | End: 2018-01-11 | Stop reason: HOSPADM

## 2018-01-10 RX ORDER — BENZONATATE 100 MG/1
100 CAPSULE ORAL
Status: DISCONTINUED | OUTPATIENT
Start: 2018-01-10 | End: 2018-01-11 | Stop reason: HOSPADM

## 2018-01-10 RX ORDER — AZITHROMYCIN 500 MG/1
500 TABLET, FILM COATED ORAL DAILY
Qty: 3 TAB | Refills: 0 | Status: SHIPPED | OUTPATIENT
Start: 2018-01-10 | End: 2018-01-13

## 2018-01-10 RX ADMIN — Medication 10 ML: at 05:58

## 2018-01-10 RX ADMIN — IBUPROFEN 400 MG: 400 TABLET, FILM COATED ORAL at 07:11

## 2018-01-10 RX ADMIN — METFORMIN HYDROCHLORIDE 1000 MG: 500 TABLET, FILM COATED ORAL at 17:29

## 2018-01-10 RX ADMIN — ENOXAPARIN SODIUM 40 MG: 40 INJECTION SUBCUTANEOUS at 17:23

## 2018-01-10 RX ADMIN — IBUPROFEN 400 MG: 400 TABLET, FILM COATED ORAL at 17:29

## 2018-01-10 RX ADMIN — THERA TABS 1 TABLET: TAB at 09:40

## 2018-01-10 RX ADMIN — Medication 10 ML: at 21:13

## 2018-01-10 RX ADMIN — METFORMIN HYDROCHLORIDE 1000 MG: 500 TABLET, FILM COATED ORAL at 09:39

## 2018-01-10 RX ADMIN — INSULIN LISPRO 2 UNITS: 100 INJECTION, SOLUTION INTRAVENOUS; SUBCUTANEOUS at 11:30

## 2018-01-10 RX ADMIN — POTASSIUM & SODIUM PHOSPHATES POWDER PACK 280-160-250 MG 1 PACKET: 280-160-250 PACK at 13:00

## 2018-01-10 RX ADMIN — DILTIAZEM HYDROCHLORIDE 120 MG: 120 CAPSULE, COATED, EXTENDED RELEASE ORAL at 09:39

## 2018-01-10 RX ADMIN — GUAIFENESIN 100 MG: 200 SOLUTION ORAL at 18:20

## 2018-01-10 RX ADMIN — POTASSIUM & SODIUM PHOSPHATES POWDER PACK 280-160-250 MG 1 PACKET: 280-160-250 PACK at 17:29

## 2018-01-10 RX ADMIN — POTASSIUM & SODIUM PHOSPHATES POWDER PACK 280-160-250 MG 1 PACKET: 280-160-250 PACK at 22:00

## 2018-01-10 RX ADMIN — INSULIN LISPRO 2 UNITS: 100 INJECTION, SOLUTION INTRAVENOUS; SUBCUTANEOUS at 07:30

## 2018-01-10 RX ADMIN — ACETAMINOPHEN 650 MG: 325 TABLET ORAL at 02:52

## 2018-01-10 RX ADMIN — BENZONATATE 100 MG: 100 CAPSULE ORAL at 18:19

## 2018-01-10 RX ADMIN — INSULIN LISPRO 2 UNITS: 100 INJECTION, SOLUTION INTRAVENOUS; SUBCUTANEOUS at 17:23

## 2018-01-10 RX ADMIN — CHOLECALCIFEROL TAB 10 MCG (400 UNIT) 2000 UNITS: 10 TAB at 09:40

## 2018-01-10 RX ADMIN — SODIUM CHLORIDE 500 MG: 900 INJECTION, SOLUTION INTRAVENOUS at 16:00

## 2018-01-10 RX ADMIN — OXYCODONE HYDROCHLORIDE AND ACETAMINOPHEN 1 TABLET: 5; 325 TABLET ORAL at 02:52

## 2018-01-10 RX ADMIN — PANTOPRAZOLE SODIUM 40 MG: 40 TABLET, DELAYED RELEASE ORAL at 06:30

## 2018-01-10 RX ADMIN — PIPERACILLIN AND TAZOBACTAM 10.12 G: 3; .375 INJECTION, POWDER, FOR SOLUTION INTRAVENOUS at 16:00

## 2018-01-10 RX ADMIN — LEVOTHYROXINE SODIUM 88 MCG: 88 TABLET ORAL at 05:55

## 2018-01-10 RX ADMIN — MAGNESIUM SULFATE HEPTAHYDRATE 2 G: 40 INJECTION, SOLUTION INTRAVENOUS at 09:41

## 2018-01-10 RX ADMIN — POTASSIUM & SODIUM PHOSPHATES POWDER PACK 280-160-250 MG 1 PACKET: 280-160-250 PACK at 09:41

## 2018-01-10 RX ADMIN — INSULIN LISPRO 2 UNITS: 100 INJECTION, SOLUTION INTRAVENOUS; SUBCUTANEOUS at 21:13

## 2018-01-10 NOTE — PROGRESS NOTES
Neurosurgery Consult     Subjective:              Krystle Srinivasan is a 40 y.o. pleasant  female admitted to [facility] with community acquired pneumonia who we are asked to see in consultation for acute on chronic back pain. She reports she first developed back pain during her pregnancy four years ago. She states she went to physical therapy after her pregnancy and her symptoms resolved. Over the last few years she has intermittent back pain of varying severity. She states her pain is worse when lying flat. She states her back pain flares up when she is less active. One of these flares was this past Sunday as she was in bed most of day. She states her pain was in a band like distribution across her low back. Pain radiated to her left buttock and left hip but did not travel down her left leg. She denies numbness, tingling, or weakness. She has no problems with her balance. She denies any changes in her bowel or bladder function. She currently rates her back pain at a 2/10. 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 10/26/2016    Hypercholesterolemia 08/18/2016    History of peptic ulcer disease 08/18/2016    Vitamin D deficiency     Obesity, Class I, BMI 30-34.9 11/18/2015    Post partum depression 10/09/2014    Chronic low back pain & Muscle Spasms 10/09/2014    Bilateral carpal tunnel syndrome 12/31/2013    Goiter 01/11/2013    History of sinus tachycardia 10/05/2012    GERD (gastroesophageal reflux disease) 2011    Dysphagia, workup negative except GERD 2010    Anemia 2010    Type II diabetes mellitus, uncontrolled ([facility]     Gallstones      (normal spontaneous vaginal delivery)          Galo Carter MD       Past Medical History:   Diagnosis Date    Acute lymphadenitis of arm, left epitrochlear 2017    Anxiety and depression 10/9/2014    10/2014; resolved as of 8/24/15 per pt    Chronic low back pain 10/9/2014    Muscular     Depression with anxiety 2017    10/2014    Diabetes mellitus type II 10/07    Consulted w/ Dr. Daly Austin    Diabetic eye exam ([facility] [address] 2016    Tish Marks MD, [facility]    Dysphagia 2010    GERD (gastroesophageal reflux disease) 2011    H. pylori infection 2016    Dr Darroll Cockayne    History of peptic ulcer disease 2016    18 yo    History of sinus tachycardia 10/5/2012    as of 8/24/15 pt states followed by PCP    Hypercholesterolemia 2016    ~2007    Kidney stone 2017     (normal spontaneous vaginal delivery) 1998    Son  7yo    Post partum depression 10/9/2014    10/2013: counseling and prn Valium    Spondylosis of lumbar & thoracic region without myelopathy or radiculopathy 10/26/2016    xrays 2010    Spondylosis, Thoracic & Lumbar 3/5/2015    xrays 2010    Thyromegaly 2010    US negative    Vitamin D deficiency            Past Surgical History:   Procedure Laterality Date    HX CARPAL TUNNEL RELEASE Bilateral     HX  SECTION      HX CHOLECYSTECTOMY  10/09    LAP-Gallstones    HX GYN  2015    IUD placed    HX LITHOTRIPSY  2017    HX ORTHOPAEDIC Right 7/10/14    trigger thumb    HX ORTHOPAEDIC Left 7/10/14    wrist          Allergies   Allergen Reactions    Lexapro [Escitalopram] Other (comments)     Fatigue, wt gain    Naprosyn [Naproxen] Nausea and Vomiting     Tolerates Advil, Toradol, etc  Topamax [Topiramate] Other (comments)     Kidney stone    Zoloft [Sertraline] Other (comments)     Fatigue            Family History   Problem Relation Age of Onset    Diabetes Mother       ESRD 43 yo in     Hypertension Mother     Stroke Mother      TIA    Heart Disease Mother     High Cholesterol Paternal Grandmother     Hypertension Maternal Grandfather          Diabetes Maternal Grandmother     Hypertension Maternal Grandmother     High Cholesterol Maternal Grandmother     Cancer Maternal Grandmother      Breast CA     Heart Disease Maternal Grandmother       age 79    Colon Cancer Maternal Uncle       at 64yo        Current Facility-Administered Medications   Medication Dose Route Frequency    potassium, sodium phosphates (NEUTRA-PHOS) packet 1 Packet  1 Packet Oral QID    piperacillin-tazobactam (ZOSYN) 10.125 g in  mL continuous 24 hr infusion  10.125 g IntraVENous Q24H    ibuprofen (MOTRIN) tablet 400 mg  400 mg Oral Q6H PRN    metFORMIN (GLUCOPHAGE) tablet 1,000 mg  1,000 mg Oral BID WITH MEALS    cholecalciferol (VITAMIN D3) tablet 2,000 Units  2,000 Units Oral DAILY    cyclobenzaprine (FLEXERIL) tablet 5 mg  5 mg Oral QHS PRN    levothyroxine (SYNTHROID) tablet 88 mcg  88 mcg Oral 6am    therapeutic multivitamin (THERAGRAN) tablet 1 Tab  1 Tab Oral DAILY    sodium chloride (NS) flush 5-10 mL  5-10 mL IntraVENous Q8H    sodium chloride (NS) flush 5-10 mL  5-10 mL IntraVENous PRN    sodium chloride (NS) flush 5-10 mL  5-10 mL IntraVENous PRN    acetaminophen (TYLENOL) tablet 650 mg  650 mg Oral Q4H PRN    enoxaparin (LOVENOX) injection 40 mg  40 mg SubCUTAneous Q24H    0.9% sodium chloride infusion  150 mL/hr IntraVENous CONTINUOUS    glucose chewable tablet 16 g  4 Tab Oral PRN    dextrose (D50W) injection syrg 12.5-25 g  12.5-25 g IntraVENous PRN    glucagon (GLUCAGEN) injection 1 mg  1 mg IntraMUSCular PRN    insulin lispro (HUMALOG) injection   SubCUTAneous AC&HS    azithromycin (ZITHROMAX) 500 mg in 0.9% sodium chloride (MBP/ADV) 250 mL  500 mg IntraVENous Q24H    pantoprazole (PROTONIX) tablet 40 mg  40 mg Oral ACB    dilTIAZem CD (CARDIZEM CD) capsule 120 mg  120 mg Oral DAILY    oxyCODONE-acetaminophen (PERCOCET) 5-325 mg per tablet 1 Tab  1 Tab Oral Q4H PRN         Review of Systems: A comprehensive review of systems was negative except for that written in the HPI. Objective:     Patient Vitals for the past 8 hrs:   BP Temp Pulse Resp SpO2   01/10/18 0745 105/68 98.2 °F (36.8 °C) (!) 118 18 94 %       Physical Exam:    General:  Alert, cooperavive, no distress, appears stated age. Eyes:  Conjunctivae/corneas clear. PERRL, EOMs intact. Fundi benign   Mouth/Throat: Lips, mucosa, and tongue normal.    Neck: Supple, symmetrical, trachea midline, no adenopathy,   Back:   ROM normal. No step offs, no ttp,   Respiratory:   Equal chest expansion, no respiratory distress   Heart:  Regular rate and rhythm, S1, S2 normal, no murmur, click, rub or gallop. Abdomen:   Soft, non-tender. No masses,   Extremities: Extremities normal, atraumatic, no cyanosis or edema. Pulses: 2+ and symmetric all extremities. Skin: Skin color, texture, turgor normal. No rashes or lesions   Neurologic: CNII-XII intact. Strength 5/5 in bilateral hip flexors, knee flexors, ankle dorsiflexion, plantar flexion. Knee/ankle reflexes 2+/4. No clonus.        Assessment:     Hospital Problems  Date Reviewed: 1/8/2018          Codes Class Noted POA    * (Principal)CAP (community acquired pneumonia) ICD-10-CM: J18.9  ICD-9-CM: 263  1/8/2018 Yes        Sepsis ([address] ICD-10-CM: A41.9  ICD-9-CM: 038.9, 995.91  1/8/2018 Yes        Bulging of intervertebral disc between L4 and L5 ICD-10-CM: M51.26  ICD-9-CM: 722.10  1/8/2018 Yes        UTI (urinary tract infection) ICD-10-CM: N39.0  ICD-9-CM: 599.0  1/8/2018 Yes        Heart palpitations ICD-10-CM: R00.2  ICD-9-CM: 785.1 8/16/2017 Yes        Obesity, Class I, BMI 30-34.9 ICD-10-CM: E66.9  ICD-9-CM: 278.00  11/18/2015 Yes        GERD (gastroesophageal reflux disease) ICD-10-CM: K21.9  ICD-9-CM: 530.81  6/23/2011 Yes    Overview Signed 6/23/2011  9:20 AM by Morales Arrington MD     2011             Type II diabetes mellitus, uncontrolled ([facility] ICD-10-CM: E11.65  ICD-9-CM: 250.02  Unknown Yes    Overview Signed 10/5/2012  9:18 AM by Morales Arrington MD     Eye exams: VEI  Podiatrist: anabel Velez                     Assessment/Plan       1. Acute on Chronic Back pain. -MRI L4-5 chronic facet arthrosis with slight subluxation. Minimal central stenosis with relatively severe left foraminal stenosis and moderate right  foraminal stenosis  -Patient's pain is well controlled with oral pain medicine. She is ambulating without difficulty and neurologically intact. She may be a candidate for a L4-5 TF GRETA in the future but there is no current emergent need. She can follow-up in Dr. Rohit Carballo office as needed.     Plan above discussed with Dr. Fernandez hSea

## 2018-01-10 NOTE — PROGRESS NOTES
Bedside shift change report given to [facility] LEONARD (oncoming nurse) by Lorena Pompa (offgoing nurse). Report included the following information SBAR.

## 2018-01-10 NOTE — PROGRESS NOTES
Hospitalist Progress Note  Earnestine Yun MD  Answering service: [phone] OR [phone] from in house phone  Cell: [phone]      Date of Service:  1/10/2018  NAME:  Gretta Rizvi  :  1980  MRN:  [id number]      Admission Summary:   39 yo female with PMhx of DM, poorly controlled, back pain, GERD, Admitted for fever and chills. Pt had dry cough and low back pain. Pain do not radiate to legs.      Interval history / Subjective:     Pt seen and examined  No fever since yesterday but took tylenol around the clock yesterday  Having cough  No chest pain, sob, n/v, abdominal pain, diarrhea     Assessment & Plan:     Sepsis due to Pneumonia, likely bacterial  - change abx to Zosyn and Azithromycin  - F/u urine Legionella and Strep ag  - f/u Mycoplasma ab  - IVF  - Oxygen prn  - can D/c on Augmentin and complete Azithromycin X 5 days    Sinus tachycardia due to above  - monitor    L4-L5 disc bulge   - pain management  - MRI lumbar reviewed  - Neurosurg eval, may need steroid injection once infection resolves    Hyponatremia/Hypokalemia  - resolved    Hypomagnesemia  - replaced    DM type 2, uncontrolled  - restart Metformin  - c/w Sliding scale  - holding Victoza    Palpitations  - on Cardizem     Code status: FULL  DVT prophylaxis: Lovenox    Care Plan discussed with: Patient/Family and Nurse  Disposition: Home w/Family     Hospital Problems  Date Reviewed: 2018          Codes Class Noted POA    * (Principal)CAP (community acquired pneumonia) ICD-10-CM: J18.9  ICD-9-CM: 043  2018 Yes        Sepsis ([address] ICD-10-CM: A41.9  ICD-9-CM: 038.9, 995.91  2018 Yes        Bulging of intervertebral disc between L4 and L5 ICD-10-CM: M51.26  ICD-9-CM: 722.10  2018 Yes        UTI (urinary tract infection) ICD-10-CM: N39.0  ICD-9-CM: 599.0  2018 Yes        Heart palpitations ICD-10-CM: R00.2  ICD-9-CM: 785.1  2017 Yes        Obesity, Class I, BMI 30-34.9 ICD-10-CM: E66.9  ICD-9-CM: 278.00  11/18/2015 Yes        GERD (gastroesophageal reflux disease) ICD-10-CM: K21.9  ICD-9-CM: 530.81  6/23/2011 Yes    Overview Signed 6/23/2011  9:20 AM by Brittney Dong MD     2011             Type [facility] diabetes mellitus, uncontrolled ([facility] ICD-10-CM: E11.65  ICD-9-CM: 250.02  Unknown Yes    Overview Signed 10/5/2012  9:18 AM by Brittney Dong MD     Eye exams: VEI  Podiatrist: anabel Velez                     Review of Systems:   A comprehensive review of systems was negative except for that written in the HPI. Vital Signs:    Last 24hrs VS reviewed since prior progress note. Most recent are:  Visit Vitals    /68    Pulse (!) 118    Temp 98.2 °F (36.8 °C)    Resp 18    Ht 5' (1.524 m)    Wt 88.5 kg (195 lb)    SpO2 94%    BMI 38.08 kg/m2         Intake/Output Summary (Last 24 hours) at 01/10/18 1249  Last data filed at 01/10/18 5063   Gross per 24 hour   Intake              240 ml   Output                0 ml   Net              240 ml        Physical Examination:             Constitutional:  No acute distress, cooperative, pleasant    ENT:  Oral mucous moist, oropharynx benign. Resp:  B/L rales R>L   CV:  Regular rhythm, normal rate,     GI:  Soft, non distended, non tender. normoactive bowel sounds,     Musculoskeletal:  No edema, warm, 2+ pulses throughout    Neurologic:  Moves all extremities.   AAOx3,     Skin:  Good turgor, no rashes or ulcers       Data Review:    Review and/or order of clinical lab test  Review and/or order of tests in the radiology section of CPT  Review and/or order of tests in the medicine section of CPT      Labs:     Recent Labs      01/10/18   0433  01/09/18   0332   WBC  7.5  14.4*   HGB  10.1*  11.4*   HCT  30.9*  34.5*   PLT  200  217     Recent Labs      01/10/18   0433  01/09/18   0332  01/08/18   1025   NA  137  134*  131*   K  3.6  3.3*  3.5   CL  106  104  98   CO2  18*  19*  23   BUN 4*  4*  8   CREA  0.41*  0.60  0.86   GLU  170*  164*  285*   CA  7.4*  7.8*  8.8   MG  1.5*  1.6   --    PHOS   --   1.5*   --      Recent Labs      01/09/18   0332  01/08/18   1025   SGOT  27  48*   ALT  47  71   AP  124*  158*   TBILI  0.6  0.5   TP  7.0  8.7*   ALB  2.9*  3.5   GLOB  4.1*  5.2*     No results for input(s): INR, PTP, APTT in the last 72 hours. No lab exists for component: INREXT, INREXT   No results for input(s): FE, TIBC, PSAT, FERR in the last 72 hours. No results found for: FOL, RBCF   No results for input(s): PH, PCO2, PO2 in the last 72 hours. No results for input(s): CPK, CKNDX, TROIQ in the last 72 hours.     No lab exists for component: CPKMB  Lab Results   Component Value Date/Time    Cholesterol, total 183 09/05/2017 07:58 AM    HDL Cholesterol 51 09/05/2017 07:58 AM    LDL, calculated 119 09/05/2017 07:58 AM    Triglyceride 67 09/05/2017 07:58 AM     Lab Results   Component Value Date/Time    Glucose (POC) 195 01/10/2018 11:04 AM    Glucose (POC) 151 01/10/2018 05:59 AM    Glucose (POC) 178 01/09/2018 09:06 PM    Glucose (POC) 286 01/09/2018 04:05 PM    Glucose (POC) 213 01/09/2018 12:07 PM     Lab Results   Component Value Date/Time    Color YELLOW/STRAW 01/08/2018 10:25 AM    Appearance CLEAR 01/08/2018 10:25 AM    Specific gravity 1.020 01/08/2018 10:25 AM    Specific gravity 1.030 08/06/2017 10:40 PM    pH (UA) 6.0 01/08/2018 10:25 AM    Protein 100 01/08/2018 10:25 AM    Glucose >1000 01/08/2018 10:25 AM    Ketone 40 01/08/2018 10:25 AM    Bilirubin NEGATIVE  01/08/2018 10:25 AM    Urobilinogen 1.0 01/08/2018 10:25 AM    Nitrites NEGATIVE  01/08/2018 10:25 AM    Leukocyte Esterase NEGATIVE  01/08/2018 10:25 AM    Epithelial cells MODERATE 01/08/2018 10:25 AM    Bacteria 1+ 01/08/2018 10:25 AM    WBC 5-10 01/08/2018 10:25 AM    RBC 5-10 01/08/2018 10:25 AM         Medications Reviewed:     Current Facility-Administered Medications   Medication Dose Route Frequency    potassium, sodium phosphates (NEUTRA-PHOS) packet 1 Packet  1 Packet Oral QID    piperacillin-tazobactam (ZOSYN) 10.125 g in  mL continuous 24 hr infusion  10.125 g IntraVENous Q24H    ibuprofen (MOTRIN) tablet 400 mg  400 mg Oral Q6H PRN    metFORMIN (GLUCOPHAGE) tablet 1,000 mg  1,000 mg Oral BID WITH MEALS    cholecalciferol (VITAMIN D3) tablet 2,000 Units  2,000 Units Oral DAILY    cyclobenzaprine (FLEXERIL) tablet 5 mg  5 mg Oral QHS PRN    levothyroxine (SYNTHROID) tablet 88 mcg  88 mcg Oral 6am    therapeutic multivitamin (THERAGRAN) tablet 1 Tab  1 Tab Oral DAILY    sodium chloride (NS) flush 5-10 mL  5-10 mL IntraVENous Q8H    sodium chloride (NS) flush 5-10 mL  5-10 mL IntraVENous PRN    sodium chloride (NS) flush 5-10 mL  5-10 mL IntraVENous PRN    acetaminophen (TYLENOL) tablet 650 mg  650 mg Oral Q4H PRN    enoxaparin (LOVENOX) injection 40 mg  40 mg SubCUTAneous Q24H    0.9% sodium chloride infusion  150 mL/hr IntraVENous CONTINUOUS    glucose chewable tablet 16 g  4 Tab Oral PRN    dextrose (D50W) injection syrg 12.5-25 g  12.5-25 g IntraVENous PRN    glucagon (GLUCAGEN) injection 1 mg  1 mg IntraMUSCular PRN    insulin lispro (HUMALOG) injection   SubCUTAneous AC&HS    azithromycin (ZITHROMAX) 500 mg in 0.9% sodium chloride (MBP/ADV) 250 mL  500 mg IntraVENous Q24H    pantoprazole (PROTONIX) tablet 40 mg  40 mg Oral ACB    dilTIAZem CD (CARDIZEM CD) capsule 120 mg  120 mg Oral DAILY    oxyCODONE-acetaminophen (PERCOCET) 5-325 mg per tablet 1 Tab  1 Tab Oral Q4H PRN     ______________________________________________________________________  EXPECTED LENGTH OF STAY: 4d 21h  ACTUAL LENGTH OF STAY:          2                 Araceli Uriostegui MD

## 2018-01-10 NOTE — PROGRESS NOTES
01/09/18 2020   Vital Signs   Temp 98.1 °F (36.7 °C)   Temp Source Oral   Pulse (Heart Rate) (!) 119   Heart Rate Source Monitor   Cardiac Rhythm Sinus Tach   Resp Rate 17   O2 Sat (%) 98 %   Level of Consciousness Alert   /75   MAP (Calculated) 88   BP 1 Method Automatic   BP 1 Location Left arm   BP Patient Position At rest   MEWS Score 3   Pain 1   Pain Scale 1 Numeric (0 - 10)   Pain Intensity 1 0   Patient Stated Pain Goal 0   Oxygen Therapy   O2 Device Room air   Patient Observation   Repositioned Sitting   Patient Turned Turns self   Activity Resting quietly   Team aware patient is tachycardiac, but denies symptoms. Patient is also being monitored on remote telemetry. Will continue to monitor.

## 2018-01-10 NOTE — DIABETES MGMT
DTC Progress Note    Recommendations/ Comments: Review for hyperglycemia; Pt is in and out of BS targets of < 180 mg/dl. She required 9 units of correction insulin in past 24 hours. If appropriate, please consider adding 15 units of Lantus to daily regimen. (wgt based: 88.5kg x 0.2=basal dose 17.7 units) in light of A1c 10%    Current hospital DM medication: lispro insulin correction scale; metformin 1000 mg bid      Chart reviewed on Yonatan Escobar. Patient is a 40 y.o. female with known  Type 2 Diabetes on oral agent (monotherapy): metformin 1000 mg bid(generic)  Victoza 1.8 ml daily at home. A1c:   Lab Results   Component Value Date/Time    Hemoglobin A1c 10.0 01/09/2018 03:32 AM    Hemoglobin A1c 8.1 09/05/2017 07:58 AM       Recent Glucose Results: Lab Results   Component Value Date/Time     (H) 01/10/2018 04:33 AM    GLUCPOC 195 (H) 01/10/2018 11:04 AM    GLUCPOC 151 (H) 01/10/2018 05:59 AM    GLUCPOC 178 (H) 01/09/2018 09:06 PM        Lab Results   Component Value Date/Time    Creatinine 0.41 01/10/2018 04:33 AM     Estimated Creatinine Clearance: 187.8 mL/min (based on Cr of 0.41). Active Orders   Diet    DIET DIABETIC CONSISTENT CARB Regular; 2 GM NA (House Low NA)        PO intake: Patient Vitals for the past 72 hrs:   % Diet Eaten   01/10/18 0921 100 %         Will continue to follow as needed.     Thank you

## 2018-01-11 VITALS
HEART RATE: 94 BPM | DIASTOLIC BLOOD PRESSURE: 68 MMHG | TEMPERATURE: 98 F | WEIGHT: 195 LBS | BODY MASS INDEX: 38.28 KG/M2 | OXYGEN SATURATION: 94 % | RESPIRATION RATE: 16 BRPM | HEIGHT: 60 IN | SYSTOLIC BLOOD PRESSURE: 104 MMHG

## 2018-01-11 LAB
ANION GAP SERPL CALC-SCNC: 11 MMOL/L (ref 5–15)
BACTERIA SPEC CULT: NORMAL
BUN SERPL-MCNC: 5 MG/DL (ref 6–20)
BUN/CREAT SERPL: 11 (ref 12–20)
CALCIUM SERPL-MCNC: 8 MG/DL (ref 8.5–10.1)
CHLORIDE SERPL-SCNC: 107 MMOL/L (ref 97–108)
CO2 SERPL-SCNC: 22 MMOL/L (ref 21–32)
CREAT SERPL-MCNC: 0.44 MG/DL (ref 0.55–1.02)
GLUCOSE BLD STRIP.AUTO-MCNC: 155 MG/DL (ref 65–100)
GLUCOSE SERPL-MCNC: 170 MG/DL (ref 65–100)
GRAM STN SPEC: NORMAL
POTASSIUM SERPL-SCNC: 3.4 MMOL/L (ref 3.5–5.1)
SERVICE CMNT-IMP: ABNORMAL
SERVICE CMNT-IMP: NORMAL
SODIUM SERPL-SCNC: 140 MMOL/L (ref 136–145)

## 2018-01-11 PROCEDURE — 80048 BASIC METABOLIC PNL TOTAL CA: CPT | Performed by: HOSPITALIST

## 2018-01-11 PROCEDURE — 74011250637 HC RX REV CODE- 250/637: Performed by: INTERNAL MEDICINE

## 2018-01-11 PROCEDURE — 74011636637 HC RX REV CODE- 636/637: Performed by: INTERNAL MEDICINE

## 2018-01-11 PROCEDURE — 82962 GLUCOSE BLOOD TEST: CPT

## 2018-01-11 PROCEDURE — 36415 COLL VENOUS BLD VENIPUNCTURE: CPT | Performed by: HOSPITALIST

## 2018-01-11 PROCEDURE — 99218 HC RM OBSERVATION: CPT

## 2018-01-11 PROCEDURE — 74011250637 HC RX REV CODE- 250/637: Performed by: HOSPITALIST

## 2018-01-11 PROCEDURE — 74011250637 HC RX REV CODE- 250/637: Performed by: NURSE PRACTITIONER

## 2018-01-11 RX ORDER — GUAIFENESIN 100 MG/5ML
100 SOLUTION ORAL
Qty: 1 BOTTLE | Refills: 0 | Status: SHIPPED | OUTPATIENT
Start: 2018-01-11 | End: 2018-01-22

## 2018-01-11 RX ORDER — BENZONATATE 100 MG/1
100 CAPSULE ORAL
Qty: 20 CAP | Refills: 0 | Status: SHIPPED | OUTPATIENT
Start: 2018-01-11 | End: 2018-01-18

## 2018-01-11 RX ADMIN — INSULIN LISPRO 2 UNITS: 100 INJECTION, SOLUTION INTRAVENOUS; SUBCUTANEOUS at 07:10

## 2018-01-11 RX ADMIN — THERA TABS 1 TABLET: TAB at 10:00

## 2018-01-11 RX ADMIN — POTASSIUM & SODIUM PHOSPHATES POWDER PACK 280-160-250 MG 1 PACKET: 280-160-250 PACK at 10:00

## 2018-01-11 RX ADMIN — GUAIFENESIN 100 MG: 200 SOLUTION ORAL at 09:59

## 2018-01-11 RX ADMIN — CHOLECALCIFEROL TAB 10 MCG (400 UNIT) 2000 UNITS: 10 TAB at 10:00

## 2018-01-11 RX ADMIN — DILTIAZEM HYDROCHLORIDE 120 MG: 120 CAPSULE, COATED, EXTENDED RELEASE ORAL at 10:00

## 2018-01-11 RX ADMIN — METFORMIN HYDROCHLORIDE 1000 MG: 500 TABLET, FILM COATED ORAL at 10:00

## 2018-01-11 RX ADMIN — BENZONATATE 100 MG: 100 CAPSULE ORAL at 09:59

## 2018-01-11 RX ADMIN — LEVOTHYROXINE SODIUM 88 MCG: 88 TABLET ORAL at 07:10

## 2018-01-11 RX ADMIN — PANTOPRAZOLE SODIUM 40 MG: 40 TABLET, DELAYED RELEASE ORAL at 07:10

## 2018-01-11 RX ADMIN — IBUPROFEN 400 MG: 400 TABLET, FILM COATED ORAL at 00:34

## 2018-01-11 RX ADMIN — IBUPROFEN 400 MG: 400 TABLET, FILM COATED ORAL at 10:00

## 2018-01-11 NOTE — DISCHARGE INSTRUCTIONS
Please bring this form with you to show your primary care provider at your follow-up appointment. Primary care provider:  Dr. Ludivina Torres MD    Discharging provider:  Alanna Colorado MD    You have been admitted to the hospital with the following diagnoses:  · CAP (community acquired pneumonia)    FOLLOW-UP CARE RECOMMENDATIONS:    APPOINTMENTS:  Follow-up Information     Follow up With Details Comments Guerline Jackson MD On 1/19/2018 For discharge follow up at 9:30AM  [address]      Leslye Jacobo MD Schedule an appointment as soon as possible for a visit As needed, If symptoms worsen [address] [facility]  [phone]              FOLLOW-UP TESTS recommended:   - you will take 2 antibiotics: Azithromycin for 3 more days and Augmentin for 6 more days. - please complete all antibiotics as prescribed    PENDING TEST RESULTS:  At the time of your discharge the following test results are still pending: NONE  Please make sure you review these results with your outpatient follow-up provider(s). SYMPTOMS to watch for: chest pain, shortness of breath, fever, chills, nausea, vomiting, diarrhea, change in mentation, falling, weakness, bleeding. DIET/what to eat:  Diabetic Diet    ACTIVITY:  Activity as tolerated    WOUND CARE: NONE    EQUIPMENT needed:  NONE      What to do if new or unexpected symptoms occur? If you experience any of the above symptoms (or should other concerns or questions arise after discharge) please call your primary care physician. Return to the emergency room if you cannot get hold of your doctor. · It is very important that you keep your follow-up appointment(s). · Please bring discharge papers, medication list (and/or medication bottles) to your follow-up appointments for review by your outpatient provider(s).   · Please check the list of medications and be sure it includes every medication (even non-prescription medications) that your provider wants you to take. · It is important that you take the medication exactly as they are prescribed. · Keep your medication in the bottles provided by the pharmacist and keep a list of the medication names, dosages, and times to be taken in your wallet. · Do not take other medications without consulting your doctor. · If you have any questions about your medications or other instructions, please talk to your nurse or care provider before you leave the hospital.    I understand that if any problems occur once I am at home I am to contact my physician. These instructions were explained to me and I had the opportunity to ask questions.

## 2018-01-11 NOTE — DISCHARGE SUMMARY
Discharge Summary       PATIENT ID: Corey Coleman  MRN: [id number]   YOB: 1980    DATE OF ADMISSION: 1/8/2018  8:51 AM    DATE OF DISCHARGE: 1/11/2018  PRIMARY CARE PROVIDER: Vu Chau MD       DISCHARGING PHYSICIAN: Caio Franklin NP    To contact this individual call [phone] and ask the  to page. If unavailable ask to be transferred the Adult Hospitalist Department. CONSULTATIONS: IP CONSULT TO HOSPITALIST  IP CONSULT TO NEUROSURGERY    PROCEDURES/SURGERIES: * No surgery found *    ADMITTING DIAGNOSES & HOSPITAL COURSE:   39 yo female with PMhx of DM, poorly controlled, back pain, GERD, Admitted for fever and chills. Pt had dry cough and low back pain. Pain do not radiate to legs.    She was found to have CAP and Sepsis. She responded well to antibiotics and was discharged with Augmentin and Azithromycin. She was evaluated by Neurosurgery and will follow up with Dr. Tano Humphreys to discuss options as an outpatient.  She was discharged in stable condition to home      [address] [place] [address]. [address] / PLAN:      Sepsis due to Pneumonia, likely bacterial  - change abx to Zosyn and Azithromycin  - F/u urine Legionella and Strep ag  - f/u Mycoplasma ab  - IVF  - Oxygen prn  - can D/c on Augmentin and complete Azithromycin X 5 days     Sinus tachycardia due to above  - monitor     L4-L5 disc bulge   - pain management  - MRI lumbar reviewed  - Neurosurg fu     Hyponatremia/Hypokalemia  - resolved     Hypomagnesemia  - replaced     DM type 2, uncontrolled  - restart Metformin/Victoza  - c/w Sliding scale       Palpitations  - on Cardizem      Code status: FULL  DVT prophylaxis: Lovenox          PENDING TEST RESULTS:   At the time of discharge the following test results are still pending: na    FOLLOW UP APPOINTMENTS:    Follow-up Information     Follow up With Details Comments Contact Info    Vu Chau MD On 1/19/2018 For discharge follow up at 9:30AM  [address] [facility] [id number] [id number]      Leslye Jacobo MD Schedule an appointment as soon as possible for a visit As needed, If symptoms worsen [address]  [phone]           FOLLOW-UP TESTS recommended:   - you will take 2 antibiotics: Azithromycin for 3 more days and Augmentin for 6 more days. - please complete all antibiotics as prescribed    PENDING TEST RESULTS:  At the time of your discharge the following test results are still pending: NONE  Please make sure you review these results with your outpatient follow-up provider(s). SYMPTOMS to watch for: chest pain, shortness of breath, fever, chills, nausea, vomiting, diarrhea, change in mentation, falling, weakness, bleeding. DIET/what to eat:  Diabetic Diet    ACTIVITY:  Activity as tolerated    WOUND CARE: NONE    EQUIPMENT needed:  NONE      What to do if new or unexpected symptoms occur? If you experience any of the above symptoms (or should other concerns or questions arise after discharge) please call your primary care physician. Return to the emergency room if you cannot get hold of your doctor. · It is very important that you keep your follow-up appointment(s). · Please bring discharge papers, medication list (and/or medication bottles) to your follow-up appointments for review by your outpatient provider(s). · Please check the list of medications and be sure it includes every medication (even non-prescription medications) that your provider wants you to take. · It is important that you take the medication exactly as they are prescribed. · Keep your medication in the bottles provided by the pharmacist and keep a list of the medication names, dosages, and times to be taken in your wallet. · Do not take other medications without consulting your doctor.    · If you have any questions about your medications or other instructions, please talk to your nurse or care provider before you leave the hospital.    I understand that if any problems occur once I am at home I am to contact my physician. DISCHARGE MEDICATIONS:  Current Discharge Medication List      START taking these medications    Details   benzonatate (TESSALON) 100 mg capsule Take 1 Cap by mouth three (3) times daily as needed for Cough for up to 7 days. Qty: 20 Cap, Refills: 0      guaiFENesin (ROBITUSSIN) 100 mg/5 mL liquid Take 5 mL by mouth every four (4) hours as needed for Cough. Qty: 1 Bottle, Refills: 0      azithromycin (ZITHROMAX) 500 mg tab Take 1 Tab by mouth daily for 3 days. Qty: 3 Tab, Refills: 0      amoxicillin-clavulanate (AUGMENTIN) 875-125 mg per tablet Take 1 Tab by mouth every twelve (12) hours. Qty: 12 Tab, Refills: 0         CONTINUE these medications which have NOT CHANGED    Details   therapeutic multivitamin (THERA) tablet Take 1 Tab by mouth daily. MVI Gummies      dilTIAZem CD (CARDIZEM CD) 120 mg ER capsule Take 1 Cap by mouth daily. Qty: 90 Cap, Refills: 1    Associated Diagnoses: Heart palpitations; Sinus tachycardia      Liraglutide (VICTOZA) 0.6 mg/0.1 mL (18 mg/3 mL) pnij 1.8 mg by SubCUTAneous route daily. Qty: 9 Pen, Refills: 3      ELIDEL 1 % topical cream Apply  to affected area as needed (Eczema). cyclobenzaprine (FLEXERIL) 10 mg tablet Take 0.5-1 Tabs by mouth nightly as needed for Muscle Spasm(s). Indications: MUSCLE SPASM  Qty: 90 Tab, Refills: 0      biotin 10,000 mcg cap Take  by mouth. DEXILANT 60 mg CpDB TAKE ONE CAPSULE BY MOUTH ONCE DAILY FOR  REFLUX  Qty: 30 Cap, Refills: 5    Associated Diagnoses: Gastroesophageal reflux disease, esophagitis presence not specified;  History of peptic ulcer disease      levothyroxine (SYNTHROID) 88 mcg tablet Take 1 tablet daily  Qty: 90 Tab, Refills: 3      metFORMIN (GLUCOPHAGE) 1,000 mg tablet TAKE ONE TABLET BY MOUTH TWICE DAILY WITH MEALS  Qty: 180 Tab, Refills: 3      cholecalciferol, vitamin D3, 2,000 unit tab Take 2,000 Units by mouth daily.      levonorgestrel (MIRENA) 20 mcg/24 hr (5 years) IUD 1 Each by IntraUTERine route once. NOTIFY YOUR PHYSICIAN FOR ANY OF THE FOLLOWING:   Fever over 101 degrees for 24 hours. Chest pain, shortness of breath, fever, chills, nausea, vomiting, diarrhea, change in mentation, falling, weakness, bleeding. Severe pain or pain not relieved by medications. Or, any other signs or symptoms that you may have questions about. DISPOSITION:    Home With:   OT  PT  HH  RN       Long term SNF/Inpatient Rehab   x Independent/assisted living    Hospice    Other:       PATIENT CONDITION AT DISCHARGE:     Functional status    Poor     Deconditioned    x Independent      Cognition    x Lucid     Forgetful     Dementia      Catheters/lines (plus indication)    Degroot     PICC     PEG    xx None      Code status   x  Full code     DNR      PHYSICAL EXAMINATION AT DISCHARGE:     Constitutional:  No acute distress, cooperative, pleasant    ENT:  Oral mucous moist, oropharynx benign. Resp:  B/L rales R>L   CV:  Regular rhythm, normal rate,     GI:  Soft, non distended, non tender. normoactive bowel sounds,     Musculoskeletal:  No edema, warm, 2+ pulses throughout    Neurologic:  Moves all extremities.   AAOx3,                                             Skin:  Good turgor, no rashes or ulcers  Visit Vitals    /68 (BP 1 Location: Left arm, BP Patient Position: At rest)    Pulse 94    Temp 98 °F (36.7 °C)    Resp 16    Ht 5' (1.524 m)    Wt 88.5 kg (195 lb)    SpO2 94%    BMI 38.08 kg/m2           CHRONIC MEDICAL DIAGNOSES:  Problem List as of 1/11/2018  Date Reviewed: 1/11/2018          Codes Class Noted - Resolved    * (Principal)CAP (community acquired pneumonia) ICD-10-CM: J18.9  ICD-9-CM: 486  1/8/2018 - Present        Sepsis ([facility] [address] ICD-10-CM: A41.9  ICD-9-CM: 038.9, 995.91  1/8/2018 - Present        Bulging of intervertebral disc between L4 and L5 ICD-10-CM: M51.26  ICD-9-CM: 722.10  1/8/2018 - Present        UTI (urinary tract infection) ICD-10-CM: N39.0  ICD-9-CM: 599.0  1/8/2018 - Present        Acute lymphadenitis of arm, left epitrochlear ICD-10-CM: L04.2  ICD-9-CM: 683  12/13/2017 - Present        Depression with anxiety ICD-10-CM: F41.8  ICD-9-CM: 300.4  9/28/2017 - Present    Overview Signed 9/28/2017 12:42 PM by Fortunato Bland MD     10/2014             Lumbar facet arthropathy ICD-10-CM: M12.88  ICD-9-CM: 721.3  8/28/2017 - Present    Overview Signed 8/28/2017  9:20 PM by Fortunato Bland MD     On CT scan             Heart palpitations ICD-10-CM: R00.2  ICD-9-CM: 785.1  8/16/2017 - Present        Spondylosis of lumbar & thoracic region without myelopathy or radiculopathy ICD-10-CM: M47.816  ICD-9-CM: 721.3  10/26/2016 - Present    Overview Signed 10/26/2016  8:50 AM by Fortunato Bland MD     xrays 2010             Hypercholesterolemia ICD-10-CM: E78.00  ICD-9-CM: 272.0  8/18/2016 - Present        History of peptic ulcer disease ICD-10-CM: Z87.11  ICD-9-CM: V12.71  8/18/2016 - Present    Overview Signed 8/18/2016 10:16 AM by Fortunato Bland MD     16 yo             Vitamin D deficiency ICD-10-CM: E55.9  ICD-9-CM: 268.9  Unknown - Present        Obesity, Class I, BMI 30-34.9 ICD-10-CM: E66.9  ICD-9-CM: 278.00  11/18/2015 - Present        Post partum depression ICD-10-CM: F53  ICD-9-CM: 648.44, 311  10/9/2014 - Present    Overview Signed 10/9/2014 12:24 PM by Fortunato Bland MD     10/2013: counseling and prn Valium             Chronic low back pain & Muscle Spasms ICD-10-CM: M54.5, G89.29  ICD-9-CM: 724.2, 338.29  10/9/2014 - Present    Overview Signed 10/9/2014 12:36 PM by Fortunato Bland MD     Muscular              Bilateral carpal tunnel syndrome ICD-10-CM: G56.03  ICD-9-CM: 354.0  12/31/2013 - Present    Overview Addendum 12/31/2013 10:28 AM by Fortunato Bland MD     10/2013; wrist splints               Goiter ICD-10-CM: E04.9  ICD-9-CM: 240.9 2013 - Present        History of sinus tachycardia ICD-10-CM: Z86.79  ICD-9-CM: V12.59  10/5/2012 - Present    Overview Signed 10/5/2012  9:13 AM by Albino Gentile MD     Since her early 's             GERD (gastroesophageal reflux disease) ICD-10-CM: K21.9  ICD-9-CM: 530.81  2011 - Present    Overview Signed 2011  9:20 AM by Albino Gentile MD                  Dysphagia, workup negative except GERD ICD-10-CM: R13.10  ICD-9-CM: 787.20  2010 - Present    Overview Addendum 2011  9:21 AM by Albino Gentile MD     2010: Thyroid US normal  ENT eval;   Barium Swallow =GERD only             Anemia ICD-10-CM: D64.9  ICD-9-CM: 285.9  2010 - Present        Type II diabetes mellitus, uncontrolled ([facility] [address] ICD-10-CM: E11.65  ICD-9-CM: 250.02  Unknown - Present    Overview Signed 10/5/2012  9:18 AM by Albino Gentile MD     Eye exams: VEI  Podiatrist: anabel Velez             Gallstones ICD-10-CM: K80.20  ICD-9-CM: 574.20  Unknown - Present    Overview Signed 3/25/2010 10:31 AM by Albino Gentile MD     Lap Cholecystectomy-Dr. Cherie [facility] (normal spontaneous vaginal delivery) ICD-10-CM: O80  ICD-9-CM: 744  Unknown - Present    Overview Signed 3/25/2010 10:31 AM by Albino Gentile MD     Son  9yo                   Greater than 30 minutes were spent with the patient on counseling and coordination of care    Signed:   Saad Lau NP  2018  10:17 AM

## 2018-01-13 LAB
BACTERIA SPEC CULT: NORMAL
SERVICE CMNT-IMP: NORMAL

## 2018-01-22 ENCOUNTER — OFFICE VISIT (OUTPATIENT)
Dept: FAMILY MEDICINE CLINIC | Age: 38
End: 2018-01-22

## 2018-01-22 ENCOUNTER — TELEPHONE (OUTPATIENT)
Dept: FAMILY MEDICINE CLINIC | Age: 38
End: 2018-01-22

## 2018-01-22 VITALS
HEART RATE: 124 BPM | WEIGHT: 196 LBS | DIASTOLIC BLOOD PRESSURE: 82 MMHG | SYSTOLIC BLOOD PRESSURE: 114 MMHG | TEMPERATURE: 98.4 F | RESPIRATION RATE: 18 BRPM | HEIGHT: 60 IN | BODY MASS INDEX: 38.48 KG/M2 | OXYGEN SATURATION: 97 %

## 2018-01-22 DIAGNOSIS — D64.9 ANEMIA, UNSPECIFIED TYPE: ICD-10-CM

## 2018-01-22 DIAGNOSIS — E87.6 HYPOKALEMIA: ICD-10-CM

## 2018-01-22 DIAGNOSIS — E83.42 HYPOMAGNESEMIA: ICD-10-CM

## 2018-01-22 DIAGNOSIS — J98.11 BILATERAL ATELECTASIS: ICD-10-CM

## 2018-01-22 DIAGNOSIS — E11.9 DIABETES MELLITUS TYPE 2, NONINSULIN DEPENDENT (HCC): ICD-10-CM

## 2018-01-22 DIAGNOSIS — R00.0 SINUS TACHYCARDIA: ICD-10-CM

## 2018-01-22 DIAGNOSIS — J18.9 COMMUNITY ACQUIRED PNEUMONIA OF LEFT LOWER LOBE OF LUNG: Primary | ICD-10-CM

## 2018-01-22 PROBLEM — A41.9 SEPSIS (HCC): Status: RESOLVED | Noted: 2018-01-08 | Resolved: 2018-01-22

## 2018-01-22 RX ORDER — LANCETS 33 GAUGE
EACH MISCELLANEOUS
COMMUNITY
Start: 2017-12-09 | End: 2018-10-05

## 2018-01-22 RX ORDER — FLUOXETINE 10 MG/1
TABLET ORAL
COMMUNITY
Start: 2017-11-20 | End: 2018-01-22

## 2018-01-22 NOTE — ASSESSMENT & PLAN NOTE
Uncontrolled, based on history, physical exam and review of pertinent labs, studies and medications; meds reconciled; lifestyle modifications recommended. , This condition is managed by Specialist.   Endo Dr Damari Bergman Antihyperglycemic Medications             Liraglutide (VICTOZA) 0.6 mg/0.1 mL (18 mg/3 mL) pnij  (Taking) 1.8 mg by SubCUTAneous route daily. metFORMIN (GLUCOPHAGE) 1,000 mg tablet  (Taking) TAKE ONE TABLET BY MOUTH TWICE DAILY WITH MEALS        Other Key Diabetic Medications     Patient is on no other key diabetic meds.         Lab Results   Component Value Date/Time    Hemoglobin A1c 10.0 01/09/2018 03:32 AM    Glucose 170 01/11/2018 05:25 AM    Creatinine 0.44 01/11/2018 05:25 AM    Microalb/Creat ratio (ug/mg creat.) 16.7 04/13/2017 07:58 AM    Cholesterol, total 183 09/05/2017 07:58 AM    HDL Cholesterol 51 09/05/2017 07:58 AM    LDL, calculated 119 09/05/2017 07:58 AM    Triglyceride 67 09/05/2017 07:58 AM     Diabetic Foot and Eye Exam HM Status   Topic Date Due    Eye Exam  04/21/2018    Diabetic Foot Care  09/13/2018

## 2018-01-22 NOTE — PROGRESS NOTES
Chief Complaint   Patient presents with   [facility] Follow Up     [facility] from 1/8 to1/11 for community acquired pneumonia - sepsis               1. Have you been to the ER, urgent care clinic since your last visit? Hospitalized since your last visit? No   Yes see above   2. Have you seen or consulted any other health care providers outside of the [address] [place] [address] since your last visit? Include any pap smears or colon screening.  No

## 2018-01-22 NOTE — PATIENT INSTRUCTIONS
Body Mass Index: Care Instructions  Your Care Instructions    Body mass index (BMI) can help you see if your weight is raising your risk for health problems. It uses a formula to compare how much you weigh with how tall you are. · A BMI lower than 18.5 is considered underweight. · A BMI between 18.5 and 24.9 is considered healthy. · A BMI between 25 and 29.9 is considered overweight. A BMI of 30 or higher is considered obese. If your BMI is in the normal range, it means that you have a lower risk for weight-related health problems. If your BMI is in the overweight or obese range, you may be at increased risk for weight-related health problems, such as high blood pressure, heart disease, stroke, arthritis or joint pain, and diabetes. If your BMI is in the underweight range, you may be at increased risk for health problems such as fatigue, lower protection (immunity) against illness, muscle loss, bone loss, hair loss, and hormone problems. BMI is just one measure of your risk for weight-related health problems. You may be at higher risk for health problems if you are not active, you eat an unhealthy diet, or you drink too much alcohol or use tobacco products. Follow-up care is a key part of your treatment and safety. Be sure to make and go to all appointments, and call your doctor if you are having problems. It's also a good idea to know your test results and keep a list of the medicines you take. How can you care for yourself at home? · Practice healthy eating habits. This includes eating plenty of fruits, vegetables, whole grains, lean protein, and low-fat dairy. · If your doctor recommends it, get more exercise. Walking is a good choice. Bit by bit, increase the amount you walk every day. Try for at least 30 minutes on most days of the week. · Do not smoke. Smoking can increase your risk for health problems. If you need help quitting, talk to your doctor about stop-smoking programs and medicines. These can increase your chances of quitting for good. · Limit alcohol to 2 drinks a day for men and 1 drink a day for women. Too much alcohol can cause health problems. If you have a BMI higher than 25  · Your doctor may do other tests to check your risk for weight-related health problems. This may include measuring the distance around your waist. A waist measurement of more than 40 inches in men or 35 inches in women can increase the risk of weight-related health problems. · Talk with your doctor about steps you can take to stay healthy or improve your health. You may need to make lifestyle changes to lose weight and stay healthy, such as changing your diet and getting regular exercise. If you have a BMI lower than 18.5  · Your doctor may do other tests to check your risk for health problems. · Talk with your doctor about steps you can take to stay healthy or improve your health. You may need to make lifestyle changes to gain or maintain weight and stay healthy, such as getting more healthy foods in your diet and doing exercises to build muscle. Where can you learn more? Go to http://elizabeth-korina.info/. Enter S176 in the search box to learn more about \"Body Mass Index: Care Instructions. \"  Current as of: October 13, 2016  Content Version: 11.4  © 3219-6400 [employer]. Care instructions adapted under license by [employer] (which disclaims liability or warranty for this information). If you have questions about a medical condition or this instruction, always ask your healthcare professional. [place] [address] any warranty or liability for your use of this information. Pneumonia: Care Instructions  Your Care Instructions    Pneumonia is an infection of the lungs. Most cases are caused by infections from bacteria or viruses. Pneumonia may be mild or very severe. If it is caused by bacteria, you will be treated with antibiotics.  It may take a few weeks to a few months to recover fully from pneumonia, depending on how sick you were and whether your overall health is good. Follow-up care is a key part of your treatment and safety. Be sure to make and go to all appointments, and call your doctor if you are having problems. It's also a good idea to know your test results and keep a list of the medicines you take. How can you care for yourself at home? · Take your antibiotics exactly as directed. Do not stop taking the medicine just because you are feeling better. You need to take the full course of antibiotics. · Take your medicines exactly as prescribed. Call your doctor if you think you are having a problem with your medicine. · Get plenty of rest and sleep. You may feel weak and tired for a while, but your energy level will improve with time. · To prevent dehydration, drink plenty of fluids, enough so that your urine is light yellow or clear like water. Choose water and other caffeine-free clear liquids until you feel better. If you have kidney, heart, or liver disease and have to limit fluids, talk with your doctor before you increase the amount of fluids you drink. · Take care of your cough so you can rest. A cough that brings up mucus from your lungs is common with pneumonia. It is one way your body gets rid of the infection. But if coughing keeps you from resting or causes severe fatigue and chest-wall pain, talk to your doctor. He or she may suggest that you take a medicine to reduce the cough. · Use a vaporizer or humidifier to add moisture to your bedroom. Follow the directions for cleaning the machine. · Do not smoke or allow others to smoke around you. Smoke will make your cough last longer. If you need help quitting, talk to your doctor about stop-smoking programs and medicines. These can increase your chances of quitting for good.   · Take an over-the-counter pain medicine, such as acetaminophen (Tylenol), ibuprofen (Advil, Motrin), or naproxen (Aleve). Read and follow all instructions on the label. · Do not take two or more pain medicines at the same time unless the doctor told you to. Many pain medicines have acetaminophen, which is Tylenol. Too much acetaminophen (Tylenol) can be harmful. · If you were given a spirometer to measure how well your lungs are working, use it as instructed. This can help your doctor tell how your recovery is going. · To prevent pneumonia in the future, talk to your doctor about getting a flu vaccine (once a year) and a pneumococcal vaccine (one time only for most people). When should you call for help? Call 911 anytime you think you may need emergency care. For example, call if:  ? · You have severe trouble breathing. ?Call your doctor now or seek immediate medical care if:  ? · You cough up dark brown or bloody mucus (sputum). ? · You have new or worse trouble breathing. ? · You are dizzy or lightheaded, or you feel like you may faint. ? Watch closely for changes in your health, and be sure to contact your doctor if:  ? · You have a new or higher fever. ? · You are coughing more deeply or more often. ? · You are not getting better after 2 days (48 hours). ? · You do not get better as expected. Where can you learn more? Go to http://elizabeth-korina.info/. Enter [id number] in the search box to learn more about \"Pneumonia: Care Instructions. \"  Current as of: May 12, 2017  Content Version: 11.4  © 1254-8370 [employer]. Care instructions adapted under license by [employer] (which disclaims liability or warranty for this information). If you have questions about a medical condition or this instruction, always ask your healthcare professional. [address] any warranty or liability for your use of this information.

## 2018-01-22 NOTE — PROGRESS NOTES
[address] follow up from [facility] 1/8/11-1/11/18 for CAP. Patient states that back on the evening of 1/6/18 she began experiencing sudden onset of chills, shakes, rigors. She had been out shopping all day w/ her daughter and was feeling fine until that night. At midnight she finally checked her temp and it was 101. She took some Nyquil and slept through the night. However the next morning she felt extremely weak, achy and continued w/ the shakes. Her temp was up to 103. She stayed in bed all day and alternated between Tylenol and Motrin but her fevers would not break. The next day she drove herself to the ED. ED triage VS were temp 99.2F, , /85, RR 17, SpO2 95% on RA. In the ED, she received azithromycin 500mg IVx1, ceftriaxone 1g IVx1, regular insulin 7 units SCx1, morphine 4mg IVx2, Zofran 4mg IVx1, and IVNS x2L bolus. Exam on admission /81, , RR 18, sats 100%, Temp 102.1. Initial CXR was negative. 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 CT spine revealed some degenerative/disc changes (detailed below) and incidentally picked up LLL opacity c/w pneumonia which the CXR didn't initially . Repeated CXR on 1-10-18 however was c/w pneumonia vs B atelectasis. She was admitted for the following (copied and summarized from hospital H&P):     \"CAP with sepsis (POA)  - pt seen/examined in ED on 1/8/18; admit inpatient remote telemetry under sepsis protocol  - tachycardia, leucocytosis, tachypnea on admission  - lactic acid WNL  - BCx 1/8 pending  - influenza swab negative and received her influenza vaccine this season  - s/p ceftrixone and azithromycin in ED;      Changed to Zosyn and continued Azithromycin  - urine strep/legionella antigens : all negative  Sputum cultures negative except heavy resp lester  - IVF  Changed to oral Augmentin and Azithro for DC (pt completed both courses by now)  Chronic heart palpitations and tachycardia (POA)  - persistent tachycardia with occasional PVCs on tele, rates 110-120's range  - continue diltiazem   - denies CP; no indication for troponin   Bulging of intervertebral disc between L4 and L5 (POA)  - likely cause of chronic back pain  - seen on CT  - due to fever, checked MRI L-spine (non infectious)  - pain control  Will follow w/ pain mgt Dr Ginna Macario out patient   UTI (urinary tract infection) (POA)  - asymptomatic but does c/o b/l flank pain  - UCx (final urine culture was negative)  - ceftriaxone should cover  DM2, uncontrolled  - FBSs, SSI  - hold Victoza, metformin inpatient    Her hospital labs and reports were reviewed and summarized below. She developed a dry cough and slight sob on hospital day 2. Her fevers subsided and normalized prior to dc. She now recalls that for several days leading up to this she did not have much of an appetite, was not eating well, yet her BS's were running in the 200-230 range anyway. Since being home, she now reports being back on a normal diet and her sugars are in the 150-160 range now. She states that the cough and sob have resolved. She denies pleuritic pain or sputum. She has not had any more fevers since being dc'd. She is still a little fatigued. REVIEW OF SYMPTOMS     Review of Systems   Constitutional: Negative for chills and fever. HENT: Negative. Respiratory: Negative for cough, hemoptysis, sputum production, shortness of breath and wheezing. Cardiovascular: Negative for chest pain. Gastrointestinal: Negative for abdominal pain, blood in stool, constipation, melena, nausea and vomiting. Stools slightly loose since taking the abx which she has now completed. No blood or abdominal pain. Genitourinary: Negative. Musculoskeletal:        Will be following up w/ pain mgt for low back.  This AM when she woke up her left shoulder was stiff and achy. But took Advil and used heat.  ROM is a little better than it was when she first got up this AM.    Neurological: Negative for dizziness, speech change, focal weakness and headaches.           PROBLEM LIST/MEDICAL HISTORY      Problem List  Date Reviewed: 1/22/2018          Codes Class Noted    CAP (community acquired pneumonia) ICD-10-CM: J18.9  ICD-9-CM: 486  1/8/2018    Overview Signed 1/22/2018 12:08 PM by Shirin Dee MD     [facility] Admission 1/8/11-1/11/18             Bulging of intervertebral disc between L4 and L5 ICD-10-CM: M51.26  ICD-9-CM: 722.10  1/8/2018        UTI (urinary tract infection) ICD-10-CM: N39.0  ICD-9-CM: 599.0  1/8/2018        Acute lymphadenitis of arm, left epitrochlear ICD-10-CM: A76.3  ICD-9-CM: 683  12/13/2017        Depression with anxiety ICD-10-CM: F41.8  ICD-9-CM: 300.4  9/28/2017    Overview Signed 9/28/2017 12:42 PM by Shirin Dee MD     10/2014             Lumbar facet arthropathy ICD-10-CM: M12.88  ICD-9-CM: 721.3  8/28/2017    Overview Signed 8/28/2017  9:20 PM by Shirin Dee MD     On CT scan             Heart palpitations ICD-10-CM: R00.2  ICD-9-CM: 785.1  8/16/2017        Spondylosis of lumbar & thoracic region without myelopathy or radiculopathy ICD-10-CM: M47.816  ICD-9-CM: 721.3  10/26/2016    Overview Signed 10/26/2016  8:50 AM by Shirin Dee MD     xrays 2010             Hypercholesterolemia ICD-10-CM: E78.00  ICD-9-CM: 272.0  8/18/2016        History of peptic ulcer disease ICD-10-CM: Z87.11  ICD-9-CM: V12.71  8/18/2016    Overview Signed 8/18/2016 10:16 AM by Shirin Dee MD     16 yo             Vitamin D deficiency ICD-10-CM: E55.9  ICD-9-CM: 268.9  Unknown        Obesity, Class I, BMI 30-34.9 ICD-10-CM: E66.9  ICD-9-CM: 278.00  11/18/2015        Post partum depression ICD-10-CM: F53  ICD-9-CM: 648.44, 465  10/9/2014    Overview Signed 10/9/2014 12:24 PM by Trudi Coello MD Lynette     10/2013: counseling and prn Valium             Chronic low back pain & Muscle Spasms ICD-10-CM: M54.5, G89.29  ICD-9-CM: 724.2, 338.29  10/9/2014    Overview Signed 10/9/2014 12:36 PM by Delores Watt MD     Muscular              Bilateral carpal tunnel syndrome ICD-10-CM: G56.03  ICD-9-CM: 354.0  2013    Overview Addendum 2013 10:28 AM by Delores Watt MD     10/2013; wrist splints               Goiter ICD-10-CM: E04.9  ICD-9-CM: 240.9  2013        History of sinus tachycardia ICD-10-CM: Z86.79  ICD-9-CM: V12.59  10/5/2012    Overview Signed 10/5/2012  9:13 AM by Delores Watt MD     Since her early 's             GERD (gastroesophageal reflux disease) ICD-10-CM: K21.9  ICD-9-CM: 530.81  2011    Overview Signed 2011  9:20 AM by Delores aWtt MD                  Dysphagia, workup negative except GERD ICD-10-CM: R13.10  ICD-9-CM: 787.20  2010    Overview Addendum 2011  9:21 AM by Delores Watt MD     2010: Thyroid US normal  ENT eval;   Barium Swallow =GERD only             Anemia ICD-10-CM: D64.9  ICD-9-CM: 285.9  2010        Type II diabetes mellitus, uncontrolled ([facility] [address] ICD-10-CM: E11.65  ICD-9-CM: 250.02  Unknown    Overview Signed 10/5/2012  9:18 AM by Delores Watt MD     Eye exams: VEI  Podiatrist: anabel Velez             Gallstones ICD-10-CM: K80.20  ICD-9-CM: 574.20  Unknown    Overview Signed 3/25/2010 10:31 AM by Delores Watt MD     Lap Cholecystectomy-Dr. Loki [facility] (normal spontaneous vaginal delivery) ICD-10-CM: O80  ICD-9-CM: 695  Unknown    Overview Signed 3/25/2010 10:31 AM by Delores Watt MD     Son  7yo                       PAST SURGICAL HISTORY       Past Surgical History:   Procedure Laterality Date    HX CARPAL TUNNEL RELEASE Bilateral     HX  SECTION      HX CHOLECYSTECTOMY  10/09    LAP-Gallstones    HX GYN  4/2015    IUD placed    HX LITHOTRIPSY  03/2017    HX ORTHOPAEDIC Right 7/10/14    trigger thumb    HX ORTHOPAEDIC Left 7/10/14    wrist         MEDICATIONS      Current Outpatient Prescriptions   Medication Sig    ONE TOUCH DELICA 33 gauge misc     dilTIAZem CD (CARDIZEM CD) 120 mg ER capsule Take 1 Cap by mouth daily.  Liraglutide (VICTOZA) 0.6 mg/0.1 mL (18 mg/3 mL) pnij 1.8 mg by SubCUTAneous route daily.  ELIDEL 1 % topical cream Apply  to affected area as needed (Eczema).  cyclobenzaprine (FLEXERIL) 10 mg tablet Take 0.5-1 Tabs by mouth nightly as needed for Muscle Spasm(s). Indications: MUSCLE SPASM    biotin 10,000 mcg cap Take  by mouth.  DEXILANT 60 mg CpDB TAKE ONE CAPSULE BY MOUTH ONCE DAILY FOR  REFLUX    levothyroxine (SYNTHROID) 88 mcg tablet Take 1 tablet daily    metFORMIN (GLUCOPHAGE) 1,000 mg tablet TAKE ONE TABLET BY MOUTH TWICE DAILY WITH MEALS    cholecalciferol, vitamin D3, 2,000 unit tab Take 2,000 Units by mouth daily.  levonorgestrel (MIRENA) 20 mcg/24 hr (5 years) IUD 1 Each by IntraUTERine route once. No current facility-administered medications for this visit.            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 Has 1 daughter; Long term relationship and has custody of her stepson; her biological son  at age 8yo in 66.   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        IMMUNIZATIONS  Immunization History   Administered Date(s) Administered    DTaP 2013    Hepatitis B Vaccine 2009, 2009, 2010    Influenza Vaccine 10/09/2013, 10/23/2014, 10/16/2015    Influenza Vaccine (Quad) PF 10/26/2016, 2017    Influenza Vaccine Split 2010, 2011, 10/05/2012    Pneumococcal Vaccine (Unspecified Type) 2013    TB Skin Test (PPD) Intradermal 2013    TD Vaccine 2007    ZZZ-RETIRED (DO NOT USE) Pneumococcal Vaccine (Unspecified Type) 2007         FAMILY HISTORY     Family History   Problem Relation Age of Onset    Diabetes Mother       ESRD 43 yo in     Hypertension Mother     Stroke Mother      TIA    Heart Disease Mother     High Cholesterol Paternal Grandmother     Hypertension Maternal Grandfather          Diabetes Maternal Grandmother     Hypertension Maternal Grandmother     High Cholesterol Maternal Grandmother     Cancer Maternal Grandmother      Breast CA     Heart Disease Maternal Grandmother       age 79    Colon Cancer Maternal Uncle       at 62yo         VITALS     Visit Vitals    /82 (BP 1 Location: Left arm, BP Patient Position: Sitting)    Pulse (!) 124    Temp 98.4 °F (36.9 °C) (Oral)    Resp 18    Ht 5' (1.524 m)    Wt 196 lb (88.9 kg)    LMP 2014    SpO2 97%    BMI 38.28 kg/m2          PHYSICAL EXAMINATION     Physical Exam   Constitutional: She is well-developed, well-nourished, and in no distress. Cardiovascular: Regular rhythm. No murmur heard. Rate 118   Pulmonary/Chest: Effort normal and breath sounds normal. No respiratory distress. She has no wheezes. She has no rales. Abdominal: Soft. Bowel sounds are normal. She exhibits no distension and no mass. There is no tenderness. Musculoskeletal: She exhibits no edema. Lymphadenopathy:     She has no cervical adenopathy. Neurological: She is alert. Skin: Skin is warm and dry.    Vitals reviewed.          DIAGNOSTIC DATA     1/11/2018  6:24 AM - Dariusz, Lab In [employer]   Component Results   Component Value Flag Ref Range Units Status   Sodium 140  136 - 145 mmol/L Final   Potassium 3.4 (L) 3.5 - 5.1 mmol/L Final   Chloride 107  97 - 108 mmol/L Final   CO2 22  21 - 32 mmol/L Final   Anion gap 11  5 - 15 mmol/L Final   Glucose 170 (H) 65 - 100 mg/dL Final   BUN 5 (L) 6 - 20 MG/DL Final   Creatinine 0.44 (L) 0.55 - 1.02 MG/DL Final   BUN/Creatinine ratio 11 (L) 12 - 20   Final   GFR est AA >60  >60 ml/min/1.73m2 Final   GFR est non-AA >60  >60 ml/min/1.73m2 Final   Calcium 8.0 (L) 8.5 - 10.1 MG/DL Final   1/10/2018  5:21 AM - Dariusz, Lab In [employer]   Component Results   Component Value Flag Ref Range Units Status   Magnesium 1.5 (L) 1.6 - 2.4 mg/dL Final     1/9/2018  4:40 AM - Dariusz, Lab In [employer]   Component Results   Component Value Flag Ref Range Units Status   Hemoglobin A1c 10.0 (H) 4.2 - 6.3 % Final   Est. average glucose 240   mg/dL Final   Comment:       1/10/2018  5:01 AM - Dariusz, [facility] In [employer]   Component Results   Component Value Flag Ref Range Units Status   WBC 7.5  3.6 - 11.0 K/uL Final   RBC 3.59 (L) 3.80 - 5.20 M/uL Final   HGB 10.1 (L) 11.5 - 16.0 g/dL Final   HCT 30.9 (L) 35.0 - 47.0 % Final   MCV 86.1  80.0 - 99.0 FL Final   MCH 28.1  26.0 - 34.0 PG Final   MCHC 32.7  30.0 - 36.5 g/dL Final   RDW 12.8  11.5 - 14.5 % Final PLATELET 383  914 - 726 K/uL Final   NEUTROPHILS 74  32 - 75 % Final   LYMPHOCYTES 17  12 - 49 % Final   MONOCYTES 9  5 - 13 % Final   EOSINOPHILS 0  0 - 7 % Final   BASOPHILS 0  0 - 1 % Final   ABS. NEUTROPHILS 5.5  1.8 - 8.0 K/UL Final   ABS. LYMPHOCYTES 1.3  0.8 - 3.5 K/UL Final   ABS. MONOCYTES 0.7  0.0 - 1.0 K/UL Final   ABS. EOSINOPHILS 0.0  0.0 - 0.4 K/UL Final   ABS. BASOPHILS 0.0  0.0 - 0.1 K/UL Final     1/11/2018 10:53 AM - Dariusz, Lab In [employer]   Component Results   Component Value Flag Ref Range Units Status   Special Requests: NO SPECIAL REQUESTS     Final   GRAM STAIN OCCASIONAL WBCS SEEN     Final   GRAM STAIN OCCASIONAL EPITHELIAL CELLS SEEN     Final   GRAM STAIN      Final   OCCASIONAL GRAM POSITIVE COCCI IN PAIRS AND CHAINS   GRAM STAIN      Final   RARE GRAM POSITIVE COCCI IN CLUSTERS   Culture result: HEAVY NORMAL RESPIRATORY MARCELO     Final     Component      Latest Ref Rng & Units 1/9/2018 1/9/2018 1/9/2018          11:57 AM 11:57 AM 10:09 AM   Source       URINE URINE    Specimen       Urine     Streptococcus pneumoniae Ag      NEGATIVE   NEGATIVE     Fluid culture       Not Indicated     Organism ID       Not indicated. Please note       Comment     L pneumophila S1 Ag, urine      NEGATIVE    NEGATIVE    M. pneumoniae Ab, IgG      0 - 99 U/mL   315 (H)   M. pneumoniae Ab, IgM      0 - 769 U/mL   <770     1/13/2018  5:45 AM - Dariusz, Lab In [employer]   Component Results   Component Value Flag Ref Range Units Status   Special Requests: NO SPECIAL REQUESTS     Final   Culture result: NO GROWTH 5 DAYS     Final     Component      Latest Ref Rng & Units 1/8/2018 1/8/2018          10:25 AM 10:25 AM   Special Requests:       NO SPECIAL REQUESTS . . .    [place] Count       >100,000 .  . .    Culture result:       MIXED UROGENITAL MARCELO ISOLATED    Influenza A Antigen      NEG    NEGATIVE   Influenza B Antigen      NEG    NEGATIVE             Final result (Exam End: 1/8/2018  1:54 PM) Open    Study Result   INDICATION: Back pain radiating to legs.     TECHNIQUE: 2.5 mm axial images were obtained through the lumbar spine and  reconstructed in the sagittal and coronal planes. CT dose reduction was achieved  through use of a standardized protocol tailored for this examination and  automatic exposure control for dose modulation. Adaptive statistical iterative  reconstruction (ASIR) was utilized.     FINDINGS: The lumbar spine alignment is normal. There is no evidence of acute  fracture. 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 There is incomplete  fusion of the posterior elements at T12. There is partial sacralization of L5. The intervertebral disc spaces are well maintained. There is no evidence of  spinal stenosis at any level. There is a broad-based disc bulge at L4-5; this  produces mild bilateral neural foraminal narrowing. No focal disc herniation is  seen. Bilateral facet arthropathy is seen in the lower lumbar spine. Extensive  airspace disease is seen in the left lower lobe. Intrauterine device is present  within the retroverted uterus.     IMPRESSION  IMPRESSION: No acute fracture or subluxation. Broad-based disc bulge at L4-5  produces mild bilateral neural foraminal narrowing. 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 Left lower lobe airspace disease likely represents  pneumonia; radiographic follow-up is recommended to assure resolution.        Final result (Exam End: 1/10/2018  9:01 AM) Open    Study Result   EXAM:  XR CHEST PORT     INDICATION:  Fever. Pneumonia.     COMPARISON: 1/8/2018 at 0952 hours     TECHNIQUE: Portable AP upright chest view at 0847 hours     FINDINGS: Cardiac monitoring wires overlie the thorax. The cardiomediastinal  contours are stable. The pulmonary vasculature is within normal limits.      There are decreased lung volumes with bibasilar opacities, left greater than  right. There is no pleural effusion or pneumothorax.  The bones and upper abdomen  are stable.     IMPRESSION  IMPRESSION:     Decreased lung finds with bibasilar opacities, left greater than right, that may  represent atelectasis or infection. Final result (Exam End: 1/9/2018  5:23 PM) Open    Study Result   EXAM:  MRI LUMB SPINE WO CONT  INDICATION:  Low back pain and disc bulge/protrusion L4-5 on CT. Fusion anomaly  thoracic spine on CT. TECHNIQUE: Sagittal T1, T2, STIR and axial T1 and T2 weighted images of the  lumbar spine were obtained. COMPARISON: CT lumbar spine 1/8/18  FINDINGS:  The distal spinal cord has normal contour and signal.  Segmentation/fusion anomaly T10 vertebral body. See CT scan. Some anterior disc  bulging T11-12 without posterior disc bulge or stenosis. T12-L1: No disc bulge or stenosis. Incomplete fusion posterior spinous process. L1-L2:  No disc bulge or stenosis. L2-L3:  No disc bulge or stenosis. Mild facet hypertrophy. L3-L4:  No significant disc bulge or stenosis. Mild facet hypertrophy. L4-L5:  Chronic facet arthrosis and hypertrophy with slight anterior listhesis  of L4 on L5 with broad-based posterior disc bulge or minimal protrusion. Minimal  central stenosis with significant left greater than right foramina stenosis. L5-S1:  L5 is partially sacralized. Rudimentary disc without stenosis. Left  facet arthrosis.     IMPRESSION  IMPRESSION:   1. Partially sacralized L5.  2. Fusion anomalies T10 and T12 again noted similar to CT. 3. L4-5 chronic facet arthrosis with slight subluxation. Minimal central  stenosis with relatively severe left foramina stenosis and moderate right  foramina stenosis. 4. Minimal/mild facet hypertrophy L2-3 and L3-4.               ASSESSMENT & PLAN   Diagnoses and all orders for this visit:    1. Community acquired pneumonia of left lower lobe of lung ([address] clinically markedly improved  -     XR CHEST PA LAT; Future    2. Bilateral atelectasis, clinically improved.  She has used incentive spirometry throughout.   -     XR CHEST PA LAT; Future    3. Anemia, unspecified type. In 8-2017 her Hgb was up to 13 but down to 10 on hospital dc. She has h/o anemia, not taking mvi anymore due to GI upset w/ it. No periods since her IUD was put in ~ 2014. Denies gross GI blood loss or melena. But will pursue GI workup if anemia has not corrected since hospital dc and back on a normal diet now. -     CBC WITH AUTOMATED DIFF  -     VITAMIN B12 & FOLATE  -     IRON PROFILE  -     FERRITIN     4. Hypokalemia  -     METABOLIC PANEL, BASIC    5. Hypomagnesemia  -     MAGNESIUM    6. Sinus tachycardia w/ chronic h/o same. Patient currently on Diltiazem w/ heart rates in the 110-120 range but low-normal BP's. She is asymptomatic from that standpoint. Current rates may be accentuated due to recent illness/infection/stress/dehydration/anemia. Hopefully as these correct, her heart rates will normalize more. If not, discussed w/ patient possibly seeing cardiology for consultation for additional evaluation and mgt. 7. Diabetes mellitus type 2, noninsulin dependent ([address] not controlled, followed by Endo Dr Carolina Gaitan, scheduled for follow up 2-7-18. Emphasis on dietary/lifestyle modifications. She has lab orders pended from him for diabetes/thyroid follow up that she will be having drawn next week for her upcoming appt there. Patient counseled about current BMI, goals, diet, nutrition, exercise, weight management. Nutrition/meal planning discussed. Monitor weights.  Appropriate patient education materials included with or without corresponding AVS via handout or [employer] if activated.

## 2018-01-23 LAB
BASOPHILS # BLD AUTO: 0 X10E3/UL (ref 0–0.2)
BASOPHILS NFR BLD AUTO: 0 %
BUN SERPL-MCNC: 8 MG/DL (ref 6–20)
BUN/CREAT SERPL: 14 (ref 9–23)
CALCIUM SERPL-MCNC: 9.1 MG/DL (ref 8.7–10.2)
CHLORIDE SERPL-SCNC: 100 MMOL/L (ref 96–106)
CO2 SERPL-SCNC: 21 MMOL/L (ref 18–29)
CREAT SERPL-MCNC: 0.56 MG/DL (ref 0.57–1)
EOSINOPHIL # BLD AUTO: 0.1 X10E3/UL (ref 0–0.4)
EOSINOPHIL NFR BLD AUTO: 1 %
ERYTHROCYTE [DISTWIDTH] IN BLOOD BY AUTOMATED COUNT: 13.8 % (ref 12.3–15.4)
FERRITIN SERPL-MCNC: 68 NG/ML (ref 15–150)
FOLATE SERPL-MCNC: >20 NG/ML
GLUCOSE SERPL-MCNC: 269 MG/DL (ref 65–99)
HCT VFR BLD AUTO: 37.1 % (ref 34–46.6)
HGB BLD-MCNC: 11.7 G/DL (ref 11.1–15.9)
IMM GRANULOCYTES # BLD: 0 X10E3/UL (ref 0–0.1)
IMM GRANULOCYTES NFR BLD: 0 %
IRON SATN MFR SERPL: 41 % (ref 15–55)
IRON SERPL-MCNC: 133 UG/DL (ref 27–159)
LYMPHOCYTES # BLD AUTO: 2.3 X10E3/UL (ref 0.7–3.1)
LYMPHOCYTES NFR BLD AUTO: 21 %
MAGNESIUM SERPL-MCNC: 1.7 MG/DL (ref 1.6–2.3)
MCH RBC QN AUTO: 28.4 PG (ref 26.6–33)
MCHC RBC AUTO-ENTMCNC: 31.5 G/DL (ref 31.5–35.7)
MCV RBC AUTO: 90 FL (ref 79–97)
MONOCYTES # BLD AUTO: 0.8 X10E3/UL (ref 0.1–0.9)
MONOCYTES NFR BLD AUTO: 8 %
NEUTROPHILS # BLD AUTO: 7.5 X10E3/UL (ref 1.4–7)
NEUTROPHILS NFR BLD AUTO: 70 %
PLATELET # BLD AUTO: 329 X10E3/UL (ref 150–379)
POTASSIUM SERPL-SCNC: 3.9 MMOL/L (ref 3.5–5.2)
RBC # BLD AUTO: 4.12 X10E6/UL (ref 3.77–5.28)
SODIUM SERPL-SCNC: 138 MMOL/L (ref 134–144)
TIBC SERPL-MCNC: 324 UG/DL (ref 250–450)
UIBC SERPL-MCNC: 191 UG/DL (ref 131–425)
VIT B12 SERPL-MCNC: >2000 PG/ML (ref 232–1245)
WBC # BLD AUTO: 10.7 X10E3/UL (ref 3.4–10.8)

## 2018-01-23 NOTE — TELEPHONE ENCOUNTER
PI of results. She asked about her shoulder. She said that it is getting better but slowly. She wanted ot know it the radiologist said anything about her shoulder. Advised nothing was mentioned. Pt wants to know if she needs to do anything about the shoulder.

## 2018-01-23 NOTE — TELEPHONE ENCOUNTER
Advise pt radiologist reports that her follow up CXR is all clear: \"There is no acute process in  the lung fields. The aeration has improved. \"

## 2018-01-24 NOTE — TELEPHONE ENCOUNTER
I told her at her appt that day to alternate between ice and heat, to take Advil and to do stretches. Her ROM was pretty good in office that day. If not getting better we can have her see Sports Med. But the stiffness and pain just started YESTERDAY AM when she woke up so it may be premature to do much more since it has only been 2 days if that so far and her rom was pretty good in the office. Do what I instructed her to do for the next few days and we will go from there. If not continuing to get better will reassess.

## 2018-01-24 NOTE — TELEPHONE ENCOUNTER
PI of Dr Peggy Valadez rec. She states that her shoulder is getting better. Pt states that she saw that her labs came back. Advised that her iron was normal.  B12 was elevated. She stopped taking oral B12 about a month ago. She didn't think is was helping her at all. Advised sugar elevated and she said that it always is. She will be seeing Dr Krystin Mobley on 2/7. Advised that I will check with Dr Peggy Valadez about any further rec in regards to lab results and when seh needs to come back.

## 2018-01-31 LAB
ALBUMIN SERPL-MCNC: 4.2 G/DL (ref 3.5–5.5)
ALBUMIN/CREAT UR: 24.9 MG/G CREAT (ref 0–30)
ALBUMIN/GLOB SERPL: 1.4 {RATIO} (ref 1.2–2.2)
ALP SERPL-CCNC: 102 IU/L (ref 39–117)
ALT SERPL-CCNC: 28 IU/L (ref 0–32)
AST SERPL-CCNC: 17 IU/L (ref 0–40)
BILIRUB SERPL-MCNC: 0.3 MG/DL (ref 0–1.2)
BUN SERPL-MCNC: 10 MG/DL (ref 6–20)
BUN/CREAT SERPL: 16 (ref 9–23)
CALCIUM SERPL-MCNC: 9 MG/DL (ref 8.7–10.2)
CHLORIDE SERPL-SCNC: 98 MMOL/L (ref 96–106)
CHOLEST SERPL-MCNC: 205 MG/DL (ref 100–199)
CO2 SERPL-SCNC: 22 MMOL/L (ref 18–29)
CREAT SERPL-MCNC: 0.63 MG/DL (ref 0.57–1)
CREAT UR-MCNC: 247.4 MG/DL
EST. AVERAGE GLUCOSE BLD GHB EST-MCNC: 252 MG/DL
GFR SERPLBLD CREATININE-BSD FMLA CKD-EPI: 116 ML/MIN/1.73
GFR SERPLBLD CREATININE-BSD FMLA CKD-EPI: 133 ML/MIN/1.73
GLOBULIN SER CALC-MCNC: 3 G/DL (ref 1.5–4.5)
GLUCOSE SERPL-MCNC: 251 MG/DL (ref 65–99)
HBA1C MFR BLD: 10.4 % (ref 4.8–5.6)
HDLC SERPL-MCNC: 48 MG/DL
LDLC SERPL CALC-MCNC: 136 MG/DL (ref 0–99)
MICROALBUMIN UR-MCNC: 61.5 UG/ML
POTASSIUM SERPL-SCNC: 4 MMOL/L (ref 3.5–5.2)
PROT SERPL-MCNC: 7.2 G/DL (ref 6–8.5)
SODIUM SERPL-SCNC: 136 MMOL/L (ref 134–144)
TRIGL SERPL-MCNC: 105 MG/DL (ref 0–149)
TSH SERPL DL<=0.005 MIU/L-ACNC: 1.81 UIU/ML (ref 0.45–4.5)
VLDLC SERPL CALC-MCNC: 21 MG/DL (ref 5–40)

## 2018-02-03 RX ORDER — CYCLOBENZAPRINE HCL 10 MG
TABLET ORAL
Qty: 90 TAB | Refills: 0 | Status: SHIPPED | OUTPATIENT
Start: 2018-02-03 | End: 2018-12-06 | Stop reason: SDUPTHER

## 2018-02-07 ENCOUNTER — OFFICE VISIT (OUTPATIENT)
Dept: ENDOCRINOLOGY | Age: 38
End: 2018-02-07

## 2018-02-07 VITALS
HEIGHT: 60 IN | HEART RATE: 128 BPM | DIASTOLIC BLOOD PRESSURE: 86 MMHG | SYSTOLIC BLOOD PRESSURE: 130 MMHG | WEIGHT: 194.2 LBS | BODY MASS INDEX: 38.13 KG/M2

## 2018-02-07 DIAGNOSIS — E04.9 GOITER: ICD-10-CM

## 2018-02-07 DIAGNOSIS — R00.0 TACHYCARDIA: ICD-10-CM

## 2018-02-07 DIAGNOSIS — E55.9 VITAMIN D DEFICIENCY: ICD-10-CM

## 2018-02-07 DIAGNOSIS — E66.9 OBESITY, CLASS I, BMI 30-34.9: ICD-10-CM

## 2018-02-07 DIAGNOSIS — E78.00 HYPERCHOLESTEROLEMIA: ICD-10-CM

## 2018-02-07 RX ORDER — DILTIAZEM HYDROCHLORIDE 180 MG/1
180 CAPSULE, COATED, EXTENDED RELEASE ORAL DAILY
Qty: 30 CAP | Refills: 11 | Status: SHIPPED | OUTPATIENT
Start: 2018-02-07 | End: 2018-03-11 | Stop reason: SDUPTHER

## 2018-02-07 NOTE — PROGRESS NOTES
Chief Complaint   Patient presents with    Diabetes     pcp and pharmacy confirmed     History of Present Illness: Skinny Fernandez is a 40 y.o. female here for follow up of diabetes. Weight up 1 lbs since last visit in 9/17. She was admitted for 3 days in 1/18 for PNA and sepsis and was treated with abx and now is feeling better. Was having a lot of back pain and was found to have a disc bulge on CT and MRI. Still having back pain especially at night despite doing PT. Her sugars have been consistently high in the 200s fasting despite metformin and victoza. Did wean herself off the prozac in 11/17. HR has been higher as she has been off the prozac but was still up even while on the prozac. Compliant with the dilt. Resting HR is in the  range. Hasn't been exercising due to concern of high HR. Current Outpatient Prescriptions   Medication Sig    cyclobenzaprine (FLEXERIL) 10 mg tablet TAKE ONE-HALF (1/2) TO ONE TABLET NIGHTLY AS NEEDED FOR MUSCLE SPASMS    ONE TOUCH DELICA 33 gauge misc     dilTIAZem CD (CARDIZEM CD) 120 mg ER capsule Take 1 Cap by mouth daily.  Liraglutide (VICTOZA) 0.6 mg/0.1 mL (18 mg/3 mL) pnij 1.8 mg by SubCUTAneous route daily.  ELIDEL 1 % topical cream Apply  to affected area as needed (Eczema).  biotin 10,000 mcg cap Take  by mouth.  DEXILANT 60 mg CpDB TAKE ONE CAPSULE BY MOUTH ONCE DAILY FOR  REFLUX    levothyroxine (SYNTHROID) 88 mcg tablet Take 1 tablet daily    metFORMIN (GLUCOPHAGE) 1,000 mg tablet TAKE ONE TABLET BY MOUTH TWICE DAILY WITH MEALS    cholecalciferol, vitamin D3, 2,000 unit tab Take 2,000 Units by mouth daily.  levonorgestrel (MIRENA) 20 mcg/24 hr (5 years) IUD 1 Each by IntraUTERine route once. No current facility-administered medications for this visit.       Allergies   Allergen Reactions    Lexapro [Escitalopram] Other (comments)     Fatigue, wt gain    Naprosyn [Naproxen] Nausea and Vomiting     Tolerates Advil, Toradol, etc    Topamax [Topiramate] Other (comments)     Kidney stone    Zoloft [Sertraline] Other (comments)     Fatigue      Review of Systems:  - Eyes: no blurry vision or double vision  - Cardiovascular: no chest pain  - Respiratory: no shortness of breath  - Musculoskeletal: no myalgias  - Neurological: no numbness/tingling in extremities    Physical Examination:  Blood pressure 130/86, pulse (!) 128, height 5' (1.524 m), weight 194 lb 3.2 oz (88.1 kg). - General: pleasant, no distress, good eye contact   - Neck: no carotid bruits  - Cardiovascular: regular, (+) tachycardic, nl s1 and s2, no m/r/g,   - Respiratory: clear bilaterally  - Integumentary: no edema,   - Psychiatric: normal mood and affect    Data Reviewed:   Component      Latest Ref Rng & Units 1/30/2018 1/30/2018 1/30/2018 1/30/2018           7:57 AM  7:57 AM  7:57 AM  7:57 AM   Glucose      65 - 99 mg/dL  251 (H)     BUN      6 - 20 mg/dL  10     Creatinine      0.57 - 1.00 mg/dL  0.63     GFR est non-AA      >59 mL/min/1.73  116     GFR est AA      >59 mL/min/1.73  133     BUN/Creatinine ratio      9 - 23  16     Sodium      134 - 144 mmol/L  136     Potassium      3.5 - 5.2 mmol/L  4.0     Chloride      96 - 106 mmol/L  98     CO2      18 - 29 mmol/L  22     Calcium      8.7 - 10.2 mg/dL  9.0     Protein, total      6.0 - 8.5 g/dL  7.2     Albumin      3.5 - 5.5 g/dL  4.2     GLOBULIN, TOTAL      1.5 - 4.5 g/dL  3.0     A-G Ratio      1.2 - 2.2  1.4     Bilirubin, total      0.0 - 1.2 mg/dL  0.3     Alk. phosphatase      39 - 117 IU/L  102     AST      0 - 40 IU/L  17     ALT (SGPT)      0 - 32 IU/L  28     Cholesterol, total      100 - 199 mg/dL    205 (H)   Triglyceride      0 - 149 mg/dL    105   HDL Cholesterol      >39 mg/dL    48   VLDL, calculated      5 - 40 mg/dL    21   LDL, calculated      0 - 99 mg/dL    136 (H)   Creatinine, urine      Not Estab. mg/dL 247.4      Microalbumin, urine      Not Estab. ug/mL 61.5      Microalbumin/Creat. Ratio      0.0 - 30.0 mg/g creat 24.9      TSH      0.450 - 4.500 uIU/mL   1.810      Component      Latest Ref Rng & Units 1/30/2018           7:57 AM   Hemoglobin A1c, (calculated)      4.8 - 5.6 % 10.4 (H)   Estimated average glucose      mg/dL 252       Assessment/Plan:     1. Diabetes mellitus type II: her most recent Hgb A1c was 10.4% in 1/18 up from 8.1% in 9/17 up from 7.9% in 4/17 down from 8.1% in 12/16 up from 7% in 7/16 down from 7.7% in 3/16 down from 7.8% in 11/15 down from 8.3% in 7/15 down from 8.8% in 3/15 up from 7.6% in 11/14 up from 7.3% in 7/14 down from 8% in 3/14 up from 6.1% in 11/13 down from 6.7% in 9/13 up from 6.3% in 8/13 up slightly from 6.1% in 7/13 down from 6.3% in 5/13 down from 6.7% in 1/13 up from 6.5% in Oct 2012. A1c is still up so likely will need basal insulin soon but will try jardiance first and control her HR in order to exercise before doing this. - cont victoza 1.8 mg daily  - cont metformin 1g bid  - begin jardiance 10 mg daily  - check bs up to 3 times daily due to fluctuating sugars  - foot exam done 9/17  - microalbumin nl 10/12--was taken off lisinopril 10 mg daily due to trying to conceive and repeat normal ever since, most recently in 1/18  - optho UTD 4/17  - check Hgb A1c and bmp prior to next visit     2. Goiter: Had a TSH of 2.68 in May 2012. Level was 2.4 in 1/13 and I started her on a a low dose of levothyroxine 25 mcg daily to keep her TSH < 2.5 to improve her chance of conception and interestingly she became pregnant shortly after starting this. TSH 2.1 in 5/13 and 1.6 in 7/13 and 1.39 in 9/13. Stopped levothyroxine after delivery in 10/13 and TSH up to 5.5 in 3/14 so restarted low dose of 25 mcg daily to keep her TSH < 2.5 as she has hypothyroid symptoms and high cholesterol.   However only 2.2 in 7/14 so increased to 50 mcg daily and TSH 2.4 in 11/14 but had missed a dose on the weekends on occasion so increased to 62.5 mcg daily and TSH 1.87 in 3/15 and 1.46 in 11/15 and 1.04 in 3/16. Up to 2.06 in 7/16 so increased to 75 mcg daily and down to 1.5 in 12/16. Up to 2.11 in 4/17 so increased her dose to 88 mcg daily and down to 0.96 in 9/17 and 1.81 in 1/18 so will keep her dose the same. - cont levothyroxine 88 mcg daily  - check TSH in 8 months      3. Hypercholesterolemia: Given DM, Goal LDL < 100, non-HDL < 130, and TG < 150.  in 10/12 off simva 10 due to trying to conceive, down to 109 in 1/13 and 92 in 5/13. Up to 127 in 11/13 and down to 111 in 3/14. Up to 124 in 7/14. Down to 114 in 11/14. Up to 140 in 3/15 possibly due to [address] as with stopping this it was down to 92 in 7/15. Up to 114 in 11/15 with diet. Was started on lipitor in 1/16 but had more nausea with this so stopped this and with 17 lb wt loss, LDL down to 84 in 3/16 and still 98 in 7/16. Up to 114 in 4/17 and still 119 in 9/17 and 136 in 1/18 but will hold on any other meds for now. - diet control  - check lipids prior to next visit      4. Obesity: I started topamax in 11/15 and Dr. Jordon Martinez added phentermine in 1/16 and changed her trulicity to [facility] and her weight had come down 26 lbs by 7/16. Had more nausea and GERD and ended up stopping all her meds in 10/16 and weight up 7 lbs by 12/16. Restarted topamax for 2 weeks and then added back victoza at a lower dose of 1.2 mg daily. Eventually added back phentermine in 3/17. Then developed a kidney stone in 3/17 and stopped the topamax and weight up 6 lbs by 4/17. Will stay off the topamax due to the kidney stone and stop the phentermine as I don't think this is helping her weight. I told her it's fine to restart the advocare spark and protein shakes as these are not as likely to cause the kidney stone as the topamax was. Weight up 7 lbs by 9/17 possibly due to stress and 1 lb by 1/18.  - cont victoza as above    5.  Vitamin D deficiency: level was 24 in 12/15 and is currently taking 2000 units of D3 and level up to 32 in 7/16 and 35 in 4/17 and 30 in 9/17  - cont vitamin D 2000 units daily  - check Vitamin D 25-OH level in 9/18    6. Tachycardia:  - increase diltiazem CD to 180 mg daily          Patient Instructions   1) Jardiance can help lower your A1c and weight by taking one 10 mg tablet every morning before breakfast to help lower your blood sugar by eliminating excess sugar through the kidneys. Take the coupon to the pharmacy to get this for 14 days free. As long as you are tolerating this after 7-10 days, then drop off the prescription. This may require an authorization and if so I'll work on it. 2) Your cholesterol was higher due to higher A1c.    3) Your liver and kidney and thyroid and urine tests were normal.    4) I will change the diltiazem to 180 mg daily for your heart rate. This will be ready for  at the pharmacy today. As long as this is keeping your heart rate under 100 and your blood pressure is not going under 90 systolic then let me know and I'll send to [facility]. 5) If your sugars are staying over 180 after 4 weeks on the new regimen, let me know. Follow-up Disposition:  Return in about 4 months (around 6/7/2018).     Copy sent to:  Dr. Jose Avila via [facility] care  Dr. Marsha Dawson

## 2018-02-07 NOTE — PATIENT INSTRUCTIONS
1) Jardiance can help lower your A1c and weight by taking one 10 mg tablet every morning before breakfast to help lower your blood sugar by eliminating excess sugar through the kidneys. Take the coupon to the pharmacy to get this for 14 days free. As long as you are tolerating this after 7-10 days, then drop off the prescription. This may require an authorization and if so I'll work on it. 2) Your cholesterol was higher due to higher A1c.    3) Your liver and kidney and thyroid and urine tests were normal.    4) I will change the diltiazem to 180 mg daily for your heart rate. This will be ready for  at the pharmacy today. As long as this is keeping your heart rate under 100 and your blood pressure is not going under 90 systolic then let me know and I'll send to [facility]. 5) If your sugars are staying over 180 after 4 weeks on the new regimen, let me know.

## 2018-02-07 NOTE — MR AVS SNAPSHOT
[address] 
[phone] Patient: Gabby Hook MRN: [id number] :[id number] Visit Information Date & Time Provider Department Dept. Phone Encounter #  
 2018 11:50 AM Kvng King MD [facility] [phone] [id number] Follow-up Instructions Return in about 4 months (around 2018). Upcoming Health Maintenance Date Due  
 PAP AKA CERVICAL CYTOLOGY 2018* EYE EXAM RETINAL OR DILATED Q1 2018 HEMOGLOBIN A1C Q6M 2018 FOOT EXAM Q1 2018 MICROALBUMIN Q1 2019 LIPID PANEL Q1 2019 DTaP/Tdap/Td series (2 - Tdap) 2023 *Topic was postponed. The date shown is not the original due date. Allergies as of 2018  Review Complete On: 2018 By: Kvng King MD  
  
 Severity Noted Reaction Type Reactions Lexapro [Escitalopram]  2017    Other (comments) Fatigue, wt gain Naprosyn [Naproxen]  2010   Intolerance Nausea and Vomiting Tolerates Advil, Toradol, etc  
 Topamax [Topiramate]  03/15/2017    Other (comments) Kidney stone Zoloft [Sertraline]  2017    Other (comments) Fatigue Current Immunizations  Reviewed on 2018 Name Date DTaP 2013 Hepatitis B Vaccine 2010, 2009, 2009 Influenza Vaccine 10/16/2015, 10/23/2014, 10/9/2013 Influenza Vaccine (Quad) PF 2017, 10/26/2016 Influenza Vaccine Split 10/5/2012, 2011, 2010 Pneumococcal Vaccine (Unspecified Type) 2013 TB Skin Test (PPD) Intradermal 2013 TD Vaccine 3/25/2007 ZZZ-RETIRED (DO NOT USE) Pneumococcal Vaccine (Unspecified Type) 3/25/2007 Not reviewed this visit You Were Diagnosed With   
  
 Codes Comments  Uncontrolled type 2 diabetes mellitus without complication, without long-term current use of insulin ([facility]    -  Primary ICD-10-CM: E11.65 ICD-9-CM: 250.02 Goiter     ICD-10-CM: E04.9 ICD-9-CM: 240.9 Obesity, Class I, BMI 30-34.9     ICD-10-CM: E66.9 ICD-9-CM: 278.00 Vitamin D deficiency     ICD-10-CM: E55.9 ICD-9-CM: 268.9 Hypercholesterolemia     ICD-10-CM: E78.00 ICD-9-CM: 272.0 Tachycardia     ICD-10-CM: R00.0 ICD-9-CM: 409. 0 Vitals BP Pulse Height(growth percentile) Weight(growth percentile) BMI OB Status 130/86 (!) 128 5' (1.524 m) 194 lb 3.2 oz (88.1 kg) 37.93 kg/m2 IUD Smoking Status Never Smoker BMI and BSA Data Body Mass Index Body Surface Area  
 37.93 kg/m 2 1.93 m 2 Preferred Pharmacy Pharmacy Name Phone [address], [address] [place] [address]  [phone] Your Updated Medication List  
  
   
This list is accurate as of: 2/7/18  1:48 PM.  Always use your most recent med list.  
  
  
  
  
 biotin 10,000 mcg Cap Take  by mouth. cholecalciferol (vitamin D3) 2,000 unit Tab Take 2,000 Units by mouth daily. cyclobenzaprine 10 mg tablet Commonly known as:  FLEXERIL  
TAKE ONE-HALF (1/2) TO ONE TABLET NIGHTLY AS NEEDED FOR MUSCLE SPASMS DEXILANT 60 mg Cpdb Generic drug:  Dexlansoprazole TAKE ONE CAPSULE BY MOUTH ONCE DAILY FOR  REFLUX  
  
 dilTIAZem  mg ER capsule Commonly known as:  CARDIZEM CD Take 1 Cap by mouth daily. ELIDEL 1 % topical cream  
Generic drug:  pimecrolimus Apply  to affected area as needed (Eczema). empagliflozin 10 mg tablet Commonly known as:  Cesar Pradip Take 1 Tab by mouth daily. levothyroxine 88 mcg tablet Commonly known as:  SYNTHROID Take 1 tablet daily Liraglutide 0.6 mg/0.1 mL (18 mg/3 mL) Pnij Commonly known as:  VICTOZA  
1.8 mg by SubCUTAneous route daily. metFORMIN 1,000 mg tablet Commonly known as:  GLUCOPHAGE  
 TAKE ONE TABLET BY MOUTH TWICE DAILY WITH MEALS  
  
 MIRENA 20 mcg/24 hr (5 years) Iud  
Generic drug:  levonorgestrel 1 Each by IntraUTERine route once. One Touch Delica 33 gauge Misc Generic drug:  lancets Prescriptions Printed Refills  
 empagliflozin (JARDIANCE) 10 mg tablet 11 Sig: Take 1 Tab by mouth daily. Class: Print Route: Oral  
  
Prescriptions Sent to Pharmacy Refills  
 dilTIAZem CD (CARDIZEM CD) 180 mg ER capsule 11 Sig: Take 1 Cap by mouth daily. Class: Normal  
 Pharmacy: [facility] [address]ANTOS MARR [address] [facility],[address], [facility] [address]  #: [phone] Route: Oral  
  
We Performed the Following HEMOGLOBIN A1C WITH EAG [94323 CPT(R)] LIPID PANEL [67787 CPT(R)] METABOLIC PANEL, BASIC [56941 CPT(R)] Follow-up Instructions Return in about 4 months (around 6/7/2018). Patient Instructions 1) Jardiance can help lower your A1c and weight by taking one 10 mg tablet every morning before breakfast to help lower your blood sugar by eliminating excess sugar through the kidneys. Take the coupon to the pharmacy to get this for 14 days free. As long as you are tolerating this after 7-10 days, then drop off the prescription. This may require an authorization and if so I'll work on it. 2) Your cholesterol was higher due to higher A1c. 
 
3) Your liver and kidney and thyroid and urine tests were normal. 
 
4) I will change the diltiazem to 180 mg daily for your heart rate. This will be ready for  at the pharmacy today. As long as this is keeping your heart rate under 100 and your blood pressure is not going under 90 systolic then let me know and I'll send to express scripts. 5) If your sugars are staying over 180 after 4 weeks on the new regimen, let me know. Introducing [facility]! Dear Vanessa Humphreys: Thank you for requesting a [employer] account.   Our records indicate that you already have an active [employer] account. You can access your account anytime at [email or web address] Did you know that you can access your hospital and ER discharge instructions at any time in [employer]? You can also review all of your test results from your hospital stay or ER visit. Additional Information If you have questions, please visit the Frequently Asked Questions section of the [employer] website at [email or web address] Remember, [employer] is NOT to be used for urgent needs. For medical emergencies, dial 911. Now available from your iPhone and Android! Please provide this summary of care documentation to your next provider. Your primary care clinician is listed as VIDHI ORELLANA. If you have any questions after today's visit, please call [phone].

## 2018-02-20 ENCOUNTER — TELEPHONE (OUTPATIENT)
Dept: ENDOCRINOLOGY | Age: 38
End: 2018-02-20

## 2018-02-20 NOTE — TELEPHONE ENCOUNTER
Sent her the following message through [employer]:    I submitted an authorization for the jardiance through our electronic system called covermymeds and received notification that this med has been approved so you should be able to go to the pharmacy to pick this med up. Please let me know if you have any trouble getting this filled.

## 2018-03-11 ENCOUNTER — TELEPHONE (OUTPATIENT)
Dept: ENDOCRINOLOGY | Age: 38
End: 2018-03-11

## 2018-03-11 RX ORDER — GLIPIZIDE 5 MG/1
TABLET ORAL
Qty: 30 TAB | Refills: 11 | Status: SHIPPED | OUTPATIENT
Start: 2018-03-11 | End: 2018-06-01 | Stop reason: SDUPTHER

## 2018-03-11 RX ORDER — DILTIAZEM HYDROCHLORIDE 240 MG/1
240 CAPSULE, COATED, EXTENDED RELEASE ORAL DAILY
Qty: 30 CAP | Refills: 11 | Status: SHIPPED | OUTPATIENT
Start: 2018-03-11 | End: 2018-04-06 | Stop reason: SDUPTHER

## 2018-03-11 NOTE — TELEPHONE ENCOUNTER
----- Message from [facility] ERASMO Escobar sent at 3/10/2018 11:20 PM EST -----  Regarding: Prescription Question  Contact: [phone]  Claire Ramirez,   Just wanted to give you an update and get your thoughts on my medications. So we are just about four weeks into the jardiance and upping the cardizem. My heart rate continues to average resting at 98 and the highest I've seen it and usually this is with exercise or while at work is between 140 and 153. I continue not to feel bad with shortness of breath and or chest pain so I don't know what your thoughts are from here. So now on to my blood sugars. I am now doing the jardiance, victoza and the metformin and my fastings continue to be between 140 and 155 and still in the 180s after meals. On two separate occasions where I've gone to bed without eating mainly because I was tired I have been able to wake up to blood sugars in the 110 and 116. What are your thoughts on something fast acting exacting like glipizide at least a small dose before we make insulin the only option. I know you're not a fan because of the potential weight gain from glipizide but I'm really trying to avoid at all  cost the insulin option right now. Please let me know at your convenience what our plan for these two issues will be from here.  Talk to you soon Thank you. [facility]

## 2018-04-06 ENCOUNTER — TELEPHONE (OUTPATIENT)
Dept: ENDOCRINOLOGY | Age: 38
End: 2018-04-06

## 2018-04-06 RX ORDER — DILTIAZEM HYDROCHLORIDE 360 MG/1
360 CAPSULE, EXTENDED RELEASE ORAL DAILY
Qty: 30 CAP | Refills: 11 | Status: SHIPPED | OUTPATIENT
Start: 2018-04-06 | End: 2018-05-11

## 2018-04-06 NOTE — TELEPHONE ENCOUNTER
----- Message from Vamshi Love sent at 4/6/2018  8:45 AM EDT -----  Regarding: FW: Prescription Question  Contact: [phone]      ----- Message -----     From: Jamie Jain     Sent: 4/6/2018   8:35 AM       To: [facility] Nurse Pool  Subject: Prescription Question                            Good morning Dr. Martina Loza. I just wanted to give you an update on my heart rate and blood sugar with the increase in the Cartia and on the other diabetic medications. I do believe that the glipizide has helped. They are still wavy but not in the high 180s or 200s like they have been. And fasting anywhere from  and that is if I didn't take the glipizide that night. However my heart rate is still fast and elevated. I have attached a screenshot from my Apple Watch monitoring it over the past month for you to see. I guess at this point I wanting to know if we should go ahead and get a referral to cardiology or even change the drug. Please let me know your thoughts at your convenience.

## 2018-04-24 ENCOUNTER — OFFICE VISIT (OUTPATIENT)
Dept: CARDIOLOGY CLINIC | Age: 38
End: 2018-04-24

## 2018-04-24 VITALS
HEIGHT: 60 IN | WEIGHT: 188.2 LBS | HEART RATE: 113 BPM | SYSTOLIC BLOOD PRESSURE: 130 MMHG | RESPIRATION RATE: 16 BRPM | DIASTOLIC BLOOD PRESSURE: 80 MMHG | BODY MASS INDEX: 36.95 KG/M2

## 2018-04-24 DIAGNOSIS — R00.0 TACHYCARDIA: Primary | ICD-10-CM

## 2018-04-24 PROBLEM — E66.01 SEVERE OBESITY (BMI 35.0-39.9) WITH COMORBIDITY (HCC): Status: ACTIVE | Noted: 2018-04-24

## 2018-04-24 NOTE — PROGRESS NOTES
HISTORY OF PRESENT ILLNESS  Fco Richardson is a 40 y.o. female.  Patient with h/o AODM GERD DEPRESSION ANXIETY HLD PUD, here for evaluation of sinus tachycardia  Past Medical History:   Diagnosis Date    Acute lymphadenitis of arm, left epitrochlear 2017    Anxiety and depression 10/9/2014    10/2014; resolved as of 8/24/15 per pt    CAP (community acquired pneumonia) 2018    [facility], [facility] Admission 11-18    Chronic low back pain 10/9/2014    Muscular     Depression with anxiety 2017    10/2014    Diabetes mellitus type II 10/07    Consulted w/ Dr. Odette Ferrara Diabetic eye exam ([facility] [address] 2016    Mellisa MD Fredrick, [facility]    Dysphagia 2010    GERD (gastroesophageal reflux disease) 2011    H. pylori infection 2016    Dr Childress Constant    History of peptic ulcer disease 2016    19 yo    History of sinus tachycardia 10/5/2012    as of 8/24/15 pt states followed by PCP    Hypercholesterolemia 2016    ~2007    Kidney stone 2017    Lumbar disc herniation 2018    L4 and L5     (normal spontaneous vaginal delivery) 1998    Son  7yo    Pneumonia 2018    community acquired with sepsis     Post partum depression 10/9/2014    10/2013: counseling and prn Valium    Spondylosis of lumbar & thoracic region without myelopathy or radiculopathy 10/26/2016    xrays 2010    Spondylosis, Thoracic & Lumbar 3/5/2015    xrays 2010    Thyromegaly 2010    US negative    Vitamin D deficiency      Past Surgical History:   Procedure Laterality Date    HX CARPAL TUNNEL RELEASE Bilateral     HX  SECTION      HX CHOLECYSTECTOMY  10/09    LAP-Gallstones    HX GYN  2015    IUD placed    HX LITHOTRIPSY  2017    HX ORTHOPAEDIC Right 7/10/14    trigger thumb    HX ORTHOPAEDIC Left 7/10/14    wrist     Social History   Substance Use Topics    Smoking status: Never Smoker    Smokeless tobacco: Never Used    Alcohol use 0.0 oz/week     0 Standard drinks or equivalent per week      Comment: not weekly, every couple of months per pt       HPI  She tells me she has had increased heart rate 120s 130 since probably 2011. Recently she has been noticing  a little bit more palpitations may be associated with some chest discomfort but she goes to the gym 3 times a week and she exercises  with no problems whatsoever. She denies any presyncopal syncopal episode  dizziness in association. No shortness of breath is either reported. Review of Systems   Constitutional: Negative. Respiratory: Negative. Cardiovascular: Positive for palpitations. Negative for chest pain, orthopnea, claudication, leg swelling and PND. Visit Vitals    /80 (BP 1 Location: Left arm, BP Patient Position: Sitting)    Pulse (!) 113    Resp 16    Ht 5' (1.524 m)    Wt 85.4 kg (188 lb 3.2 oz)    BMI 36.76 kg/m2       Physical Exam   Neck: No JVD present. Carotid bruit is not present. Cardiovascular: Regular rhythm. Tachycardia present. No murmur heard. Pulmonary/Chest: Effort normal and breath sounds normal.   Abdominal: Soft. Musculoskeletal: She exhibits no edema. Psychiatric: She has a normal mood and affect. Lab Results   Component Value Date/Time    TSH 1.810 01/30/2018 07:57 AM     Lab Results   Component Value Date/Time    WBC 10.7 01/22/2018 11:47 AM    HGB 11.7 01/22/2018 11:47 AM    HCT 37.1 01/22/2018 11:47 AM    PLATELET 297 95/79/8370 11:47 AM    MCV 90 01/22/2018 11:47 AM     Current Outpatient Prescriptions on File Prior to Visit   Medication Sig Dispense Refill    dilTIAZem CD (TIAZAC) 360 mg ER capsule Take 1 Cap by mouth daily. Replaces the 240 mg dose 30 Cap 11    glipiZIDE (GLUCOTROL) 5 mg tablet Take 1 tab 5-10 minutes before dinner 30 Tab 11    DEXILANT 60 mg CpDB TAKE ONE CAPSULE BY MOUTH ONCE DAILY FOR  REFLUX 90 Cap 3    empagliflozin (JARDIANCE) 10 mg tablet Take 1 Tab by mouth daily.  30 Tab 11    cyclobenzaprine (FLEXERIL) 10 mg tablet TAKE ONE-HALF (1/2) TO ONE TABLET NIGHTLY AS NEEDED FOR MUSCLE SPASMS 90 Tab 0    ONE TOUCH DELICA 33 gauge misc       Liraglutide (VICTOZA) 0.6 mg/0.1 mL (18 mg/3 mL) pnij 1.8 mg by SubCUTAneous route daily. 9 Pen 3    ELIDEL 1 % topical cream Apply  to affected area as needed (Eczema).  biotin 10,000 mcg cap Take  by mouth.  levothyroxine (SYNTHROID) 88 mcg tablet Take 1 tablet daily 90 Tab 3    metFORMIN (GLUCOPHAGE) 1,000 mg tablet TAKE ONE TABLET BY MOUTH TWICE DAILY WITH MEALS 180 Tab 3    cholecalciferol, vitamin D3, 2,000 unit tab Take 2,000 Units by mouth daily.  levonorgestrel (MIRENA) 20 mcg/24 hr (5 years) IUD 1 Each by IntraUTERine route once. No current facility-administered medications on file prior to visit. ASSESSMENT and PLAN  Sinus tachycardia: I suspect the patient has inappropriate sinus tachycardia. She has no other underlying causes of tachycardia that I can see at this point. Her thyroid and hemoglobin were essentially normal 2 months ago and she was already having the sinus tachycardia. Discussed with her significant implication of the sinus tachycardia. Proceed with 24 hours Holter monitor to confirm and a transthoracic echocardiogram as well to evaluate ejection fraction. After these tests are obtained I suspect we will need to change her Cardizem to Toprol-XL which is the first line drug for sinus tachycardia. Her blood pressure seems to be well-controlled on high dose of Cardizem at this time. This does not seem to be very effective on the sinus tachycardia on the other hand. She is leaving for him in the morning therefore I do not want to change her medications today but we will wait for the results of the above-mentioned tests. See her back in approximately in 3 weeks.

## 2018-04-24 NOTE — MR AVS SNAPSHOT
[address] [facility] [address] [place] 
[phone] Patient: Viet Rice MRN: [id number] :[id number] Visit Information Date & Time Provider Department Dept. Phone Encounter #  
 2018 11:00 AM Mauro Alcala MD [facility] Vadim Holly [phone] [id number] Your Appointments 2018  4:10 PM  
ROUTINE CARE with MD Jose Sandoval [facility] Appt Note: f/u thyroid cp0.00  
 [address] [facility] [address] [place]  
[id number] [id number] [place] [id number] Upcoming Health Maintenance Date Due  
 PAP AKA CERVICAL CYTOLOGY 3/3/2017 EYE EXAM RETINAL OR DILATED Q1 2018 HEMOGLOBIN A1C Q6M 2018 FOOT EXAM Q1 2018 MICROALBUMIN Q1 2019 LIPID PANEL Q1 2019 DTaP/Tdap/Td series (2 - Tdap) 2023 Allergies as of 2018  Review Complete On: 2018 By: Kay Morales Severity Noted Reaction Type Reactions Lexapro [Escitalopram]  2017    Other (comments) Fatigue, wt gain Naprosyn [Naproxen]  2010   Intolerance Nausea and Vomiting Tolerates Advil, Toradol, etc  
 Topamax [Topiramate]  03/15/2017    Other (comments) Kidney stone Zoloft [Sertraline]  2017    Other (comments) Fatigue Current Immunizations  Reviewed on 2018 Name Date DTaP 2013 Hepatitis B Vaccine 2010, 2009, 2009 Influenza Vaccine 10/16/2015, 10/23/2014, 10/9/2013 Influenza Vaccine (Quad) PF 2017, 10/26/2016 Influenza Vaccine Split 10/5/2012, 2011, 2010 Pneumococcal Vaccine (Unspecified Type) 2013 TB Skin Test (PPD) Intradermal 2013 TD Vaccine 3/25/2007 ZZZ-RETIRED (DO NOT USE) Pneumococcal Vaccine (Unspecified Type) 3/25/2007 Not reviewed this visit You Were Diagnosed With   
  
 Codes Comments Tachycardia    -  Primary ICD-10-CM: R00.0 ICD-9-CM: 597. 0 Vitals BP Pulse Resp Height(growth percentile) Weight(growth percentile) BMI  
 130/80 (BP 1 Location: Left arm, BP Patient Position: Sitting) (!) 113 16 5' (1.524 m) 188 lb 3.2 oz (85.4 kg) 36.76 kg/m2 OB Status Smoking Status IUD Never Smoker Vitals History BMI and BSA Data Body Mass Index Body Surface Area  
 36.76 kg/m 2 1.9 m 2 Preferred Pharmacy Pharmacy Name Phone Jocelyn Chavez [address]  [phone] Your Updated Medication List  
  
   
This list is accurate as of 4/24/18 11:29 AM.  Always use your most recent med list.  
  
  
  
  
 biotin 10,000 mcg Cap Take  by mouth. cholecalciferol (vitamin D3) 2,000 unit Tab Take 2,000 Units by mouth daily. cyclobenzaprine 10 mg tablet Commonly known as:  FLEXERIL  
TAKE ONE-HALF (1/2) TO ONE TABLET NIGHTLY AS NEEDED FOR MUSCLE SPASMS DEXILANT 60 mg Cpdb Generic drug:  Dexlansoprazole TAKE ONE CAPSULE BY MOUTH ONCE DAILY FOR  REFLUX  
  
 dilTIAZem 360 mg ER capsule Commonly known as:  [facility] Take 1 Cap by mouth daily. Replaces the 240 mg dose ELIDEL 1 % topical cream  
Generic drug:  pimecrolimus Apply  to affected area as needed (Eczema). empagliflozin 10 mg tablet Commonly known as:  [place] Pier Take 1 Tab by mouth daily. glipiZIDE 5 mg tablet Commonly known as:  Angelo Pane Take 1 tab 5-10 minutes before dinner  
  
 levothyroxine 88 mcg tablet Commonly known as:  SYNTHROID Take 1 tablet daily Liraglutide 0.6 mg/0.1 mL (18 mg/3 mL) Pnij Commonly known as:  VICTOZA  
1.8 mg by SubCUTAneous route daily. metFORMIN 1,000 mg tablet Commonly known as:  GLUCOPHAGE  
TAKE ONE TABLET BY MOUTH TWICE DAILY WITH MEALS  
  
 MIRENA 20 mcg/24 hr (5 years) Iud  
Generic drug:  levonorgestrel 1 Each by IntraUTERine route once. One Touch Delica 33 gauge Misc Generic drug:  lancets We Performed the Following AMB POC EKG ROUTINE W/ 12 LEADS, INTER & REP [04790 CPT(R)] Patient Instructions Echo and 24 hour holter and see Janis Armando a week after Introducing [facility]! Dear Aelxandra Mcgregor: Thank you for requesting a Dishcrawl account. Our records indicate that you already have an active Dishcrawl account. You can access your account anytime at [email or web address] Did you know that you can access your hospital and ER discharge instructions at any time in Dishcrawl? You can also review all of your test results from your hospital stay or ER visit. Additional Information If you have questions, please visit the Frequently Asked Questions section of the Dishcrawl website at [email or web address] Remember, Dishcrawl is NOT to be used for urgent needs. For medical emergencies, dial 911. Now available from your iPhone and Android! Please provide this summary of care documentation to your next provider. Your primary care clinician is listed as VIDHI ORELLANA. If you have any questions after today's visit, please call [phone].

## 2018-04-26 ENCOUNTER — TELEPHONE (OUTPATIENT)
Dept: FAMILY MEDICINE CLINIC | Age: 38
End: 2018-04-26

## 2018-04-26 DIAGNOSIS — Z87.11 HISTORY OF PEPTIC ULCER DISEASE: ICD-10-CM

## 2018-04-26 DIAGNOSIS — K21.9 GASTROESOPHAGEAL REFLUX DISEASE, ESOPHAGITIS PRESENCE NOT SPECIFIED: ICD-10-CM

## 2018-04-26 RX ORDER — DEXLANSOPRAZOLE 60 MG/1
CAPSULE, DELAYED RELEASE ORAL
Qty: 90 CAP | Refills: 3 | Status: CANCELLED | OUTPATIENT
Start: 2018-04-26

## 2018-04-26 NOTE — TELEPHONE ENCOUNTER
Pharm on file needs clarification. 90 day 1 cap by mouth once daily for reflux.     Requested Prescriptions     Pending Prescriptions Disp Refills    Dexlansoprazole (DEXILANT) 60 mg CpDB 90 Cap 3

## 2018-05-01 RX ORDER — METFORMIN HYDROCHLORIDE 1000 MG/1
TABLET ORAL
Qty: 180 TAB | Refills: 3 | Status: SHIPPED | OUTPATIENT
Start: 2018-05-01 | End: 2019-05-28 | Stop reason: SDUPTHER

## 2018-05-01 NOTE — TELEPHONE ENCOUNTER
From: Monserrat Escobar  To: Nathan Eller MD  Sent: 5/1/2018 2:20 PM EDT  Subject: Medication Renewal Request    Original authorizing provider: MD Yonatan Rico would like a refill of the following medications:  metFORMIN (GLUCOPHAGE) 1,000 mg tablet Nathan Eller MD]    Preferred pharmacy: [address] [place] [address]:

## 2018-05-03 ENCOUNTER — CLINICAL SUPPORT (OUTPATIENT)
Dept: CARDIOLOGY CLINIC | Age: 38
End: 2018-05-03

## 2018-05-03 DIAGNOSIS — R00.0 TACHYCARDIA: Primary | ICD-10-CM

## 2018-05-03 DIAGNOSIS — R00.2 PALPITATIONS: ICD-10-CM

## 2018-05-11 ENCOUNTER — OFFICE VISIT (OUTPATIENT)
Dept: CARDIOLOGY CLINIC | Age: 38
End: 2018-05-11

## 2018-05-11 VITALS
OXYGEN SATURATION: 99 % | DIASTOLIC BLOOD PRESSURE: 90 MMHG | WEIGHT: 186 LBS | BODY MASS INDEX: 36.52 KG/M2 | SYSTOLIC BLOOD PRESSURE: 130 MMHG | HEIGHT: 60 IN | RESPIRATION RATE: 16 BRPM | HEART RATE: 116 BPM

## 2018-05-11 DIAGNOSIS — R00.0 TACHYCARDIA: Primary | ICD-10-CM

## 2018-05-11 RX ORDER — METOPROLOL SUCCINATE 50 MG/1
50 TABLET, EXTENDED RELEASE ORAL 2 TIMES DAILY
Qty: 180 TAB | Refills: 1 | Status: SHIPPED | OUTPATIENT
Start: 2018-05-11 | End: 2018-05-25 | Stop reason: DRUGHIGH

## 2018-05-11 RX ORDER — METOPROLOL SUCCINATE 50 MG/1
50 TABLET, EXTENDED RELEASE ORAL 2 TIMES DAILY
Qty: 180 TAB | Refills: 1 | Status: SHIPPED | OUTPATIENT
Start: 2018-05-11 | End: 2018-05-11 | Stop reason: SDUPTHER

## 2018-05-11 NOTE — MR AVS SNAPSHOT
[address] [facility] [address] 
[phone] Patient: Suzy Shah MRN: [id number] :[id number] Visit Information Date & Time Provider Department Dept. Phone Encounter #  
 2018 11:00 AM Danni Caballero MD [facility] Indra Das [phone] [id number] Follow-up Instructions Return in about 2 weeks (around 2018). Follow-up and Disposition History Your Appointments 2018  4:10 PM  
ROUTINE CARE with Sincere Fernandez MD  
[facility] [address] [place] [address]) Appt Note: f/u thyroid cp0.00  
 [address] [facility] [address] [facility] [phone] Upcoming Health Maintenance Date Due  
 PAP AKA CERVICAL CYTOLOGY 3/3/2017 EYE EXAM RETINAL OR DILATED Q1 2018 HEMOGLOBIN A1C Q6M 2018 Influenza Age 5 to Adult 2018 FOOT EXAM Q1 2018 MICROALBUMIN Q1 2019 LIPID PANEL Q1 2019 DTaP/Tdap/Td series (2 - Tdap) 2023 Allergies as of 2018  Review Complete On: 2018 By: Danni Caballero MD  
  
 Severity Noted Reaction Type Reactions Lexapro [Escitalopram]  2017    Other (comments) Fatigue, wt gain Naprosyn [Naproxen]  2010   Intolerance Nausea and Vomiting Tolerates Advil, Toradol, etc  
 Topamax [Topiramate]  03/15/2017    Other (comments) Kidney stone Zoloft [Sertraline]  2017    Other (comments) Fatigue Current Immunizations  Reviewed on 2018 Name Date DTaP 2013 Hepatitis B Vaccine 2010, 2009, 2009 Influenza Vaccine 10/16/2015, 10/23/2014, 10/9/2013 Influenza Vaccine (Quad) PF 2017, 10/26/2016 Influenza Vaccine Split 10/5/2012, 2011, 2010 Pneumococcal Vaccine (Unspecified Type) 2013 TB Skin Test (PPD) Intradermal 8/29/2013 TD Vaccine 3/25/2007 ZZZ-RETIRED (DO NOT USE) Pneumococcal Vaccine (Unspecified Type) 3/25/2007 Not reviewed this visit You Were Diagnosed With   
  
 Codes Comments Tachycardia    -  Primary ICD-10-CM: R00.0 ICD-9-CM: 042. 0 Vitals BP Pulse Resp Height(growth percentile) Weight(growth percentile) SpO2  
 130/90 (BP 1 Location: Left arm, BP Patient Position: Sitting) (!) 116 16 5' (1.524 m) 186 lb (84.4 kg) 99% BMI OB Status Smoking Status 36.33 kg/m2 IUD Never Smoker BMI and BSA Data Body Mass Index Body Surface Area  
 36.33 kg/m 2 1.89 m 2 Preferred Pharmacy Pharmacy Name Phone Jes[facility] Santoyo, [facility] [phone] Your Updated Medication List  
  
   
This list is accurate as of 5/11/18 11:53 AM.  Always use your most recent med list.  
  
  
  
  
 biotin 10,000 mcg Cap Take  by mouth. cholecalciferol (vitamin D3) 2,000 unit Tab Take 2,000 Units by mouth daily. cyclobenzaprine 10 mg tablet Commonly known as:  FLEXERIL  
TAKE ONE-HALF (1/2) TO ONE TABLET NIGHTLY AS NEEDED FOR MUSCLE SPASMS DEXILANT 60 mg Cpdb Generic drug:  Dexlansoprazole TAKE ONE CAPSULE BY MOUTH ONCE DAILY FOR  REFLUX  
  
 ELIDEL 1 % topical cream  
Generic drug:  pimecrolimus Apply  to affected area as needed (Eczema). empagliflozin 10 mg tablet Commonly known as:  Jolly Pruett Take 1 Tab by mouth daily. glipiZIDE 5 mg tablet Commonly known as:  Petey Bergman Take 1 tab 5-10 minutes before dinner  
  
 levothyroxine 88 mcg tablet Commonly known as:  SYNTHROID Take 1 tablet daily Liraglutide 0.6 mg/0.1 mL (18 mg/3 mL) Pnij Commonly known as:  VICTOZA  
1.8 mg by SubCUTAneous route daily. metFORMIN 1,000 mg tablet Commonly known as:  GLUCOPHAGE  
TAKE ONE TABLET BY MOUTH TWICE DAILY WITH MEALS  
  
 metoprolol succinate 50 mg XL tablet Commonly known as:  TOPROL-XL Take 1 Tab by mouth two (2) times a day. MIRENA 20 mcg/24 hr (5 years) Iud  
Generic drug:  levonorgestrel 1 Each by IntraUTERine route once. One Touch Delica 33 gauge Misc Generic drug:  lancets Prescriptions Sent to Pharmacy Refills  
 metoprolol succinate (TOPROL-XL) 50 mg XL tablet 1 Sig: Take 1 Tab by mouth two (2) times a day. Class: Normal  
 Pharmacy: [address] [place] [address], [address] [place] [address] #: [phone] Route: Oral  
  
Follow-up Instructions Return in about 2 weeks (around 5/25/2018). Patient Instructions Stop taking cardizem. Start taking toprol XL 50 mg twice a day. Follow up with Dr. Michael Del Valle in 2 weeks. Introducing \[facility]\"" & [facility] SERVICES! Dear Isabella Elder: Thank you for requesting a Lumense account. Our records indicate that you already have an active Lumense account. You can access your account anytime at [email or web address] Did you know that you can access your hospital and ER discharge instructions at any time in Lumense? You can also review all of your test results from your hospital stay or ER visit. Additional Information If you have questions, please visit the Frequently Asked Questions section of the Lumense website at [email or web address] Remember, Lumense is NOT to be used for urgent needs. For medical emergencies, dial 911. Now available from your iPhone and Android! Please provide this summary of care documentation to your next provider. Your primary care clinician is listed as VIDHI ORELLANA. If you have any questions after today's visit, please call [phone].

## 2018-05-11 NOTE — PROGRESS NOTES
HISTORY OF PRESENT ILLNESS  Maurice Santiago is a 40 y.o. female.  Patient with h/o AODM GERD DEPRESSION ANXIETY HLD PUD, here for evaluation of sinus tachycardia  Echo on 5/3/18: EF 50%, no valves issues  holter on 5/10/18: sinus tachycardia  average 108, KIT and no pacs or pvcs       Past Medical History:   Diagnosis Date    Acute lymphadenitis of arm, left epitrochlear 2017    Anxiety and depression 10/9/2014     10/2014; resolved as of 8/24/15 per pt    CAP (community acquired pneumonia) 2018     [facility] [address] [place] [address] Admission 11-18    Chronic low back pain 10/9/2014     Muscular     Depression with anxiety 2017     10/2014    Diabetes mellitus type II 10/07     Consulted w/ Dr. Tony Cordova    Diabetic eye exam ([facility] [address] 2016     Dereck Eisenmenger, MD, [facility]    Dysphagia 2010    GERD (gastroesophageal reflux disease) 2011    H. pylori infection 2016     Dr Maria Ines Tapia History of peptic ulcer disease 2016     16 yo    History of sinus tachycardia 10/5/2012     as of 8/24/15 pt states followed by PCP    Hypercholesterolemia 2016     ~2007    Kidney stone 2017    Lumbar disc herniation 2018     L4 and L5     (normal spontaneous vaginal delivery) 1998     Son  7yo    Pneumonia 2018     community acquired with sepsis     Post partum depression 10/9/2014     10/2013: counseling and prn Valium    Spondylosis of lumbar & thoracic region without myelopathy or radiculopathy 10/26/2016     xrays 2010    Spondylosis, Thoracic & Lumbar 3/5/2015     xrays 2010    Thyromegaly 2010     US negative    Vitamin D deficiency              Past Surgical History:   Procedure Laterality Date    HX CARPAL TUNNEL RELEASE Bilateral      HX  SECTION        HX CHOLECYSTECTOMY   10/09     LAP-Gallstones    HX GYN   2015     IUD placed    HX LITHOTRIPSY   2017    HX ORTHOPAEDIC Right 7/10/14     trigger thumb    HX ORTHOPAEDIC Left 7/10/14     wrist              Social History    Substance Use Topics     Smoking status: Never Smoker     Smokeless tobacco: Never Used     Alcohol use 0.0 oz/week       0 Standard drinks or equivalent per week         Comment: not weekly, every couple of months per pt        HPI  Doing ok  Review of Systems   Respiratory: Negative. Cardiovascular: Negative. Physical Exam  Visit Vitals    /90 (BP 1 Location: Left arm, BP Patient Position: Sitting)    Pulse (!) 116    Resp 16    Ht 5' (1.524 m)    Wt 84.4 kg (186 lb)    SpO2 99%    BMI 36.33 kg/m2       ASSESSMENT and PLAN  Sinus tachycardia: Discussed results of Holter monitor and echocardiogram.    I still suspect the patient has inappropriate sinus tachycardia. She has no other underlying causes of tachycardia that I can see at this point. Her thyroid and hemoglobin were essentially normal 2 months ago and she was already having the sinus tachycardia. Discussed with her significant implication of the sinus tachycardia.     Clearly she continues to have tachycardia the regardless of Cardizem increase. I feel that calcium channel blockers should be stopped and beta-blockers need to be started. Start Toprol-XL 50 mg twice daily. Side effects have been discussed. She will let me know if any. Her blood pressure will need to be followed closely as well.     Borderline ejection fraction: I suspect this is related to her inappropriate sinus tachycardia and even for this reason need to aggressively control her heart rate. I have discussed with her the potential for electrophysiologic evaluation as well if no improvement in heart rate. Also will obtain a limited echocardiogram in approximately 4 weeks after her heart rate hopefully has improved. See her back in 2 weeks.

## 2018-05-11 NOTE — PATIENT INSTRUCTIONS
Stop taking cardizem. Start taking toprol XL 50 mg twice a day. Follow up with Dr. Kimani Hightower in 2 weeks.

## 2018-05-25 ENCOUNTER — OFFICE VISIT (OUTPATIENT)
Dept: CARDIOLOGY CLINIC | Age: 38
End: 2018-05-25

## 2018-05-25 VITALS
BODY MASS INDEX: 36.71 KG/M2 | DIASTOLIC BLOOD PRESSURE: 80 MMHG | WEIGHT: 187 LBS | SYSTOLIC BLOOD PRESSURE: 110 MMHG | HEIGHT: 60 IN | HEART RATE: 101 BPM | RESPIRATION RATE: 16 BRPM

## 2018-05-25 DIAGNOSIS — R00.0 TACHYCARDIA: Primary | ICD-10-CM

## 2018-05-25 RX ORDER — METOPROLOL SUCCINATE 50 MG/1
50 TABLET, EXTENDED RELEASE ORAL DAILY
COMMUNITY
End: 2018-06-28 | Stop reason: SDUPTHER

## 2018-05-25 NOTE — PATIENT INSTRUCTIONS
Increase Toprol 75 mg twice a day    Refer to Dr.Ngo Cazares in August and see Norma Jennings a week after

## 2018-05-25 NOTE — MR AVS SNAPSHOT
[address] [facility] [address] 
[phone] Patient: Nina Temple MRN: [id number] :[id number] Visit Information Date & Time Provider Department Dept. Phone Encounter #  
 2018 10:00 AM Lula Frank MD [facility] [place] [phone] [id number] Your Appointments 2018  4:10 PM  
ROUTINE CARE with Cristy Junior MD  
[facility] [address] [place] [address]) Appt Note: f/u thyroid cp0.00  
 [address]  [address] [address]  
[address] [facility] [address]  
  
    
 2018  3:40 PM  
New Patient with Shayla Meek MD  
[facility] ([facility]) Appt Note: referred by Dr Blank Arizmendi [address]  [address] [facility],[address]  
[address] [facility],[address] [address] Upcoming Health Maintenance Date Due  
 PAP AKA CERVICAL CYTOLOGY 3/3/2017 EYE EXAM RETINAL OR DILATED Q1 2018 HEMOGLOBIN A1C Q6M 2018 Influenza Age 5 to Adult 2018 FOOT EXAM Q1 2018 MICROALBUMIN Q1 2019 LIPID PANEL Q1 2019 DTaP/Tdap/Td series (2 - Tdap) 2023 Allergies as of 2018  Review Complete On: 2018 By: Surendra Chun Severity Noted Reaction Type Reactions Lexapro [Escitalopram]  2017    Other (comments) Fatigue, wt gain Naprosyn [Naproxen]  2010   Intolerance Nausea and Vomiting Tolerates Advil, Toradol, etc  
 Topamax [Topiramate]  03/15/2017    Other (comments) Kidney stone Zoloft [Sertraline]  2017    Other (comments) Fatigue Current Immunizations  Reviewed on 2018 Name Date DTaP 2013 Hepatitis B Vaccine 2010, 2009, 2009 Influenza Vaccine 10/16/2015, 10/23/2014, 10/9/2013 Influenza Vaccine (Quad) PF 11/2/2017, 10/26/2016 Influenza Vaccine Split 10/5/2012, 9/21/2011, 9/23/2010 Pneumococcal Vaccine (Unspecified Type) 9/18/2013 TB Skin Test (PPD) Intradermal 8/29/2013 TD Vaccine 3/25/2007 ZZZ-RETIRED (DO NOT USE) Pneumococcal Vaccine (Unspecified Type) 3/25/2007 Not reviewed this visit You Were Diagnosed With   
  
 Codes Comments Tachycardia    -  Primary ICD-10-CM: R00.0 ICD-9-CM: 402. 0 Vitals BP Pulse Resp Height(growth percentile) Weight(growth percentile) BMI  
 110/80 (BP 1 Location: Left arm, BP Patient Position: Sitting) (!) 101 16 5' (1.524 m) 187 lb (84.8 kg) 36.52 kg/m2 OB Status Smoking Status IUD Never Smoker Vitals History BMI and BSA Data Body Mass Index Body Surface Area  
 36.52 kg/m 2 1.89 m 2 Preferred Pharmacy Pharmacy Name Phone 500 [address], [address] [place] [address]  [phone] Your Updated Medication List  
  
   
This list is accurate as of 5/25/18 10:56 AM.  Always use your most recent med list.  
  
  
  
  
 biotin 10,000 mcg Cap Take  by mouth. cholecalciferol (vitamin D3) 2,000 unit Tab Take 2,000 Units by mouth daily. cyclobenzaprine 10 mg tablet Commonly known as:  FLEXERIL  
TAKE ONE-HALF (1/2) TO ONE TABLET NIGHTLY AS NEEDED FOR MUSCLE SPASMS DEXILANT 60 mg Cpdb Generic drug:  Dexlansoprazole TAKE ONE CAPSULE BY MOUTH ONCE DAILY FOR  REFLUX  
  
 ELIDEL 1 % topical cream  
Generic drug:  pimecrolimus Apply  to affected area as needed (Eczema). empagliflozin 10 mg tablet Commonly known as:  Deljean claude Mutters Take 1 Tab by mouth daily. glipiZIDE 5 mg tablet Commonly known as:  Guadalupe Stade Take 1 tab 5-10 minutes before dinner  
  
 levothyroxine 88 mcg tablet Commonly known as:  SYNTHROID Take 1 tablet daily Liraglutide 0.6 mg/0.1 mL (18 mg/3 mL) Pnij Commonly known as:  Ghada Eastman 1.8 mg by SubCUTAneous route daily. metFORMIN 1,000 mg tablet Commonly known as:  GLUCOPHAGE  
TAKE ONE TABLET BY MOUTH TWICE DAILY WITH MEALS  
  
 MIRENA 20 mcg/24 hr (5 years) Iud  
Generic drug:  levonorgestrel 1 Each by IntraUTERine route once. One Touch Delica 33 gauge Misc Generic drug:  lancets TOPROL XL 50 mg XL tablet Generic drug:  metoprolol succinate Take 50 mg by mouth daily. 1 1/2 tablet twice a day We Performed the Following AMB POC EKG ROUTINE W/ 12 LEADS, INTER & REP [23315 CPT(R)] Patient Instructions Increase Toprol 75 mg twice a day Refer to Dr.Ngo Cazares in August and see Kina Vasquez a week after Introducing [facility]! Dear Stephanie Kaminski: Thank you for requesting a RealtimeBoard account. Our records indicate that you already have an active RealtimeBoard account. You can access your account anytime at [email or web address] Did you know that you can access your hospital and ER discharge instructions at any time in RealtimeBoard? You can also review all of your test results from your hospital stay or ER visit. Additional Information If you have questions, please visit the Frequently Asked Questions section of the RealtimeBoard website at [email or web address] Remember, RealtimeBoard is NOT to be used for urgent needs. For medical emergencies, dial 911. Now available from your iPhone and Android! Please provide this summary of care documentation to your next provider. Your primary care clinician is listed as VIDHI ORELLANA. If you have any questions after today's visit, please call [phone].

## 2018-05-25 NOTE — PROGRESS NOTES
HISTORY OF PRESENT ILLNESS  Suzy Shah is a 40 y.o. female. Patient with h/o AODM GERD DEPRESSION ANXIETY HLD PUD, here for evaluation of sinus tachycardia  Echo on 5/3/18: EF 50%, no valves issues  holter on 5/10/18: sinus tachycardia  average 108, KIT and no pacs or pvcs          Past Medical History:   Diagnosis Date    Acute lymphadenitis of arm, left epitrochlear 2017    Anxiety and depression 10/9/2014      10/2014; resolved as of 8/24/15 per pt    CAP (community acquired pneumonia) 2018      [facility] 11[facility]    Chronic low back pain 10/9/2014      Muscular     Depression with anxiety 2017      10/2014    Diabetes mellitus type II 10/07      Consulted w/ Dr. Siva Ramos Diabetic eye exam ([facility] [address] 2016      Tangela Martin MD, [facility]    Dysphagia 2010    GERD (gastroesophageal reflux disease) 2011    H. pylori infection 2016      Dr Devon Smith History of peptic ulcer disease 2016      16 yo    History of sinus tachycardia 10/5/2012      as of 8/24/15 pt states followed by PCP    Hypercholesterolemia 2016      ~2007    Kidney stone 2017    Lumbar disc herniation 2018      L4 and L5     (normal spontaneous vaginal delivery) 1998      Son  9yo    Pneumonia 2018      community acquired with sepsis     Post partum depression 10/9/2014      10/2013: counseling and prn Valium    Spondylosis of lumbar & thoracic region without myelopathy or radiculopathy 10/26/2016      xrays 2010    Spondylosis, Thoracic & Lumbar 3/5/2015      xrays 2010    Thyromegaly 2010      US negative    Vitamin D deficiency                    Past Surgical History:   Procedure Laterality Date    HX CARPAL TUNNEL RELEASE Bilateral       HX  SECTION          HX CHOLECYSTECTOMY    10/09      LAP-Gallstones    HX GYN    2015      IUD placed    HX LITHOTRIPSY    2017    HX ORTHOPAEDIC Right 7/10/14      trigger thumb    HX ORTHOPAEDIC Left 7/10/14      wrist                     Social History     Substance Use Topics      Smoking status: Never Smoker      Smokeless tobacco: Never Used      Alcohol use 0.0 oz/week         0 Standard drinks or equivalent per week            Comment: not weekly, every couple of months per pt          HPI  She feels better. Still the heart rate is increased but better than it used to be. Thus far able to tolerate Toprol with a little fatigue but not much. Some chest tightness reported when she becomes very anxious. Review of Systems   Respiratory: Negative. Cardiovascular: Negative. Physical Exam  Visit Vitals    /80 (BP 1 Location: Left arm, BP Patient Position: Sitting)    Pulse (!) 101    Resp 16    Ht 5' (1.524 m)    Wt 84.8 kg (187 lb)    BMI 36.52 kg/m2     Current Outpatient Prescriptions on File Prior to Visit   Medication Sig Dispense Refill    metoprolol succinate (TOPROL-XL) 50 mg XL tablet Take 1 Tab by mouth two (2) times a day. 180 Tab 1    metFORMIN (GLUCOPHAGE) 1,000 mg tablet TAKE ONE TABLET BY MOUTH TWICE DAILY WITH MEALS 180 Tab 3    glipiZIDE (GLUCOTROL) 5 mg tablet Take 1 tab 5-10 minutes before dinner 30 Tab 11    DEXILANT 60 mg CpDB TAKE ONE CAPSULE BY MOUTH ONCE DAILY FOR  REFLUX 90 Cap 3    empagliflozin (JARDIANCE) 10 mg tablet Take 1 Tab by mouth daily. 30 Tab 11    cyclobenzaprine (FLEXERIL) 10 mg tablet TAKE ONE-HALF (1/2) TO ONE TABLET NIGHTLY AS NEEDED FOR MUSCLE SPASMS 90 Tab 0    ONE TOUCH DELICA 33 gauge misc       Liraglutide (VICTOZA) 0.6 mg/0.1 mL (18 mg/3 mL) pnij 1.8 mg by SubCUTAneous route daily. 9 Pen 3    ELIDEL 1 % topical cream Apply  to affected area as needed (Eczema).  biotin 10,000 mcg cap Take  by mouth.  levothyroxine (SYNTHROID) 88 mcg tablet Take 1 tablet daily 90 Tab 3    cholecalciferol, vitamin D3, 2,000 unit tab Take 2,000 Units by mouth daily.       levonorgestrel (MIRENA) 20 mcg/24 hr (5 years) IUD 1 Each by IntraUTERine route once. No current facility-administered medications on file prior to visit. Lab Results   Component Value Date/Time    TSH 1.810 01/30/2018 07:57 AM       Lab Results   Component Value Date/Time    Sodium 136 01/30/2018 07:57 AM    Potassium 4.0 01/30/2018 07:57 AM    Chloride 98 01/30/2018 07:57 AM    CO2 22 01/30/2018 07:57 AM    Anion gap 11 01/11/2018 05:25 AM    Glucose 251 (H) 01/30/2018 07:57 AM    BUN 10 01/30/2018 07:57 AM    Creatinine 0.63 01/30/2018 07:57 AM    BUN/Creatinine ratio 16 01/30/2018 07:57 AM    GFR est  01/30/2018 07:57 AM    GFR est non- 01/30/2018 07:57 AM    Calcium 9.0 01/30/2018 07:57 AM     Lab Results   Component Value Date/Time    WBC 10.7 01/22/2018 11:47 AM    HGB 11.7 01/22/2018 11:47 AM    HCT 37.1 01/22/2018 11:47 AM    PLATELET 941 10/64/1969 11:47 AM    MCV 90 01/22/2018 11:47 AM       ASSESSMENT and PLAN  Sinus tachycardia:   I still suspect IST. Discussed with patient its implications and significance . HR better with toprol  cardizem was completely ineffective  HR no yet optimal. Increase slowly increase  toprol to 75 g bid she is aware of side effects    Also discussed potential for EP evaluation. She is agreeable , refer to Dr. Pieter Hu ejection fraction: I suspect this is related to her inappropriate sinus tachycardia and even for this reason need to aggressively control her heart rate. Also will obtain a limited echocardiogram in approximately 4 weeks after her heart rate hopefully has improved. She tells me her tsh and labs will be rechecked again at the end of june     See her back in 6 weeks. after ep evaluation

## 2018-06-01 RX ORDER — GLIPIZIDE 5 MG/1
TABLET ORAL
Qty: 90 TAB | Refills: 4 | Status: SHIPPED | OUTPATIENT
Start: 2018-06-01 | End: 2018-08-27 | Stop reason: SDUPTHER

## 2018-06-01 NOTE — TELEPHONE ENCOUNTER
Requested Prescriptions     Pending Prescriptions Disp Refills    glipiZIDE (GLUCOTROL) 5 mg tablet 90 Tab 4     Sig: Take 1 tab 5-10 minutes before dinner       Requesting 90 days supply.

## 2018-06-15 RX ORDER — LEVOTHYROXINE SODIUM 88 UG/1
TABLET ORAL
Qty: 90 TAB | Refills: 3 | Status: SHIPPED | OUTPATIENT
Start: 2018-06-15 | End: 2018-06-18 | Stop reason: SDUPTHER

## 2018-06-22 LAB
BUN SERPL-MCNC: 11 MG/DL (ref 6–20)
BUN/CREAT SERPL: 18 (ref 9–23)
CALCIUM SERPL-MCNC: 9.1 MG/DL (ref 8.7–10.2)
CHLORIDE SERPL-SCNC: 102 MMOL/L (ref 96–106)
CHOLEST SERPL-MCNC: 181 MG/DL (ref 100–199)
CO2 SERPL-SCNC: 22 MMOL/L (ref 20–29)
CREAT SERPL-MCNC: 0.6 MG/DL (ref 0.57–1)
EST. AVERAGE GLUCOSE BLD GHB EST-MCNC: 192 MG/DL
GFR SERPLBLD CREATININE-BSD FMLA CKD-EPI: 118 ML/MIN/1.73
GFR SERPLBLD CREATININE-BSD FMLA CKD-EPI: 136 ML/MIN/1.73
GLUCOSE SERPL-MCNC: 127 MG/DL (ref 65–99)
HBA1C MFR BLD: 8.3 % (ref 4.8–5.6)
HDLC SERPL-MCNC: 51 MG/DL
LDLC SERPL CALC-MCNC: 116 MG/DL (ref 0–99)
POTASSIUM SERPL-SCNC: 4.3 MMOL/L (ref 3.5–5.2)
SODIUM SERPL-SCNC: 140 MMOL/L (ref 134–144)
TRIGL SERPL-MCNC: 70 MG/DL (ref 0–149)
VLDLC SERPL CALC-MCNC: 14 MG/DL (ref 5–40)

## 2018-06-27 ENCOUNTER — OFFICE VISIT (OUTPATIENT)
Dept: ENDOCRINOLOGY | Age: 38
End: 2018-06-27

## 2018-06-27 VITALS
BODY MASS INDEX: 37.54 KG/M2 | HEART RATE: 112 BPM | HEIGHT: 60 IN | SYSTOLIC BLOOD PRESSURE: 115 MMHG | DIASTOLIC BLOOD PRESSURE: 75 MMHG | WEIGHT: 191.2 LBS

## 2018-06-27 DIAGNOSIS — E55.9 VITAMIN D DEFICIENCY: ICD-10-CM

## 2018-06-27 DIAGNOSIS — E66.9 OBESITY, CLASS I, BMI 30-34.9: ICD-10-CM

## 2018-06-27 DIAGNOSIS — E04.9 GOITER: ICD-10-CM

## 2018-06-27 DIAGNOSIS — E78.00 HYPERCHOLESTEROLEMIA: ICD-10-CM

## 2018-06-27 DIAGNOSIS — R00.0 TACHYCARDIA: ICD-10-CM

## 2018-06-27 RX ORDER — DICLOFENAC EPOLAMINE 0.01 G/1
1 PATCH TOPICAL EVERY 12 HOURS
COMMUNITY
End: 2020-02-26

## 2018-06-27 RX ORDER — DICLOFENAC POTASSIUM 25 MG/1
25 CAPSULE, LIQUID FILLED ORAL
COMMUNITY
End: 2018-10-05

## 2018-06-27 NOTE — MR AVS SNAPSHOT
[address] [facility] [address] [place] 
[phone] Patient: Evelin Karimi MRN: [id number] :[id number] Visit Information Date & Time Provider Department Dept. Phone Encounter #  
 2018  4:10 PM Toñito Vivas, [address] [place] [address] [facility] [phone] [id number] Follow-up Instructions Return in about 5 months (around 2018). Your Appointments 2018  3:40 PM  
New Patient with Lani Rodriguez MD  
[facility] ([facility]) Appt Note: referred by Dr Mica Gonzalez [address]  [address] [place]  
[address] [address]  
  
    
 2018  8:00 AM  
ECHO CARDIOGRAMS 2D with ECHO, [place] [facility] ([facility] SCHEDULING) Appt Note: echo dx palp per Dr Pereyra Edu [address] [place]  
[phone]  
  
   
 [address]  
  
    
 2018  3:00 PM  
ESTABLISHED PATIENT with Allison Grossman MD  
[facility] ([facility]) Appt Note: 1 sweek f/u  
 [address]  [address] [place]  
[address] [address] Upcoming Health Maintenance Date Due  
 PAP AKA CERVICAL CYTOLOGY 3/3/2017 EYE EXAM RETINAL OR DILATED Q1 2018 Influenza Age 5 to Adult 2018 FOOT EXAM Q1 2018 HEMOGLOBIN A1C Q6M 2018 MICROALBUMIN Q1 2019 LIPID PANEL Q1 2019 DTaP/Tdap/Td series (2 - Tdap) 2023 Allergies as of 2018  Review Complete On: 2018 By: Toñito Vivas MD  
  
 Severity Noted Reaction Type Reactions Lexapro [Escitalopram]  2017    Other (comments) Fatigue, wt gain Naprosyn [Naproxen]  2010   Intolerance Nausea and Vomiting Tolerates Advil, Toradol, etc  
 Topamax [Topiramate]  03/15/2017    Other (comments) Kidney stone Zoloft [Sertraline]  08/16/2017    Other (comments) Fatigue Current Immunizations  Reviewed on 1/22/2018 Name Date DTaP 9/4/2013 Hepatitis B Vaccine 2/16/2010, 9/25/2009, 8/25/2009 Influenza Vaccine 10/16/2015, 10/23/2014, 10/9/2013 Influenza Vaccine (Quad) PF 11/2/2017, 10/26/2016 Influenza Vaccine Split 10/5/2012, 9/21/2011, 9/23/2010 Pneumococcal Vaccine (Unspecified Type) 9/18/2013 TB Skin Test (PPD) Intradermal 8/29/2013 TD Vaccine 3/25/2007 ZZZ-RETIRED (DO NOT USE) Pneumococcal Vaccine (Unspecified Type) 3/25/2007 Not reviewed this visit You Were Diagnosed With   
  
 Codes Comments Uncontrolled type 2 diabetes mellitus without complication, without long-term current use of insulin ([facility]    -  Primary ICD-10-CM: E11.65 ICD-9-CM: 250.02 Goiter     ICD-10-CM: E04.9 ICD-9-CM: 240.9 Obesity, Class I, BMI 30-34.9     ICD-10-CM: E66.9 ICD-9-CM: 278.00 Vitamin D deficiency     ICD-10-CM: E55.9 ICD-9-CM: 268.9 Hypercholesterolemia     ICD-10-CM: E78.00 ICD-9-CM: 272.0 Tachycardia     ICD-10-CM: R00.0 ICD-9-CM: 366. 0 Vitals BP Pulse Height(growth percentile) Weight(growth percentile) BMI OB Status 115/75 (!) 112 5' (1.524 m) 191 lb 3.2 oz (86.7 kg) 37.34 kg/m2 IUD Smoking Status Never Smoker Vitals History BMI and BSA Data Body Mass Index Body Surface Area  
 37.34 kg/m 2 1.92 m 2 Preferred Pharmacy Pharmacy Name Phone [address] [facility] [phone] Your Updated Medication List  
  
   
This list is accurate as of 6/27/18  5:12 PM.  Always use your most recent med list.  
  
  
  
  
 biotin 10,000 mcg Cap Take  by mouth. cholecalciferol (vitamin D3) 2,000 unit Tab Take 2,000 Units by mouth daily. cyclobenzaprine 10 mg tablet Commonly known as:  FLEXERIL  
TAKE ONE-HALF (1/2) TO ONE TABLET NIGHTLY AS NEEDED FOR MUSCLE SPASMS DEXILANT 60 mg Cpdb Generic drug:  Dexlansoprazole TAKE ONE CAPSULE BY MOUTH ONCE DAILY FOR  REFLUX  
  
 ELIDEL 1 % topical cream  
Generic drug:  pimecrolimus Apply  to affected area as needed (Eczema). empagliflozin 10 mg tablet Commonly known as:  Will Donaldson Take 1/2 tab daily--Dose change 6/27/18--updated med list--did not send prescription to the pharmacy FLECTOR 1.3 % Pt12 Generic drug:  diclofenac 1 Patch by TransDERmal route every twelve (12) hours every twelve (12) hours. glipiZIDE 5 mg tablet Commonly known as:  Alfonso Schillings Take 1 tab 5-10 minutes before dinner  
  
 levothyroxine 88 mcg tablet Commonly known as:  SYNTHROID Take 1 tablet daily Liraglutide 0.6 mg/0.1 mL (18 mg/3 mL) Pnij Commonly known as:  VICTOZA  
1.8 mg by SubCUTAneous route daily. metFORMIN 1,000 mg tablet Commonly known as:  GLUCOPHAGE  
TAKE ONE TABLET BY MOUTH TWICE DAILY WITH MEALS  
  
 MIRENA 20 mcg/24 hr (5 years) Iud  
Generic drug:  levonorgestrel 1 Each by IntraUTERine route once. One Touch Delica 33 gauge Misc Generic drug:  lancets TOPROL XL 50 mg XL tablet Generic drug:  metoprolol succinate Take 50 mg by mouth daily. 1 1/2 tablet twice a day ZIPSOR 25 mg capsule Generic drug:  diclofenac potassium Take 25 mg by mouth. We Performed the Following HEMOGLOBIN A1C WITH EAG [06044 CPT(R)] LIPID PANEL [54664 CPT(R)] METABOLIC PANEL, COMPREHENSIVE [36626 CPT(R)] MICROALBUMIN, UR, RAND W/ MICROALB/CREAT RATIO [id number] CPT(R)] T4, FREE [id number] CPT(R)] TSH 3RD GENERATION [77001 CPT(R)] VITAMIN D, 25 HYDROXY [id number] CPT(R)] Follow-up Instructions Return in about 5 months (around 11/27/2018). Patient Instructions 1) If you don't eat dinner, take 1/2 tab of glipizide rather than a whole tab to help prevent your liver from dumping sugar that is keeping your fasting sugars over 130. If you do have a low sugar with taking 1/2 tab then plan on having at least a small amount of protein before bed (slice of cheese or deli meat or handful of almonds or 1 teaspoon of peanut butter). 2) Decrease the jardiance to 1/2 tab daily to see if this helps with vaginal yeast.  
 
3) Your A1c has come down 2% which is a great improvement and my hope is next time will be closer to 7% or less. 4) Your LDL (bad cholesterol) has dropped from 136 to 116 and goal is under 100. Introducing [facility]! Dear Conrad Aguilera: Thank you for requesting a EduRise account. Our records indicate that you already have an active EduRise account. You can access your account anytime at [email or web address] Did you know that you can access your hospital and ER discharge instructions at any time in EduRise? You can also review all of your test results from your hospital stay or ER visit. Additional Information If you have questions, please visit the Frequently Asked Questions section of the EduRise website at [email or web address] Remember, EduRise is NOT to be used for urgent needs. For medical emergencies, dial 911. Now available from your iPhone and Android! Please provide this summary of care documentation to your next provider. Your primary care clinician is listed as VIDHI ORELLANA. If you have any questions after today's visit, please call [phone].

## 2018-06-27 NOTE — PATIENT INSTRUCTIONS
1) If you don't eat dinner, take 1/2 tab of glipizide rather than a whole tab to help prevent your liver from dumping sugar that is keeping your fasting sugars over 130. If you do have a low sugar with taking 1/2 tab then plan on having at least a small amount of protein before bed (slice of cheese or deli meat or handful of almonds or 1 teaspoon of peanut butter). 2) Decrease the jardiance to 1/2 tab daily to see if this helps with vaginal yeast.     3) Your A1c has come down 2% which is a great improvement and my hope is next time will be closer to 7% or less. 4) Your LDL (bad cholesterol) has dropped from 136 to 116 and goal is under 100.

## 2018-06-27 NOTE — PROGRESS NOTES
Chief Complaint   Patient presents with    Diabetes     pcp and pharmacy confirmed    Other     consent form signed to obtain eye report     History of Present Illness: Vale Mark is a 40 y.o. female here for follow up of diabetes. Weight down 3 lbs since last visit in 2/18. Has established with Dr. Vern Phalen and he thinks she has inappropriate sinus tachycardia and stopped the cardizem and changed to toprol XL and she has increased her dose to 75 mg bid after her last visit in 5/18. Due for visit with Dr. Simon Malone tomorrow for further testing. Initially had a lot of fatigue with toprol but this has improved the longer she has been on this but still having resting HR in the  range with some in the low 110s. After exercise goes in the 140-150s. Has been on the jardiance since last visit but in 3/18 she e-mailed me that her fasting sugars were still in the 140-150s so we added glipizide 5 mg before dinner. Has seen some fasting sugars down in the 90-130s with starting the glipizide but still may get some 140-150 especially if she doesn't eat dinner as she then will skip the glipizide and this can happed a few times a week. She has had yeast infections 2 times a month since starting jardiance requiring fluconazole from Dr. Diana Sutton so we'll try decreasing the dose of jardiance to see if this helps. She was also diagnosed with alopecia by Dr. Bharat Marcano and offered steroid injections into her scalp but wants to hold on this for now. Current Outpatient Prescriptions   Medication Sig    diclofenac potassium (ZIPSOR) 25 mg capsule Take 25 mg by mouth.  diclofenac (FLECTOR) 1.3 % pt12 1 Patch by TransDERmal route every twelve (12) hours every twelve (12) hours.  levothyroxine (SYNTHROID) 88 mcg tablet Take 1 tablet daily    glipiZIDE (GLUCOTROL) 5 mg tablet Take 1 tab 5-10 minutes before dinner    metoprolol succinate (TOPROL XL) 50 mg XL tablet Take 50 mg by mouth daily.  1 1/2 tablet twice a day  metFORMIN (GLUCOPHAGE) 1,000 mg tablet TAKE ONE TABLET BY MOUTH TWICE DAILY WITH MEALS    DEXILANT 60 mg CpDB TAKE ONE CAPSULE BY MOUTH ONCE DAILY FOR  REFLUX    empagliflozin (JARDIANCE) 10 mg tablet Take 1 Tab by mouth daily.  cyclobenzaprine (FLEXERIL) 10 mg tablet TAKE ONE-HALF (1/2) TO ONE TABLET NIGHTLY AS NEEDED FOR MUSCLE SPASMS    ONE TOUCH DELICA 33 gauge misc     Liraglutide (VICTOZA) 0.6 mg/0.1 mL (18 mg/3 mL) pnij 1.8 mg by SubCUTAneous route daily.  ELIDEL 1 % topical cream Apply  to affected area as needed (Eczema).  biotin 10,000 mcg cap Take  by mouth.  cholecalciferol, vitamin D3, 2,000 unit tab Take 2,000 Units by mouth daily.  levonorgestrel (MIRENA) 20 mcg/24 hr (5 years) IUD 1 Each by IntraUTERine route once. No current facility-administered medications for this visit. Allergies   Allergen Reactions    Lexapro [Escitalopram] Other (comments)     Fatigue, wt gain    Naprosyn [Naproxen] Nausea and Vomiting     Tolerates Advil, Toradol, etc    Topamax [Topiramate] Other (comments)     Kidney stone    Zoloft [Sertraline] Other (comments)     Fatigue      Review of Systems:  - Eyes: no blurry vision or double vision  - Cardiovascular: no chest pain  - Respiratory: no shortness of breath  - Musculoskeletal: no myalgias  - Neurological: no numbness/tingling in extremities    Physical Examination:  Blood pressure 115/75, pulse (!) 112, height 5' (1.524 m), weight 191 lb 3.2 oz (86.7 kg).   - General: pleasant, no distress, good eye contact   - Neck: no carotid bruits  - Cardiovascular: regular, (+) tachycrardia, nl s1 and s2, no m/r/g,   - Respiratory: clear bilaterally  - Integumentary: no edema,   - Psychiatric: normal mood and affect    Data Reviewed:   Component      Latest Ref Rng & Units 6/21/2018 6/21/2018 6/21/2018           8:16 AM  8:16 AM  8:16 AM   Glucose      65 - 99 mg/dL 127 (H)     BUN      6 - 20 mg/dL 11     Creatinine      0.57 - 1.00 mg/dL 0.60 GFR est non-AA      >59 mL/min/1.73 118     GFR est AA      >59 mL/min/1.73 136     BUN/Creatinine ratio      9 - 23 18     Sodium      134 - 144 mmol/L 140     Potassium      3.5 - 5.2 mmol/L 4.3     Chloride      96 - 106 mmol/L 102     CO2      20 - 29 mmol/L 22     Calcium      8.7 - 10.2 mg/dL 9.1     Cholesterol, total      100 - 199 mg/dL  181    Triglyceride      0 - 149 mg/dL  70    HDL Cholesterol      >39 mg/dL  51    VLDL, calculated      5 - 40 mg/dL  14    LDL, calculated      0 - 99 mg/dL  116 (H)    Hemoglobin A1c, (calculated)      4.8 - 5.6 %   8.3 (H)   Estimated average glucose      mg/dL   192       Assessment/Plan:     1. Diabetes mellitus type II: her most recent Hgb A1c was 8.3% in 6/18 down from 10.4% in 1/18 up from 8.1% in 9/17 up from 7.9% in 4/17 down from 8.1% in 12/16 up from 7% in 7/16 down from 7.7% in 3/16 down from 7.8% in 11/15 down from 8.3% in 7/15 down from 8.8% in 3/15 up from 7.6% in 11/14 up from 7.3% in 7/14 down from 8% in 3/14 up from 6.1% in 11/13 down from 6.7% in 9/13 up from 6.3% in 8/13 up slightly from 6.1% in 7/13 down from 6.3% in 5/13 down from 6.7% in 1/13 up from 6.5% in Oct 2012. A1c is coming down with jardiance and glipizide but will try decreasing the jardiance to help with yeast infections. Also will take just 1/2 tab of glipizide if she skips dinner to help with hepatic glucose output that is keeping her fasting sugars up and A1c up. - cont victoza 1.8 mg daily  - cont metformin 1g bid  - decrease jardiance to 1/2 of 10 mg daily  - cont glipizide 5 mg before dinner (1/2 tab if she skips dinner)  - check bs up to 3 times daily due to fluctuating sugars  - foot exam done 9/17  - microalbumin nl 10/12--was taken off lisinopril 10 mg daily due to trying to conceive and repeat normal ever since, most recently in 1/18  - optho UTD 4/18--will obtain report  - check Hgb A1c and cmp and microalbumin prior to next visit     2.  Goiter: Had a TSH of 2.68 in May 2012.  Level was 2.4 in 1/13 and I started her on a a low dose of levothyroxine 25 mcg daily to keep her TSH < 2.5 to improve her chance of conception and interestingly she became pregnant shortly after starting this. TSH 2.1 in 5/13 and 1.6 in 7/13 and 1.39 in 9/13. Stopped levothyroxine after delivery in 10/13 and TSH up to 5.5 in 3/14 so restarted low dose of 25 mcg daily to keep her TSH < 2.5 as she has hypothyroid symptoms and high cholesterol. However only 2.2 in 7/14 so increased to 50 mcg daily and TSH 2.4 in 11/14 but had missed a dose on the weekends on occasion so increased to 62.5 mcg daily and TSH 1.87 in 3/15 and 1.46 in 11/15 and 1.04 in 3/16. Up to 2.06 in 7/16 so increased to 75 mcg daily and down to 1.5 in 12/16. Up to 2.11 in 4/17 so increased her dose to 88 mcg daily and down to 0.96 in 9/17 and 1.81 in 1/18 so kept her dose the same. Her thyroid is not to blame for her tachycardia as she has never been over-replaced. - cont levothyroxine 88 mcg daily  - check TSH and FT4 prior to next visit      3. Hypercholesterolemia: Given DM, Goal LDL < 100, non-HDL < 130, and TG < 150.  in 10/12 off simva 10 due to trying to conceive, down to 109 in 1/13 and 92 in 5/13. Up to 127 in 11/13 and down to 111 in 3/14. Up to 124 in 7/14. Down to 114 in 11/14. Up to 140 in 3/15 possibly due to [address] as with stopping this it was down to 92 in 7/15. Up to 114 in 11/15 with diet. Was started on lipitor in 1/16 but had more nausea with this so stopped this and with 17 lb wt loss, LDL down to 84 in 3/16 and still 98 in 7/16. Up to 114 in 4/17 and still 119 in 9/17 and 136 in 1/18. Down to 116 in 6/18 so will hold on any other meds for now. - diet control  - check lipids prior to next visit      4. Obesity: I started topamax in 11/15 and Dr. Carolee Sanchez added phentermine in 1/16 and changed her trulicity to iLike Jewels and her weight had come down 26 lbs by 7/16.   Had more nausea and GERD and ended up stopping all her meds in 10/16 and weight up 7 lbs by 12/16. Restarted topamax for 2 weeks and then added back victoza at a lower dose of 1.2 mg daily. Eventually added back phentermine in 3/17. Then developed a kidney stone in 3/17 and stopped the topamax and weight up 6 lbs by 4/17. Will stay off the topamax due to the kidney stone and stop the phentermine as I don't think this is helping her weight. I told her it's fine to restart the advocare spark and protein shakes as these are not as likely to cause the kidney stone as the topamax was. Weight up 7 lbs by 9/17 possibly due to stress and 1 lb by 1/18. Down 3 lbs by 6/18.  - cont victoza as above    5. Vitamin D deficiency: level was 24 in 12/15 and is currently taking 2000 units of D3 and level up to 32 in 7/16 and 35 in 4/17 and 30 in 9/17  - cont vitamin D 2000 units daily  - check Vitamin D 25-OH level prior to next visit    6. 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 Brent Cohen and she is scheduled to see Dr. Miguel Ángel Rosenbaum tomorrow. - cont Toprol XL 50 mg 1.5 tabs bid  - eval by Dr. Miguel Ángel Rosenbaum on 6/28/18        Patient Instructions   1) If you don't eat dinner, take 1/2 tab of glipizide rather than a whole tab to help prevent your liver from dumping sugar that is keeping your fasting sugars over 130. If you do have a low sugar with taking 1/2 tab then plan on having at least a small amount of protein before bed (slice of cheese or deli meat or handful of almonds or 1 teaspoon of peanut butter). 2) Decrease the jardiance to 1/2 tab daily to see if this helps with vaginal yeast.     3) Your A1c has come down 2% which is a great improvement and my hope is next time will be closer to 7% or less. 4) Your LDL (bad cholesterol) has dropped from 136 to 116 and goal is under 100. Follow-up Disposition:  Return in about 5 months (around 11/27/2018). Copy sent to:   Edel Chicas MD as PCP - General  Gabby Ambrosio MD (Obstetrics & Gynecology)  Martha Rodríguez MD (Cardiology)  Lizeth Rosario MD (Cardiology)  Jacqueline Gunter MD (Dermatology)

## 2018-06-28 ENCOUNTER — OFFICE VISIT (OUTPATIENT)
Dept: CARDIOLOGY CLINIC | Age: 38
End: 2018-06-28

## 2018-06-28 VITALS
SYSTOLIC BLOOD PRESSURE: 118 MMHG | WEIGHT: 191 LBS | HEIGHT: 60 IN | DIASTOLIC BLOOD PRESSURE: 74 MMHG | BODY MASS INDEX: 37.5 KG/M2 | HEART RATE: 106 BPM

## 2018-06-28 DIAGNOSIS — E66.01 SEVERE OBESITY (BMI 35.0-39.9) WITH COMORBIDITY (HCC): ICD-10-CM

## 2018-06-28 DIAGNOSIS — R00.2 PALPITATIONS: ICD-10-CM

## 2018-06-28 DIAGNOSIS — R00.0 SINUS TACHYCARDIA: Primary | ICD-10-CM

## 2018-06-28 RX ORDER — METOPROLOL SUCCINATE 100 MG/1
100 TABLET, EXTENDED RELEASE ORAL 2 TIMES DAILY
Qty: 60 TAB | Refills: 5 | Status: SHIPPED | OUTPATIENT
Start: 2018-06-28 | End: 2018-09-03 | Stop reason: SDUPTHER

## 2018-06-28 NOTE — MR AVS SNAPSHOT
[address] [facility] 
[phone] Patient: Evelin Karimi MRN: [id number] :1980 Visit Information Date & Time Provider Department Dept. Phone Encounter #  
 2018  3:40 PM Lani Rodriguez MD [facility] Amalia Webb [phone] [id number] Your Appointments 2018  8:00 AM  
ECHO CARDIOGRAMS 2D with [facility] (DEMAR SCHEDULING) Appt Note: echo dx palp per Dr Pereyra Edu [address]  
[phone]  
  
   
 [address] [place]  
  
    
 2018  3:00 PM  
ESTABLISHED PATIENT with Allison Grossman MD  
[facility] ([address]) Appt Note: 1 sweek f/u  
 [address] [facility] [address]  
[address] [facility]  
  
    
 2018  3:50 PM  
ROUTINE CARE with Toñito Vivas MD  
[facility] [address] [place] [address]) Appt Note: f/u diabetes cp0.00  
 [address] [facility]  
[id number] [place] [address] Upcoming Health Maintenance Date Due  
 PAP AKA CERVICAL CYTOLOGY 3/3/2017 EYE EXAM RETINAL OR DILATED Q1 2018 Influenza Age 5 to Adult 2018 FOOT EXAM Q1 2018 HEMOGLOBIN A1C Q6M 2018 MICROALBUMIN Q1 2019 LIPID PANEL Q1 2019 DTaP/Tdap/Td series (2 - Tdap) 2023 Allergies as of 2018  Review Complete On: 2018 By: Beatriz Rowland RN Severity Noted Reaction Type Reactions Lexapro [Escitalopram]  2017    Other (comments) Fatigue, wt gain Naprosyn [Naproxen]  2010   Intolerance Nausea and Vomiting Tolerates Advil, Toradol, etc  
 Topamax [Topiramate]  03/15/2017    Other (comments) Kidney stone Zoloft [Sertraline]  08/16/2017    Other (comments) Fatigue Current Immunizations  Reviewed on 1/22/2018 Name Date DTaP 9/4/2013 Hepatitis B Vaccine 2/16/2010, 9/25/2009, 8/25/2009 Influenza Vaccine 10/16/2015, 10/23/2014, 10/9/2013 Influenza Vaccine (Quad) PF 11/2/2017, 10/26/2016 Influenza Vaccine Split 10/5/2012, 9/21/2011, 9/23/2010 Pneumococcal Vaccine (Unspecified Type) 9/18/2013 TB Skin Test (PPD) Intradermal 8/29/2013 TD Vaccine 3/25/2007 ZZZ-RETIRED (DO NOT USE) Pneumococcal Vaccine (Unspecified Type) 3/25/2007 Not reviewed this visit Vitals BP Pulse Height(growth percentile) Weight(growth percentile) BMI OB Status 118/74 (BP 1 Location: Right arm, BP Patient Position: Sitting) (!) 106 5' (1.524 m) 191 lb (86.6 kg) 37.3 kg/m2 IUD Smoking Status Never Smoker Vitals History BMI and BSA Data Body Mass Index Body Surface Area  
 37.3 kg/m 2 1.91 m 2 Preferred Pharmacy Pharmacy Name Phone [facility], [facility] [phone] Your Updated Medication List  
  
   
This list is accurate as of 6/28/18  4:46 PM.  Always use your most recent med list.  
  
  
  
  
 biotin 10,000 mcg Cap Take  by mouth. cholecalciferol (vitamin D3) 2,000 unit Tab Take 2,000 Units by mouth daily. cyclobenzaprine 10 mg tablet Commonly known as:  FLEXERIL  
TAKE ONE-HALF (1/2) TO ONE TABLET NIGHTLY AS NEEDED FOR MUSCLE SPASMS DEXILANT 60 mg Cpdb Generic drug:  Dexlansoprazole TAKE ONE CAPSULE BY MOUTH ONCE DAILY FOR  REFLUX  
  
 ELIDEL 1 % topical cream  
Generic drug:  pimecrolimus Apply  to affected area as needed (Eczema). empagliflozin 10 mg tablet Commonly known as:  Robin Armenta Take 1/2 tab daily--Dose change 6/27/18--updated med list--did not send prescription to the pharmacy FLECTOR 1.3 % Pt12 Generic drug:  diclofenac 1 Patch by TransDERmal route every twelve (12) hours every twelve (12) hours. glipiZIDE 5 mg tablet Commonly known as:  Bernard Picking Take 1 tab 5-10 minutes before dinner  
  
 levothyroxine 88 mcg tablet Commonly known as:  SYNTHROID Take 1 tablet daily Liraglutide 0.6 mg/0.1 mL (18 mg/3 mL) Pnij Commonly known as:  VICTOZA  
1.8 mg by SubCUTAneous route daily. metFORMIN 1,000 mg tablet Commonly known as:  GLUCOPHAGE  
TAKE ONE TABLET BY MOUTH TWICE DAILY WITH MEALS  
  
 metoprolol succinate 100 mg tablet Commonly known as:  TOPROL XL Take 1 Tab by mouth two (2) times a day. MIRENA 20 mcg/24 hr (5 years) Iud  
Generic drug:  levonorgestrel 1 Each by IntraUTERine route once. One Touch Delica 33 gauge Misc Generic drug:  lancets ZIPSOR 25 mg capsule Generic drug:  diclofenac potassium Take 25 mg by mouth. Prescriptions Sent to Pharmacy Refills  
 metoprolol succinate (TOPROL-XL) 100 mg tablet 5 Sig: Take 1 Tab by mouth two (2) times a day. Class: Normal  
 Pharmacy: [address] [place] [address], [address] [place] [address] #: [phone] Route: Oral  
  
Patient Instructions Increase Toprol to 100 mg twice daily Introducing [facility]! Dear James Choi: Thank you for requesting a "[employer] account. Our records indicate that you already have an active "[employer] account. You can access your account anytime at [email or web address] Did you know that you can access your hospital and ER discharge instructions at any time in "[employer]? You can also review all of your test results from your hospital stay or ER visit. Additional Information If you have questions, please visit the Frequently Asked Questions section of the "[employer] website at [email or web address] Remember, "[employer] is NOT to be used for urgent needs.  For medical emergencies, dial 911. Now available from your iPhone and Android! Please provide this summary of care documentation to your next provider. Your primary care clinician is listed as VIDHI ORELLANA. If you have any questions after today's visit, please call [phone].

## 2018-06-28 NOTE — PROGRESS NOTES
Chief Complaint   Patient presents with    Rapid Heart Rate    New Patient     referred by Dr. Joseph Antunez     Verified patient with two types of identifiers. Verified medications with the patient.     Verified patient's pharmacy

## 2018-06-29 NOTE — PROGRESS NOTES
Cardiac Electrophysiology OFFICE Consultation Note     Subjective:      Kiley Mortensen is a 40 y.o. patient who is seen for evaluation of palpitation. The patient said she has been treated for inappropriate sinus tachycardia. She is referred from Dr. Joe Mon. She works at [address] [place] [address]  The patient is on her feet most of the time but does not have dizziness. She said the metoprolol has not helped her significantly at the current dose 75 mg twice daily  She is worried because there is family history of cardiomyopathy and the echocardiogram was reported to her with LVEF of low normal 50%  holter 2018 sinus rate to 141 bpm no PAC or PVC    ECG 2018 sinus tachycardia    cardizem did not work for her    Patient Active Problem List   Diagnosis Code    Type II diabetes mellitus, uncontrolled ([facility] E11.65    Gallstones K80.20     (normal spontaneous vaginal delivery) O80    Anemia D64.9    Dysphagia, workup negative except GERD R13.10    GERD (gastroesophageal reflux disease) K21.9    History of sinus tachycardia Z86.79    Goiter E04.9    Bilateral carpal tunnel syndrome G56.03    Post partum depression F53    Chronic low back pain & Muscle Spasms M54.5, G89.29    Obesity, Class I, BMI 30-34.9 E66.9    Vitamin D deficiency E55.9    Hypercholesterolemia E78.00    History of peptic ulcer disease Z87.11    Spondylosis of lumbar & thoracic region without myelopathy or radiculopathy M47.816    Heart palpitations R00.2    Lumbar facet arthropathy (HCC) M46.96    Depression with anxiety F41.8    Acute lymphadenitis of arm, left epitrochlear L04.2    CAP (community acquired pneumonia) J18.9    Bulging of intervertebral disc between L4 and L5 M51.26    UTI (urinary tract infection) N39.0    Tachycardia R00.0    Severe obesity (BMI 35.0-39. 9) with comorbidity ([facility] E66.01     Current Outpatient Prescriptions   Medication Sig Dispense Refill    metoprolol succinate (TOPROL-XL) 100 mg tablet Take 1 Tab by mouth two (2) times a day. 60 Tab 5    diclofenac potassium (ZIPSOR) 25 mg capsule Take 25 mg by mouth.  diclofenac (FLECTOR) 1.3 % pt12 1 Patch by TransDERmal route every twelve (12) hours every twelve (12) hours.  empagliflozin (JARDIANCE) 10 mg tablet Take 1/2 tab daily--Dose change 6/27/18--updated med list--did not send prescription to the pharmacy 30 Tab 11    levothyroxine (SYNTHROID) 88 mcg tablet Take 1 tablet daily 90 Tab 1    glipiZIDE (GLUCOTROL) 5 mg tablet Take 1 tab 5-10 minutes before dinner 90 Tab 4    metFORMIN (GLUCOPHAGE) 1,000 mg tablet TAKE ONE TABLET BY MOUTH TWICE DAILY WITH MEALS 180 Tab 3    DEXILANT 60 mg CpDB TAKE ONE CAPSULE BY MOUTH ONCE DAILY FOR  REFLUX 90 Cap 3    cyclobenzaprine (FLEXERIL) 10 mg tablet TAKE ONE-HALF (1/2) TO ONE TABLET NIGHTLY AS NEEDED FOR MUSCLE SPASMS 90 Tab 0    ONE TOUCH DELICA 33 gauge misc       Liraglutide (VICTOZA) 0.6 mg/0.1 mL (18 mg/3 mL) pnij 1.8 mg by SubCUTAneous route daily. 9 Pen 3    ELIDEL 1 % topical cream Apply  to affected area as needed (Eczema).  biotin 10,000 mcg cap Take  by mouth.  cholecalciferol, vitamin D3, 2,000 unit tab Take 2,000 Units by mouth daily.  levonorgestrel (MIRENA) 20 mcg/24 hr (5 years) IUD 1 Each by IntraUTERine route once.        Allergies   Allergen Reactions    Lexapro [Escitalopram] Other (comments)     Fatigue, wt gain    Naprosyn [Naproxen] Nausea and Vomiting     Tolerates Advil, Toradol, etc    Topamax [Topiramate] Other (comments)     Kidney stone    Zoloft [Sertraline] Other (comments)     Fatigue      Past Medical History:   Diagnosis Date    Acute lymphadenitis of arm, left epitrochlear 12/13/2017    Anxiety and depression 10/9/2014    10/2014; resolved as of 8/24/15 per pt    CAP (community acquired pneumonia) 1/8/2018    [facility] Admission 1/8/11-1/11/18    Chronic low back pain 10/9/2014    Muscular     Depression with anxiety 9/28/2017 10/2014    Diabetes mellitus type II 10/07    Consulted w/ Dr. Naveen Ponce    Diabetic eye exam ([facility] 2016    Casey Zhou MD, [employer]    Dysphagia 2010    GERD (gastroesophageal reflux disease) 2011    H. pylori infection 2016    Dr Louisa Josue    History of peptic ulcer disease 2016    16 yo    History of sinus tachycardia 10/5/2012    as of 8/24/15 pt states followed by PCP    Hypercholesterolemia 2016    ~2007    Kidney stone 2017    Lumbar disc herniation 2018    L4 and L5     (normal spontaneous vaginal delivery) 1998    Son  9yo    Pneumonia 2018    community acquired with sepsis     Post partum depression 10/9/2014    10/2013: counseling and prn Valium    Spondylosis of lumbar & thoracic region without myelopathy or radiculopathy 10/26/2016    xrays 2010    Spondylosis, Thoracic & Lumbar 3/5/2015    xrays 2010    Thyromegaly 2010    US negative    Vitamin D deficiency      Past Surgical History:   Procedure Laterality Date    HX CARPAL TUNNEL RELEASE Bilateral     HX  SECTION      HX CHOLECYSTECTOMY  10/09    LAP-Gallstones    HX GYN  2015    IUD placed    HX LITHOTRIPSY  2017    HX ORTHOPAEDIC Right 7/10/14    trigger thumb    HX ORTHOPAEDIC Left 7/10/14    wrist     Family History   Problem Relation Age of Onset    Diabetes Mother       ESRD 45 yo in     Hypertension Mother     Stroke Mother      TIA    Heart Disease Mother     High Cholesterol Paternal Grandmother     Hypertension Maternal Grandfather          Diabetes Maternal Grandmother     Hypertension Maternal Grandmother     High Cholesterol Maternal Grandmother     Cancer Maternal Grandmother      Breast CA     Heart Disease Maternal Grandmother       age 79    Colon Cancer Maternal Uncle       at 64yo     Social History   Substance Use Topics    Smoking status: Never Smoker    Smokeless tobacco: Never Used    Alcohol use 0.0 oz/week     0 Standard drinks or equivalent per week      Comment: not weekly, every couple of months per pt        Review of Systems:   Constitutional: Negative for fever, chills, weight loss, malaise/fatigue. HEENT: Negative for nosebleeds, vision changes. Respiratory: Negative for cough, hemoptysis  Cardiovascular: Negative for chest pain, + palpitations, no orthopnea, claudication, leg swelling, syncope, and PND. Gastrointestinal: Negative for nausea, vomiting, diarrhea, blood in stool and melena. Genitourinary: Negative for dysuria, and hematuria. Musculoskeletal: Negative for myalgias, arthralgia. Skin: Negative for rash. Heme: Does not bleed or bruise easily. Neurological: Negative for speech change and focal weakness     Objective:     Visit Vitals    /74 (BP 1 Location: Right arm, BP Patient Position: Sitting)    Pulse (!) 106    Ht 5' (1.524 m)    Wt 191 lb (86.6 kg)    BMI 37.3 kg/m2      Physical Exam:   Constitutional: well-developed and well-nourished. No respiratory distress. Head: Normocephalic and atraumatic. Eyes: Pupils are equal, round  ENT: hearing normal  Neck: supple. No JVD present. Cardiovascular: fast rate, regular rhythm. Exam reveals no gallop and no friction rub. No murmur heard. Pulmonary/Chest: Effort normal and breath sounds normal. No wheezes. Abdominal: Soft, obese  Musculoskeletal: no edema. Neurological: alert,oriented. Skin: Skin is warm and dry  Psychiatric: normal mood and affect. Behavior is normal. Judgment and thought content normal.        Assessment/Plan:       ICD-10-CM ICD-9-CM    1. Sinus tachycardia R00.0 427.89    2. Palpitations R00.2 785.1    3. Severe obesity (BMI 35.0-39. 9) with comorbidity ([facility] E66.01 278.01    4.       Diabetes Mellitus 2  Orders Placed This Encounter    metoprolol succinate (TOPROL-XL) 100 mg tablet     reviewed diet, exercise and weight control  reviewed medications and side effects in detail   I have discussed with her about increasing the beta-blocker and she would like to do that first by going up on the Toprol to 200 mg total in 1 day she will continue splitting the dose is Dr. Joe Mon had been for her with 100 mg bid  She and I talked about sotalol admission if she fails beta blocker  She does not appear to have POTS  Her sinus tachycardia appears to be inappropriate  We discussed risks and benefits of EP study and ablation for possible reentry sinus tachycardia or jaelyn atrial tachycardia  Future Appointments  Date Time Provider Cris John   8/13/2018 8:00 AM ECHO, [address]   8/24/2018 3:00 PM Suzy Olvera  [address]   11/28/2018 3:50 PM Tj Ornelas MD Count includes the [facility]     She said she will contact me after trying beta blocker and follow up with Dr Arash Billingsley repeat  Thank you for involving me in this patient's care and please call with further concerns or questions. Vibha Mortensen M.D.   Electrophysiology/Cardiology  [facility] [place]  [address] [place] [address] , [facility] [address]  [address]  [phone]

## 2018-07-05 ENCOUNTER — TELEPHONE (OUTPATIENT)
Dept: ENDOCRINOLOGY | Age: 38
End: 2018-07-05

## 2018-07-05 RX ORDER — FLUCONAZOLE 150 MG/1
TABLET ORAL
Qty: 2 TAB | Refills: 11 | Status: SHIPPED | OUTPATIENT
Start: 2018-07-05 | End: 2020-04-24 | Stop reason: ALTCHOICE

## 2018-07-05 NOTE — TELEPHONE ENCOUNTER
----- Message from [facility] sent at 7/5/2018  9:31 AM EDT -----  Regarding: FW: Prescription Question  Contact: [phone]      ----- Message -----     From: Ernesto Chapman     Sent: 7/3/2018   8:08 PM       To: [facility] Nurse Pool  Subject: Prescription Question                            Dr. Baltazar River. If possible would you please send diflucan to the [place] on file. I have started to break the pills(Jardiance) in half. But only for the past 5 days.  How soon after take half tablet and no relief should I see my GYN?

## 2018-08-13 ENCOUNTER — CLINICAL SUPPORT (OUTPATIENT)
Dept: CARDIOLOGY CLINIC | Age: 38
End: 2018-08-13

## 2018-08-13 DIAGNOSIS — R00.2 PALPITATIONS: Primary | ICD-10-CM

## 2018-08-27 ENCOUNTER — OFFICE VISIT (OUTPATIENT)
Dept: CARDIOLOGY CLINIC | Age: 38
End: 2018-08-27

## 2018-08-27 ENCOUNTER — TELEPHONE (OUTPATIENT)
Dept: ENDOCRINOLOGY | Age: 38
End: 2018-08-27

## 2018-08-27 VITALS
OXYGEN SATURATION: 98 % | RESPIRATION RATE: 16 BRPM | DIASTOLIC BLOOD PRESSURE: 80 MMHG | HEART RATE: 107 BPM | HEIGHT: 60 IN | BODY MASS INDEX: 38.28 KG/M2 | WEIGHT: 195 LBS | SYSTOLIC BLOOD PRESSURE: 110 MMHG

## 2018-08-27 DIAGNOSIS — R00.0 TACHYCARDIA: Primary | ICD-10-CM

## 2018-08-27 RX ORDER — GLIPIZIDE 10 MG/1
TABLET ORAL
Qty: 90 TAB | Refills: 3 | Status: SHIPPED | OUTPATIENT
Start: 2018-08-27 | End: 2019-07-24 | Stop reason: SDUPTHER

## 2018-08-27 NOTE — MR AVS SNAPSHOT
[address] [facility] [address] [facility] 
[phone] Patient: Paul Mcknight MRN: [id number] :1980 Visit Information Date & Time Provider Department Dept. Phone Encounter #  
 2018  3:20 PM Jessica Rudolph MD [facility] Rodger Mode [phone] [id number] Your Appointments 10/5/2018 10:00 AM  
ROUTINE CARE with Makenna Wheeler MD  
[facility]) Appt Note: annual visit, glm,18 cp$50; annual visit, glm, 18  
 [address]  
[phone]  
  
   
 [address] [address]  
  
    
 2018  3:50 PM  
Follow Up with Robyn Monet MD  
[facility]) Appt Note: f/u diabetes cp0.00; f/u diabetes cp0.00  
 [address] [address]  
[phone]  
  
   
 [address] [facility] [id number] Upcoming Health Maintenance Date Due  
 PAP AKA CERVICAL CYTOLOGY 3/3/2017 EYE EXAM RETINAL OR DILATED Q1 2018 Influenza Age 5 to Adult 2018 FOOT EXAM Q1 2018 HEMOGLOBIN A1C Q6M 2018 MICROALBUMIN Q1 2019 LIPID PANEL Q1 2019 DTaP/Tdap/Td series (2 - Tdap) 2023 Allergies as of 2018  Review Complete On: 2018 By: Denzel Chand Severity Noted Reaction Type Reactions Jardiance [Empagliflozin]  2018    Other (comments) Recurrent yeast infections Lexapro [Escitalopram]  2017    Other (comments) Fatigue, wt gain Naprosyn [Naproxen]  2010   Intolerance Nausea and Vomiting Tolerates Advil, Toradol, etc  
 Topamax [Topiramate]  03/15/2017    Other (comments) Kidney stone Zoloft [Sertraline]  2017    Other (comments) Fatigue Current Immunizations  Reviewed on 2018 Name Date DTaP 2013 Hepatitis B Vaccine 2/16/2010, 9/25/2009, 8/25/2009 Influenza Vaccine 10/16/2015, 10/23/2014, 10/9/2013 Influenza Vaccine (Quad) PF 11/2/2017, 10/26/2016 Influenza Vaccine Split 10/5/2012, 9/21/2011, 9/23/2010 Pneumococcal Vaccine (Unspecified Type) 9/18/2013 TB Skin Test (PPD) Intradermal 8/29/2013 TD Vaccine 3/25/2007 ZZZ-RETIRED (DO NOT USE) Pneumococcal Vaccine (Unspecified Type) 3/25/2007 Not reviewed this visit You Were Diagnosed With   
  
 Codes Comments Tachycardia    -  Primary ICD-10-CM: R00.0 ICD-9-CM: 211. 0 Vitals BP Pulse Resp Height(growth percentile) Weight(growth percentile) SpO2  
 110/80 (BP 1 Location: Left arm, BP Patient Position: Sitting) (!) 107 16 5' (1.524 m) 195 lb (88.5 kg) 98% BMI OB Status Smoking Status 38.08 kg/m2 IUD Never Smoker BMI and BSA Data Body Mass Index Body Surface Area 38.08 kg/m 2 1.94 m 2 Preferred Pharmacy Pharmacy Name Phone [facility] [address] [place] [address], [address] [place] [address] Dr [phone] Your Updated Medication List  
  
   
This list is accurate as of 8/27/18  4:22 PM.  Always use your most recent med list.  
  
  
  
  
 biotin 10,000 mcg Cap Take  by mouth. cholecalciferol (vitamin D3) 2,000 unit Tab Take 2,000 Units by mouth daily. cyclobenzaprine 10 mg tablet Commonly known as:  FLEXERIL  
TAKE ONE-HALF (1/2) TO ONE TABLET NIGHTLY AS NEEDED FOR MUSCLE SPASMS DEXILANT 60 mg Cpdb Generic drug:  Dexlansoprazole TAKE ONE CAPSULE BY MOUTH ONCE DAILY FOR  REFLUX  
  
 ELIDEL 1 % topical cream  
Generic drug:  pimecrolimus Apply  to affected area as needed (Eczema). FLECTOR 1.3 % Pt12 Generic drug:  diclofenac 1 Patch by TransDERmal route every twelve (12) hours every twelve (12) hours. fluconazole 150 mg tablet Commonly known as:  DIFLUCAN Take 1 tab now. May repeat in 2 days if yeast infection doesn't clear glipiZIDE 10 mg tablet Commonly known as:  Garcia Worley Take 1 tab 5-10 minutes before dinner  
  
 levothyroxine 88 mcg tablet Commonly known as:  SYNTHROID Take 1 tablet daily Liraglutide 0.6 mg/0.1 mL (18 mg/3 mL) Pnij Commonly known as:  VICTOZA  
1.8 mg by SubCUTAneous route daily. metFORMIN 1,000 mg tablet Commonly known as:  GLUCOPHAGE  
TAKE ONE TABLET BY MOUTH TWICE DAILY WITH MEALS  
  
 metoprolol succinate 100 mg tablet Commonly known as:  TOPROL XL Take 1 Tab by mouth two (2) times a day. MIRENA 20 mcg/24 hr (5 years) Iud  
Generic drug:  levonorgestrel 1 Each by IntraUTERine route once. One Touch Delica 33 gauge Misc Generic drug:  lancets ZIPSOR 25 mg capsule Generic drug:  diclofenac potassium Take 25 mg by mouth. We Performed the Following AMB POC EKG ROUTINE W/ 12 LEADS, INTER & REP [11575 CPT(R)] Patient Instructions 's nurse will call you back regarding appointment Introducing [facility] [place] [facility]! Dear Drake Infante: Thank you for requesting a [employer] account. Our records indicate that you already have an active [employer] account. You can access your account anytime at [email or web address] Did you know that you can access your hospital and ER discharge instructions at any time in [employer] You can also review all of your test results from your hospital stay or ER visit. Additional Information If you have questions, please visit the Frequently Asked Questions section of the [employer] website at [email or web address] Remember, [employer] is NOT to be used for urgent needs. For medical emergencies, dial 911. Now available from your iPhone and Android! Please provide this summary of care documentation to your next provider. Your primary care clinician is listed as VIDHI ORELLANA.  If you have any questions after today's visit, please call [phone].

## 2018-08-27 NOTE — TELEPHONE ENCOUNTER
----- Message from [facility] GEOFFREYChasity Escobar sent at 8/27/2018 10:59 AM EDT -----  Regarding: RE: RE: Prescription Question  Contact: [phone]  Sounds like a plan. Lets try this. You can send locally to the [address] [facility] [address]. Thank you and I will keep you posted. ----- Message -----  From: Susanna Yeung MD  Sent: 8/27/18, 10:51 AM  To: Yonatan Escobar  Subject: RE: Prescription Question    [id number] [address] Cotton I'll take this off your list.  If your fasting sugars are staying over 130, then I'm thinking we should increase the glipizide to 10 mg tabs to take 1 whole tab with dinner but if you skip dinner, just take 1/2 tab. Do you want to try this or give it another 1-2 weeks on the 5 mg dose with dinner while off the jardiance? Let me know when you have a chance. ----- Message -----     From: Brian Escobar     Sent: 8/26/2018 10:30 PM EDT       To: Susanna Yeung MD  Subject: Prescription Question    Good evening. Just wanted to give you a heads up that I have had to stop taking the Jardiance. The yeast infections continued even with half of a tablet. I've monitored it for quite some time now and notice if my sugars are up in a day or two I get a yeast infection. Totally expected but over the past 2 weeks I've been staying on top of checking my BS and even in the 100-200 ranges(fasting and after meals), I guess I'm pushing the sugar through my urine therefore continuing to cause the yeast infections and they are bad when they come. One the meds I'm having at least 2 a month. Since I've ceased this medication is there Any need for me to see you sooner than scheduled?

## 2018-08-27 NOTE — PROGRESS NOTES
HISTORY OF PRESENT ILLNESS  Jonatan Jacobo is a 40 y.o. female. .Patient with h/o AODM GERD DEPRESSION ANXIETY HLD PUD, here for evaluation of sinus tachycardia  Echo on 5/3/18: EF 50%, no valves issues  holter on 5/10/18: sinus tachycardia  average 108, KIT and no pacs or pvcs  Evaluated by EP as well: \"Her sinus tachycardia appears to be inappropriate  We discussed risks and benefits of EP study and ablation for possible reentry sinus tachycardia or jaelyn atrial tachycardia\"  ECHO on :Systolic function was normal by EF (4D LVQ and biplane  method of disks). Ejection fraction was estimated to be 53 % in the range  of 50 % to 55 %. There were no regional wall motion abnormalities. Doppler  parameters were consistent with abnormal left ventricular relaxation  (grade 1 diastolic dysfunction).   Annette Davis   Past Medical History:   Diagnosis Date    Acute lymphadenitis of arm, left epitrochlear 2017    Anxiety and depression 10/9/2014      10/2014; resolved as of 8/24/15 per pt    CAP (community acquired pneumonia) 2018      [facility] 11[facility]18    Chronic low back pain 10/9/2014      Muscular     Depression with anxiety 2017      10/2014    Diabetes mellitus type II 10/07      Consulted w/ Dr. Isa Webb    Diabetic eye exam ([facility] [address] 2016      Allen Salazar MD, [facility]    Dysphagia 2010    GERD (gastroesophageal reflux disease) 2011    H. pylori infection 2016      Dr Delvin Dickey    History of peptic ulcer disease 2016      16 yo    History of sinus tachycardia 10/5/2012      as of 8/24/15 pt states followed by PCP    Hypercholesterolemia 2016      ~2007    Kidney stone 2017    Lumbar disc herniation 2018      L4 and L5     (normal spontaneous vaginal delivery) 1998      Son  7yo    Pneumonia 2018      community acquired with sepsis     Post partum depression 10/9/2014      10/2013: counseling and prn Valium    Spondylosis of lumbar & thoracic region without myelopathy or radiculopathy 10/26/2016      xrays 2010    Spondylosis, Thoracic & Lumbar 3/5/2015      xrays 2010    Thyromegaly 2010      US negative    Vitamin D deficiency                        Past Surgical History:   Procedure Laterality Date    HX CARPAL TUNNEL RELEASE Bilateral       HX  SECTION          HX CHOLECYSTECTOMY    10/09      LAP-Gallstones    HX GYN    2015      IUD placed    HX LITHOTRIPSY    2017    HX ORTHOPAEDIC Right 7/10/14      trigger thumb    HX ORTHOPAEDIC Left 7/10/14      wrist                           Social History      Substance Use Topics       Smoking status: Never Smoker       Smokeless tobacco: Never Used       Alcohol use 0.0 oz/week         0 Standard drinks or equivalent per week            Comment: not weekly, every couple of months per pt          HPI  Doing ok still palpitations with exercise  Not much sob  No syncope  Review of Systems   Respiratory: Negative. Cardiovascular: Positive for palpitations. Negative for chest pain, orthopnea, claudication, leg swelling and PND. Physical Exam   Psychiatric: She has a normal mood and affect. Visit Vitals    /80 (BP 1 Location: Left arm, BP Patient Position: Sitting)    Pulse (!) 107    Resp 16    Ht 5' (1.524 m)    Wt 88.5 kg (195 lb)    SpO2 98%    BMI 38.08 kg/m2         ASSESSMENT and PLAN  Sinus tachycardia:   I still suspect IST. Discussed with patient its implications and significance .  HR better with toprol  cardizem was completely ineffective but HR still elevated at 107 on today's ECG  Discussed options  She is agreeable to sotalol  I will discuss with Dr. Enid Prajapati timing of elective admission for sotalol rx  Echo repeated with EF low normal at 53%     Borderline ejection fraction: I suspect this is related to her inappropriate sinus tachycardia and even for this reason need to aggressively control her heart rate.       Her TSH and other labs on 1/18 were ok      See her back in 6 weeks. after ep evaluation

## 2018-08-28 ENCOUNTER — TELEPHONE (OUTPATIENT)
Dept: CARDIOLOGY CLINIC | Age: 38
End: 2018-08-28

## 2018-08-28 NOTE — TELEPHONE ENCOUNTER
Per Dr. Opal You and Dr. Hailey Almanzar patient needs to be set up for hospitalization to initiate Sotalol administration. Verified patient with two types of identifiers. Patient states that after talking it over with her , she would like to get a second opinion. Notified patient to let our office know if she decides that she would like to move forward with Sotalol. Patient verbalized understanding and will call with any other questions.

## 2018-09-04 RX ORDER — METOPROLOL SUCCINATE 100 MG/1
100 TABLET, EXTENDED RELEASE ORAL 2 TIMES DAILY
Qty: 60 TAB | Refills: 5 | Status: SHIPPED | OUTPATIENT
Start: 2018-09-04

## 2018-09-04 NOTE — TELEPHONE ENCOUNTER
[facility]  To: Car Pearson MD  Sent: 9/3/2018 11:32 PM EDT  Subject: Medication Renewal Request    Original authorizing provider: Car Pearson MD    [facility] ERASMO Escobar would like a refill of the following medications:  metoprolol succinate (TOPROL-XL) 100 mg tablet Car Pearson MD]    Preferred pharmacy: [address] [place] [address] Adeline    Comment:

## 2018-09-04 NOTE — TELEPHONE ENCOUNTER
Request for Toprol- mg BID. Last office visit 6/28/18, next office visit not yet scheduled. Refills per verbal order from Dr. Khoi Wilburn.

## 2018-10-05 ENCOUNTER — OFFICE VISIT (OUTPATIENT)
Dept: FAMILY MEDICINE CLINIC | Age: 38
End: 2018-10-05

## 2018-10-05 VITALS
OXYGEN SATURATION: 98 % | BODY MASS INDEX: 37 KG/M2 | WEIGHT: 196 LBS | RESPIRATION RATE: 18 BRPM | TEMPERATURE: 98.1 F | HEIGHT: 61 IN | HEART RATE: 98 BPM | DIASTOLIC BLOOD PRESSURE: 64 MMHG | SYSTOLIC BLOOD PRESSURE: 96 MMHG

## 2018-10-05 DIAGNOSIS — E11.9 DIABETES MELLITUS TYPE 2, NONINSULIN DEPENDENT (HCC): ICD-10-CM

## 2018-10-05 DIAGNOSIS — Z23 ENCOUNTER FOR IMMUNIZATION: ICD-10-CM

## 2018-10-05 DIAGNOSIS — Z00.00 ROUTINE GENERAL MEDICAL EXAMINATION AT A HEALTH CARE FACILITY: Primary | ICD-10-CM

## 2018-10-05 DIAGNOSIS — F41.8 DEPRESSION WITH ANXIETY: ICD-10-CM

## 2018-10-05 DIAGNOSIS — E55.9 VITAMIN D DEFICIENCY: ICD-10-CM

## 2018-10-05 DIAGNOSIS — R00.0 INAPPROPRIATE SINUS TACHYCARDIA: ICD-10-CM

## 2018-10-05 DIAGNOSIS — F41.0 PANIC DISORDER: ICD-10-CM

## 2018-10-05 DIAGNOSIS — E78.00 HYPERCHOLESTEROLEMIA: ICD-10-CM

## 2018-10-05 DIAGNOSIS — E03.9 HYPOTHYROIDISM, UNSPECIFIED TYPE: ICD-10-CM

## 2018-10-05 PROBLEM — Q12.0 CONGENITAL CATARACT OF LEFT EYE: Status: ACTIVE | Noted: 2018-10-05

## 2018-10-05 RX ORDER — DILTIAZEM HYDROCHLORIDE 120 MG/1
120 CAPSULE, EXTENDED RELEASE ORAL DAILY
COMMUNITY
Start: 2018-09-05

## 2018-10-05 RX ORDER — IBUPROFEN 200 MG
400 TABLET ORAL EVERY EVENING
COMMUNITY
End: 2020-09-09

## 2018-10-05 RX ORDER — FLUOXETINE 10 MG/1
10 CAPSULE ORAL DAILY
Qty: 30 CAP | Refills: 3 | Status: SHIPPED | OUTPATIENT
Start: 2018-10-05 | End: 2019-01-16

## 2018-10-05 NOTE — PATIENT INSTRUCTIONS
Body Mass Index: Care Instructions Your Care Instructions Body mass index (BMI) can help you see if your weight is raising your risk for health problems. It uses a formula to compare how much you weigh with how tall you are. · A BMI lower than 18.5 is considered underweight. · A BMI between 18.5 and 24.9 is considered healthy. · A BMI between 25 and 29.9 is considered overweight. A BMI of 30 or higher is considered obese. If your BMI is in the normal range, it means that you have a lower risk for weight-related health problems. If your BMI is in the overweight or obese range, you may be at increased risk for weight-related health problems, such as high blood pressure, heart disease, stroke, arthritis or joint pain, and diabetes. If your BMI is in the underweight range, you may be at increased risk for health problems such as fatigue, lower protection (immunity) against illness, muscle loss, bone loss, hair loss, and hormone problems. BMI is just one measure of your risk for weight-related health problems. You may be at higher risk for health problems if you are not active, you eat an unhealthy diet, or you drink too much alcohol or use tobacco products. Follow-up care is a key part of your treatment and safety. Be sure to make and go to all appointments, and call your doctor if you are having problems. It's also a good idea to know your test results and keep a list of the medicines you take. How can you care for yourself at home? · Practice healthy eating habits. This includes eating plenty of fruits, vegetables, whole grains, lean protein, and low-fat dairy. · If your doctor recommends it, get more exercise. Walking is a good choice. Bit by bit, increase the amount you walk every day. Try for at least 30 minutes on most days of the week. · Do not smoke. Smoking can increase your risk for health problems.  If you need help quitting, talk to your doctor about stop-smoking programs and medicines. These can increase your chances of quitting for good. · Limit alcohol to 2 drinks a day for men and 1 drink a day for women. Too much alcohol can cause health problems. If you have a BMI higher than 25 · Your doctor may do other tests to check your risk for weight-related health problems. This may include measuring the distance around your waist. A waist measurement of more than 40 inches in men or 35 inches in women can increase the risk of weight-related health problems. · Talk with your doctor about steps you can take to stay healthy or improve your health. You may need to make lifestyle changes to lose weight and stay healthy, such as changing your diet and getting regular exercise. If you have a BMI lower than 18.5 · Your doctor may do other tests to check your risk for health problems. · Talk with your doctor about steps you can take to stay healthy or improve your health. You may need to make lifestyle changes to gain or maintain weight and stay healthy, such as getting more healthy foods in your diet and doing exercises to build muscle. Where can you learn more? Go to http://elizabeth-korina.info/. Enter S176 in the search box to learn more about \"Body Mass Index: Care Instructions. \" Current as of: October 13, 2016 Content Version: 11.4 © 8970-4599 Healthwise, [employer]. Care instructions adapted under license by [employer] (which disclaims liability or warranty for this information). If you have questions about a medical condition or this instruction, always ask your healthcare professional. [address] any warranty or liability for your use of this information. Learning About Diabetes Food Guidelines Your Care Instructions Meal planning is important to manage diabetes. It helps keep your blood sugar at a target level (which you set with your doctor).  You don't have to eat special foods. You can eat what your family eats, including sweets once in a while. But you do have to pay attention to how often you eat and how much you eat of certain foods. You may want to work with a dietitian or a certified diabetes educator (CDE) to help you plan meals and snacks. A dietitian or CDE can also help you lose weight if that is one of your goals. What should you know about eating carbs? Managing the amount of carbohydrate (carbs) you eat is an important part of healthy meals when you have diabetes. Carbohydrate is found in many foods. · Learn which foods have carbs. And learn the amounts of carbs in different foods. ¨ Bread, cereal, pasta, and rice have about 15 grams of carbs in a serving. A serving is 1 slice of bread (1 ounce), ½ cup of cooked cereal, or 1/3 cup of cooked pasta or rice. ¨ Fruits have 15 grams of carbs in a serving. A serving is 1 small fresh fruit, such as an apple or orange; ½ of a banana; ½ cup of cooked or canned fruit; ½ cup of fruit juice; 1 cup of melon or raspberries; or 2 tablespoons of dried fruit. ¨ Milk and no-sugar-added yogurt have 15 grams of carbs in a serving. A serving is 1 cup of milk or 2/3 cup of no-sugar-added yogurt. ¨ Starchy vegetables have 15 grams of carbs in a serving. A serving is ½ cup of mashed potatoes or sweet potato; 1 cup winter squash; ½ of a small baked potato; ½ cup of cooked beans; or ½ cup cooked corn or green peas. · Learn how much carbs to eat each day and at each meal. A dietitian or CDE can teach you how to keep track of the amount of carbs you eat. This is called carbohydrate counting. · If you are not sure how to count carbohydrate grams, use the Plate Method to plan meals. It is a good, quick way to make sure that you have a balanced meal. It also helps you spread carbs throughout the day. ¨ Divide your plate by types of foods.  Put non-starchy vegetables on half the plate, meat or other protein food on one-quarter of the plate, and a grain or starchy vegetable in the final quarter of the plate. To this you can add a small piece of fruit and 1 cup of milk or yogurt, depending on how many carbs you are supposed to eat at a meal. 
· Try to eat about the same amount of carbs at each meal. Do not \"save up\" your daily allowance of carbs to eat at one meal. 
· Proteins have very little or no carbs per serving. Examples of proteins are beef, chicken, turkey, fish, eggs, tofu, cheese, cottage cheese, and peanut butter. A serving size of meat is 3 ounces, which is about the size of a deck of cards. Examples of meat substitute serving sizes (equal to 1 ounce of meat) are 1/4 cup of cottage cheese, 1 egg, 1 tablespoon of peanut butter, and ½ cup of tofu. How can you eat out and still eat healthy? · Learn to estimate the serving sizes of foods that have carbohydrate. If you measure food at home, it will be easier to estimate the amount in a serving of restaurant food. · If the meal you order has too much carbohydrate (such as potatoes, corn, or baked beans), ask to have a low-carbohydrate food instead. Ask for a salad or green vegetables. · If you use insulin, check your blood sugar before and after eating out to help you plan how much to eat in the future. · If you eat more carbohydrate at a meal than you had planned, take a walk or do other exercise. This will help lower your blood sugar. What else should you know? · Limit saturated fat, such as the fat from meat and dairy products. This is a healthy choice because people who have diabetes are at higher risk of heart disease. So choose lean cuts of meat and nonfat or low-fat dairy products. Use olive or canola oil instead of butter or shortening when cooking. · Don't skip meals. Your blood sugar may drop too low if you skip meals and take insulin or certain medicines for diabetes. · Check with your doctor before you drink alcohol. Alcohol can cause your blood sugar to drop too low. Alcohol can also cause a bad reaction if you take certain diabetes medicines. Follow-up care is a key part of your treatment and safety. Be sure to make and go to all appointments, and call your doctor if you are having problems. It's also a good idea to know your test results and keep a list of the medicines you take. Where can you learn more? Go to http://elizabeth-korina.info/. Enter U059 in the search box to learn more about \"Learning About Diabetes Food Guidelines. \" Current as of: December 7, 2017 Content Version: 11.8 © 8511-5440 Healthwise, [employer]. Care instructions adapted under license by [employer] (which disclaims liability or warranty for this information). If you have questions about a medical condition or this instruction, always ask your healthcare professional. [address] any warranty or liability for your use of this information.

## 2018-10-05 NOTE — PROGRESS NOTES
Chief Complaint Patient presents with  Complete Physical  
  fasting - has gyn - Dr Hayden Horta 1. Have you been to the ER, urgent care clinic since your last visit? Hospitalized since your last visit? No 
 
2. Have you seen or consulted any other health care providers outside of the [address] [place] [address] since your last visit? Include any pap smears or colon screening. Yes Dr Galvin Bending  
        Dr Johnson Innocent Did have a recent eye exam with Dr Yasmani Bedolla - will fax for results

## 2018-10-05 NOTE — PROGRESS NOTES
HISTORY OF PRESENT ILLNESS  
HPI Annual CPE fasting and follow up diabetes, hypercholesterolemia, tachycardia, anxiety and depression. Patient had been under the care of Cardio Dr Minh Renee for evaluation and mgt of heart palpitations and inappropriate sinus tachy who subsequently referred her to [facility] I had started pt on Diltiazem last year which was controlling her heart palpitation sx but her heart rates were still running high. So she was changed to Toprol, still w/ good control of the heart palp but rates still high. Dr Karl Chawla had recommended hospitalizing her and putting her on Betapace. Patient declined and instead went to see Dr Purvi Paige for a 2nd opinion. She saw him for that first visit early September at which time he restarted the Diltiazem for her to take along w/ the Toprol. At her follow up there on 9-17-18, he was very pleased w/ her heart rate in the 90's and she continued to be sx free. Her primary complaint is that of being overweight, depressed and anxious. Her anxiety is starting to get bad again to the point of having \"anxiety attacks\". Last year I had her on Prozac 10 mg daily and she did really really well on that. Upon review of her follow up notes during that time she reported that her mood was much better, she was no longer feeling anxious, sad, depressed or irritated. She reported that people around her (friends and family) also noticed a much more pleasant, friendly, calm demeanor. She also tolerated it really well. She was feeling so well while on it, she thinks she eventually stopped it because she was doing so much better. Now she regrets it. Shortly after being off it is when her moodiness and anxiety resurfaced. Her wt is making her feel depressed and she feels like if she can get that down, she would also feel better. She just has not been motivated to exercise any. She is willing to go back on the Prozac. She is under the care of Endo Dr Yasmine Yu for Diabetes mgt. At her last visit her HgBA1c had come down to 8.3 from the 10 range but she states he added Glipizide to reduce it further. She checks her BS's bid. Fasting in AM runs 120-180 
2 hrs after meals runs 140-200 REVIEW OF SYMPTOMS  
  Review of Systems Constitutional: Negative. Respiratory: Negative. Negative for shortness of breath. Cardiovascular: Negative for chest pain and palpitations. Gastrointestinal: Negative. Genitourinary: Negative. Musculoskeletal: Negative for myalgias. Neurological: Negative.   
 
 
  
 PROBLEM LIST/MEDICAL HISTORY  
  
Problem List  Date Reviewed: 10/5/2018 Codes Class Noted Inappropriate sinus tachycardia ICD-10-CM: R00.0 ICD-9-CM: 427.89  10/5/2018 Overview Signed 10/5/2018 10:57 AM by Solomon Albert MD  
  [facility] [id number]: Dr Alexander Pierce, Dr Benjamín Izaguirre; self referred for 2nd opinion Dr. Rhett Faith 7-1979; managed w/ CCB & BB Congenital cataract of left eye ICD-10-CM: Q12.0 ICD-9-CM: 743.30  10/5/2018 Overview Signed 10/5/2018 11:11 AM by Solomon Albert MD  
  [id number] Severe obesity (BMI 35.0-39. 9) with comorbidity ([facility] [address] ICD-10-CM: E66.01 
ICD-9-CM: 278.01  4/24/2018 CAP (community acquired pneumonia) ICD-10-CM: J18.9 ICD-9-CM: 172  1/8/2018 Overview Signed 1/22/2018 12:08 PM by Solomon Albert MD  
  [facility] Admission 1/8/11-1/11/18 Bulging of intervertebral disc between L4 and L5 ICD-10-CM: M51.26 
ICD-9-CM: 722.10  1/8/2018 UTI (urinary tract infection) ICD-10-CM: N39.0 ICD-9-CM: 599.0  1/8/2018 Acute lymphadenitis of arm, left epitrochlear ICD-10-CM: T39.6 ICD-9-CM: 131  12/13/2017 Depression with anxiety ICD-10-CM: F41.8 ICD-9-CM: 300.4  9/28/2017 Overview Signed 9/28/2017 12:42 PM by Solomon Albert MD  
  10/2014  Lumbar facet arthropathy ICD-10-CM: M47.816 
 ICD-9-CM: 721.3  8/28/2017 Overview Signed 8/28/2017  9:20 PM by Kayla Tanner MD  
  On CT scan Heart palpitations ICD-10-CM: R00.2 ICD-9-CM: 785.1  8/16/2017 Overview Signed 10/5/2018 10:56 AM by Kayla Tanner MD  
  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  
  xrays 2010 Hypercholesterolemia ICD-10-CM: E78.00 ICD-9-CM: 272.0  8/18/2016 History of peptic ulcer disease ICD-10-CM: Z87.11 ICD-9-CM: V12.71  8/18/2016 Overview Signed 8/18/2016 10:16 AM by Kayla Tanner MD  
  18 yo Vitamin D deficiency ICD-10-CM: E55.9 ICD-9-CM: 268.9  Unknown Obesity, Class I, BMI 30-34.9 ICD-10-CM: E66.9 ICD-9-CM: 278.00  11/18/2015 Post partum depression ICD-10-CM: F53.0 ICD-9-CM: 648.44, 311  10/9/2014 Overview Signed 10/9/2014 12:24 PM by Kayla Tanner MD  
  10/2013: counseling and prn Valium Chronic low back pain & Muscle Spasms ICD-10-CM: M54.5, G89.29 ICD-9-CM: 724.2, 338.29  10/9/2014 Overview Signed 10/9/2014 12:36 PM by Kayla Tanner MD  
  Muscular Bilateral carpal tunnel syndrome ICD-10-CM: G56.03 
ICD-9-CM: 354.0  12/31/2013 Overview Addendum 12/31/2013 10:28 AM by Kayla Tanner MD  
  10/2013; wrist splints Goiter ICD-10-CM: E04.9 ICD-9-CM: 240.9  1/11/2013 History of sinus tachycardia ICD-10-CM: Z86.79 
ICD-9-CM: V12.59  10/5/2012 Overview Signed 10/5/2012  9:13 AM by Kayla Tanner MD  
  Since her early 20's GERD (gastroesophageal reflux disease) ICD-10-CM: K21.9 ICD-9-CM: 530.81  6/23/2011 Overview Signed 6/23/2011  9:20 AM by Kayla Tanner MD  
  2011 Dysphagia, workup negative except GERD ICD-10-CM: R13.10 ICD-9-CM: 787.20  2010 Overview Addendum 2011  9:21 AM by Monique Bosch MD  
  2010: Thyroid US normal 
ENT eval;  
Barium Swallow =GERD only Anemia ICD-10-CM: D64.9 ICD-9-CM: 285.9  2010 Type II diabetes mellitus, uncontrolled ([address] ICD-10-CM: E11.65 ICD-9-CM: 250.02  Unknown Overview Signed 10/5/2012  9:18 AM by Monique Bosch MD  
  Eye exams: [facility] Podiatrist: anabel Velez Gallstones ICD-10-CM: K80.20 ICD-9-CM: 574.20  Unknown Overview Signed 3/25/2010 10:31 AM by Monique Bosch MD  
  Lap Cholecystectomy-Dr. Millie [facility] (normal spontaneous vaginal delivery) ICD-10-CM: O80 
ICD-9-CM: 508  Unknown Overview Signed 3/25/2010 10:31 AM by Monique Bosch MD  
  Son  9yo 
  
  
   
  
 
 
  PAST SURGICAL HISTORY  
   
Past Surgical History:  
Procedure Laterality Date  HX CARPAL TUNNEL RELEASE Bilateral   
 HX  SECTION    
 HX CHOLECYSTECTOMY  10/09 LAP-Gallstones  HX GYN  2015 IUD placed  HX LITHOTRIPSY  2017  HX ORTHOPAEDIC Right 7/10/14  
 trigger thumb  HX ORTHOPAEDIC Left 7/10/14  
 wrist  
 
 
 
MEDICATIONS  
  
Current Outpatient Prescriptions Medication Sig  CARTIA  mg ER capsule Take 120 mg by mouth daily.  ibuprofen (MOTRIN IB) 200 mg tablet Take 800 mg by mouth every evening. Takes ~ 3-4 nights a week  metoprolol succinate (TOPROL-XL) 100 mg tablet Take 1 Tab by mouth two (2) times a day.  glipiZIDE (GLUCOTROL) 10 mg tablet Take 1 tab 5-10 minutes before dinner  fluconazole (DIFLUCAN) 150 mg tablet Take 1 tab now. May repeat in 2 days if yeast infection doesn't clear  diclofenac (FLECTOR) 1.3 % pt12 1 Patch by TransDERmal route every twelve (12) hours every twelve (12) hours.  levothyroxine (SYNTHROID) 88 mcg tablet Take 1 tablet daily  metFORMIN (GLUCOPHAGE) 1,000 mg tablet TAKE ONE TABLET BY MOUTH TWICE DAILY WITH MEALS  DEXILANT 60 mg CpDB TAKE ONE CAPSULE BY MOUTH ONCE DAILY FOR  REFLUX  cyclobenzaprine (FLEXERIL) 10 mg tablet TAKE ONE-HALF (1/2) TO ONE TABLET NIGHTLY AS NEEDED FOR MUSCLE SPASMS  Liraglutide (VICTOZA) 0.6 mg/0.1 mL (18 mg/3 mL) pnij 1.8 mg by SubCUTAneous route daily.  ELIDEL 1 % topical cream Apply  to affected area as needed (Eczema).  biotin 10,000 mcg cap Take  by mouth.  cholecalciferol, vitamin D3, 2,000 unit tab Take 2,000 Units by mouth daily.  levonorgestrel (MIRENA) 20 mcg/24 hr (5 years) IUD 1 Each by IntraUTERine route once. No current facility-administered medications for this visit. ALLERGIES Allergies Allergen Reactions  Jardiance [Empagliflozin] Other (comments) Recurrent yeast infections  Lexapro [Escitalopram] Other (comments) Fatigue, wt gain  Naprosyn [Naproxen] Nausea and Vomiting Tolerates Advil, Toradol, etc  
 Topamax [Topiramate] Other (comments) Kidney stone  Zoloft [Sertraline] Other (comments) Fatigue SOCIAL HISTORY  
   
Social History Social History  Marital status:  Spouse name: N/A  
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  
  0 Standard drinks or equivalent per week Comment: not weekly, every couple of months per pt  Drug use: No  
 Sexual activity: Yes  
  Partners: Male Birth control/ protection: IUD Other Topics Concern  Caffeine Concern No  
  very seldom  Special Diet No  
  stress eating  Back Care Yes  
  completed PT 2 x a week x several months 2017  Exercise No  
 
 Social History Narrative Got  ~ . Has 1 daughter; Long term relationship and has custody of her stepson; her biological son  at age 9yo in 66. 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  
 
[address] Immunization History Administered Date(s) Administered  DTaP 2013  Hepatitis B Vaccine 2009, 2009, 2010  Influenza Vaccine 10/09/2013, 10/23/2014, 10/16/2015  Influenza Vaccine (Quad) PF 10/26/2016, 2017, 10/05/2018  Influenza Vaccine Split 2010, 2011, 10/05/2012  Pneumococcal Vaccine (Unspecified Type) 2013  TB Skin Test (PPD) Intradermal 2013  TD Vaccine 2007  ZZZ-RETIRED (DO NOT USE) Pneumococcal Vaccine (Unspecified Type) 2007 FAMILY HISTORY Family History Problem Relation Age of Onset  Diabetes Mother   
   ESRD 45 yo in   Hypertension Mother  Stroke Mother TIA  Heart Disease Mother  Diabetes Sister 29  
  type 2  
 No Known Problems Brother  High Cholesterol Paternal Grandmother  Hypertension Maternal Grandfather   
    Diabetes Maternal Grandmother  Hypertension Maternal Grandmother  High Cholesterol Maternal Grandmother  Cancer Maternal Grandmother Breast CA  Heart Disease Maternal Grandmother   
   age 79  Colon Cancer Maternal Uncle   
   at 62yo [facility] Visit Vitals  BP 96/64 (BP 1 Location: Left arm, BP Patient Position: Sitting)  Pulse 98  Temp 98.1 °F (36.7 °C) (Oral)  Resp 18  Ht 5' 1\" (1.549 m)  Wt 196 lb (88.9 kg)  SpO2 98%  BMI 37.03 kg/m2 PHYSICAL EXAMINATION  
  Physical Exam  
Constitutional: She is oriented to person, place, and time and well-developed, well-nourished, and in no distress.   
HENT:  
Right Ear: Tympanic membrane normal.  
Left Ear: Tympanic membrane normal.  
 Mouth/Throat: Oropharynx is clear and moist. No oropharyngeal exudate. Eyes: Conjunctivae and EOM are normal. Pupils are equal, round, and reactive to light. Neck: Carotid bruit is not present. Cardiovascular: Regular rhythm and normal heart sounds. Exam reveals no gallop and no friction rub. No murmur heard. Pulmonary/Chest: Effort normal and breath sounds normal.  
Abdominal: Soft. She exhibits no distension and no mass. There is no tenderness. Musculoskeletal: She exhibits no edema or tenderness. Lymphadenopathy:  
  She has no cervical adenopathy. Neurological: She is alert and oriented to person, place, and time. Gait normal.  
Skin: Skin is warm and dry. Psychiatric: Mood, affect and judgment normal.  
Vitals reviewed. 
 
 
  
 DIAGNOSTIC DATA  
 
  
 LABORATORY DATA  
   
Lab Results Component Value Date/Time WBC 10.7 01/22/2018 11:47 AM  
HGB 11.7 01/22/2018 11:47 AM  
HCT 37.1 01/22/2018 11:47 AM  
PLATELET 190 16/03/6733 11:47 AM  
MCV 90 01/22/2018 11:47 AM  
 
Lab Results Component Value Date/Time Hemoglobin A1c 8.3 (H) 06/21/2018 08:16 AM  
Hemoglobin A1c 10.4 (H) 01/30/2018 07:57 AM  
Hemoglobin A1c 10.0 (H) 01/09/2018 03:32 AM  
Glucose 127 (H) 06/21/2018 08:16 AM  
Glucose (POC) 155 (H) 01/11/2018 07:04 AM  
Microalb/Creat ratio (ug/mg creat.) 24.9 01/30/2018 07:57 AM  
LDL, calculated 116 (H) 06/21/2018 08:16 AM  
Creatinine 0.60 06/21/2018 08:16 AM  
  
Lab Results Component Value Date/Time Cholesterol, total 181 06/21/2018 08:16 AM  
HDL Cholesterol 51 06/21/2018 08:16 AM  
LDL, calculated 116 (H) 06/21/2018 08:16 AM  
Triglyceride 70 06/21/2018 08:16 AM  
 
Lab Results Component Value Date/Time TSH 1.810 01/30/2018 07:57 AM  
T4, Free 1.49 09/05/2017 07:58 AM  
  
Lab Results Component Value Date/Time  Sodium 140 06/21/2018 08:16 AM  
 Potassium 4.3 06/21/2018 08:16 AM  
 Chloride 102 06/21/2018 08:16 AM  
 CO2 22 06/21/2018 08:16 AM  
 Anion gap 11 01/11/2018 05:25 AM  
 Glucose 127 (H) 06/21/2018 08:16 AM  
 BUN 11 06/21/2018 08:16 AM  
 Creatinine 0.60 06/21/2018 08:16 AM  
 BUN/Creatinine ratio 18 06/21/2018 08:16 AM  
 GFR est  06/21/2018 08:16 AM  
 GFR est non- 06/21/2018 08:16 AM  
 Calcium 9.1 06/21/2018 08:16 AM  
 Bilirubin, total 0.3 01/30/2018 07:57 AM  
 ALT (SGPT) 28 01/30/2018 07:57 AM  
 AST (SGOT) 17 01/30/2018 07:57 AM  
 Alk. phosphatase 102 01/30/2018 07:57 AM  
 Protein, total 7.2 01/30/2018 07:57 AM  
 Albumin 4.2 01/30/2018 07:57 AM  
 Globulin 4.1 (H) 01/09/2018 03:32 AM  
 A-G Ratio 1.4 01/30/2018 07:57 AM  
  
Lab Results Component Value Date/Time Hemoglobin A1c 8.3 (H) 06/21/2018 08:16 AM  
   
 
 ASSESSMENT & PLAN  
 
  ICD-10-CM ICD-9-CM 1. Routine general medical examination at a health care facility Z00.00 V70.0 2. Diabetes mellitus type 2, noninsulin dependent (HCC) E11.9 250.00 HM DIABETES EYE EXAM  
   HEMOGLOBIN A1C WITH EAG  
   METABOLIC PANEL, COMPREHENSIVE MICROALBUMIN, UR, RAND W/ MICROALB/CREAT RATIO 3. Hypercholesterolemia E78.00 272.0 LIPID PANEL 4. Depression with anxiety F41.8 300.4 FLUoxetine (PROZAC) 10 mg capsule 5. Panic disorder F41.0 300.01 FLUoxetine (PROZAC) 10 mg capsule 6. Hypothyroidism, unspecified type E03.9 244.9 T4, FREE  
   TSH 3RD GENERATION  
7. Inappropriate sinus tachycardia R00.0 427.89   
8. Vitamin D deficiency E55.9 268.9 VITAMIN D, 25 HYDROXY 9. Encounter for immunization Z23 V03.89 INFLUENZA VIRUS VAC QUAD,SPLIT,PRESV FREE SYRINGE IM  
   OR IMMUNIZ ADMIN,1 SINGLE/COMB VAC/TOXOID She is under the care of Endo Dr Becka Rodrigues for Diabetes mgt. At her last visit her HgBA1c had come down to 8.3 from the 10 range but she states he added Glipizide to reduce it further. Fasting labs today to include those ordered by Dr. Becka Rodrigues for her upcoming appt w/ him Cardiovascular risk and specific lipid/LDL/BS/HgBA1c goals reviewed Specific diabetic recommendations: annual eye examinations at Ophthalmology discussed and long term diabetic complications discussed Saw her eye doctor in 3-2018, eye exam report requested She states she gets her diabetic foot exams done per Dr. Arnold Raines and will be having that done at her next appt w/ him coming up soon Sees gyn annually for well woman visits/gyn exams Restart Prozac 10 mg daily which worked effectively for her last year Reviewed all medications, effects and side effects Further follow up & other recommendations pending review of labs.  If all remains good and stable, follow up in 3 months for medication check on Prozac, call back sooner in the interim prn

## 2018-10-06 LAB
25(OH)D3+25(OH)D2 SERPL-MCNC: 35.5 NG/ML (ref 30–100)
ALBUMIN SERPL-MCNC: 4 G/DL (ref 3.5–5.5)
ALBUMIN/CREAT UR: 4.5 MG/G CREAT (ref 0–30)
ALBUMIN/GLOB SERPL: 1.4 {RATIO} (ref 1.2–2.2)
ALP SERPL-CCNC: 75 IU/L (ref 39–117)
ALT SERPL-CCNC: 13 IU/L (ref 0–32)
AST SERPL-CCNC: 12 IU/L (ref 0–40)
BILIRUB SERPL-MCNC: 0.3 MG/DL (ref 0–1.2)
BUN SERPL-MCNC: 7 MG/DL (ref 6–20)
BUN/CREAT SERPL: 14 (ref 9–23)
CALCIUM SERPL-MCNC: 8.9 MG/DL (ref 8.7–10.2)
CHLORIDE SERPL-SCNC: 101 MMOL/L (ref 96–106)
CHOLEST SERPL-MCNC: 178 MG/DL (ref 100–199)
CO2 SERPL-SCNC: 23 MMOL/L (ref 20–29)
CREAT SERPL-MCNC: 0.51 MG/DL (ref 0.57–1)
CREAT UR-MCNC: 68.6 MG/DL
EST. AVERAGE GLUCOSE BLD GHB EST-MCNC: 189 MG/DL
GLOBULIN SER CALC-MCNC: 2.9 G/DL (ref 1.5–4.5)
GLUCOSE SERPL-MCNC: 95 MG/DL (ref 65–99)
HBA1C MFR BLD: 8.2 % (ref 4.8–5.6)
HDLC SERPL-MCNC: 53 MG/DL
INTERPRETATION, 910389: NORMAL
LDLC SERPL CALC-MCNC: 113 MG/DL (ref 0–99)
MICROALBUMIN UR-MCNC: 3.1 UG/ML
POTASSIUM SERPL-SCNC: 4 MMOL/L (ref 3.5–5.2)
PROT SERPL-MCNC: 6.9 G/DL (ref 6–8.5)
SODIUM SERPL-SCNC: 139 MMOL/L (ref 134–144)
T4 FREE SERPL-MCNC: 1.59 NG/DL (ref 0.82–1.77)
TRIGL SERPL-MCNC: 60 MG/DL (ref 0–149)
TSH SERPL DL<=0.005 MIU/L-ACNC: 1.96 UIU/ML (ref 0.45–4.5)
VLDLC SERPL CALC-MCNC: 12 MG/DL (ref 5–40)

## 2018-11-28 ENCOUNTER — OFFICE VISIT (OUTPATIENT)
Dept: ENDOCRINOLOGY | Age: 38
End: 2018-11-28

## 2018-11-28 VITALS
HEIGHT: 61 IN | WEIGHT: 197.2 LBS | BODY MASS INDEX: 37.23 KG/M2 | HEART RATE: 93 BPM | DIASTOLIC BLOOD PRESSURE: 76 MMHG | SYSTOLIC BLOOD PRESSURE: 118 MMHG

## 2018-11-28 DIAGNOSIS — E78.00 HYPERCHOLESTEROLEMIA: ICD-10-CM

## 2018-11-28 DIAGNOSIS — E55.9 VITAMIN D DEFICIENCY: ICD-10-CM

## 2018-11-28 DIAGNOSIS — E66.9 OBESITY, CLASS I, BMI 30-34.9: ICD-10-CM

## 2018-11-28 DIAGNOSIS — R00.0 TACHYCARDIA: ICD-10-CM

## 2018-11-28 DIAGNOSIS — E04.9 GOITER: ICD-10-CM

## 2018-11-28 RX ORDER — TOPIRAMATE 50 MG/1
TABLET, FILM COATED ORAL
Qty: 30 TAB | Refills: 11 | Status: SHIPPED | OUTPATIENT
Start: 2018-11-28 | End: 2019-03-13

## 2018-11-28 NOTE — PROGRESS NOTES
Chief Complaint Patient presents with  Diabetes  
  pcp and pharmacy confirmed History of Present Illness: Michele Pierce is a 41 y.o. female here for follow up of diabetes. Weight up 6 lbs since last visit in 6/18. In 8/18 we e-mailed about the continued yeast infections despite cutting back to 5 mg of jardiance so we agreed to stop the jardiance and increase the glipizide to 10 mg at dinner. However, despite stopping the jardiance is still having yeast infections about 2 per month requiring diflucan. She ended up having her toprol XL increased to 100 mg bid and saw Dr. Scottie Lazo and he recommended sotalol but she went for a 2nd opinion with Dr. Elías Damian who recommended holding on this and instead added back dilt 120 mg daily and this combination has kept her resting heart rate in the 90s with some in the 80s and not ever over 100 unless she has red wine or exercises and feels fine at this time. She discussed with Dr. Salvador Randle in 10/18 about going back on prozac and has been taking this and mood has been better but still having some headaches despite starting this and still does have some episodes of anxiety especially at work. Has been off victoza since mid-October due to cost with her change in insurance. Fasting sugars were in the  range on the victoza but off this thinks they are closer to the 140s. Still running close to 180s after eating with some over 200. She would like to try going back on topamax to see if this will help with headaches and will take the chance that this may cause another kidney stone as it's unclear if this was truly the cause of the kidney stone or not as she was drinking a lot of coffee and energy drinks at the time of the kidney stone. Current Outpatient Medications Medication Sig  CARTIA  mg ER capsule Take 120 mg by mouth daily.  ibuprofen (MOTRIN IB) 200 mg tablet Take 800 mg by mouth every evening. Takes ~ 3-4 nights a week  FLUoxetine (PROZAC) 10 mg capsule Take 1 Cap by mouth daily. Indications: ANXIETY WITH DEPRESSION, Panic Disorder  metoprolol succinate (TOPROL-XL) 100 mg tablet Take 1 Tab by mouth two (2) times a day.  glipiZIDE (GLUCOTROL) 10 mg tablet Take 1 tab 5-10 minutes before dinner  fluconazole (DIFLUCAN) 150 mg tablet Take 1 tab now. May repeat in 2 days if yeast infection doesn't clear  diclofenac (FLECTOR) 1.3 % pt12 1 Patch by TransDERmal route every twelve (12) hours every twelve (12) hours.  levothyroxine (SYNTHROID) 88 mcg tablet Take 1 tablet daily  metFORMIN (GLUCOPHAGE) 1,000 mg tablet TAKE ONE TABLET BY MOUTH TWICE DAILY WITH MEALS  DEXILANT 60 mg CpDB TAKE ONE CAPSULE BY MOUTH ONCE DAILY FOR  REFLUX  cyclobenzaprine (FLEXERIL) 10 mg tablet TAKE ONE-HALF (1/2) TO ONE TABLET NIGHTLY AS NEEDED FOR MUSCLE SPASMS  ELIDEL 1 % topical cream Apply  to affected area as needed (Eczema).  cholecalciferol, vitamin D3, 2,000 unit tab Take 2,000 Units by mouth daily.  levonorgestrel (MIRENA) 20 mcg/24 hr (5 years) IUD 1 Each by IntraUTERine route once. No current facility-administered medications for this visit. Allergies Allergen Reactions  Jardiance [Empagliflozin] Other (comments) Recurrent yeast infections  Lexapro [Escitalopram] Other (comments) Fatigue, wt gain  Naprosyn [Naproxen] Nausea and Vomiting Tolerates Advil, Toradol, etc  
 Topamax [Topiramate] Other (comments) Kidney stone  Zoloft [Sertraline] Other (comments) Fatigue Review of Systems: - Eyes: no blurry vision or double vision - Cardiovascular: no chest pain - Respiratory: no shortness of breath - Musculoskeletal: no myalgias - Neurological: no numbness/tingling in extremities Physical Examination: 
Blood pressure 118/76, pulse 93, height 5' 1\" (1.549 m), weight 197 lb 3.2 oz (89.4 kg). - General: pleasant, no distress, good eye contact - Neck: no carotid bruits - Cardiovascular: regular, normal rate, nl s1 and s2, no m/r/g,  
- Respiratory: clear bilaterally - Integumentary: no edema,  
- Psychiatric: normal mood and affect Diabetic foot exam:  
 
Left Foot: 
 Visual Exam: callous - mild Pulse DP: 2+ (normal) Filament test: normal sensation Vibratory sensation: normal 
   
Right Foot: 
 Visual Exam: callous - mild Pulse DP: 2+ (normal) Filament test: normal sensation Vibratory sensation: normal 
 
 
 
Data Reviewed:  
Component Latest Ref Rng & Units 10/5/2018 10/5/2018 10/5/2018 10/5/2018 11:22 AM 11:22 AM 11:22 AM 11:22 AM  
Creatinine, urine Not Estab. mg/dL 68.6 Microalbumin, urine Not Estab. ug/mL 3.1 Microalbumin/Creat. Ratio 
    0.0 - 30.0 mg/g creat 4.5     
T4, Free 0.82 - 1.77 ng/dL    1.59  
TSH 
    0.450 - 4.500 uIU/mL   1.960   
VITAMIN D, 25-HYDROXY 30.0 - 100.0 ng/mL  35.5 Component Latest Ref Rng & Units 10/5/2018 10/5/2018 10/5/2018 11:22 AM 11:22 AM 11:22 AM  
Glucose 65 - 99 mg/dL 95 BUN 
    6 - 20 mg/dL 7 Creatinine 
    0.57 - 1.00 mg/dL 0.51 (L)    
GFR est non-AA 
    >59 mL/min/1.73 124 GFR est AA 
    >59 mL/min/1.73 143 BUN/Creatinine ratio 9 - 23 14 Sodium 134 - 144 mmol/L 139 Potassium 3.5 - 5.2 mmol/L 4.0 Chloride 96 - 106 mmol/L 101 CO2 
    20 - 29 mmol/L 23 Calcium 8.7 - 10.2 mg/dL 8.9 Protein, total 
    6.0 - 8.5 g/dL 6.9 Albumin 3.5 - 5.5 g/dL 4.0    
GLOBULIN, TOTAL 
    1.5 - 4.5 g/dL 2.9 A-G Ratio 1.2 - 2.2 1.4 Bilirubin, total 
    0.0 - 1.2 mg/dL 0.3 Alk. phosphatase 39 - 117 IU/L 75 AST 
    0 - 40 IU/L 12    
ALT (SGPT) 0 - 32 IU/L 13 Cholesterol, total 
    100 - 199 mg/dL  178 Triglyceride 0 - 149 mg/dL  60 HDL Cholesterol >39 mg/dL  53 VLDL, calculated     5 - 40 mg/dL  12   
 LDL, calculated 0 - 99 mg/dL  113 (H) Hemoglobin A1c, (calculated) 4.8 - 5.6 %   8.2 (H) Estimated average glucose 
    mg/dL   189 Assessment/Plan: 1. Diabetes mellitus type II: her most recent Hgb A1c was 8.2% in 10/18 down from 8.3% in 6/18 down from 10.4% in 1/18 up from 8.1% in 9/17 up from 7.9% in 4/17 down from 8.1% in 12/16 up from 7% in 7/16 down from 7.7% in 3/16 down from 7.8% in 11/15 down from 8.3% in 7/15 down from 8.8% in 3/15 up from 7.6% in 11/14 up from 7.3% in 7/14 down from 8% in 3/14 up from 6.1% in 11/13 down from 6.7% in 9/13 up from 6.3% in 8/13 up slightly from 6.1% in 7/13 down from 6.3% in 5/13 down from 6.7% in 1/13 up from 6.5% in Oct 2012. A1c is slowly coming down. Now is off victoza due to cost but may consider a different GLP-1 like trulicity, which she previously tolerated at the 0.75 mg dose, or bydureon but first will focus on wt loss with adding back topamax as below. - cont metformin 1g bid 
- cont glipizide 10 mg before dinner (1/2 tab if she skips dinner) - check bs up to 3 times daily due to fluctuating sugars 
- foot exam done 11/18 
- microalbumin nl 10/12--was taken off lisinopril 10 mg daily due to trying to conceive and repeat normal ever since, most recently in 10/18 
- optho UTD 4/18--will obtain report 
- check Hgb A1c and cmp prior to next visit 2. Goiter: Had a TSH of 2.68 in May 2012. Level was 2.4 in 1/13 and I started her on a a low dose of levothyroxine 25 mcg daily to keep her TSH < 2.5 to improve her chance of conception and interestingly she became pregnant shortly after starting this. TSH 2.1 in 5/13 and 1.6 in 7/13 and 1.39 in 9/13. Stopped levothyroxine after delivery in 10/13 and TSH up to 5.5 in 3/14 so restarted low dose of 25 mcg daily to keep her TSH < 2.5 as she has hypothyroid symptoms and high cholesterol.   However only 2.2 in 7/14 so increased to 50 mcg daily and TSH 2.4 in 11/14 but had missed a dose on the weekends on occasion so increased to 62.5 mcg daily and TSH 1.87 in 3/15 and 1.46 in 11/15 and 1.04 in 3/16. Up to 2.06 in 7/16 so increased to 75 mcg daily and down to 1.5 in 12/16. Up to 2.11 in 4/17 so increased her dose to 88 mcg daily and down to 0.96 in 9/17 and 1.81 in 1/18 and 1.96 in 10/18 so kept her dose the same. Her thyroid is not to blame for her tachycardia as she has never been over-replaced. - cont levothyroxine 88 mcg daily - check TSH and FT4 prior to next visit 3. Hypercholesterolemia: Given DM, Goal LDL < 100, non-HDL < 130, and TG < 150.  in 10/12 off simva 10 due to trying to conceive, down to 109 in 1/13 and 92 in 5/13. Up to 127 in 11/13 and down to 111 in 3/14. Up to 124 in 7/14. Down to 114 in 11/14. Up to 140 in 3/15 possibly due to [address] as with stopping this it was down to 92 in 7/15. Up to 114 in 11/15 with diet. Was started on lipitor in 1/16 but had more nausea with this so stopped this and with 17 lb wt loss, LDL down to 84 in 3/16 and still 98 in 7/16. Up to 114 in 4/17 and still 119 in 9/17 and 136 in 1/18. Down to 116 in 6/18 and 113 in 10/18 so will hold on any other meds for now. - diet control 
- check lipids prior to next visit 4. Obesity: I started topamax in 11/15 and Dr. Leopoldo Pronto added phentermine in 1/16 and changed her trulicity to [employer] and her weight had come down 26 lbs by 7/16. Had more nausea and GERD and ended up stopping all her meds in 10/16 and weight up 7 lbs by 12/16. Restarted topamax for 2 weeks and then added back victoza at a lower dose of 1.2 mg daily. Eventually added back phentermine in 3/17. Then developed a kidney stone in 3/17 and stopped the topamax and weight up 6 lbs by 4/17. Will stay off the topamax due to the kidney stone and stop the phentermine as I don't think this is helping her weight.   I told her it's fine to restart the [employer] spark and protein shakes as these are not as likely to cause the kidney stone as the topamax was. Weight up 7 lbs by 9/17 possibly due to stress and 1 lb by 1/18. Down 3 lbs by 6/18.  Up 6 lbs by 11/18 but is off victoza and started back on prozac. Will try adding back topamax carefully at this time. - restart topamax 50 mg 1/2 tab at bedtime x 1-2 weeks then 1 tab at bedtime 5. Vitamin D deficiency: level was 24 in 12/15 and is currently taking 2000 units of D3 and level up to 32 in 7/16 and 35 in 4/17 and 30 in 9/17 and 35 in 10/18 
- cont vitamin D 2000 units daily - check Vitamin D 25-OH level in 11/19 
 
6. 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 Nargis Francois and Dr. Korey Ma. Now under care of Dr. Ethel Jain and combo of dilt and toprol is working. 
- cont Toprol  mg bid 
- cont dilt  mg daily Patient Instructions 1) Check the cost of trulicity 1.5 mg weekly or bydureon 2 mg weekly instead of victoza and if either are cheaper, let me know and I can get you a prescription. 2) Your A1c is slightly better than last time. 3) Your cholesterol is better and liver and kidney and urine and thyroid are normal. 
 
4) We will try to slowly restart the topamax at 50 mg 1/2 tab at bedtime for 1-2 weeks. If tolerating, increase to a whole tab at bedtime. Follow-up Disposition: 
Return in about 4 months (around 3/28/2019). Copy sent to: Ann Welsh MD as PCP - General 
Kota Marsh MD (Obstetrics & Gynecology) Binta Garibay MD (Cardiology)

## 2018-11-28 NOTE — PATIENT INSTRUCTIONS
1) Check the cost of trulicity 1.5 mg weekly or bydureon 2 mg weekly instead of victoza and if either are cheaper, let me know and I can get you a prescription. 2) Your A1c is slightly better than last time. 3) Your cholesterol is better and liver and kidney and urine and thyroid are normal. 
 
4) We will try to slowly restart the topamax at 50 mg 1/2 tab at bedtime for 1-2 weeks. If tolerating, increase to a whole tab at bedtime.

## 2018-11-29 ENCOUNTER — TELEPHONE (OUTPATIENT)
Dept: ENDOCRINOLOGY | Age: 38
End: 2018-11-29

## 2018-11-29 NOTE — TELEPHONE ENCOUNTER
Can you clarify with the pharmacy if this is a temporary issue or if other pharmacies cannot get this med either as I'm happy to send it somewhere else or if I need to change the strength to 25 mg tabs I can do this too?

## 2018-11-29 NOTE — TELEPHONE ENCOUNTER
I spoke with Trini Quinteros with [facility]  and she stated that they are not able to obtain the Topamax from any of there vendors. She stated that it is on back order and it has no date as to when it would be available. I informed Dr. Brennan Cook and per him I told her that we would try another pharmacy. I called patient per him and she stated that she already spoke with someone at [facility] and had it transferred to [facility] and they had it in stock. She stated she will be picking it up today.

## 2018-12-15 ENCOUNTER — TELEPHONE (OUTPATIENT)
Dept: ENDOCRINOLOGY | Age: 38
End: 2018-12-15

## 2018-12-15 RX ORDER — PEN NEEDLE, DIABETIC 31 GX3/16"
NEEDLE, DISPOSABLE MISCELLANEOUS
Qty: 100 PEN NEEDLE | Refills: 3 | Status: SHIPPED | OUTPATIENT
Start: 2018-12-15 | End: 2020-04-24 | Stop reason: ALTCHOICE

## 2018-12-15 NOTE — TELEPHONE ENCOUNTER
Regarding: FW: Prescription Question  Contact: [phone]      ----- Message -----  From: Shaylee Jiménez  Sent: 12/13/2018  10:54 AM  To: [facility]  Subject: Prescription Question                            ----- Message from [place], Generic sent at 12/13/2018 10:54 AM EST -----    I checked into the cost of the trulicity and bydureon and they are double the cost of the victoza. Hence I will have to just make it work and pay for the [address] [place] [address]. Please let me know. If you want me to restart the Victoza please send to local pharmacy not mail-order.  Thank you!!

## 2019-01-16 ENCOUNTER — OFFICE VISIT (OUTPATIENT)
Dept: FAMILY MEDICINE CLINIC | Age: 39
End: 2019-01-16

## 2019-01-16 VITALS
HEIGHT: 61 IN | DIASTOLIC BLOOD PRESSURE: 66 MMHG | RESPIRATION RATE: 18 BRPM | WEIGHT: 197 LBS | TEMPERATURE: 98 F | HEART RATE: 91 BPM | BODY MASS INDEX: 37.19 KG/M2 | SYSTOLIC BLOOD PRESSURE: 102 MMHG | OXYGEN SATURATION: 98 %

## 2019-01-16 DIAGNOSIS — F41.8 DEPRESSION WITH ANXIETY: Primary | ICD-10-CM

## 2019-01-16 DIAGNOSIS — F41.0 PANIC DISORDER: ICD-10-CM

## 2019-01-16 PROBLEM — E11.9 DIABETES MELLITUS TYPE 2, NONINSULIN DEPENDENT (HCC): Status: ACTIVE | Noted: 2019-01-16

## 2019-01-16 RX ORDER — FLUOXETINE HYDROCHLORIDE 20 MG/1
20 CAPSULE ORAL DAILY
Qty: 30 CAP | Refills: 3 | Status: SHIPPED | OUTPATIENT
Start: 2019-01-16 | End: 2019-03-04

## 2019-01-16 NOTE — PATIENT INSTRUCTIONS

## 2019-01-16 NOTE — PROGRESS NOTES
Chief Complaint   Patient presents with    Anxiety     with anxiety-follow up after starting Prozac - helping with irritability however has not help with the anxiety at all -continues to cry a lot-not sleeping-gets a brning sensation in chest    Depression     -has alot to do with her weight-states she is doing everything she knows how and the weight doesnt come off      1. Have you been to the ER, urgent care clinic since your last visit? Hospitalized since your last visit? No    2. Have you seen or consulted any other health care providers outside of the [address] [place] [address] since your last visit? Include any pap smears or colon screening.    [address] [place] [address]

## 2019-01-16 NOTE — PROGRESS NOTES
HISTORY OF PRESENT ILLNESS   HPI  Patient presents for follow up depression, anxiety and medication evaluation from last visit 10-5-18 at which time we restarted Prozac 10 mg daily. She has tolerated this well and feels that she is seeing some improvement in some areas and no change in other areas. In general she feels like it is helping to help \"stablize\" her mood swings a bit more. She feels less agitated, less irritable, and calmer in situations that would have ordinarily stressed her out and made her annoyed. Managing stress/coping better. Feels more calm in high stress situations. nto having any panic attacks now. But still feels general high level of anxiety/nervousness most days. She also continues to feel sad/depressed primarily about her wt and health. She feels like she has been working so hard on diet and exercise yet her wt and BS's are not improving the way she would like. She is followed closely by Maulik Valdes. He restarted her on Topamax for wt mgt and her chronic headaches and she states it has really helped w/ the headaches significantly. No change in her wt. She started going to the gym  and is up to 3 x a week. Doing cardio x 30 minutes and wts 1-2 x a week. She is on a BB and CCB per cardio for her h/o inappropriate sinus tachycardia and heart palpitations. She is pleased that her BP's and heart rates are good. Usually her rates would range 100-120, now she sees numbers in the 62-98 range on her Fit Bit. No exertional symptoms during exercise and she has not been having any palpitations. REVIEW OF SYMPTOMS     Review of Systems   Respiratory: Negative. Cardiovascular: Negative for chest pain and palpitations. Gastrointestinal: Negative. Neurological: Negative for dizziness and headaches. Psychiatric/Behavioral: Positive for depression.  The patient is nervous/anxious and has insomnia.            PROBLEM LIST/MEDICAL HISTORY      Problem List  Date Reviewed: 1/16/2019 Codes Class Noted    Inappropriate sinus tachycardia ICD-10-CM: R00.0  ICD-9-CM: 427.89  10/5/2018    Overview Signed 10/5/2018 10:57 AM by Mallory Griffin MD     [facility] [id number]: Dr Argelia Romero, Dr Avani Noriega; self referred for 2nd opinion Dr. Claudell Ferraris [phone]; managed w/ CCB & BB             Congenital cataract of left eye ICD-10-CM: Q12.0  ICD-9-CM: 743.30  10/5/2018    Overview Signed 10/5/2018 11:11 AM by Mallory Griffin MD     [id number]             Severe obesity (BMI 35.0-39. 9) with comorbidity ([facility] [address] ICD-10-CM: E66.01  ICD-9-CM: 278.01  4/24/2018        CAP (community acquired pneumonia) ICD-10-CM: J18.9  ICD-9-CM: 251  1/8/2018    Overview Signed 1/22/2018 12:08 PM by Mallory Griffin MD     [facility] [place] Admission 1/8/11-1/11/18             Bulging of intervertebral disc between L4 and L5 ICD-10-CM: M51.26  ICD-9-CM: 722.10  1/8/2018        UTI (urinary tract infection) ICD-10-CM: N39.0  ICD-9-CM: 599.0  1/8/2018        Acute lymphadenitis of arm, left epitrochlear ICD-10-CM: D99.9  ICD-9-CM: 106  12/13/2017        Depression with anxiety ICD-10-CM: F41.8  ICD-9-CM: 300.4  9/28/2017    Overview Signed 9/28/2017 12:42 PM by Mallory Griffin MD     10/2014             Lumbar facet arthropathy ICD-10-CM: M47.816  ICD-9-CM: 721.3  8/28/2017    Overview Signed 8/28/2017  9:20 PM by Mallory Griffin MD     On CT scan             Heart palpitations ICD-10-CM: R00.2  ICD-9-CM: 785.1  8/16/2017    Overview Signed 10/5/2018 10:56 AM by Mallory Griffin MD     Related to sinus tachycardia and anxiety             Spondylosis of lumbar & thoracic region without myelopathy or radiculopathy ICD-10-CM: M47.816  ICD-9-CM: 721.3  10/26/2016    Overview Signed 10/26/2016  8:50 AM by Mallory Griffin MD     xrays 2010             Hypercholesterolemia ICD-10-CM: E78.00  ICD-9-CM: 272.0  8/18/2016        History of peptic ulcer disease ICD-10-CM: Z87.11  ICD-9-CM: V12.71  8/18/2016    Overview Signed 2016 10:16 AM by Teresa Malagon MD     16 yo             Vitamin D deficiency ICD-10-CM: E55.9  ICD-9-CM: 268.9  Unknown        Obesity, Class I, BMI 30-34.9 ICD-10-CM: E66.9  ICD-9-CM: 278.00  2015        Post partum depression ICD-10-CM: F53.0  ICD-9-CM: 648.44, 450  10/9/2014    Overview Signed 10/9/2014 12:24 PM by Teresa Malagon MD     10/2013: counseling and prn Valium             Chronic low back pain & Muscle Spasms ICD-10-CM: M54.5, G89.29  ICD-9-CM: 724.2, 338.29  10/9/2014    Overview Signed 10/9/2014 12:36 PM by Teresa Malagon MD     Muscular              Bilateral carpal tunnel syndrome ICD-10-CM: G56.03  ICD-9-CM: 354.0  2013    Overview Addendum 2013 10:28 AM by Teresa Malagon MD     10/2013; wrist splints               Goiter ICD-10-CM: E04.9  ICD-9-CM: 240.9  2013        History of sinus tachycardia ICD-10-CM: Z86.79  ICD-9-CM: V12.59  10/5/2012    Overview Signed 10/5/2012  9:13 AM by Teresa Malagon MD     Since her early              GERD (gastroesophageal reflux disease) ICD-10-CM: K21.9  ICD-9-CM: 530.81  2011    Overview Signed 2011  9:20 AM by Teresa Malagon MD                  Dysphagia, workup negative except GERD ICD-10-CM: R13.10  ICD-9-CM: 787.20  2010    Overview Addendum 2011  9:21 AM by Teresa Malagon MD     2010: Thyroid US normal  ENT eval;   Barium Swallow =GERD only             Anemia ICD-10-CM: D64.9  ICD-9-CM: 285.9  2010        Type II diabetes mellitus, uncontrolled ([facility] [address] ICD-10-CM: E11.65  ICD-9-CM: 250.02  Unknown    Overview Signed 10/5/2012  9:18 AM by Teresa Malagon MD     Eye exams: VEI  Podiatrist: Rosie, anabel             Gallstones ICD-10-CM: K80.20  ICD-9-CM: 574.20  Unknown    Overview Signed 3/25/2010 10:31 AM by Teresa Malagon MD     Lap Cholecystectomy-Dr. Micheal Harrell [facility] (normal spontaneous vaginal delivery) ICD-10-CM: O80  ICD-9-CM: 114  Unknown    Overview Signed 3/25/2010 10:31 AM by Mallory Griffin MD     Son  7yo                       PAST SURGICAL HISTORY       Past Surgical History:   Procedure Laterality Date    HX CARPAL TUNNEL RELEASE Bilateral     HX  SECTION      HX CHOLECYSTECTOMY  10/09    LAP-Gallstones    HX GYN  2015    IUD placed    HX LITHOTRIPSY  2017    HX ORTHOPAEDIC Right 7/10/14    trigger thumb    HX ORTHOPAEDIC Left 7/10/14    wrist         MEDICATIONS      Current Outpatient Medications   Medication Sig    acetaminophen/diphenhydramine (TYLENOL PM PO) Take  by mouth.  liraglutide (VICTOZA 3-CANDIE) 0.6 mg/0.1 mL (18 mg/3 mL) pnij 1.8 mg by SubCUTAneous route daily.  Insulin Needles, Disposable, (BD ULTRA-FINE MINI PEN NEEDLE) 31 gauge x 3/16\" ndle Use as directed once daily with victoza    cyclobenzaprine (FLEXERIL) 10 mg tablet Take 0.5-1 Tabs by mouth nightly as needed (for muscle spasms in low back).  topiramate (TOPAMAX) 50 mg tablet Take 1/2 tablet at bedtime x 1-2 weeks and then increase to 1 tab at bedtime    CARTIA  mg ER capsule Take 120 mg by mouth daily.  ibuprofen (MOTRIN IB) 200 mg tablet Take 800 mg by mouth every evening. Takes ~ 2-3 x a week in the evenings for back pain if needed    FLUoxetine (PROZAC) 10 mg capsule Take 1 Cap by mouth daily. Indications: ANXIETY WITH DEPRESSION, Panic Disorder    metoprolol succinate (TOPROL-XL) 100 mg tablet Take 1 Tab by mouth two (2) times a day.  glipiZIDE (GLUCOTROL) 10 mg tablet Take 1 tab 5-10 minutes before dinner    fluconazole (DIFLUCAN) 150 mg tablet Take 1 tab now. May repeat in 2 days if yeast infection doesn't clear    diclofenac (FLECTOR) 1.3 % pt12 1 Patch by TransDERmal route every twelve (12) hours every twelve (12) hours.  As needed in pm    levothyroxine (SYNTHROID) 88 mcg tablet Take 1 tablet daily    metFORMIN (GLUCOPHAGE) 1,000 mg tablet TAKE ONE TABLET BY MOUTH TWICE DAILY WITH MEALS    DEXILANT 60 mg CpDB TAKE ONE CAPSULE BY MOUTH ONCE DAILY FOR  REFLUX    ELIDEL 1 % topical cream Apply  to affected area as needed (Eczema).  cholecalciferol, vitamin D3, 2,000 unit tab Take 2,000 Units by mouth daily.  levonorgestrel (MIRENA) 20 mcg/24 hr (5 years) IUD 1 Each by IntraUTERine route once. No current facility-administered medications for this visit. ALLERGIES     Allergies   Allergen Reactions    Jardiance [Empagliflozin] Other (comments)     Recurrent yeast infections    Lexapro [Escitalopram] Other (comments)     Fatigue, wt gain    Naprosyn [Naproxen] Nausea and Vomiting     Tolerates Advil, Toradol, etc    Topamax [Topiramate] Other (comments)     Kidney stone    Zoloft [Sertraline] Other (comments)     Fatigue           SOCIAL HISTORY       Social History     Socioeconomic History    Marital status:      Spouse name: Not on file    Number of children: 1    Years of education: Not on file    Highest education level: Not on file   Social Needs    Financial resource strain: Not on file    Food insecurity - worry: Not on file    Food insecurity - inability: Not on file   [employer] needs - medical: Not on file   [employer] needs - non-medical: Not on file   Occupational History    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 classes 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[employer], Elementary Education     Employer: [employer]   Tobacco Use    Smoking status: Never Smoker    Smokeless tobacco: Never Used   Substance and Sexual Activity    Alcohol use:  Yes     Alcohol/week: 0.0 oz     Comment: not weekly, every couple of months per pt    Drug use: No    Sexual activity: Yes     Partners: Male     Birth control/protection: IUD   Other Topics Concern     Service Not Asked    Blood Transfusions Not Asked    Caffeine Concern No     Comment: very seldom    Occupational Exposure Not Asked  Hobby Hazards Not Asked    Sleep Concern Not Asked    Stress Concern Not Asked    Weight Concern Not Asked    Special Diet No     Comment: adhering to more portion control    Back Care Yes     Comment: completed PT 2 x a week x several months 2017    Exercise Yes     Comment: restarted back at gym : 3 x a week: cardio x 30 minutes, wts 15-30 minutes;  1 x a week    Bike Helmet Not Asked    [address] Not Asked    Self-Exams Not Asked   Social History Narrative    Got  ~ . Has 1 daughter; Long term relationship and has custody of her stepson; her biological son  at age 7yo in 66.   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        IMMUNIZATIONS  Immunization History   Administered Date(s) Administered    DTaP 2013    Hepatitis B Vaccine 2009, 2009, 2010    Influenza Vaccine 10/09/2013, 10/23/2014, 10/16/2015    Influenza Vaccine (Quad) PF 10/26/2016, 2017, 10/05/2018    Influenza Vaccine Split 2010, 2011, 10/05/2012    Pneumococcal Vaccine (Unspecified Type) 2013    TB Skin Test (PPD) Intradermal 2013    TD Vaccine 2007    ZZZ-RETIRED (DO NOT USE) Pneumococcal Vaccine (Unspecified Type) 2007         FAMILY HISTORY     Family History   Problem Relation Age of Onset    Diabetes Mother          ESRD 43 yo in     Hypertension Mother    [facility] Stroke Mother         TIA    Heart Disease Mother     Diabetes Sister 29        type 2    No Known Problems Brother     High Cholesterol Paternal Grandmother     Hypertension Maternal Grandfather             Diabetes Maternal Grandmother     Hypertension Maternal Grandmother     High Cholesterol Maternal Grandmother     Cancer Maternal Grandmother         Breast CA     Heart Disease Maternal Grandmother          age 79    Colon Cancer Maternal Uncle          at 64yo         VITALS     Visit Vitals  /66 (BP 1 Location: Left arm, BP Patient Position: Sitting)   Pulse 91   Temp 98 °F (36.7 °C) (Oral)   Resp 18   Ht 5' 1\" (1.549 m)   Wt 197 lb (89.4 kg)   SpO2 98%   BMI 37.22 kg/m²          PHYSICAL EXAMINATION     Physical Exam   Constitutional: She is oriented to person, place, and time and well-developed, well-nourished, and in no distress. Cardiovascular: Regular rhythm. No murmur heard. Pulmonary/Chest: Effort normal and breath sounds normal.   Neurological: She is alert and oriented to person, place, and time. Skin: Skin is warm and dry. Psychiatric: Mood and affect normal.   Vitals reviewed.              ASSESSMENT & PLAN       ICD-10-CM ICD-9-CM    1. Depression with anxiety F41.8 300.4 FLUoxetine (PROZAC) 20 mg capsule   2. Panic disorder F41.0 300.01 FLUoxetine (PROZAC) 20 mg capsule     Showing some improvement since restarting Prozac 10 mg every day . Titrate to 20 mg daily and continue monitoring efficacy and tolerability  Reviewed medications and side effects   Reviewed diet, nutrition, exercise, weight management, BMI/goals, age/risk based screening recommendations, health maintenance & prevention counseling. Encouraged her to gradually build her cardio to 150 minutes per week as tolerated  Continue routine follow up w/ Endo for for diabetes, thyroid, lipid mgt. Update in the next several weeks w/ response to therapy, sooner prn  We can titrate further in the interim if needed.  Next follow up to be determined based on that

## 2019-02-13 ENCOUNTER — TELEPHONE (OUTPATIENT)
Dept: ENDOCRINOLOGY | Age: 39
End: 2019-02-13

## 2019-02-13 NOTE — TELEPHONE ENCOUNTER
Please call her pharmacy and inform them that she is no longer on jardiance and therefore we won't be pursuing a prior auth for this med.

## 2019-03-02 ENCOUNTER — TELEPHONE (OUTPATIENT)
Dept: FAMILY MEDICINE CLINIC | Age: 39
End: 2019-03-02

## 2019-03-02 DIAGNOSIS — F41.0 PANIC DISORDER: ICD-10-CM

## 2019-03-02 DIAGNOSIS — F41.8 DEPRESSION WITH ANXIETY: ICD-10-CM

## 2019-03-02 NOTE — TELEPHONE ENCOUNTER
I do feel she should see a psychiatrist and also see a counselor. We can refer her to both. Also I am a bit confused as to why she stopped the Prozac if she said she was helping some w/ her mood, especially if she is still feeling depressed and anxious. Some improvement is better than none so I dont think not being on anything is a good idea. She also shouldn't just stop these medications abruptly. Nonetheless, I am not clear on exactly why she stopped instead of just seeking more options (doesn't sound like it was a tolerability issue to start with). At her visit in 1-2019 we titrated her Prozac from 10 to 20 and still had room to titrate it more if needed. She was tolerating it fine to date as opposed to some other ones she hadnt tolerated. If it is a tolerability issue I could try her on something else until she gets in to see psych. If it is a \"partial effect\" issue, I would say lets titrate from 20 to 40 mg until sees psych. She can let Dr. Brody Blow focus on her other health conditions as it relates to her diabetes, thyroid, nutrition, etc and we will work on this end to get counseling and psychiatry underway.

## 2019-03-02 NOTE — TELEPHONE ENCOUNTER
Regarding: FW: Prescription Question  Contact: [phone]  I can get her booked with you,  Let her know that she does need to make a psych appt now b/c hard to get in. Do you need 30m or 15 m?  ----- Message -----  From: Ripgamal Solitario  Sent: 3/1/2019   8:09 AM  To: Jaleel Leon Pool  Subject: Prescription Question                            ----- Message from [address] [place] [address], Generic sent at 3/1/2019  8:09 AM EST -----    Dr. Von Isaacs I am writing to let you know that I have stopped taking the Prozac. While I do see a change in my mood while on this medication. I don't think that they are helping with what I feel seems to be my biggest problem. I continue to be full of anxiety all the time, usually about my weight, my diabetes and personal finances that then leads to depressed feelings. I also am still not sleeping at night without Tylenol pm. My energy I would say is back up, I continue to workout but I have absolutely no concentration and my anxiety is through the roof. I don't know what to do from here. I called to make an appointment with you, hopefully for today or Monday as I was off work but was told you didn't have any openings until April. Do you think it's necessary for me to get in to see psych at this point. I also see dr Krishna Bateman on the 13th. I guess I could wait until then and talk to him about it. Please let me know how we should move forward at this point.    Thanks, Marisol Beranl

## 2019-03-04 RX ORDER — FLUOXETINE HYDROCHLORIDE 40 MG/1
40 CAPSULE ORAL DAILY
Qty: 30 CAP | Refills: 0 | Status: SHIPPED | OUTPATIENT
Start: 2019-03-04 | End: 2019-03-13

## 2019-03-04 NOTE — TELEPHONE ENCOUNTER
Spoke to pt and she states that she did have a \"partial effect\" from the prozac. She didn't think about increasing it. She is willing to give it a try. She stopped it ~ end of Jan.  Told pt I could give her some names of different groups that we refer to. Pt said that she is driving. Let her know that I could put the paper in the mail to her or put upfront for p/u. Pt ask that I mail it to her. I also recommended that she call her insurance co.  They may be able to give her more names and possibly be able to see her sooner.

## 2019-03-05 LAB
ALBUMIN SERPL-MCNC: 3.9 G/DL (ref 3.5–5.5)
ALBUMIN/GLOB SERPL: 1.4 {RATIO} (ref 1.2–2.2)
ALP SERPL-CCNC: 85 IU/L (ref 39–117)
ALT SERPL-CCNC: 19 IU/L (ref 0–32)
AST SERPL-CCNC: 13 IU/L (ref 0–40)
BILIRUB SERPL-MCNC: <0.2 MG/DL (ref 0–1.2)
BUN SERPL-MCNC: 11 MG/DL (ref 6–20)
BUN/CREAT SERPL: 17 (ref 9–23)
CALCIUM SERPL-MCNC: 9.1 MG/DL (ref 8.7–10.2)
CHLORIDE SERPL-SCNC: 106 MMOL/L (ref 96–106)
CHOLEST SERPL-MCNC: 178 MG/DL (ref 100–199)
CO2 SERPL-SCNC: 23 MMOL/L (ref 20–29)
CREAT SERPL-MCNC: 0.66 MG/DL (ref 0.57–1)
EST. AVERAGE GLUCOSE BLD GHB EST-MCNC: 217 MG/DL
GLOBULIN SER CALC-MCNC: 2.8 G/DL (ref 1.5–4.5)
GLUCOSE SERPL-MCNC: 181 MG/DL (ref 65–99)
HBA1C MFR BLD: 9.2 % (ref 4.8–5.6)
HDLC SERPL-MCNC: 48 MG/DL
LDLC SERPL CALC-MCNC: 110 MG/DL (ref 0–99)
POTASSIUM SERPL-SCNC: 4.3 MMOL/L (ref 3.5–5.2)
PROT SERPL-MCNC: 6.7 G/DL (ref 6–8.5)
SODIUM SERPL-SCNC: 140 MMOL/L (ref 134–144)
TRIGL SERPL-MCNC: 101 MG/DL (ref 0–149)
TSH SERPL DL<=0.005 MIU/L-ACNC: 1.49 UIU/ML (ref 0.45–4.5)
VLDLC SERPL CALC-MCNC: 20 MG/DL (ref 5–40)

## 2019-03-05 NOTE — TELEPHONE ENCOUNTER
Let pt know of Dr José Miguel Myles rec. She ran out of the 20's she will p/u her refill of the 20's and then go to the 40 mg dose. She will call us prn.

## 2019-03-05 NOTE — TELEPHONE ENCOUNTER
Lucrecia Constantino since she stopped it and has been off it a while now, have her start back on the 20 mg caps she still has and take 1 tab daily for the first week then titrate from 20 to 40 mg after that. If she has a lot of the 20's left she can just double up on those starting the second week and I will go ahead and have the 40 mg caps on file at her local pharm to  when the 20's are out.

## 2019-03-13 ENCOUNTER — OFFICE VISIT (OUTPATIENT)
Dept: ENDOCRINOLOGY | Age: 39
End: 2019-03-13

## 2019-03-13 VITALS
WEIGHT: 196.8 LBS | BODY MASS INDEX: 37.16 KG/M2 | SYSTOLIC BLOOD PRESSURE: 106 MMHG | HEIGHT: 61 IN | HEART RATE: 111 BPM | DIASTOLIC BLOOD PRESSURE: 76 MMHG

## 2019-03-13 DIAGNOSIS — E78.00 HYPERCHOLESTEROLEMIA: ICD-10-CM

## 2019-03-13 DIAGNOSIS — R00.0 TACHYCARDIA: ICD-10-CM

## 2019-03-13 DIAGNOSIS — E55.9 VITAMIN D DEFICIENCY: ICD-10-CM

## 2019-03-13 DIAGNOSIS — E66.9 OBESITY, CLASS I, BMI 30-34.9: ICD-10-CM

## 2019-03-13 DIAGNOSIS — E04.9 GOITER: ICD-10-CM

## 2019-03-13 RX ORDER — BUPROPION HYDROCHLORIDE 150 MG/1
150 TABLET ORAL
Qty: 30 TAB | Refills: 11 | Status: SHIPPED | OUTPATIENT
Start: 2019-03-13 | End: 2019-03-30

## 2019-03-13 RX ORDER — TOPIRAMATE 50 MG/1
50 TABLET, FILM COATED ORAL DAILY
COMMUNITY
Start: 2019-03-13 | End: 2019-03-30 | Stop reason: SDUPTHER

## 2019-03-13 NOTE — PATIENT INSTRUCTIONS
1) Begin Wellbutrin  mg in the morning. Take this for 1-2 weeks. If tolerating, then increase to 2 tabs of wellbutrin,  If still tolerating this higher dose, let me know so I can change the prescription to 300 mg once daily in the morning. Side effects of wellbutrin are nausea, dry mouth, dizziness, heart burn and insomnia. 2) My hope is with better controlling your stress, your sugars will come back down so we won't change your diabetes regimen at this time. 3) Your TSH (thyroid test) is normal.    4) Your LDL (bad cholesterol) is 110 but has come down from 137 a year ago and goal is under 100.

## 2019-03-13 NOTE — PROGRESS NOTES
Chief Complaint   Patient presents with    Diabetes     PCP and Pharmacy verified     History of Present Illness: Rina Rowe is a 41 y.o. female here for follow up of diabetes. Weight down 1 lbs since last visit in 11/18. She did contact Dr. Faiza Jones earlier this month about continued anxiety despite the prozac and stopped this on her own about a month or more ago and Dr. Faiza Jones recommended going back on this but she decided against this. Has been compliant with victoza and metformin and glipizide but on the weekends may get her doses later due to sleeping in. Her pulse has still been in the 90s resting for the most part and then up to 110-140s with more exertion. Fasting sugars have been running higher and got a reading of 199 this morning after just having soup for dinner but no other carbs for dinner nor snacking after dinner. Has seen fasting sugars around 180-200 but has seen after meal readings some as high as 300s. Has been exercising 3 times a week. Has been taking the topamax 50 mg in the morning and headaches have improved. No return of kidney stones or hematuria or dysuria on the topamax. Has not had any return of vaginal yeast infections the past few months after Dr. Alexandra Beauchamp gave her a higher dose of 200 mg of fluconazole. Thinks her sugars are up from stress at work and some back pain and uses flector patch for this and voltaren and is able to work during the day. Has never been on wellbutrin and willing to give this a try. Current Outpatient Medications   Medication Sig    topiramate (TOPAMAX) 50 mg tablet Take 1 Tab by mouth daily.  acetaminophen/diphenhydramine (TYLENOL PM PO) Take  by mouth.  liraglutide (VICTOZA 3-CANDIE) 0.6 mg/0.1 mL (18 mg/3 mL) pnij 1.8 mg by SubCUTAneous route daily.     Insulin Needles, Disposable, (BD ULTRA-FINE MINI PEN NEEDLE) 31 gauge x 3/16\" ndle Use as directed once daily with victoza    cyclobenzaprine (FLEXERIL) 10 mg tablet Take 0.5-1 Tabs by mouth nightly as needed (for muscle spasms in low back).  CARTIA  mg ER capsule Take 120 mg by mouth daily.  metoprolol succinate (TOPROL-XL) 100 mg tablet Take 1 Tab by mouth two (2) times a day.  glipiZIDE (GLUCOTROL) 10 mg tablet Take 1 tab 5-10 minutes before dinner    levothyroxine (SYNTHROID) 88 mcg tablet Take 1 tablet daily    metFORMIN (GLUCOPHAGE) 1,000 mg tablet TAKE ONE TABLET BY MOUTH TWICE DAILY WITH MEALS    DEXILANT 60 mg CpDB TAKE ONE CAPSULE BY MOUTH ONCE DAILY FOR  REFLUX    cholecalciferol, vitamin D3, 2,000 unit tab Take 2,000 Units by mouth daily.  levonorgestrel (MIRENA) 20 mcg/24 hr (5 years) IUD 1 Each by IntraUTERine route once.  ibuprofen (MOTRIN IB) 200 mg tablet Take 800 mg by mouth every evening. Takes ~ 2-3 x a week in the evenings for back pain if needed    fluconazole (DIFLUCAN) 150 mg tablet Take 1 tab now. May repeat in 2 days if yeast infection doesn't clear    diclofenac (FLECTOR) 1.3 % pt12 1 Patch by TransDERmal route every twelve (12) hours every twelve (12) hours. As needed in pm    ELIDEL 1 % topical cream Apply  to affected area as needed (Eczema). No current facility-administered medications for this visit. Allergies   Allergen Reactions    Jardiance [Empagliflozin] Other (comments)     Recurrent yeast infections    Lexapro [Escitalopram] Other (comments)     Fatigue, wt gain    Naprosyn [Naproxen] Nausea and Vomiting     Tolerates Advil, Toradol, etc    Topamax [Topiramate] Other (comments)     Kidney stone    Zoloft [Sertraline] Other (comments)     Fatigue      Review of Systems:  - Eyes: no blurry vision or double vision  - Cardiovascular: no chest pain  - Respiratory: no shortness of breath  - Musculoskeletal: no myalgias  - Neurological: no numbness/tingling in extremities    Physical Examination:  Blood pressure 106/76, pulse (!) 111, height 5' 1\" (1.549 m), weight 196 lb 12.8 oz (89.3 kg).   - General: pleasant, no distress, good eye contact   - Neck: no carotid bruits  - Cardiovascular: regular, (+) tachycardic, nl s1 and s2, no m/r/g,   - Respiratory: clear bilaterally  - Integumentary: no edema,   - Psychiatric: normal mood and affect    Data Reviewed:   Component      Latest Ref Rng & Units 3/4/2019 3/4/2019 3/4/2019 3/4/2019           8:21 AM  8:21 AM  8:21 AM  8:21 AM   Glucose      65 - 99 mg/dL 181 (H)      BUN      6 - 20 mg/dL 11      Creatinine      0.57 - 1.00 mg/dL 0.66      GFR est non-AA      >59 mL/min/1.73 113      GFR est AA      >59 mL/min/1.73 131      BUN/Creatinine ratio      9 - 23 17      Sodium      134 - 144 mmol/L 140      Potassium      3.5 - 5.2 mmol/L 4.3      Chloride      96 - 106 mmol/L 106      CO2      20 - 29 mmol/L 23      Calcium      8.7 - 10.2 mg/dL 9.1      Protein, total      6.0 - 8.5 g/dL 6.7      Albumin      3.5 - 5.5 g/dL 3.9      GLOBULIN, TOTAL      1.5 - 4.5 g/dL 2.8      A-G Ratio      1.2 - 2.2 1.4      Bilirubin, total      0.0 - 1.2 mg/dL <0.2      Alk. phosphatase      39 - 117 IU/L 85      AST      0 - 40 IU/L 13      ALT (SGPT)      0 - 32 IU/L 19      Cholesterol, total      100 - 199 mg/dL  178     Triglyceride      0 - 149 mg/dL  101     HDL Cholesterol      >39 mg/dL  48     VLDL, calculated      5 - 40 mg/dL  20     LDL, calculated      0 - 99 mg/dL  110 (H)     Hemoglobin A1c, (calculated)      4.8 - 5.6 %   9.2 (H)    Estimated average glucose      mg/dL   217    TSH      0.450 - 4.500 uIU/mL    1.490       Assessment/Plan:     1.  Diabetes mellitus type II: her most recent Hgb A1c was 9.2% in 3/19 up from 8.2% in 10/18 down from 8.3% in 6/18 down from 10.4% in 1/18 up from 8.1% in 9/17 up from 7.9% in 4/17 down from 8.1% in 12/16 up from 7% in 7/16 down from 7.7% in 3/16 down from 7.8% in 11/15 down from 8.3% in 7/15 down from 8.8% in 3/15 up from 7.6% in 11/14 up from 7.3% in 7/14 down from 8% in 3/14 up from 6.1% in 11/13 down from 6.7% in 9/13 up from 6.3% in 8/13 up slightly from 6.1% in 7/13 down from 6.3% in 5/13 down from 6.7% in 1/13 up from 6.5% in Oct 2012. A1c is up possibly due to stress and anxiety so will try wellbutrin as below. - cont metformin 1g bid  - cont glipizide 10 mg before dinner (1/2 tab if she skips dinner)  - check bs up to 3 times daily due to fluctuating sugars  - foot exam done 11/18  - microalbumin nl 10/12--was taken off lisinopril 10 mg daily due to trying to conceive and repeat normal ever since, most recently in 10/18  - optho UTD 4/18--will obtain report  - check Hgb A1c and cmp prior to next visit     2. Goiter: Had a TSH of 2.68 in May 2012. Level was 2.4 in 1/13 and I started her on a a low dose of levothyroxine 25 mcg daily to keep her TSH < 2.5 to improve her chance of conception and interestingly she became pregnant shortly after starting this. TSH 2.1 in 5/13 and 1.6 in 7/13 and 1.39 in 9/13. Stopped levothyroxine after delivery in 10/13 and TSH up to 5.5 in 3/14 so restarted low dose of 25 mcg daily to keep her TSH < 2.5 as she has hypothyroid symptoms and high cholesterol. However only 2.2 in 7/14 so increased to 50 mcg daily and TSH 2.4 in 11/14 but had missed a dose on the weekends on occasion so increased to 62.5 mcg daily and TSH 1.87 in 3/15 and 1.46 in 11/15 and 1.04 in 3/16. Up to 2.06 in 7/16 so increased to 75 mcg daily and down to 1.5 in 12/16. Up to 2.11 in 4/17 so increased her dose to 88 mcg daily and down to 0.96 in 9/17 and 1.81 in 1/18 and 1.96 in 10/18 and 1.49 in 3/19 so kept her dose the same. Her thyroid is not to blame for her tachycardia as she has never been over-replaced. - cont levothyroxine 88 mcg daily  - check TSH prior to next visit      3. Hypercholesterolemia: Given DM, Goal LDL < 100, non-HDL < 130, and TG < 150.  in 10/12 off simva 10 due to trying to conceive, down to 109 in 1/13 and 92 in 5/13. Up to 127 in 11/13 and down to 111 in 3/14. Up to 124 in 7/14.   Down to 114 in 11/14. Up to 140 in 3/15 possibly due to [address] as with stopping this it was down to 92 in 7/15. Up to 114 in 11/15 with diet. Was started on lipitor in 1/16 but had more nausea with this so stopped this and with 17 lb wt loss, LDL down to 84 in 3/16 and still 98 in 7/16. Up to 114 in 4/17 and still 119 in 9/17 and 136 in 1/18. Down to 116 in 6/18 and 113 in 10/18 and 110 in 3/19 so will hold on any other meds for now. - diet control  - check lipids prior to next visit      4. Obesity: I started topamax in 11/15 and Dr. Winston Segura added phentermine in 1/16 and changed her trulicity to [employer] and her weight had come down 26 lbs by 7/16. Had more nausea and GERD and ended up stopping all her meds in 10/16 and weight up 7 lbs by 12/16. Restarted topamax for 2 weeks and then added back victoza at a lower dose of 1.2 mg daily. Eventually added back phentermine in 3/17. Then developed a kidney stone in 3/17 and stopped the topamax and weight up 6 lbs by 4/17. Will stay off the topamax due to the kidney stone and stop the phentermine as I don't think this is helping her weight. I told her it's fine to restart the advocare spark and protein shakes as these are not as likely to cause the kidney stone as the topamax was. Weight up 7 lbs by 9/17 possibly due to stress and 1 lb by 1/18. Down 3 lbs by 6/18.  Up 6 lbs by 11/18 but was off victoza and started back on prozac. I added back topamax 1 tab daily and restarted victoza but wt down just 1 lb by 3/19 so will try wellbutrin instead. - begin wellbutrin  mg daily and titrate to 300 mg daily as tolerated  - cont topamax 50 mg daily      5. Vitamin D deficiency: level was 24 in 12/15 and is currently taking 2000 units of D3 and level up to 32 in 7/16 and 35 in 4/17 and 30 in 9/17 and 35 in 10/18  - cont vitamin D 2000 units daily  - check Vitamin D 25-OH level in 11/19    6.  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 Kaylin Gifford. Now under care of Dr. Ember Oliveira and combo of dilt and toprol is working.  - cont Toprol  mg bid  - cont dilt  mg daily      Patient Instructions   1) Begin Wellbutrin  mg in the morning. Take this for 1-2 weeks. If tolerating, then increase to 2 tabs of wellbutrin,  If still tolerating this higher dose, let me know so I can change the prescription to 300 mg once daily in the morning. Side effects of wellbutrin are nausea, dry mouth, dizziness, heart burn and insomnia. 2) My hope is with better controlling your stress, your sugars will come back down so we won't change your diabetes regimen at this time. 3) Your TSH (thyroid test) is normal.    4) Your LDL (bad cholesterol) is 110 but has come down from 137 a year ago and goal is under 100. Follow-up Disposition:  Return in about 4 months (around 7/13/2019). Copy sent to:   Albino Gentile MD as PCP - General  Aj Metzger MD (Obstetrics & Gynecology)  Chris Alvarez MD (Cardiology)

## 2019-03-30 ENCOUNTER — TELEPHONE (OUTPATIENT)
Dept: ENDOCRINOLOGY | Age: 39
End: 2019-03-30

## 2019-03-30 RX ORDER — TOPIRAMATE 50 MG/1
50 TABLET, FILM COATED ORAL DAILY
Qty: 90 TAB | Refills: 3 | Status: SHIPPED | OUTPATIENT
Start: 2019-03-30 | End: 2019-06-04

## 2019-03-30 RX ORDER — BUPROPION HYDROCHLORIDE 300 MG/1
300 TABLET ORAL
Qty: 30 TAB | Refills: 11 | Status: SHIPPED | OUTPATIENT
Start: 2019-03-30 | End: 2020-04-04

## 2019-03-30 NOTE — TELEPHONE ENCOUNTER
Regarding: Prescription Question  Contact: [phone]  ----- Message from [address] [place] [address] [id number], Generic sent at 3/30/2019 12:02 PM EDT -----    I have been able to increase the Wellbutrin to 300mg with no side effects. [facility]. Thank you. Also please send an updated Topomax prescription to read once daily. The pharmacy gives me a hard time because the refills continue to have the titration instructions. If you would [facility] that would be great.      Thanks   [employer]

## 2019-05-28 RX ORDER — METFORMIN HYDROCHLORIDE 1000 MG/1
TABLET ORAL
Qty: 180 TAB | Refills: 3 | Status: SHIPPED | OUTPATIENT
Start: 2019-05-28 | End: 2020-01-22 | Stop reason: ALTCHOICE

## 2019-06-04 ENCOUNTER — TELEPHONE (OUTPATIENT)
Dept: FAMILY MEDICINE CLINIC | Age: 39
End: 2019-06-04

## 2019-06-04 RX ORDER — TOPIRAMATE 50 MG/1
TABLET, FILM COATED ORAL
Qty: 90 TAB | Refills: 3
Start: 2019-06-04 | End: 2019-07-24

## 2019-06-04 NOTE — TELEPHONE ENCOUNTER
Chief Complaint   Patient presents with    Prior Auth     DEXILANT DR 60 MG CAPSULES , 90 DAY SUPPLY. APPROVED:  JUNE 6, 2019 TO JUNE 30, 2020. AUTHORIZATION NUMBER:  [id number]     DEXILANT DR 60 MG CAPSULES, TAKE ONE CAPSULE BY MOUTH ONCE DAILY, 80 DAY SUPPLYY AS WRITTEN BY DR. Ej Monsalve. NOTICE OF APPROVAL  FAXED TO [address] [place] [address] [phone] WITH CONFIRMATION RECEIVED. PATIENT INFORMED VIA [employer].

## 2019-07-20 LAB
ALBUMIN SERPL-MCNC: 4.1 G/DL (ref 3.5–5.5)
ALBUMIN/GLOB SERPL: 1.4 {RATIO} (ref 1.2–2.2)
ALP SERPL-CCNC: 97 IU/L (ref 39–117)
ALT SERPL-CCNC: 28 IU/L (ref 0–32)
AST SERPL-CCNC: 19 IU/L (ref 0–40)
BILIRUB SERPL-MCNC: 0.3 MG/DL (ref 0–1.2)
BUN SERPL-MCNC: 10 MG/DL (ref 6–20)
BUN/CREAT SERPL: 15 (ref 9–23)
CALCIUM SERPL-MCNC: 9.2 MG/DL (ref 8.7–10.2)
CHLORIDE SERPL-SCNC: 101 MMOL/L (ref 96–106)
CHOLEST SERPL-MCNC: 214 MG/DL (ref 100–199)
CO2 SERPL-SCNC: 24 MMOL/L (ref 20–29)
CREAT SERPL-MCNC: 0.65 MG/DL (ref 0.57–1)
EST. AVERAGE GLUCOSE BLD GHB EST-MCNC: 237 MG/DL
GLOBULIN SER CALC-MCNC: 2.9 G/DL (ref 1.5–4.5)
GLUCOSE SERPL-MCNC: 184 MG/DL (ref 65–99)
HBA1C MFR BLD: 9.9 % (ref 4.8–5.6)
HDLC SERPL-MCNC: 55 MG/DL
LDLC SERPL CALC-MCNC: 138 MG/DL (ref 0–99)
POTASSIUM SERPL-SCNC: 4.3 MMOL/L (ref 3.5–5.2)
PROT SERPL-MCNC: 7 G/DL (ref 6–8.5)
SODIUM SERPL-SCNC: 138 MMOL/L (ref 134–144)
TRIGL SERPL-MCNC: 107 MG/DL (ref 0–149)
TSH SERPL DL<=0.005 MIU/L-ACNC: 2.64 UIU/ML (ref 0.45–4.5)
VLDLC SERPL CALC-MCNC: 21 MG/DL (ref 5–40)

## 2019-07-24 ENCOUNTER — OFFICE VISIT (OUTPATIENT)
Dept: ENDOCRINOLOGY | Age: 39
End: 2019-07-24

## 2019-07-24 VITALS
BODY MASS INDEX: 37.16 KG/M2 | DIASTOLIC BLOOD PRESSURE: 84 MMHG | HEART RATE: 130 BPM | SYSTOLIC BLOOD PRESSURE: 126 MMHG | WEIGHT: 196.8 LBS | HEIGHT: 61 IN

## 2019-07-24 DIAGNOSIS — E78.00 HYPERCHOLESTEROLEMIA: ICD-10-CM

## 2019-07-24 DIAGNOSIS — R00.0 TACHYCARDIA: ICD-10-CM

## 2019-07-24 DIAGNOSIS — E04.9 GOITER: ICD-10-CM

## 2019-07-24 DIAGNOSIS — E55.9 VITAMIN D DEFICIENCY: ICD-10-CM

## 2019-07-24 DIAGNOSIS — E66.9 OBESITY, CLASS I, BMI 30-34.9: ICD-10-CM

## 2019-07-24 RX ORDER — GLIPIZIDE 10 MG/1
TABLET ORAL
Qty: 180 TAB | Refills: 3
Start: 2019-07-24 | End: 2019-07-31 | Stop reason: SDUPTHER

## 2019-07-24 RX ORDER — CLOTRIMAZOLE AND BETAMETHASONE DIPROPIONATE 10; .64 MG/G; MG/G
CREAM TOPICAL
COMMUNITY
End: 2019-07-24

## 2019-07-24 RX ORDER — VALACYCLOVIR HYDROCHLORIDE 500 MG/1
TABLET, FILM COATED ORAL AS NEEDED
COMMUNITY
Start: 2015-06-22 | End: 2019-07-24

## 2019-07-24 NOTE — PATIENT INSTRUCTIONS
1) Try taking one 10 mg glipizide before dinner and at bedtime to see if this gets your fasting sugars down closer to 130 or less. Let me know if [facility] will [facility] which is a new mail order that some [place] patients are using. 2) Your TSH (thyroid test) is slightly over 2 due to missing some doses the past 6 weeks. If you ever do miss a dose of levothyroxine, it's okay to take 2 pills the next day to catch up on your dose. The goal is always to get 7 pills per week and even if you have to take 3-4 pills at a time to get caught up on missed doses, this is better than letting your weekly level fall. 3) Your LDL (bad cholesterol) likely is higher from higher A1c and higher TSH.     4) Your liver and kidney are normal and blood pressure is at goal.

## 2019-07-24 NOTE — LETTER
1/7/2020 8:44 AM 
 
[facility] Dallis Closs [address] [facility] [id number] Please go to [facility] and have your labs repeated sometime in the 1-2 weeks prior to your upcoming visit with me. Awa Mooney to allow at least 2 days at the minimum to ensure I get the results in time for your visit. Samir Will order is already in their system and be sure to ask to have labs drawn that are under Dr. Noemi Cantu name. Fransico Saenz you have the actual lab order that I may have given you (check your glove compartment or other safe spot where you may keep your medical papers), please bring this to the lab just to be safe in case [employer]'s computer system is down. Mae Duane will review the results at your follow up visit. Please fast for your labs.  Don't eat anything after midnight. Be prepared to give a urine sample to test for any effect of the diabetes on your kidneys.   
 
 
 
 
 
Sincerely, 
 
 
Cooper Marroquin MD

## 2019-07-24 NOTE — PROGRESS NOTES
Chief Complaint   Patient presents with    Diabetes     History of Present Illness: Jose Alberto Leigh is a 41 y.o. female here for follow up of diabetes. Weight stable since last visit in 3/19. Did try the topamax at 1/2 tab daily in 6/19 but after a few days of having some left sided back pain she ended up stopping just to be safe because of prior history of kidney stone and never had any dysuria or hematuria and has remained off this. Has noticed increase in headaches off this and has one since 11am today that may be driving her HR up as she been taking the toprol XL and dilt. Has seen some resting HRs in the 70s but most are in the 90s and still goes up with exercise. Tolerating the higher dose of wellbutrin 300 mg daily and mood is doing well on this. Sometimes has trouble with concentration and attention that may be a little more noticeable since starting the wellbutrin but did occur prior to this med. Still taking glipizide, metformin, and victoza as directed. Still having fasting sugars in the 190-200s with max up to 230s and has seen down to the 130-140s at the lowest but not often. Has seen over 200s frequently after eating. Has missed some weekend doses of levothyroxine over the past 2 months as she has been travelling more. Did go to a bariatric seminar last month and she is working with her insurance to see if she'll qualify and I told her I'm happy to support her with surgery as she does have 2 weight related co-morbidities with diabetes and high cholesterol. Current Outpatient Medications   Medication Sig    metFORMIN (GLUCOPHAGE) 1,000 mg tablet TAKE 1 TABLET TWICE A DAY WITH MEALS    DEXILANT 60 mg CpDB capsule (delayed release) TAKE 1 CAPSULE BY MOUTH ONCE DAILY FOR  REFLUX    buPROPion XL (WELLBUTRIN XL) 300 mg XL tablet Take 1 Tab by mouth every morning.  acetaminophen/diphenhydramine (TYLENOL PM PO) Take  by mouth nightly.     liraglutide (VICTOZA 3-ACNDIE) 0.6 mg/0.1 mL (18 mg/3 mL) pnij 1.8 mg by SubCUTAneous route daily.  Insulin Needles, Disposable, (BD ULTRA-FINE MINI PEN NEEDLE) 31 gauge x 3/16\" ndle Use as directed once daily with victoza    cyclobenzaprine (FLEXERIL) 10 mg tablet Take 0.5-1 Tabs by mouth nightly as needed (for muscle spasms in low back).  CARTIA  mg ER capsule Take 120 mg by mouth daily.  ibuprofen (MOTRIN IB) 200 mg tablet Take 800 mg by mouth every evening. Takes ~ 2-3 x a week in the evenings for back pain if needed    metoprolol succinate (TOPROL-XL) 100 mg tablet Take 1 Tab by mouth two (2) times a day.  glipiZIDE (GLUCOTROL) 10 mg tablet Take 1 tab 5-10 minutes before dinner    fluconazole (DIFLUCAN) 150 mg tablet Take 1 tab now. May repeat in 2 days if yeast infection doesn't clear (Patient taking differently: Take 150 mg by mouth as needed. Take 1 tab now. May repeat in 2 days if yeast infection doesn't clear)    diclofenac (FLECTOR) 1.3 % pt12 1 Patch by TransDERmal route every twelve (12) hours every twelve (12) hours. As needed in pm    levothyroxine (SYNTHROID) 88 mcg tablet Take 1 tablet daily    ELIDEL 1 % topical cream Apply  to affected area as needed (Eczema).  cholecalciferol, vitamin D3, 2,000 unit tab Take 2,000 Units by mouth daily.  levonorgestrel (MIRENA) 20 mcg/24 hr (5 years) IUD 1 Each by IntraUTERine route once.  dexlansoprazole (DEXILANT) 60 mg CpDB capsule (delayed release) Take 1 Cap by mouth daily. No current facility-administered medications for this visit.       Allergies   Allergen Reactions    Jardiance [Empagliflozin] Other (comments)     Recurrent yeast infections    Lexapro [Escitalopram] Other (comments)     Fatigue, wt gain    Naprosyn [Naproxen] Nausea and Vomiting     Tolerates Advil, Toradol, etc    Topamax [Topiramate] Other (comments)     Kidney stone    Zoloft [Sertraline] Other (comments)     Fatigue      Review of Systems:  - Eyes: no blurry vision or double vision  - Cardiovascular: no chest pain  - Respiratory: no shortness of breath  - Musculoskeletal: no myalgias  - Neurological: no numbness/tingling in extremities    Physical Examination:  Blood pressure 126/84, pulse (!) 130, height 5' 1\" (1.549 m), weight 196 lb 12.8 oz (89.3 kg). - General: pleasant, no distress, good eye contact   - Neck: no carotid bruits  - Cardiovascular: regular, (+) tachycardic, nl s1 and s2, no m/r/g,   - Respiratory: clear bilaterally  - Integumentary: no edema,   - Psychiatric: normal mood and affect    Data Reviewed:   Component      Latest Ref Rng & Units 7/19/2019 7/19/2019 7/19/2019 7/19/2019           8:12 AM  8:12 AM  8:12 AM  8:12 AM   Glucose      65 - 99 mg/dL   184 (H)    BUN      6 - 20 mg/dL   10    Creatinine      0.57 - 1.00 mg/dL   0.65    GFR est non-AA      >59 mL/min/1.73   114    GFR est AA      >59 mL/min/1.73   131    BUN/Creatinine ratio      9 - 23   15    Sodium      134 - 144 mmol/L   138    Potassium      3.5 - 5.2 mmol/L   4.3    Chloride      96 - 106 mmol/L   101    CO2      20 - 29 mmol/L   24    Calcium      8.7 - 10.2 mg/dL   9.2    Protein, total      6.0 - 8.5 g/dL   7.0    Albumin      3.5 - 5.5 g/dL   4.1    GLOBULIN, TOTAL      1.5 - 4.5 g/dL   2.9    A-G Ratio      1.2 - 2.2   1.4    Bilirubin, total      0.0 - 1.2 mg/dL   0.3    Alk. phosphatase      39 - 117 IU/L   97    AST      0 - 40 IU/L   19    ALT (SGPT)      0 - 32 IU/L   28    Cholesterol, total      100 - 199 mg/dL  214 (H)     Triglyceride      0 - 149 mg/dL  107     HDL Cholesterol      >39 mg/dL  55     VLDL, calculated      5 - 40 mg/dL  21     LDL, calculated      0 - 99 mg/dL  138 (H)     Hemoglobin A1c, (calculated)      4.8 - 5.6 %    9.9 (H)   Estimated average glucose      mg/dL    237   TSH      0.450 - 4.500 uIU/mL 2.640        Assessment/Plan:     1.  Diabetes mellitus type II: her most recent Hgb A1c was 9.9% in 7/19 up from 9.2% in 3/19 up from 8.2% in 10/18 down from 8.3% in 6/18 down from 10.4% in 1/18 up from 8.1% in 9/17 up from 7.9% in 4/17 down from 8.1% in 12/16 up from 7% in 7/16 down from 7.7% in 3/16 down from 7.8% in 11/15 down from 8.3% in 7/15 down from 8.8% in 3/15 up from 7.6% in 11/14 up from 7.3% in 7/14 down from 8% in 3/14 up from 6.1% in 11/13 down from 6.7% in 9/13 up from 6.3% in 8/13 up slightly from 6.1% in 7/13 down from 6.3% in 5/13 down from 6.7% in 1/13 up from 6.5% in Oct 2012. A1c is still up so will add a bedtime dose of glipizide to get fasting sugars lower. - cont metformin 1g bid  - cont glipizide 10 mg before dinner (1/2 tab if she skips dinner) and begin 10 mg at bedtime  - check bs up to 3 times daily due to fluctuating sugars  - foot exam done 11/18  - microalbumin nl 10/12--was taken off lisinopril 10 mg daily due to trying to conceive and repeat normal ever since, most recently in 10/18  - optho UTD 5/19  - check Hgb A1c and cmp and microalbumin prior to next visit     2. Goiter: Had a TSH of 2.68 in May 2012. Level was 2.4 in 1/13 and I started her on a a low dose of levothyroxine 25 mcg daily to keep her TSH < 2.5 to improve her chance of conception and interestingly she became pregnant shortly after starting this. TSH 2.1 in 5/13 and 1.6 in 7/13 and 1.39 in 9/13. Stopped levothyroxine after delivery in 10/13 and TSH up to 5.5 in 3/14 so restarted low dose of 25 mcg daily to keep her TSH < 2.5 as she has hypothyroid symptoms and high cholesterol. However only 2.2 in 7/14 so increased to 50 mcg daily and TSH 2.4 in 11/14 but had missed a dose on the weekends on occasion so increased to 62.5 mcg daily and TSH 1.87 in 3/15 and 1.46 in 11/15 and 1.04 in 3/16. Up to 2.06 in 7/16 so increased to 75 mcg daily and down to 1.5 in 12/16. Up to 2.11 in 4/17 so increased her dose to 88 mcg daily and down to 0.96 in 9/17 and 1.81 in 1/18 and 1.96 in 10/18 and 1.49 in 3/19 so kept her dose the same.   TSH up to 2.64 in 7/19 but had missed some doses prior to lab draw so kept dose the same. Her thyroid is not to blame for her tachycardia as she has never been over-replaced. - cont levothyroxine 88 mcg daily  - check TSH and free T4 prior to next visit      3. Hypercholesterolemia: Given DM, Goal LDL < 100, non-HDL < 130, and TG < 150.  in 10/12 off simva 10 due to trying to conceive, down to 109 in 1/13 and 92 in 5/13. Up to 127 in 11/13 and down to 111 in 3/14. Up to 124 in 7/14. Down to 114 in 11/14. Up to 140 in 3/15 possibly due to [address] as with stopping this it was down to 92 in 7/15. Up to 114 in 11/15 with diet. Was started on lipitor in 1/16 but had more nausea with this so stopped this and with 17 lb wt loss, LDL down to 84 in 3/16 and still 98 in 7/16. Up to 114 in 4/17 and still 119 in 9/17 and 136 in 1/18. Down to 116 in 6/18 and 113 in 10/18 and 110 in 3/19 but up to 138 in 7/19 due to higher A1c and TSH so will hold on any other meds for now. - diet control  - check lipids prior to next visit      4. Obesity: I started topamax in 11/15 and Dr. Negrita Morse added phentermine in 1/16 and changed her trulicity to [address] and her weight had come down 26 lbs by 7/16. Had more nausea and GERD and ended up stopping all her meds in 10/16 and weight up 7 lbs by 12/16. Restarted topamax for 2 weeks and then added back victoza at a lower dose of 1.2 mg daily. Eventually added back phentermine in 3/17. Then developed a kidney stone in 3/17 and stopped the topamax and weight up 6 lbs by 4/17. Will stay off the topamax due to the kidney stone and stop the phentermine as I don't think this is helping her weight. I told her it's fine to restart the advocare spark and protein shakes as these are not as likely to cause the kidney stone as the topamax was. Weight up 7 lbs by 9/17 possibly due to stress and 1 lb by 1/18. Down 3 lbs by 6/18.  Up 6 lbs by 11/18 but was off victoza and started back on prozac.   I added back topamax 1 tab daily and restarted victoza but wt down just 1 lb by 3/19 so began wellbutrin instead and still stable by 7/19. Stopped topamax in 6/19 due to concern for new back pain that could be the beginning of another kidney stone. - cont wellbutrin  mg daily   - stay off topamax 50 mg daily due to risk of kidney stone      5. Vitamin D deficiency: level was 24 in 12/15 and is currently taking 2000 units of D3 and level up to 32 in 7/16 and 35 in 4/17 and 30 in 9/17 and 35 in 10/18  - cont vitamin D 2000 units daily  - check Vitamin D 25-OH level in spring 2020    6. 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 Karri Christianson and Dr. Shahriar Fairbanks. Now under care of Dr. Alicia Chi and combo of dilt and toprol normally works but up today due to headache  - cont Toprol  mg bid  - cont dilt  mg daily    Patient Instructions   1) Try taking one 10 mg glipizide before dinner and at bedtime to see if this gets your fasting sugars down closer to 130 or less. Let me know if this will go to [facility] which is a new mail order that some [place] patients are using. 2) Your TSH (thyroid test) is slightly over 2 due to missing some doses the past 6 weeks. If you ever do miss a dose of levothyroxine, it's okay to take 2 pills the next day to catch up on your dose. The goal is always to get 7 pills per week and even if you have to take 3-4 pills at a time to get caught up on missed doses, this is better than letting your weekly level fall. 3) Your LDL (bad cholesterol) likely is higher from higher A1c and higher TSH. 4) Your liver and kidney are normal and blood pressure is at goal.          Follow-up and Dispositions    · Return in about 6 months (around 1/24/2020). Copy sent to:   Hank Krueger MD as PCP - General  Kaylin Payne MD (Obstetrics & Gynecology)  Carie Garcia MD (Cardiology)

## 2019-07-31 ENCOUNTER — TELEPHONE (OUTPATIENT)
Dept: ENDOCRINOLOGY | Age: 39
End: 2019-07-31

## 2019-07-31 RX ORDER — GLIPIZIDE 10 MG/1
TABLET ORAL
Qty: 180 TAB | Refills: 3 | Status: SHIPPED | OUTPATIENT
Start: 2019-07-31 | End: 2019-08-28

## 2019-07-31 NOTE — TELEPHONE ENCOUNTER
7/31/2019  2:40 PM      Mrs. Escobar stated that her mail order is still [facility].           Thanks

## 2019-07-31 NOTE — TELEPHONE ENCOUNTER
Sent her the following message through [employer]:    I sent the higher dose of glipizide to [facility] per your request.

## 2019-08-28 RX ORDER — INSULIN GLARGINE 100 [IU]/ML
INJECTION, SOLUTION SUBCUTANEOUS
Qty: 15 ML | Refills: 11 | Status: SHIPPED | COMMUNITY
Start: 2019-08-28 | End: 2019-08-28 | Stop reason: CLARIF

## 2019-08-28 RX ORDER — INSULIN GLARGINE 100 [IU]/ML
INJECTION, SOLUTION SUBCUTANEOUS
Qty: 15 ML | Refills: 11 | Status: SHIPPED | OUTPATIENT
Start: 2019-08-28 | End: 2020-01-22

## 2020-02-15 LAB
ALBUMIN SERPL-MCNC: 4.2 G/DL (ref 3.8–4.8)
ALBUMIN/CREAT UR: 9 MG/G CREAT (ref 0–29)
ALBUMIN/GLOB SERPL: 1.7 {RATIO} (ref 1.2–2.2)
ALP SERPL-CCNC: 87 IU/L (ref 39–117)
ALT SERPL-CCNC: 20 IU/L (ref 0–32)
AST SERPL-CCNC: 21 IU/L (ref 0–40)
BILIRUB SERPL-MCNC: 0.3 MG/DL (ref 0–1.2)
BUN SERPL-MCNC: 10 MG/DL (ref 6–20)
BUN/CREAT SERPL: 18 (ref 9–23)
CALCIUM SERPL-MCNC: 9.4 MG/DL (ref 8.7–10.2)
CHLORIDE SERPL-SCNC: 101 MMOL/L (ref 96–106)
CHOLEST SERPL-MCNC: 188 MG/DL (ref 100–199)
CO2 SERPL-SCNC: 22 MMOL/L (ref 20–29)
CREAT SERPL-MCNC: 0.57 MG/DL (ref 0.57–1)
CREAT UR-MCNC: 83.8 MG/DL
EST. AVERAGE GLUCOSE BLD GHB EST-MCNC: 183 MG/DL
GLOBULIN SER CALC-MCNC: 2.5 G/DL (ref 1.5–4.5)
GLUCOSE SERPL-MCNC: 141 MG/DL (ref 65–99)
HBA1C MFR BLD: 8 % (ref 4.8–5.6)
HDLC SERPL-MCNC: 56 MG/DL
LDLC SERPL CALC-MCNC: 115 MG/DL (ref 0–99)
MICROALBUMIN UR-MCNC: 7.4 UG/ML
POTASSIUM SERPL-SCNC: 4.6 MMOL/L (ref 3.5–5.2)
PROT SERPL-MCNC: 6.7 G/DL (ref 6–8.5)
SODIUM SERPL-SCNC: 138 MMOL/L (ref 134–144)
T4 FREE SERPL-MCNC: 1.47 NG/DL (ref 0.82–1.77)
TRIGL SERPL-MCNC: 83 MG/DL (ref 0–149)
TSH SERPL DL<=0.005 MIU/L-ACNC: 1.49 UIU/ML (ref 0.45–4.5)
VLDLC SERPL CALC-MCNC: 17 MG/DL (ref 5–40)

## 2020-02-26 ENCOUNTER — OFFICE VISIT (OUTPATIENT)
Dept: ENDOCRINOLOGY | Age: 40
End: 2020-02-26

## 2020-02-26 VITALS
BODY MASS INDEX: 34.4 KG/M2 | HEIGHT: 61 IN | SYSTOLIC BLOOD PRESSURE: 109 MMHG | DIASTOLIC BLOOD PRESSURE: 77 MMHG | WEIGHT: 182.2 LBS | HEART RATE: 104 BPM

## 2020-02-26 DIAGNOSIS — E55.9 VITAMIN D DEFICIENCY: ICD-10-CM

## 2020-02-26 DIAGNOSIS — E66.9 OBESITY, CLASS I, BMI 30-34.9: ICD-10-CM

## 2020-02-26 DIAGNOSIS — E04.9 GOITER: ICD-10-CM

## 2020-02-26 DIAGNOSIS — E78.00 HYPERCHOLESTEROLEMIA: ICD-10-CM

## 2020-02-26 DIAGNOSIS — R00.0 TACHYCARDIA: ICD-10-CM

## 2020-02-26 RX ORDER — NITROFURANTOIN 25; 75 MG/1; MG/1
CAPSULE ORAL
COMMUNITY
End: 2020-04-24 | Stop reason: ALTCHOICE

## 2020-02-26 RX ORDER — INSULIN GLARGINE 100 [IU]/ML
INJECTION, SOLUTION SUBCUTANEOUS
COMMUNITY
Start: 2020-02-26 | End: 2020-04-24 | Stop reason: ALTCHOICE

## 2020-02-26 NOTE — PROGRESS NOTES
Chief Complaint   Patient presents with    Diabetes     History of Present Illness: Natalia Crum is a 46 y.o. female here for follow up of diabetes. Weight down 14 lbs since last visit in 7/19. We ended up starting lantus in 8/19 but then started seeing Dr. Niels [facility] in 9/19 and she was taken off metformin and glipizide and changed to synjardy XR 12.5/1000 mg 2 tabs together in the am and was started on ozempic at 0.25 mg and has since worked up to 1 mg weekly on Fridays. Was able to stop the lantus as of 12/19. Off the lantus, her sugars are staying 150-185 fasting and during the day and every now and again can get a fasting sugar in the 90s. Was having fasting sugars under 80 in 12/19 while still on lantus 20 units but never tried lower than this on the lantus. This time the synjardy has not had as much yeast infection as the plain jardiance and tries to stay hydrated. Has needed fluconazole 3 times in the past few months. Still taking dilt and metoprolol. Has still had left sided back pain that has limited her ability to exercise. Current Outpatient Medications   Medication Sig    nitrofurantoin, macrocrystal-monohydrate, (MACROBID) 100 mg capsule Macrobid 100 mg capsule   1 po bid    empagliflozin-metformin (SYNJARDY XR) 12.5-1,000 mg TBph Take 2 tabs daily--per Dr. Inez Francois semaglutide (OZEMPIC) 1 mg/dose (2 mg/1.5 mL) sub-q pen Inject 1 mg weekly--per Dr. Fabi Roberson    cyclobenzaprine (FLEXERIL) 10 mg tablet TAKE ONE-HALF (1/2) TABLET TO 1 TABLET NIGHTLY AS NEEDED FOR MUSCLE SPASMS IN LOW BACK    levothyroxine (SYNTHROID) 88 mcg tablet TAKE 1 TABLET DAILY    dexlansoprazole (DEXILANT) 60 mg CpDB capsule (delayed release) Take 1 Cap by mouth daily.  DEXILANT 60 mg CpDB capsule (delayed release) TAKE 1 CAPSULE BY MOUTH ONCE DAILY FOR  REFLUX    buPROPion XL (WELLBUTRIN XL) 300 mg XL tablet Take 1 Tab by mouth every morning.     Insulin Needles, Disposable, (BD ULTRA-FINE MINI PEN NEEDLE) 31 gauge x 3/16\" ndle Use as directed once daily with victoza    CARTIA  mg ER capsule Take 120 mg by mouth daily.  ibuprofen (MOTRIN IB) 200 mg tablet Take 800 mg by mouth every evening. Takes ~ 2-3 x a week in the evenings for back pain if needed    metoprolol succinate (TOPROL-XL) 100 mg tablet Take 1 Tab by mouth two (2) times a day.  ELIDEL 1 % topical cream Apply  to affected area as needed (Eczema).  cholecalciferol, vitamin D3, 2,000 unit tab Take 2,000 Units by mouth daily.  levonorgestrel (MIRENA) 20 mcg/24 hr (5 years) IUD 1 Each by IntraUTERine route once.  fluconazole (DIFLUCAN) 150 mg tablet Take 1 tab now. May repeat in 2 days if yeast infection doesn't clear (Patient taking differently: Take 150 mg by mouth as needed. Take 1 tab now. May repeat in 2 days if yeast infection doesn't clear)     No current facility-administered medications for this visit. Allergies   Allergen Reactions    Jardiance [Empagliflozin] Other (comments)     Recurrent yeast infections    Lexapro [Escitalopram] Other (comments)     Fatigue, wt gain    Naprosyn [Naproxen] Nausea and Vomiting     Tolerates Advil, Toradol, etc    Topamax [Topiramate] Other (comments)     Kidney stone    Zoloft [Sertraline] Other (comments)     Fatigue      Review of Systems:  - Eyes: no blurry vision or double vision  - Cardiovascular: no chest pain  - Respiratory: no shortness of breath  - Musculoskeletal: no myalgias  - Neurological: no numbness/tingling in extremities    Physical Examination:  Blood pressure 109/77, pulse (!) 104, height 5' 1\" (1.549 m), weight 182 lb 3.2 oz (82.6 kg).   - General: pleasant, no distress, good eye contact   - Neck: no carotid bruits  - Cardiovascular: regular, borderline tachy, nl s1 and s2, no m/r/g,   - Respiratory: clear bilaterally  - Integumentary: no edema,   - Psychiatric: normal mood and affect    Diabetic foot exam:     Left Foot:   Visual Exam: callous - mild   Pulse DP: 2+ (normal)   Filament test: normal sensation    Vibratory sensation: normal      Right Foot:   Visual Exam: callous - mild   Pulse DP: 2+ (normal)   Filament test: normal sensation    Vibratory sensation: normal        Data Reviewed:   Component      Latest Ref Rng & Units 2/14/2020 2/14/2020 2/14/2020 2/14/2020           8:14 AM  8:14 AM  8:14 AM  8:14 AM   Glucose      65 - 99 mg/dL 141 (H)      BUN      6 - 20 mg/dL 10      Creatinine      0.57 - 1.00 mg/dL 0.57      GFR est non-AA      >59 mL/min/1.73 118      GFR est AA      >59 mL/min/1.73 136      BUN/Creatinine ratio      9 - 23 18      Sodium      134 - 144 mmol/L 138      Potassium      3.5 - 5.2 mmol/L 4.6      Chloride      96 - 106 mmol/L 101      CO2      20 - 29 mmol/L 22      Calcium      8.7 - 10.2 mg/dL 9.4      Protein, total      6.0 - 8.5 g/dL 6.7      Albumin      3.8 - 4.8 g/dL 4.2      GLOBULIN, TOTAL      1.5 - 4.5 g/dL 2.5      A-G Ratio      1.2 - 2.2 1.7      Bilirubin, total      0.0 - 1.2 mg/dL 0.3      Alk. phosphatase      39 - 117 IU/L 87      AST      0 - 40 IU/L 21      ALT (SGPT)      0 - 32 IU/L 20      Cholesterol, total      100 - 199 mg/dL  188     Triglyceride      0 - 149 mg/dL  83     HDL Cholesterol      >39 mg/dL  56     VLDL, calculated      5 - 40 mg/dL  17     LDL, calculated      0 - 99 mg/dL  115 (H)     Creatinine, urine      Not Estab. mg/dL   83.8    Microalbumin, urine      Not Estab. ug/mL   7.4    Microalbumin/Creat. Ratio      0 - 29 mg/g creat   9    Hemoglobin A1c, (calculated)      4.8 - 5.6 %    8.0 (H)   Estimated average glucose      mg/dL    183     Component      Latest Ref Rng & Units 2/14/2020 2/14/2020           8:14 AM  8:14 AM   T4, Free      0.82 - 1.77 ng/dL  1.47   TSH      0.450 - 4.500 uIU/mL 1.490      Assessment/Plan:     1.  Diabetes mellitus type II: her most recent Hgb A1c was 8% in 2/20 down from 9.9% in 7/19 up from 9.2% in 3/19 up from 8.2% in 10/18 down from 8.3% in 6/18 down from 10.4% in 1/18 up from 8.1% in 9/17 up from 7.9% in 4/17 down from 8.1% in 12/16 up from 7% in 7/16 down from 7.7% in 3/16 down from 7.8% in 11/15 down from 8.3% in 7/15 down from 8.8% in 3/15 up from 7.6% in 11/14 up from 7.3% in 7/14 down from 8% in 3/14 up from 6.1% in 11/13 down from 6.7% in 9/13 up from 6.3% in 8/13 up slightly from 6.1% in 7/13 down from 6.3% in 5/13 down from 6.7% in 1/13 up from 6.5% in Oct 2012. A1c is coming down with 14 lb wt loss on ozempic and synjardy but fasting sugars are still too high off the lantus so her aquiles phenomenon is strong and needs a low dose of lantus to keep this down so will restart. - restart lantus 10 units at bedtime  - cont ozempic 1 mg weekly  - cont synjardy XR 12.5/1000 mg 2 tabs daily  - check bs up to 3 times daily due to fluctuating sugars  - foot exam done 2/20  - microalbumin nl 10/12--was taken off lisinopril 10 mg daily due to trying to conceive and repeat normal ever since, most recently in 2/20  - optho UTD 5/19  - check Hgb A1c and cmp prior to next visit     2. Goiter: Had a TSH of 2.68 in May 2012. Level was 2.4 in 1/13 and I started her on a a low dose of levothyroxine 25 mcg daily to keep her TSH < 2.5 to improve her chance of conception and interestingly she became pregnant shortly after starting this. TSH 2.1 in 5/13 and 1.6 in 7/13 and 1.39 in 9/13. Stopped levothyroxine after delivery in 10/13 and TSH up to 5.5 in 3/14 so restarted low dose of 25 mcg daily to keep her TSH < 2.5 as she has hypothyroid symptoms and high cholesterol. However only 2.2 in 7/14 so increased to 50 mcg daily and TSH 2.4 in 11/14 but had missed a dose on the weekends on occasion so increased to 62.5 mcg daily and TSH 1.87 in 3/15 and 1.46 in 11/15 and 1.04 in 3/16. Up to 2.06 in 7/16 so increased to 75 mcg daily and down to 1.5 in 12/16.   Up to 2.11 in 4/17 so increased her dose to 88 mcg daily and down to 0.96 in 9/17 and 1.81 in 1/18 and 1.96 in 10/18 and 1.49 in 3/19 so kept her dose the same. TSH up to 2.64 in 7/19 but had missed some doses prior to lab draw so kept dose the same and 1.49 in 2/20 so kept dose the same. Her thyroid is not to blame for her tachycardia as she has never been over-replaced. - cont levothyroxine 88 mcg daily  - check TSH prior to next visit      3. Hypercholesterolemia: Given DM, Goal LDL < 100, non-HDL < 130, and TG < 150.  in 10/12 off simva 10 due to trying to conceive, down to 109 in 1/13 and 92 in 5/13. Up to 127 in 11/13 and down to 111 in 3/14. Up to 124 in 7/14. Down to 114 in 11/14. Up to 140 in 3/15 possibly due to [address] as with stopping this it was down to 92 in 7/15. Up to 114 in 11/15 with diet. Was started on lipitor in 1/16 but had more nausea with this so stopped this and with 17 lb wt loss, LDL down to 84 in 3/16 and still 98 in 7/16. Up to 114 in 4/17 and still 119 in 9/17 and 136 in 1/18. Down to 116 in 6/18 and 113 in 10/18 and 110 in 3/19 but up to 138 in 7/19 due to higher A1c and TSH but down to 115 in 2/20 so will hold on any other meds for now. - diet control  - check lipids prior to next visit      4. Obesity: I started topamax in 11/15 and Dr. Alma Rosa Palm added phentermine in 1/16 and changed her trulicity to [address] and her weight had come down 26 lbs by 7/16. Had more nausea and GERD and ended up stopping all her meds in 10/16 and weight up 7 lbs by 12/16. Restarted topamax for 2 weeks and then added back victoza at a lower dose of 1.2 mg daily. Eventually added back phentermine in 3/17. Then developed a kidney stone in 3/17 and stopped the topamax and weight up 6 lbs by 4/17. Will stay off the topamax due to the kidney stone and stop the phentermine as I don't think this is helping her weight. I told her it's fine to restart the advocare spark and protein shakes as these are not as likely to cause the kidney stone as the topamax was.  Weight up 7 lbs by 9/17 possibly due to stress and 1 lb by 1/18. Down 3 lbs by 6/18.  Up 6 lbs by 11/18 but was off victoza and started back on prozac. I added back topamax 1 tab daily and restarted victoza but wt down just 1 lb by 3/19 so began wellbutrin instead and still stable by 7/19. Stopped topamax in 6/19 due to concern for new back pain that could be the beginning of another kidney stone. Started seeing Dr. Kyra Cruz in 9/19 and was started on ozempic and synardy and wt down 14 lbs by 2/20.  - cont wellbutrin  mg daily   - cont synjardy and ozempic as above  - stay off topamax 50 mg daily due to risk of kidney stone      5. Vitamin D deficiency: level was 24 in 12/15 and is currently taking 2000 units of D3 and level up to 32 in 7/16 and 35 in 4/17 and 30 in 9/17 and 35 in 10/18  - cont vitamin D 2000 units daily  - check Vitamin D 25-OH level in 2/21    6. 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 Mallorie Payne and Dr. Preethi Loza. Now under care of Dr. Nick Orosco and combo of dilt and toprol normally works   - cont Toprol  mg bid  - cont dilt  mg daily    Patient Instructions   1) Restart just 10 units of lantus at bedtime. Give this 4 days and if your fasting sugar is between 100-130 then stay on this dose. If over 130, then go up by 2 units every 4 days to get to the dose that keeps you under 130. If under 100, go down by 2 units every 4 days. 2) Your A1c is 8% down from 9.9% and I hope if we can keep your fasting sugar under 130, then your next A1c will be back under 7%. 3) Your cholesterol is much better and your liver and kidney and urine and thyroid are normal.    4) Your blood pressure is controlled. 5) You have lost 14 lbs on our scales. Keep up the good work. Follow-up and Dispositions    · Return in about 6 months (around 8/26/2020). Copy sent to:   Mar Galdamez MD as PCP - General Creola Ormond, MD (Obstetrics & Gynecology)  Kayode Marrufo MD (Cardiology)   [facility]

## 2020-03-21 ENCOUNTER — TELEPHONE (OUTPATIENT)
Dept: FAMILY MEDICINE CLINIC | Age: 40
End: 2020-03-21

## 2020-03-21 NOTE — TELEPHONE ENCOUNTER
Incoming call to on call provider. Patient c/o tingling sensation from left jaw down left arm and left leg that began yesterday. Awoke this am with same symptoms now affecting right arm as well. No chest pain, SOB. Advised to go to ED for further evaluation.

## 2020-04-04 ENCOUNTER — TELEPHONE (OUTPATIENT)
Dept: FAMILY MEDICINE CLINIC | Age: 40
End: 2020-04-04

## 2020-04-04 DIAGNOSIS — F41.0 PANIC ATTACKS: ICD-10-CM

## 2020-04-04 DIAGNOSIS — F41.8 DEPRESSION WITH ANXIETY: Primary | ICD-10-CM

## 2020-04-04 RX ORDER — BUSPIRONE HYDROCHLORIDE 5 MG/1
5 TABLET ORAL
Qty: 60 TAB | Refills: 1 | Status: SHIPPED | OUTPATIENT
Start: 2020-04-04 | End: 2020-06-02 | Stop reason: SDUPTHER

## 2020-04-04 RX ORDER — BUPROPION HYDROCHLORIDE 150 MG/1
150 TABLET ORAL
Qty: 30 TAB | Refills: 1 | Status: SHIPPED | OUTPATIENT
Start: 2020-04-04 | End: 2020-06-02 | Stop reason: SDUPTHER

## 2020-04-04 RX ORDER — FLUOXETINE HYDROCHLORIDE 20 MG/1
20 CAPSULE ORAL DAILY
Qty: 30 CAP | Refills: 1 | Status: SHIPPED | OUTPATIENT
Start: 2020-04-04 | End: 2020-04-24 | Stop reason: DRUGHIGH

## 2020-04-05 NOTE — TELEPHONE ENCOUNTER
----- Message from Darshan Oshea LPN sent at 6/4/2719  8:10 AM EDT -----  Regarding: FW: Prescription Question/Anxiety  Contact: [phone]    ----- Message -----  From: Shook Rikar  Sent: 4/2/2020   7:45 PM EDT  To: [facility]  Subject: RE: Prescription Question/Anxiety                Hi Dr. John Viveros I am willing to try these medications. I am sure that a lot of what's going on has increased this feeling. 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 I did try to call your office today to get an appointment between my patients and I was never able to get through. Placed on hold for substantial amount of time. I will try back again tomorrow. I use the [facility] on [facility] for my medicines. Thank you again.     ----- Message -----  From: Belkis Brandon MD  Sent: 4/2/20, 1:26 PM  To: Yonatan Escobar  Subject: RE: Prescription Question/Anxiety    Good afternoon Yonatan,       I am so sorry to hear about your anxiety. There seems to be a lot of that going on right now. But you should also know that while Wellbutrin is a great anti-depressant, it can definitely intensify feelings of anxiety and nervousness, especially if a certain level of anxiety is already underlying. I would recommend we taper the Wellbutrin from 300 to 150 mg once daily and add another medication to it temporarily that will work better for depression AND anxiety. You did not tolerate Zoloft or Lexapro previously so I would try you on something different. Fluoxetine is what I had you on in the past but at a low dose and you stopped it before we were able to titrate it much because you were worried about weight gain even though you felt it worked well for your mood. It happens to be one of the more weight neutral  medications and works beneficially for both depression and anxiety.  It also can be used in conjunction w/ the lower dose of the Wellbutrin and the 2 work well together and the side effects balance each other out. In ADDITION to this combo, I can also prescribe Buspar for   you to take on an AS NEEDED basis for anxiety attacks in the time being. So here would be my recs:    1) Decrease Wellbutrin XL from 300 to 150 mg once daily in the AM    2) Add Fluoxetine 20 mg once daily in the AM    3) Buspar 5 mg twice daily AS NEEDED    4) Follow up w/ me in 3-4 weeks (we are not seeing \"well patients\" who are not having upper respiratory symptoms in the office; we are seeing patients via virtual visits). Call the office to get an appointment w/ me for about 3 weeks out and tell them you would like a virtual visit. They will give you instructions after that so that we can do your visit online via computer or your smartphone. 5) Let me know after you have reviewed all the above and gotten your appointment set and let me know what pharm you want to use      ----- Message -----       From:Yonatan Escobar       Sent:4/2/2020  8:17 AM EDT         To:Melvi Chaudhari MD    Subject:Prescription Question    Hi. Dr. Vishnu Taylor, I have been in touch with Dr. Sue Ashford mainly because he's given me Wellbutrin but below is my issue and he has asked me to reach out to you. If someone would please let me know what to do from here. .... My question and concern today is my restlessness and anxiety. This has been going on for the past 3-4 weeks. My anxiety is so bad that I almost feel like I'm having a panic attack. I'm not sure what has brought this on but it's not getting any better. Then there's the restlessness, I don't know if it secondary to the anxiety but it has only been happening just as long as I have been experiencing the other symptoms. I don't know what to do but it's wearing me out. Is there something that we can try or something I should stop taking that could be causing this. Please let me know. Thanks. Stay healthy and safe.        - - - DISCLAIMER - - -  [email or web address][This message may contain formatting that cannot be shown on this site.]

## 2020-04-24 ENCOUNTER — VIRTUAL VISIT (OUTPATIENT)
Dept: FAMILY MEDICINE CLINIC | Age: 40
End: 2020-04-24

## 2020-04-24 VITALS
SYSTOLIC BLOOD PRESSURE: 117 MMHG | BODY MASS INDEX: 32.12 KG/M2 | DIASTOLIC BLOOD PRESSURE: 78 MMHG | WEIGHT: 170 LBS | HEART RATE: 95 BPM

## 2020-04-24 DIAGNOSIS — F41.1 GENERALIZED ANXIETY DISORDER WITH PANIC ATTACKS: Primary | ICD-10-CM

## 2020-04-24 DIAGNOSIS — F41.0 GENERALIZED ANXIETY DISORDER WITH PANIC ATTACKS: Primary | ICD-10-CM

## 2020-04-24 DIAGNOSIS — G47.9 SLEEP DISTURBANCE: ICD-10-CM

## 2020-04-24 DIAGNOSIS — F32.A CHRONIC DEPRESSION: ICD-10-CM

## 2020-04-24 RX ORDER — FLUOXETINE 20 MG/1
20 TABLET ORAL
Qty: 90 TAB | Refills: 0 | Status: SHIPPED | OUTPATIENT
Start: 2020-04-24 | End: 2020-08-03 | Stop reason: SDUPTHER

## 2020-04-24 NOTE — PATIENT INSTRUCTIONS

## 2020-04-24 NOTE — PROGRESS NOTES
Chief Complaint   Patient presents with    Anxiety     follow up    Medication Evaluation    Sleep Problem     VIRTUAL VISIT    HISTORY OF PRESENT ILLNESS   HPI  Patient seen via virtual visit today for follow up anxiety, panic attacks, depression and sleep problems. Since being back on Prozac 20 mg capsule once daily and Buspar prn anxiety attacks, she really feels this regimen has been working really well for her. She is feeling a lot more calm and on the days she does have heightened anxiety or a panic attack, taking 1 of the Buspar really calms her down and relaxes her w/o making her feel tired or groggy. It has really helped level off her anxiety to a functional level when the need arises. She has only been taking this once a day most days and it works effectively that way. Her mood in general has been much more stable and she is pleased w/ the effects of her current regimen of Prozac 20 mg, Wellbutrin  mg and Buspar 5 mg prn. She does notice that since restarting the Prozac however, it causes heartburn. Now she remembers that is the same effect it had before when she took it. She notices it w/in about 30 minutes of taking the capsule. Doesn't happen every time but most of the time. She takes it in the AM on an empty stomach before breakfast. She also takes her Dexilant in the AM. She takes Motrin 400 mg several nights a week for her chronic low back pain. I have recommended that she avoid taking Motrin at night on an empty stomach just before bed and instead take it w/ her dinner earlier on. I have also recommended she change taking her Dexilant to before dinner and that she start taking the Prozac after eating her breakfast and change her from the capsule formulation to the tablet to see if all this helps the heartburn. She reports chronic sleep issues the past several months. She falls asleep ok but wakes up \"like clock work\" every morning at 3 am and then cant get back to sleep.   Every morning she gets up feeling tired and not able to focus or function very well at work, but she forces herself to keep going. She drinks sleepy time tea at night but that doesn't help. She tried Melatonin Cherry SL Dissolvable tablets for a while but it was causing her morning BS's to be high. Dr. Mitchel Stephenson NP prescribed Trazodone 50 mg back in , she took those for a while and they did seem to help for a while but then she either forgot about them or thinks maybe they stopped working. She cant recall. REVIEW OF SYMPTOMS   Review of Systems   Constitutional: Negative. Eyes: Negative. Respiratory: Negative. Cardiovascular: Negative. Gastrointestinal: Negative. Genitourinary: Negative. Neurological: Negative. Endo/Heme/Allergies: Negative. Psychiatric/Behavioral: Negative for depression. The patient has insomnia. PROBLEM LIST/MEDICAL HISTORY     Problem List  Date Reviewed: 4/24/2020          Codes Class Noted    Diabetes mellitus type 2, noninsulin dependent ([facility] ICD-10-CM: E11.9  ICD-9-CM: 250.00  1/16/2019    Overview Signed 1/16/2019 10:08 AM by Gertrude Mock MD     Initial consult Dr. Carie Bee; Followed by Endo Dr. Jovita Bishop ; [facility]: Rosie annually             Inappropriate sinus tachycardia ICD-10-CM: R00.0  ICD-9-CM: 427.89  10/5/2018    Overview Signed 10/5/2018 10:57 AM by Gertrude Mock MD     Cardi [phone]: Dr Rubia Cary, Dr Bety Alamo; self referred for 2nd opinion Dr. Jose Beck [phone]; managed w/ CCB & BB             Congenital cataract of left eye ICD-10-CM: Q12.0  ICD-9-CM: 743.30  10/5/2018    Overview Signed 10/5/2018 11:11 AM by Gertrude Mock MD     [phone]             Severe obesity (BMI 35.0-39. 9) with comorbidity ([facility] ICD-10-CM: E66.01  ICD-9-CM: 278.01  4/24/2018        CAP (community acquired pneumonia) ICD-10-CM: J18.9  ICD-9-CM: 486  1/8/2018    Overview Signed 1/22/2018 12:08 PM by Gertrude Mock MD [facility] Admission 1/8/11-1/11/18             Bulging of intervertebral disc between L4 and L5 ICD-10-CM: M51.26  ICD-9-CM: 722.10  1/8/2018        UTI (urinary tract infection) ICD-10-CM: N39.0  ICD-9-CM: 599.0  1/8/2018        Acute lymphadenitis of arm, left epitrochlear ICD-10-CM: L04.2  ICD-9-CM: 683  12/13/2017        Depression with anxiety ICD-10-CM: F41.8  ICD-9-CM: 300.4  9/28/2017    Overview Signed 9/28/2017 12:42 PM by Don Holguin MD     10/2014             Lumbar facet arthropathy ICD-10-CM: M47.816  ICD-9-CM: 721.3  8/28/2017    Overview Signed 8/28/2017  9:20 PM by Don Holguin MD     On CT scan             Heart palpitations ICD-10-CM: R00.2  ICD-9-CM: 785.1  8/16/2017    Overview Signed 10/5/2018 10:56 AM by Don Holguin MD     Related to sinus tachycardia and anxiety             Spondylosis of lumbar & thoracic region without myelopathy or radiculopathy ICD-10-CM: M47.816  ICD-9-CM: 721.3  10/26/2016    Overview Signed 10/26/2016  8:50 AM by Don Holguin MD     xrays 2010             Hypercholesterolemia ICD-10-CM: E78.00  ICD-9-CM: 272.0  8/18/2016        History of peptic ulcer disease ICD-10-CM: Z87.11  ICD-9-CM: V12.71  8/18/2016    Overview Signed 8/18/2016 10:16 AM by Don Holguin MD     18 yo             Vitamin D deficiency ICD-10-CM: E55.9  ICD-9-CM: 268.9  Unknown        Obesity, Class I, BMI 30-34.9 ICD-10-CM: E66.9  ICD-9-CM: 278.00  11/18/2015        Post partum depression ICD-10-CM: O99.345, F53.0  ICD-9-CM: 648.44, 311  10/9/2014    Overview Signed 10/9/2014 12:24 PM by Don Holguin MD     10/2013: counseling and prn Valium             Chronic low back pain & Muscle Spasms ICD-10-CM: M54.5, G89.29  ICD-9-CM: 724.2, 338.29  10/9/2014    Overview Signed 10/9/2014 12:36 PM by Don Holguin MD     Muscular              Bilateral carpal tunnel syndrome ICD-10-CM: G56.03  ICD-9-CM: 354.0  12/31/2013 Overview Addendum 2013 10:28 AM by Xavier Sheikh MD     10/2013; wrist splints               Goiter ICD-10-CM: E04.9  ICD-9-CM: 240.9  2013        History of sinus tachycardia ICD-10-CM: Z86.79  ICD-9-CM: V12.59  10/5/2012    Overview Signed 10/5/2012  9:13 AM by Xavier Sheikh MD     Since her early              GERD (gastroesophageal reflux disease) ICD-10-CM: K21.9  ICD-9-CM: 530.81  2011    Overview Signed 2011  9:20 AM by Xavier Sheikh MD                  Dysphagia, workup negative except GERD ICD-10-CM: R13.10  ICD-9-CM: 787.20  2010    Overview Addendum 2011  9:21 AM by Xavier Sheikh MD     2010: Thyroid US normal  ENT eval;   Barium Swallow =GERD only             Anemia ICD-10-CM: D64.9  ICD-9-CM: 285.9  2010        Gallstones ICD-10-CM: K80.20  ICD-9-CM: 574.20  Unknown    Overview Signed 3/25/2010 10:31 AM by Xavier Sheikh MD     Lap Cholecystectomy-Dr. Weeks [facility] (normal spontaneous vaginal delivery) ICD-10-CM: O80  ICD-9-CM: 0  Unknown    Overview Signed 3/25/2010 10:31 AM by Xavier Sheikh MD     Son  9yo                       PAST SURGICAL HISTORY     Past Surgical History:   Procedure Laterality Date    HX CARPAL TUNNEL RELEASE Bilateral     HX  SECTION      HX CHOLECYSTECTOMY  10/09    LAP-Gallstones    HX GYN  2015    IUD placed    HX LITHOTRIPSY  2017    HX ORTHOPAEDIC Right 7/10/14    trigger thumb    HX ORTHOPAEDIC Left 7/10/14    wrist         MEDICATIONS     Current Outpatient Medications   Medication Sig    buPROPion XL (WELLBUTRIN XL) 150 mg tablet Take 1 Tab by mouth every morning.  FLUoxetine (PROzac) 20 mg capsule Take 1 Cap by mouth daily.  busPIRone (BUSPAR) 5 mg tablet Take 1 Tab by mouth two (2) times daily as needed (for anxiety/panic attacks).  empagliflozin-metformin (SYNJARDY XR) 12.5-1,000 mg TBph 2 Tabs daily.  Per Endo    semaglutide (OZEMPIC) 1 mg/dose (2 mg/1.5 mL) sub-q pen 1 mg by SubCUTAneous route every seven (7) days. Per Endo    cyclobenzaprine (FLEXERIL) 10 mg tablet TAKE ONE-HALF (1/2) TABLET TO 1 TABLET NIGHTLY AS NEEDED FOR MUSCLE SPASMS IN LOW BACK    levothyroxine (SYNTHROID) 88 mcg tablet TAKE 1 TABLET DAILY    dexlansoprazole (DEXILANT) 60 mg CpDB capsule (delayed release) Take 1 Cap by mouth daily.  DEXILANT 60 mg CpDB capsule (delayed release) TAKE 1 CAPSULE BY MOUTH ONCE DAILY FOR  REFLUX    CARTIA  mg ER capsule Take 120 mg by mouth daily.  metoprolol succinate (TOPROL-XL) 100 mg tablet Take 1 Tab by mouth two (2) times a day.  cholecalciferol, vitamin D3, 2,000 unit tab Take 2,000 Units by mouth daily.  levonorgestrel (MIRENA) 20 mcg/24 hr (5 years) IUD 1 Each by IntraUTERine route once.  ibuprofen (MOTRIN IB) 200 mg tablet Take 400 mg by mouth every evening. Takes ~ 2-3 x a week in the evenings for back pain if needed    ELIDEL 1 % topical cream Apply  to affected area as needed (Eczema). No current facility-administered medications for this visit.            ALLERGIES     Allergies   Allergen Reactions    Jardiance [Empagliflozin] Other (comments)     Recurrent yeast infections    Naprosyn [Naproxen] Nausea and Vomiting     Tolerates Advil, Toradol, etc    Topamax [Topiramate] Other (comments)     Kidney stone    Lexapro [Escitalopram] Other (comments)     Fatigue, wt gain    Zoloft [Sertraline] Other (comments)     Fatigue           SOCIAL HISTORY     Social History     Socioeconomic History    Marital status:      Spouse name: Not on file    Number of children: 1    Years of education: Not on file    Highest education level: Not on file   Occupational History    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 Other Topics Concern    Caffeine Concern No     Comment: 1 cup of coffee qam, occ diet soda    Weight Concern Yes     Comment: Attended Dr. Pillai Prime x several months, ended : met goal: wt down from 198 to [address] Yes     Comment: adhering to more portion control    Back Care Yes     Comment: completed PT 2 x a week x several months 2017    Exercise No     Comment: nothing regular but just tries to stay active and is very busy at work   Social History Narrative    Got  ~ . Has 1 daughter; Long term relationship and has custody of her stepson; her biological son  at age 9yo in 66. 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         IMMUNIZATIONS  Immunization History   Administered Date(s) Administered    (RETIRED) Pneumococcal Vaccine (Unspecified Type) 2007    DTaP 2013    HPV (Quad) 2010    Hep B Vaccine 2013    Hepatitis B Vaccine 2009, 2009, 2010    Influenza Vaccine 10/09/2013, 10/23/2014, 10/16/2015    Influenza Vaccine (Quad) PF 10/26/2016, 2017, 10/05/2018    Influenza Vaccine Split 2010, 2011, 10/05/2012    Pneumococcal Vaccine (Unspecified Type) 2013    TB Skin Test (PPD) Intradermal 2013    TD Vaccine 2007         FAMILY HISTORY     Family History   Problem Relation Age of Onset    Diabetes Mother          ESRD 45 yo in     Hypertension Mother    Felicity Cervantes Stroke Mother         TIA    Heart Disease Mother     Diabetes Sister 29        type 2    No Known Problems Brother     High Cholesterol Paternal Grandmother     Hypertension Maternal Grandfather             Diabetes Maternal Grandmother     Hypertension Maternal Grandmother     High Cholesterol Maternal Grandmother     Cancer Maternal Grandmother         Breast CA     Heart Disease Maternal Grandmother          age 79    Colon Cancer Maternal Uncle          at 62yo         [facility] limited due to virtual visit. Self reported data collected today:  Visit Vitals  /78   Pulse 95   Wt 170 lb (77.1 kg)   BMI 32.12 kg/m²          PHYSICAL EXAMINATION   Physical Exam  Constitutional:       General: She is not in acute distress. Appearance: Normal appearance. Pulmonary:      Effort: Pulmonary effort is normal. No respiratory distress. Neurological:      Mental Status: She is alert and oriented to person, place, and time. Psychiatric:         Mood and Affect: Mood normal. Mood is not anxious or depressed.          Behavior: Behavior normal.         Cognition and Memory: Cognition normal.         Judgment: Judgment normal.             DIAGNOSTIC DATA         LABORATORY DATA     Results for orders placed or performed in visit on 19   HEMOGLOBIN A1C WITH EAG   Result Value Ref Range    Hemoglobin A1c 8.0 (H) 4.8 - 5.6 %    Estimated average glucose 183 mg/dL   MICROALBUMIN, UR, RAND W/ MICROALB/CREAT RATIO   Result Value Ref Range    Creatinine, urine 83.8 Not Estab. mg/dL    Microalbumin, urine 7.4 Not Estab. ug/mL    Microalb/Creat ratio (ug/mg creat.) 9 0 - 29 mg/g creat   LIPID PANEL   Result Value Ref Range    Cholesterol, total 188 100 - 199 mg/dL    Triglyceride 83 0 - 149 mg/dL    HDL Cholesterol 56 >39 mg/dL    VLDL, calculated 17 5 - 40 mg/dL    LDL, calculated 115 (H) 0 - 99 mg/dL   METABOLIC PANEL, COMPREHENSIVE   Result Value Ref Range    Glucose 141 (H) 65 - 99 mg/dL    BUN 10 6 - 20 mg/dL    Creatinine 0.57 0.57 - 1.00 mg/dL    GFR est non- >59 mL/min/1.73    GFR est  >59 mL/min/1.73    BUN/Creatinine ratio 18 9 - 23    Sodium 138 134 - 144 mmol/L    Potassium 4.6 3.5 - 5.2 mmol/L Chloride 101 96 - 106 mmol/L    CO2 22 20 - 29 mmol/L    Calcium 9.4 8.7 - 10.2 mg/dL    Protein, total 6.7 6.0 - 8.5 g/dL    Albumin 4.2 3.8 - 4.8 g/dL    GLOBULIN, TOTAL 2.5 1.5 - 4.5 g/dL    A-G Ratio 1.7 1.2 - 2.2    Bilirubin, total 0.3 0.0 - 1.2 mg/dL    Alk. phosphatase 87 39 - 117 IU/L    AST (SGOT) 21 0 - 40 IU/L    ALT (SGPT) 20 0 - 32 IU/L   T4, FREE   Result Value Ref Range    T4, Free 1.47 0.82 - 1.77 ng/dL   TSH 3RD GENERATION   Result Value Ref Range    TSH 1.490 0.450 - 4.500 uIU/mL            ASSESSMENT & PLAN       ICD-10-CM ICD-9-CM    1. Generalized anxiety disorder with panic attacks F41.1 300.02 FLUoxetine (PROzac) 20 mg tablet    F41.0 300.01    2. Chronic depression F32.9 311 FLUoxetine (PROzac) 20 mg tablet   3. Sleep disturbance G47.9 780.50      Improved w/ the addition of Prozac 20 mg and prn Buspar taken along w/ her Wellbutrin. But experiencing some GI upset on capsule formulation of the Prozac. Will change to tablet formulation of the Prozac 20 mg and have her take in the mornings after breakfast.  Also recommend avoiding Motrin at night on an empty stomach and change to earlier in the day on a full stomach. Continue Dexilant but change to supper time. For sleep I have recommended she try the Melatonin 5 or 10 mg tablets nightly. If this is ineffective, she has some Trazodone 50 mg tabs that were prescribed by Dr. Dao Darby NP back in November. She took those for a while back then and they did seem to help for a while, but then she either forgot about them or thinks maybe they stopped working. She cant recall. But she will try those again if the Melatonin is ineffective. Reviewed all the above medications, effects, usage, risks/benefits and potential side effects in detail.   She will update me via [employer] in several weeks to let me know how all the above is going.  -------------------------------------------------------------------------------------------------------------------------  Patient is being evaluated by a video visit encounter for concerns as above. A caregiver was present when appropriate. Due to this being a TeleHealth encounter (During IZYEL-93 public health emergency), evaluation of the following organ systems was limited: Vitals/Constitutional/EENT/Resp/CV/GI//MS/Neuro/Skin/Heme-Lymph-Imm. Pursuant to the emergency declaration under the [address] [place] [address], [address] [place] [address] authority and the [employer] Act, this Virtual  Visit was conducted, with patient's (and/or legal guardian's) consent, to reduce the patient's risk of exposure to COVID-19 and provide necessary medical care. Services were provided through a video synchronous discussion virtually to substitute for in-person clinic visit. Patient was located at her office. I was at home while conducting this encounter. Consent:  She and/or her healthcare decision maker is aware that this patient-initiated Telehealth encounter is a billable service, with coverage as determined by her insurance carrier. She is aware that she may receive a bill and has provided verbal consent to proceed: Yes    This virtual visit was conducted via [address] Pursuant to the emergency declaration under the [facility] waiver authority and the [employer] Act, this Virtual  Visit was conducted to reduce the patient's risk of exposure to COVID-19 and provide continuity of care for an established patient. Services were provided through a video synchronous discussion virtually to substitute for in-person clinic visit. Due to this being a TeleHealth evaluation, many elements of the physical examination are unable to be assessed.      Total Time: minutes: 21-30 minutes.

## 2020-05-17 DIAGNOSIS — L65.9 ALOPECIA: Primary | ICD-10-CM

## 2020-05-18 ENCOUNTER — TELEPHONE (OUTPATIENT)
Dept: FAMILY MEDICINE CLINIC | Age: 40
End: 2020-05-18

## 2020-05-18 NOTE — TELEPHONE ENCOUNTER
Pt called to schedule her labs. She received a Edhub message from Dr. Tee Hyde to get her labs done. There are no labs ordered yet. .. and she is scheduled for Thursday, May 21.     BCB:  [phone] (JESSEE)

## 2020-05-19 NOTE — TELEPHONE ENCOUNTER
The orders have already been in the system. I placed them on 5/17 and they are still showing in the lab order section. Please clarify.

## 2020-05-19 NOTE — TELEPHONE ENCOUNTER
Outbound call to patient. Patient made aware that lab orders are in the system. Patient verbalized understanding.

## 2020-05-24 LAB
ANA TITR SER IF: NEGATIVE {TITER}
ERYTHROCYTE [DISTWIDTH] IN BLOOD BY AUTOMATED COUNT: 12.8 % (ref 11.7–15.4)
FERRITIN SERPL-MCNC: 37 NG/ML (ref 15–150)
HCT VFR BLD AUTO: 37.6 % (ref 34–46.6)
HGB BLD-MCNC: 12.9 G/DL (ref 11.1–15.9)
IRON SERPL-MCNC: 71 UG/DL (ref 27–159)
MCH RBC QN AUTO: 31.2 PG (ref 26.6–33)
MCHC RBC AUTO-ENTMCNC: 34.3 G/DL (ref 31.5–35.7)
MCV RBC AUTO: 91 FL (ref 79–97)
PLATELET # BLD AUTO: 279 X10E3/UL (ref 150–450)
RBC # BLD AUTO: 4.13 X10E6/UL (ref 3.77–5.28)
RPR SER QL: NON REACTIVE
WBC # BLD AUTO: 9.8 X10E3/UL (ref 3.4–10.8)

## 2020-06-02 DIAGNOSIS — F41.8 DEPRESSION WITH ANXIETY: ICD-10-CM

## 2020-06-02 DIAGNOSIS — F41.0 PANIC ATTACKS: ICD-10-CM

## 2020-06-02 RX ORDER — BUSPIRONE HYDROCHLORIDE 5 MG/1
5 TABLET ORAL
Qty: 60 TAB | Refills: 2 | Status: SHIPPED | OUTPATIENT
Start: 2020-06-02 | End: 2021-11-08

## 2020-06-02 RX ORDER — BUPROPION HYDROCHLORIDE 150 MG/1
150 TABLET ORAL
Qty: 90 TAB | Refills: 1 | Status: SHIPPED | OUTPATIENT
Start: 2020-06-02 | End: 2021-08-04 | Stop reason: ALTCHOICE

## 2020-06-02 NOTE — TELEPHONE ENCOUNTER
Last visit 04/24/2020 Virtual visit MD Ojeda   Next appointment None   Previous refill encounter(s) 04/04/2020 Wellbutrin Xl #30 with 1 refill, 04/04/2020 Buspar ! 60 with 1 refill     Requested Prescriptions     Pending Prescriptions Disp Refills    buPROPion XL (WELLBUTRIN XL) 150 mg tablet 90 Tab 0     Sig: Take 1 Tab by mouth every morning.  busPIRone (BUSPAR) 5 mg tablet 180 Tab 0     Sig: Take 1 Tab by mouth two (2) times daily as needed (for anxiety/panic attacks).

## 2020-08-03 DIAGNOSIS — Z87.11 HISTORY OF PEPTIC ULCER DISEASE: ICD-10-CM

## 2020-08-03 DIAGNOSIS — K21.9 GASTROESOPHAGEAL REFLUX DISEASE, ESOPHAGITIS PRESENCE NOT SPECIFIED: ICD-10-CM

## 2020-08-03 DIAGNOSIS — F41.0 GENERALIZED ANXIETY DISORDER WITH PANIC ATTACKS: ICD-10-CM

## 2020-08-03 DIAGNOSIS — F32.A CHRONIC DEPRESSION: ICD-10-CM

## 2020-08-03 DIAGNOSIS — F41.1 GENERALIZED ANXIETY DISORDER WITH PANIC ATTACKS: ICD-10-CM

## 2020-08-04 RX ORDER — DEXLANSOPRAZOLE 60 MG/1
60 CAPSULE, DELAYED RELEASE ORAL DAILY
Qty: 90 CAP | Refills: 1 | Status: SHIPPED | OUTPATIENT
Start: 2020-08-04 | End: 2021-06-20

## 2020-08-04 RX ORDER — FLUOXETINE 20 MG/1
20 TABLET ORAL
Qty: 90 TAB | Refills: 1 | Status: SHIPPED | OUTPATIENT
Start: 2020-08-04 | End: 2021-06-20

## 2020-09-01 LAB
ALBUMIN SERPL-MCNC: 4.2 G/DL (ref 3.8–4.8)
ALBUMIN/GLOB SERPL: 1.5 {RATIO} (ref 1.2–2.2)
ALP SERPL-CCNC: 78 IU/L (ref 39–117)
ALT SERPL-CCNC: 14 IU/L (ref 0–32)
AST SERPL-CCNC: 14 IU/L (ref 0–40)
BILIRUB SERPL-MCNC: 0.5 MG/DL (ref 0–1.2)
BUN SERPL-MCNC: 11 MG/DL (ref 6–20)
BUN/CREAT SERPL: 17 (ref 9–23)
CALCIUM SERPL-MCNC: 9.2 MG/DL (ref 8.7–10.2)
CHLORIDE SERPL-SCNC: 102 MMOL/L (ref 96–106)
CHOLEST SERPL-MCNC: 197 MG/DL (ref 100–199)
CO2 SERPL-SCNC: 25 MMOL/L (ref 20–29)
COMMENT:: ABNORMAL
CREAT SERPL-MCNC: 0.66 MG/DL (ref 0.57–1)
EST. AVERAGE GLUCOSE BLD GHB EST-MCNC: 171 MG/DL
GLOBULIN SER CALC-MCNC: 2.8 G/DL (ref 1.5–4.5)
GLUCOSE SERPL-MCNC: 141 MG/DL (ref 65–99)
HBA1C MFR BLD: 7.6 % (ref 4.8–5.6)
HDLC SERPL-MCNC: 58 MG/DL
LDLC SERPL CALC-MCNC: 126 MG/DL (ref 0–99)
POTASSIUM SERPL-SCNC: 4.5 MMOL/L (ref 3.5–5.2)
PROT SERPL-MCNC: 7 G/DL (ref 6–8.5)
SODIUM SERPL-SCNC: 140 MMOL/L (ref 134–144)
TRIGL SERPL-MCNC: 71 MG/DL (ref 0–149)
TSH SERPL DL<=0.005 MIU/L-ACNC: 1.08 UIU/ML (ref 0.45–4.5)
VLDLC SERPL CALC-MCNC: 13 MG/DL (ref 5–40)

## 2020-09-09 ENCOUNTER — VIRTUAL VISIT (OUTPATIENT)
Dept: ENDOCRINOLOGY | Age: 40
End: 2020-09-09
Payer: COMMERCIAL

## 2020-09-09 DIAGNOSIS — E11.65 UNCONTROLLED TYPE 2 DIABETES MELLITUS WITH HYPERGLYCEMIA (HCC): Primary | ICD-10-CM

## 2020-09-09 DIAGNOSIS — E55.9 VITAMIN D DEFICIENCY: ICD-10-CM

## 2020-09-09 DIAGNOSIS — E04.9 GOITER: ICD-10-CM

## 2020-09-09 DIAGNOSIS — E66.9 OBESITY, CLASS I, BMI 30-34.9: ICD-10-CM

## 2020-09-09 DIAGNOSIS — E78.00 HYPERCHOLESTEROLEMIA: ICD-10-CM

## 2020-09-09 DIAGNOSIS — R00.0 TACHYCARDIA: ICD-10-CM

## 2020-09-09 PROCEDURE — 99214 OFFICE O/P EST MOD 30 MIN: CPT | Performed by: INTERNAL MEDICINE

## 2020-09-09 RX ORDER — INSULIN GLARGINE 100 [IU]/ML
12 INJECTION, SOLUTION SUBCUTANEOUS
COMMUNITY
End: 2021-02-13

## 2020-09-09 NOTE — PATIENT INSTRUCTIONS
1) Dose your lantus at bedtime as follows: 
- for blood sugar over 120: take 12 units 
- for blood sugar of 100-120: take 10 units 
- for blood sugar under 100: eat a snack of 15 grams (4 oz of juice, milk, regular soda or a few crackers with peanut butter) and take 10 units 2) Try splitting your synjardy to 1 tab with breakfast and lunch to see if this helps with your stomach pain. If still persisting, let me know as we may need to decrease your ozempic. 3) Your TSH (thyroid test) is perfect so I will keep your dose the same of levothyroxine. 4) Your LDL (bad cholesterol) is still above goal of 100 or less. Try to limit the amount of fried foods, fatty foods, butter, gravy, red meat, ice cream, cheese and eggs in your diet, which are all high in cholesterol. 5) Your liver and kidney are normal. 
 
6) Please come for a follow up visit on 3/24/21 at 4:10pm in our [facility] office. 7) I will mail you a lab slip. Please put this in the glove compartment or other safe spot where you keep your medical papers and I will send you a reminder to have your labs drawn prior to next visit.

## 2020-09-09 NOTE — PROGRESS NOTES
Chief Complaint   Patient presents with    Thyroid Problem     pcp and pharmacy confirmed    Other     [phone] (M)  doxy-iphone       **THIS IS A VIRTUAL VISIT VIA A VIDEO SYNCHRONOUS DISCUSSION. PATIENT AGREED TO HAVE THEIR CARE DELIVERED OVER A DOX. ME VIDEO VISIT IN PLACE OF THEIR REGULARLY SCHEDULED OFFICE VISIT**    History of Present Illness: Latonia Macedo is a 46 y.o. female here for follow up of diabetes. Restarted the lantus at 10 units and needed to increase to 12 units and has stayed on this dose. Has had many readings under 120 at bedtime where she is concerned about taking the lantus for fear of going low but then always has her sugar rise overnight to the 130-140s the next morning. Compliant with synjardy XR 2 tabs together in the morning and hasn't had any vaginal yeast but has had more stomach troubles recently and I advised her to try splitting the dose to see if this helps. Made a f/u with Dr. Félix Trivedi for later this month and her tachycardia has still been controlled on the dilt and toprol. Compliant with levothyroxine. Weight is currently 178 down from 182 in 2/20. Current Outpatient Medications   Medication Sig    insulin glargine (Lantus Solostar U-100 Insulin) 100 unit/mL (3 mL) inpn 12 Units by SubCUTAneous route.  FLUoxetine (PROzac) 20 mg tablet Take 1 Tab by mouth daily (after breakfast).  dexlansoprazole (Dexilant) 60 mg CpDB capsule (delayed release) Take 1 Cap by mouth daily.  levothyroxine (SYNTHROID) 88 mcg tablet Take 1 Tab by mouth Daily (before breakfast).  buPROPion XL (WELLBUTRIN XL) 150 mg tablet Take 1 Tab by mouth every morning.  busPIRone (BUSPAR) 5 mg tablet Take 1 Tab by mouth two (2) times daily as needed (for anxiety/panic attacks).  empagliflozin-metformin (SYNJARDY XR) 12.5-1,000 mg TBph 2 Tabs daily. Per Endo    semaglutide (OZEMPIC) 1 mg/dose (2 mg/1.5 mL) sub-q pen 1 mg by SubCUTAneous route every seven (7) days.  Per Endo    cyclobenzaprine (FLEXERIL) 10 mg tablet TAKE ONE-HALF (1/2) TABLET TO 1 TABLET NIGHTLY AS NEEDED FOR MUSCLE SPASMS IN LOW BACK    DEXILANT 60 mg CpDB capsule (delayed release) TAKE 1 CAPSULE BY MOUTH ONCE DAILY FOR  REFLUX    CARTIA  mg ER capsule Take 120 mg by mouth daily.  metoprolol succinate (TOPROL-XL) 100 mg tablet Take 1 Tab by mouth two (2) times a day.  ELIDEL 1 % topical cream Apply  to affected area as needed (Eczema).  cholecalciferol, vitamin D3, 2,000 unit tab Take 2,000 Units by mouth daily.  levonorgestrel (MIRENA) 20 mcg/24 hr (5 years) IUD 1 Each by IntraUTERine route once. No current facility-administered medications for this visit.       Allergies   Allergen Reactions    Jardiance [Empagliflozin] Other (comments)     Recurrent yeast infections    Naprosyn [Naproxen] Nausea and Vomiting     Tolerates Advil, Toradol, etc    Topamax [Topiramate] Other (comments)     Kidney stone    Lexapro [Escitalopram] Other (comments)     Fatigue, wt gain    Zoloft [Sertraline] Other (comments)     Fatigue      Review of Systems: PER HPI    Physical Examination:  - GENERAL: NCAT, Appears well nourished   - EYES: EOMI, non-icteric, no proptosis   - Ear/Nose/Throat: NCAT, no visible inflammation or masses   - CARDIOVASCULAR: no cyanosis, no visible JVD   - RESPIRATORY: respiratory effort normal without any distress or labored breathing   - MUSCULOSKELETAL: Normal ROM of neck and upper extremities observed   - SKIN: No rash on face  - NEUROLOGIC:  No facial asymmetry (Cranial nerve 7 motor function), No gaze palsy   - PSYCHIATRIC: Normal affect, Normal insight and judgement       Data Reviewed:   Component      Latest Ref Rng & Units 8/31/2020 8/31/2020 8/31/2020 8/31/2020           8:57 AM  8:57 AM  8:57 AM  8:57 AM   Glucose      65 - 99 mg/dL  141 (H)     BUN      6 - 20 mg/dL  11     Creatinine      0.57 - 1.00 mg/dL  0.66     GFR est non-AA      >59 mL/min/1.73  112     GFR est AA >59 mL/min/1.73  130     BUN/Creatinine ratio      9 - 23  17     Sodium      134 - 144 mmol/L  140     Potassium      3.5 - 5.2 mmol/L  4.5     Chloride      96 - 106 mmol/L  102     CO2      20 - 29 mmol/L  25     Calcium      8.7 - 10.2 mg/dL  9.2     Protein, total      6.0 - 8.5 g/dL  7.0     Albumin      3.8 - 4.8 g/dL  4.2     GLOBULIN, TOTAL      1.5 - 4.5 g/dL  2.8     A-G Ratio      1.2 - 2.2  1.5     Bilirubin, total      0.0 - 1.2 mg/dL  0.5     Alk. phosphatase      39 - 117 IU/L  78     AST      0 - 40 IU/L  14     ALT      0 - 32 IU/L  14     Cholesterol, total      100 - 199 mg/dL   197    Triglyceride      0 - 149 mg/dL   71    HDL Cholesterol      >39 mg/dL   58    VLDL Cholesterol Mert      5 - 40 mg/dL   13    LDL Cholesterol      0 - 99 mg/dL   126 (H)    Comment         CANCELED    Hemoglobin A1c, (calculated)      4.8 - 5.6 %    7.6 (H)   Estimated average glucose      mg/dL    171   TSH      0.450 - 4.500 uIU/mL 1.080          Assessment/Plan:     1. Diabetes mellitus type II: her most recent Hgb A1c was 7.6% in 8/20 down from 8% in 2/20 down from 9.9% in 7/19 up from 9.2% in 3/19 up from 8.2% in 10/18 down from 8.3% in 6/18 down from 10.4% in 1/18 up from 8.1% in 9/17 up from 7.9% in 4/17 down from 8.1% in 12/16 up from 7% in 7/16 down from 7.7% in 3/16 down from 7.8% in 11/15 down from 8.3% in 7/15 down from 8.8% in 3/15 up from 7.6% in 11/14 up from 7.3% in 7/14 down from 8% in 3/14 up from 6.1% in 11/13 down from 6.7% in 9/13 up from 6.3% in 8/13 up slightly from 6.1% in 7/13 down from 6.3% in 5/13 down from 6.7% in 1/13 up from 6.5% in Oct 2012. 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   - cont lantus 12 units at bedtime for bs >120 and 10 units if < 120  - cont ozempic 1 mg weekly  - split synjardy XR 12.5/1000 mg to 1 tab bid  - check bs up to 3 times daily due to fluctuating sugars  - foot exam done 2/20  - microalbumin nl 10/12--was taken off lisinopril 10 mg daily due to trying to conceive and repeat normal ever since, most recently in 2/20  - optho UTD 5/19  - check Hgb A1c and cmp and microalbumin prior to next visit     2. Goiter: Had a TSH of 2.68 in May 2012. Level was 2.4 in 1/13 and I started her on a a low dose of levothyroxine 25 mcg daily to keep her TSH < 2.5 to improve her chance of conception and interestingly she became pregnant shortly after starting this. TSH 2.1 in 5/13 and 1.6 in 7/13 and 1.39 in 9/13. Stopped levothyroxine after delivery in 10/13 and TSH up to 5.5 in 3/14 so restarted low dose of 25 mcg daily to keep her TSH < 2.5 as she has hypothyroid symptoms and high cholesterol. However only 2.2 in 7/14 so increased to 50 mcg daily and TSH 2.4 in 11/14 but had missed a dose on the weekends on occasion so increased to 62.5 mcg daily and TSH 1.87 in 3/15 and 1.46 in 11/15 and 1.04 in 3/16. Up to 2.06 in 7/16 so increased to 75 mcg daily and down to 1.5 in 12/16. Up to 2.11 in 4/17 so increased her dose to 88 mcg daily and down to 0.96 in 9/17 and 1.81 in 1/18 and 1.96 in 10/18 and 1.49 in 3/19 so kept her dose the same. TSH up to 2.64 in 7/19 but had missed some doses prior to lab draw so kept dose the same and 1.49 in 2/20 and 1.08 in 8/20 so kept dose the same. Her thyroid is not to blame for her tachycardia as she has never been over-replaced. - cont levothyroxine 88 mcg daily  - check TSH in 9/21      3. Hypercholesterolemia: Given DM, Goal LDL < 100, non-HDL < 130, and TG < 150.  in 10/12 off simva 10 due to trying to conceive, down to 109 in 1/13 and 92 in 5/13. Up to 127 in 11/13 and down to 111 in 3/14. Up to 124 in 7/14. Down to 114 in 11/14. Up to 140 in 3/15 possibly due to [address] as with stopping this it was down to 92 in 7/15. Up to 114 in 11/15 with diet.   Was started on lipitor in 1/16 but had more nausea with this so stopped this and with 17 lb wt loss, LDL down to 84 in 3/16 and still 98 in 7/16. Up to 114 in 4/17 and still 119 in 9/17 and 136 in 1/18. Down to 116 in 6/18 and 113 in 10/18 and 110 in 3/19 but up to 138 in 7/19 due to higher A1c and TSH but down to 115 in 2/20 and 126 in 8/20 so will hold on any other meds for now. - diet control  - check lipids prior to next visit      4. Obesity: I started topamax in 11/15 and Dr. Shahida Garcia added phentermine in 1/16 and changed her trulicity to [email or web address] [facility] [employer] and her weight had come down 26 lbs by 7/16. Had more nausea and GERD and ended up stopping all her meds in 10/16 and weight up 7 lbs by 12/16. Restarted topamax for 2 weeks and then added back victoza at a lower dose of 1.2 mg daily. Eventually added back phentermine in 3/17. Then developed a kidney stone in 3/17 and stopped the topamax and weight up 6 lbs by 4/17. Will stay off the topamax due to the kidney stone and stop the phentermine as I don't think this is helping her weight. I told her it's fine to restart the advocare spark and protein shakes as these are not as likely to cause the kidney stone as the topamax was. Weight up 7 lbs by 9/17 possibly due to stress and 1 lb by 1/18. Down 3 lbs by 6/18.  Up 6 lbs by 11/18 but was off victoza and started back on prozac. I added back topamax 1 tab daily and restarted victoza but wt down just 1 lb by 3/19 so began wellbutrin instead and still stable by 7/19. Stopped topamax in 6/19 due to concern for new back pain that could be the beginning of another kidney stone. Started seeing Dr. Ania Viera in 9/19 and was started on ozempic and synardy and wt down 14 lbs by 2/20. Down 4 lbs by 9/20  - cont wellbutrin  mg daily   - cont synjardy and ozempic as above  - stay off topamax 50 mg daily due to risk of kidney stone      5.  Vitamin D deficiency: level was 24 in 12/15 and is currently taking 2000 units of D3 and level up to 32 in 7/16 and 35 in 4/17 and 30 in 9/17 and 35 in 10/18  - cont vitamin D 2000 units daily  - check Vitamin D 25-OH level prior to next visit      6. 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 Juany Shannon and Dr. Nichol Ohara. Now under care of Dr. Keller Batch and combo of dilt and toprol normally works   - cont Toprol  mg bid  - cont dilt  mg daily      Patient Instructions   1) Dose your lantus at bedtime as follows:  - for blood sugar over 120: take 12 units  - for blood sugar of 100-120: take 10 units  - for blood sugar under 100: eat a snack of 15 grams (4 oz of juice, milk, regular soda or a few crackers with peanut butter) and take 10 units    2) Try splitting your synjardy to 1 tab with breakfast and lunch to see if this helps with your stomach pain. If still persisting, let me know as we may need to decrease your ozempic. 3) Your TSH (thyroid test) is perfect so I will keep your dose the same of levothyroxine. 4) Your LDL (bad cholesterol) is still above goal of 100 or less. Try to limit the amount of fried foods, fatty foods, butter, gravy, red meat, ice cream, cheese and eggs in your diet, which are all high in cholesterol. 5) Your liver and kidney are normal.    6) Please come for a follow up visit on 3/24/21 at 4:10pm in our [facility] office. 7) I will mail you a lab slip. Please put this in the glove compartment or other safe spot where you keep your medical papers and I will send you a reminder to have your labs drawn prior to next visit. Follow-up and Dispositions    · Return 3/24/21 at 4:10pm.               Copy sent to:   Tammy Saenz MD as PCP - General  Fox Saldana MD (Obstetrics & Gynecology)  Parul Dailey MD (Cardiology)  Dr. Jenaro Avila

## 2020-10-28 RX ORDER — LEVOTHYROXINE SODIUM 88 UG/1
TABLET ORAL
Qty: 90 TAB | Refills: 3 | Status: SHIPPED | OUTPATIENT
Start: 2020-10-28 | End: 2021-09-30 | Stop reason: SDUPTHER

## 2020-11-02 NOTE — ADDENDUM NOTE
Addended by: Renetta Flores on: 5/11/2018 11:59 AM     Modules accepted: Orders Informed patient that 24hr urine kit is ready for her to be picked up at Dr. Brooks's office in [place]. She plans to pick it up today.

## 2020-11-24 RX ORDER — EMPAGLIFLOZIN, METFORMIN HYDROCHLORIDE 12.5; 1 MG/1; MG/1
TABLET, EXTENDED RELEASE ORAL
COMMUNITY
Start: 2020-11-24 | End: 2021-02-13 | Stop reason: SINTOL

## 2020-12-15 DIAGNOSIS — E66.9 OBESITY, CLASS I, BMI 30-34.9: Primary | ICD-10-CM

## 2020-12-15 RX ORDER — PHENDIMETRAZINE TARTRATE 35 MG/1
TABLET ORAL
Qty: 90 TAB | Refills: 1 | Status: SHIPPED | OUTPATIENT
Start: 2020-12-15 | End: 2021-02-13

## 2020-12-17 ENCOUNTER — OFFICE VISIT (OUTPATIENT)
Dept: FAMILY MEDICINE CLINIC | Age: 40
End: 2020-12-17
Payer: COMMERCIAL

## 2020-12-17 VITALS
RESPIRATION RATE: 18 BRPM | OXYGEN SATURATION: 97 % | DIASTOLIC BLOOD PRESSURE: 60 MMHG | TEMPERATURE: 99.3 F | WEIGHT: 187 LBS | BODY MASS INDEX: 35.3 KG/M2 | SYSTOLIC BLOOD PRESSURE: 118 MMHG | HEART RATE: 94 BPM | HEIGHT: 61 IN
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
## 2020-12-17 DIAGNOSIS — T23.162A SUPERFICIAL BURN OF BACK OF LEFT HAND, INITIAL ENCOUNTER: ICD-10-CM

## 2020-12-17 DIAGNOSIS — S40.012A CONTUSION OF LEFT SHOULDER, INITIAL ENCOUNTER: ICD-10-CM

## 2020-12-17 DIAGNOSIS — S16.1XXA CERVICAL MYOFASCIAL STRAIN, INITIAL ENCOUNTER: ICD-10-CM

## 2020-12-17 DIAGNOSIS — S00.81XA ABRASION OF CHEEK, INITIAL ENCOUNTER: ICD-10-CM

## 2020-12-17 PROBLEM — E66.01 SEVERE OBESITY (BMI 35.0-39.9) WITH COMORBIDITY (HCC): Status: RESOLVED | Noted: 2018-04-24 | Resolved: 2020-12-17

## 2020-12-17 PROCEDURE — 99215 OFFICE O/P EST HI 40 MIN: CPT | Performed by: FAMILY MEDICINE
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
## 2020-12-17 NOTE — PATIENT INSTRUCTIONS
Neck Strain or Sprain: Rehab Exercises Introduction Here are some examples of exercises for you to try. The exercises may be suggested for a condition or for rehabilitation. Start each exercise slowly. Ease off the exercises if you start to have pain. You will be told when to start these exercises and which ones will work best for you. How to do the exercises Neck rotation 1. Sit in a firm chair, or stand up straight. 2. Keeping your chin level, turn your head to the right, and hold for 15 to 30 seconds. 3. Turn your head to the left and hold for 15 to 30 seconds. 4. Repeat 2 to 4 times to each side. Neck stretches 1. Look straight ahead, and tip your right ear to your right shoulder. Do not let your left shoulder rise up as you tip your head to the right. 2. Hold for 15 to 30 seconds. 3. Tilt your head to the left. Do not let your right shoulder rise up as you tip your head to the left. 4. Hold for 15 to 30 seconds. 5. Repeat 2 to 4 times to each side. Forward neck flexion 1. Sit in a firm chair, or stand up straight. 2. Bend your head forward. 3. Hold for 15 to 30 seconds. 4. Repeat 2 to 4 times. Lateral (side) bend strengthening 1. With your right hand, place your first two fingers on your right temple. 2. Start to bend your head to the side while using gentle pressure from your fingers to keep your head from bending. 3. Hold for about 6 seconds. 4. Repeat 8 to 12 times. 5. Switch hands and repeat the same exercise on your left side. Forward bend strengthening 1. Place your first two fingers of either hand on your forehead. 2. Start to bend your head forward while using gentle pressure from your fingers to keep your head from bending. 3. Hold for about 6 seconds. 4. Repeat 8 to 12 times. Neutral position strengthening 1. Using one hand, place your fingertips on the back of your head at the top of your neck.  
2. Start to bend your head backward while using gentle pressure from your fingers to keep your head from bending. 3. Hold for about 6 seconds. 4. Repeat 8 to 12 times. Chin tuck 1. Lie on the floor with a rolled-up towel under your neck. Your head should be touching the floor. 2. Slowly bring your chin toward your chest. 
3. Hold for a count of 6, and then relax for up to 10 seconds. 4. Repeat 8 to 12 times. Follow-up care is a key part of your treatment and safety. Be sure to make and go to all appointments, and call your doctor if you are having problems. It's also a good idea to know your test results and keep a list of the medicines you take. Where can you learn more? Go to http://www.gray.com/ Enter M679 in the search box to learn more about \"Neck Strain or Sprain: Rehab Exercises. \" Current as of: March 2, 2020               Content Version: 12.6 © 2006-2020 [employer]. Care instructions adapted under license by [employer] (which disclaims liability or warranty for this information). If you have questions about a medical condition or this instruction, always ask your healthcare professional. [place] [address] any warranty or liability for your use of this information. Rotator Cuff: Exercises Introduction Here are some examples of exercises for you to try. The exercises may be suggested for a condition or for rehabilitation. Start each exercise slowly. Ease off the exercises if you start to have pain. You will be told when to start these exercises and which ones will work best for you. How to do the exercises Pendulum swing If you have pain in your back, do not do this exercise. 1. Hold on to a table or the back of a chair with your good arm. Then bend forward a little and let your sore arm hang straight down. This exercise does not use the arm muscles. Rather, use your legs and your hips to create movement that makes your arm swing freely.  
2. Use the movement from your hips and legs to guide the slightly swinging arm back and forth like a pendulum (or elephant trunk). Then guide it in circles that start small (about the size of a dinner plate). Make the circles a bit larger each day, as your pain allows. 3. Do this exercise for 5 minutes, 5 to 7 times each day. 4. As you have less pain, try bending over a little farther to do this exercise. This will increase the amount of movement at your shoulder. Posterior stretching exercise 1. Hold the elbow of your injured arm with your other hand. 2. Use your hand to pull your injured arm gently up and across your body. You will feel a gentle stretch across the back of your injured shoulder. 3. Hold for at least 15 to 30 seconds. Then slowly lower your arm. 4. Repeat 2 to 4 times. Up-the-back stretch Your doctor or physical therapist may want you to wait to do this stretch until you have regained most of your range of motion and strength. You can do this stretch in different ways. Hold any of these stretches for at least 15 to 30 seconds. Repeat them 2 to 4 times. 1. Light stretch: Put your hand in your back pocket. Let it rest there to stretch your shoulder. 2. Moderate stretch: With your other hand, hold your injured arm (palm outward) behind your back by the wrist. Pull your arm up gently to stretch your shoulder. 3. Advanced stretch: Put a towel over your other shoulder. Put the hand of your injured arm behind your back. Now hold the back end of the towel. With the other hand, hold the front end of the towel in front of your body. Pull gently on the front end of the towel. This will bring your hand farther up your back to stretch your shoulder. Overhead stretch 1. Standing about an arm's length away, grasp onto a solid surface. You could use a countertop, a doorknob, or the back of a sturdy chair. 2. With your knees slightly bent, bend forward with your arms straight.  Lower your upper body, and let your shoulders stretch. 3. As your shoulders are able to stretch farther, you may need to take a step or two backward. 4. Hold for at least 15 to 30 seconds. Then stand up and relax. If you had stepped back during your stretch, step forward so you can keep your hands on the solid surface. 5. Repeat 2 to 4 times. Shoulder flexion (lying down) To make a wand for this exercise, use a piece of PVC pipe or a broom handle with the broom removed. Make the wand about a foot wider than your shoulders. 1. Lie on your back, holding a wand with both hands. Your palms should face down as you hold the wand. 2. Keeping your elbows straight, slowly raise your arms over your head. Raise them until you feel a stretch in your shoulders, upper back, and chest. 
3. Hold for 15 to 30 seconds. 4. Repeat 2 to 4 times. Shoulder rotation (lying down) To make a wand for this exercise, use a piece of PVC pipe or a broom handle with the broom removed. Make the wand about a foot wider than your shoulders. 1. Lie on your back. Hold a wand with both hands with your elbows bent and palms up. 2. Keep your elbows close to your body, and move the wand across your body toward the sore arm. 3. Hold for 8 to 12 seconds. 4. Repeat 2 to 4 times. Wall climbing (to the side) Avoid any movement that is straight to your side, and be careful not to arch your back. Your arm should stay about 30 degrees to the front of your side. 1. Stand with your side to a wall so that your fingers can just touch it at an angle about 30 degrees toward the front of your body. 2. Walk the fingers of your injured arm up the wall as high as pain permits. Try not to shrug your shoulder up toward your ear as you move your arm up. 3. Hold that position for a count of at least 15 to 20. 
4. Walk your fingers back down to the starting position. 5. Repeat at least 2 to 4 times. Try to reach higher each time. Wall climbing (to the front) During this stretching exercise, be careful not to arch your back. 1. Face a wall, and stand so your fingers can just touch it. 2. Keeping your shoulder down, walk the fingers of your injured arm up the wall as high as pain permits. (Don't shrug your shoulder up toward your ear.) 3. Hold your arm in that position for at least 15 to 30 seconds. 4. Slowly walk your fingers back down to where you started. 5. Repeat at least 2 to 4 times. Try to reach higher each time. Shoulder blade squeeze 1. Stand with your arms at your sides, and squeeze your shoulder blades together. Do not raise your shoulders up as you squeeze. 2. Hold 6 seconds. 3. Repeat 8 to 12 times. Scapular exercise: Arm reach 1. Lie flat on your back. This exercise is a very slight motion that starts with your arms raised (elbows straight, arms straight). 2. From this position, reach higher toward the niya or ceiling. Keep your elbows straight. All motion should be from your shoulder blade only. 3. Relax your arms back to where you started. 4. Repeat 8 to 12 times. Arm raise to the side During this strengthening exercise, your arm should stay about 30 degrees to the front of your side. 1. Slowly raise your injured arm to the side, with your thumb facing up. Raise your arm 60 degrees at the most (shoulder level is 90 degrees). 2. Hold the position for 3 to 5 seconds. Then lower your arm back to your side. If you need to, bring your \"good\" arm across your body and place it under the elbow as you lower your injured arm. Use your good arm to keep your injured arm from dropping down too fast. 
3. Repeat 8 to 12 times. 4. When you first start out, don't hold any extra weight in your hand. As you get stronger, you may use a 1-pound to 2-pound dumbbell or a small can of food. Shoulder flexor and extensor exercise These are isometric exercises. That means you contract your muscles without actually moving.  
1. Push forward (flex): Stand facing a wall or doorjamb, about 6 inches or less back. Hold your injured arm against your body. Make a closed fist with your thumb on top. Then gently push your hand forward into the wall with about 25% to 50% of your strength. Don't let your body move backward as you push. Hold for about 6 seconds. Relax for a few seconds. Repeat 8 to 12 times. 2. Push backward (extend): Stand with your back flat against a wall. Your upper arm should be against the wall, with your elbow bent 90 degrees (your hand straight ahead). Push your elbow gently back against the wall with about 25% to 50% of your strength. Don't let your body move forward as you push. Hold for about 6 seconds. Relax for a few seconds. Repeat 8 to 12 times. Scapular exercise: Wall push-ups This exercise is best done with your fingers somewhat turned out, rather than straight up and down. 1. Stand facing a wall, about 12 inches to 18 inches away. 2. Place your hands on the wall at shoulder height. 3. Slowly bend your elbows and bring your face to the wall. Keep your back and hips straight. 4. Push back to where you started. 5. Repeat 8 to 12 times. 6. When you can do this exercise against a wall comfortably, you can try it against a counter. You can then slowly progress to the end of a couch, then to a sturdy chair, and finally to the floor. Scapular exercise: Retraction For this exercise, you will need elastic exercise material, such as surgical tubing or Thera-Band. 1. Put the band around a solid object at about waist level. (A bedpost will work well.) Each hand should hold an end of the band. 2. With your elbows at your sides and bent to 90 degrees, pull the band back. Your shoulder blades should move toward each other. Then move your arms back where you started. 3. Repeat 8 to 12 times. 4. If you have good range of motion in your shoulders, try this exercise with your arms lifted out to the sides. Keep your elbows at a 90-degree angle.  Raise the elastic band up to about shoulder level. Pull the band back to move your shoulder blades toward each other. Then move your arms back where you started. Internal rotator strengthening exercise 1. Start by tying a piece of elastic exercise material to a doorknob. You can use surgical tubing or Thera-Band. 2. Stand or sit with your shoulder relaxed and your elbow bent 90 degrees. Your upper arm should rest comfortably against your side. Squeeze a rolled towel between your elbow and your body for comfort. This will help keep your arm at your side. 3. Hold one end of the elastic band in the hand of the painful arm. 4. Slowly rotate your forearm toward your body until it touches your belly. Slowly move it back to where you started. 5. Keep your elbow and upper arm firmly tucked against the towel roll or at your side. 6. Repeat 8 to 12 times. External rotator strengthening exercise 1. Start by tying a piece of elastic exercise material to a doorknob. You can use surgical tubing or Thera-Band. (You may also hold one end of the band in each hand.) 2. Stand or sit with your shoulder relaxed and your elbow bent 90 degrees. Your upper arm should rest comfortably against your side. Squeeze a rolled towel between your elbow and your body for comfort. This will help keep your arm at your side. 3. Hold one end of the elastic band with the hand of the painful arm. 4. Start with your forearm across your belly. Slowly rotate the forearm out away from your body. Keep your elbow and upper arm tucked against the towel roll or the side of your body until you begin to feel tightness in your shoulder. Slowly move your arm back to where you started. 5. Repeat 8 to 12 times. Follow-up care is a key part of your treatment and safety. Be sure to make and go to all appointments, and call your doctor if you are having problems. It's also a good idea to know your test results and keep a list of the medicines you take. Where can you learn more? Go to http://www.[employer].com/ Enter Cloyd Favre in the search box to learn more about \"Rotator Cuff: Exercises. \" Current as of: March 2, 2020               Content Version: 12.6 © 6030-3753 [employer]. Care instructions adapted under license by [employer] (which disclaims liability or warranty for this information). If you have questions about a medical condition or this instruction, always ask your healthcare professional. [place] [address] any warranty or liability for your use of this information.

## 2020-12-17 NOTE — LETTER
NOTIFICATION FOR WORK 
 
12/17/2020 3:02 PM 
 
[address] [id number] To Whom It May Concern: Faina Blake is currently under the care of EMMANUEL Eckert. Please excuse from work 12/18/20 due to current medical condition. If there are questions or concerns please have the patient contact our office. Sincerely, Kishan Martinez MD

## 2020-12-26 DIAGNOSIS — S49.92XA INJURY OF LEFT SHOULDER AND UPPER ARM, INITIAL ENCOUNTER: ICD-10-CM

## 2020-12-26 DIAGNOSIS — S16.1XXA CERVICAL MYOFASCIAL STRAIN, INITIAL ENCOUNTER: Primary | ICD-10-CM

## 2021-01-04 ENCOUNTER — HOSPITAL ENCOUNTER (OUTPATIENT)
Dept: PHYSICAL THERAPY | Age: 41
Discharge: HOME | End: 2021-01-04
Payer: COMMERCIAL

## 2021-01-04 PROCEDURE — 97161 PT EVAL LOW COMPLEX 20 MIN: CPT | Performed by: PHYSICAL THERAPIST

## 2021-01-04 PROCEDURE — 97110 THERAPEUTIC EXERCISES: CPT | Performed by: PHYSICAL THERAPIST
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
## 2021-01-07 ENCOUNTER — HOSPITAL ENCOUNTER (OUTPATIENT)
Dept: PHYSICAL THERAPY | Age: 41
Discharge: HOME | End: 2021-01-07
Payer: COMMERCIAL

## 2021-01-07 PROCEDURE — 97140 MANUAL THERAPY 1/> REGIONS: CPT | Performed by: PHYSICAL THERAPIST

## 2021-01-07 PROCEDURE — 97110 THERAPEUTIC EXERCISES: CPT | Performed by: PHYSICAL THERAPIST

## 2021-01-07 NOTE — PROGRESS NOTES
PT DAILY TREATMENT NOTE - [facility] 2-15    Patient Name: Praveen Smith  Date:2021  : 1980  [x]  Patient  Verified  Payor: Sam Marks / Plan: Shane Gaspar / Product Type: HMO /    In time: 872 A  Out time: 835 A  Total Treatment Time (min): 50  Total Timed Codes (min): 40  1:1 Treatment Time ( only): 40   Visit #:  2    Treatment Area: Neck pain [M54.2]    SUBJECTIVE  Pain Level (0-10 scale): 4.5  Any medication changes, allergies to medications, adverse drug reactions, diagnosis change, or new procedure performed?: [x] No    [] Yes (see summary sheet for update)  Subjective functional status/changes:   [] No changes reported  Pt reports that her  has been massaging her L neck which makes it feel better.  \"I can do everything but it is like a burning pain\"    OBJECTIVE    Modality rationale: decrease inflammation and decrease pain to improve the patients ability to lift objects; perform daily chores with dec'd symptoms   Min Type Additional Details       [] Estim: []Att   []Unatt    []TENS instruct                  []IFC  []Premod   []NMES                     []Other:  []w/US   []w/ice   []w/heat  Position:  Location:       []  Traction: [] Cervical       []Lumbar                       [] Prone          []Supine                       []Intermittent   []Continuous Lbs:  [] before manual  [] after manual  []w/heat    []  Ultrasound: []Continuous   [] Pulsed                       at: []1MHz   []3MHz Location:  W/cm2:   10 [x]  Ice     []  Heat  []  Ice massage Position: seated  Location: L shoulder/neck     [x] Skin assessment post-treatment:  [x]intact []redness- no adverse reaction    []redness  adverse reaction:     30 min Therapeutic Exercise:  [x] See flow sheet : progressed per flow sheet   Rationale: increase ROM and increase strength to improve the patients ability to exercise, perform daily chores with dec'd symptoms    10 min Manual Therapy:  STM/TPR L UT, LS, SCM  Man L UT stretch Rationale: decrease pain, increase ROM, increase tissue extensibility and decrease trigger points to improve the patients ability to perform daily activities with dec'd symptoms            With   [] TE   [] TA   [] neuro   [] other: Patient Education: [x] Review HEP    [] Progressed/Changed HEP based on:   [] positioning   [] body mechanics   [] transfers   [] heat/ice application    [] other:      Other Objective/Functional Measures: inc'd muscle turgor/banding L SCM, UT     Pain Level (0-10 scale) post treatment: 2    ASSESSMENT/Changes in Function:   Dec'd symptoms after manual techniques. Roman progression of exercises well. Updated HEP to include tband bilat ER and corner pec stretch. Patient will continue to benefit from skilled PT services to modify and progress therapeutic interventions, address functional mobility deficits, address ROM deficits, address strength deficits, analyze and address soft tissue restrictions, analyze and cue movement patterns and analyze and modify body mechanics/ergonomics to attain remaining goals.      [x]  See Plan of Care  []  See progress note/recertification  []  See Discharge Summary         Progress towards goals / Updated goals:  NT    PLAN  []  Upgrade activities as tolerated     [x]  Continue plan of care  []  Update interventions per flow sheet       []  Discharge due to:_  []  Other:_      Ny Lua, PT 1/7/2021

## 2021-01-08 RX ORDER — SEMAGLUTIDE 1.34 MG/ML
1 INJECTION, SOLUTION SUBCUTANEOUS
Qty: 9 ML | Refills: 3 | Status: SHIPPED | OUTPATIENT
Start: 2021-01-08 | End: 2021-12-06 | Stop reason: ALTCHOICE

## 2021-01-12 ENCOUNTER — HOSPITAL ENCOUNTER (OUTPATIENT)
Dept: PHYSICAL THERAPY | Age: 41
Discharge: HOME | End: 2021-01-12
Payer: COMMERCIAL

## 2021-01-12 PROCEDURE — 97110 THERAPEUTIC EXERCISES: CPT | Performed by: PHYSICAL THERAPIST

## 2021-01-12 PROCEDURE — 97140 MANUAL THERAPY 1/> REGIONS: CPT | Performed by: PHYSICAL THERAPIST

## 2021-01-12 NOTE — PROGRESS NOTES
PT DAILY TREATMENT NOTE - [facility] 2-15    Patient Name: Isaias Jimenez  Date:2021  : 1980  [x]  Patient  Verified  Payor: Maryanne  / Plan: [employer] / Product Type: HMO /    In time: 890 A  Out time: 835 A  Total Treatment Time (min): 50  Total Timed Codes (min): 50  1:1 Treatment Time ([address] only): 45   Visit #:  3    Treatment Area: Neck pain [M54.2]    SUBJECTIVE  Pain Level (0-10 scale): 2  Any medication changes, allergies to medications, adverse drug reactions, diagnosis change, or new procedure performed?: [x] No    [] Yes (see summary sheet for update)  Subjective functional status/changes:   [] No changes reported  Pt states that after last visit she went to work and taped on ice then a lidocaine patch. It was very sore that day but overall feels much better. Good compliance with HEP.  Dec'd symptoms through parascapular region-- majority of symptoms today are through her UT and SCM    OBJECTIVE    Modality rationale: decrease inflammation and decrease pain to improve the patients ability to lift objects; perform daily chores with dec'd symptoms   Min Type Additional Details       [] Estim: []Att   []Unatt    []TENS instruct                  []IFC  []Premod   []NMES                     []Other:  []w/US   []w/ice   []w/heat  Position:  Location:       []  Traction: [] Cervical       []Lumbar                       [] Prone          []Supine                       []Intermittent   []Continuous Lbs:  [] before manual  [] after manual  []w/heat    []  Ultrasound: []Continuous   [] Pulsed                       at: []1MHz   []3MHz Location:  W/cm2:   At work [x]  Ice     []  Heat  []  Ice massage Position: seated  Location: L shoulder/neck     [x] Skin assessment post-treatment:  [x]intact []redness- no adverse reaction    []redness  adverse reaction:     35 min Therapeutic Exercise:  [x] See flow sheet : progressed per flow sheet   Rationale: increase ROM and increase strength to improve the patients ability to exercise, perform daily chores with dec'd symptoms    15 min Manual Therapy:  STM/TPR L UT, LS, SCM  Man L UT stretch    Rationale: decrease pain, increase ROM, increase tissue extensibility and decrease trigger points to improve the patients ability to perform daily activities with dec'd symptoms            With   [] TE   [] TA   [] neuro   [] other: Patient Education: [x] Review HEP    [] Progressed/Changed HEP based on:   [] positioning   [] body mechanics   [] transfers   [] heat/ice application    [] other:      Other Objective/Functional Measures: inc'd muscle turgor/banding L SCM, UT     Pain Level (0-10 scale) post treatment: 4    ASSESSMENT/Changes in Function:   Progressing well  Patient will continue to benefit from skilled PT services to modify and progress therapeutic interventions, address functional mobility deficits, address ROM deficits, address strength deficits, analyze and address soft tissue restrictions, analyze and cue movement patterns and analyze and modify body mechanics/ergonomics to attain remaining goals.      [x]  See Plan of Care  []  See progress note/recertification  []  See Discharge Summary         Progress towards goals / Updated goals:  NT    PLAN  []  Upgrade activities as tolerated     [x]  Continue plan of care  []  Update interventions per flow sheet       []  Discharge due to:_  []  Other:_      Huong Reyes, PT 1/12/2021

## 2021-01-15 ENCOUNTER — APPOINTMENT (OUTPATIENT)
Dept: PHYSICAL THERAPY | Age: 41
End: 2021-01-15
Payer: COMMERCIAL

## 2021-01-19 ENCOUNTER — APPOINTMENT (OUTPATIENT)
Dept: PHYSICAL THERAPY | Age: 41
End: 2021-01-19
Payer: COMMERCIAL

## 2021-01-22 ENCOUNTER — HOSPITAL ENCOUNTER (OUTPATIENT)
Dept: PHYSICAL THERAPY | Age: 41
Discharge: HOME | End: 2021-01-22
Payer: COMMERCIAL

## 2021-01-22 PROCEDURE — 97110 THERAPEUTIC EXERCISES: CPT | Performed by: PHYSICAL THERAPIST

## 2021-01-22 PROCEDURE — 97140 MANUAL THERAPY 1/> REGIONS: CPT | Performed by: PHYSICAL THERAPIST

## 2021-01-22 NOTE — PROGRESS NOTES
PT DAILY TREATMENT NOTE - [facility] 2-15    Patient Name: Reza Holbrook  Date:2021  : 1980  [x]  Patient  Verified  Payor: Ernie Amaro / Plan: Scheryl Stammer / Product Type: HMO /    In time: 889 A  Out time: 8:25 A  Total Treatment Time (min): 50  Total Timed Codes (min): 50  1:1 Treatment Time ( only): 50   Visit #:  4    Treatment Area: Neck pain [M54.2]    SUBJECTIVE  Pain Level (0-10 scale): 0  Any medication changes, allergies to medications, adverse drug reactions, diagnosis change, or new procedure performed?: [x] No    [] Yes (see summary sheet for update)  Subjective functional status/changes:   [] No changes reported  She has started getting headaches on the L side. Has appt with eye MD on -- has known stigmatism that she wants to get checked. She has missed her last 2 visits-- \"I can really feel a difference from not coming\". She had to cancel her last 2 appointments due to possible COVID exposure-- she had a patient come into her work that tested pos the next day.  She was tested, neg test results last Friday, 1/15  Pt states that she went to grab something from a high shelf at work with both hands-- had a sharp, burning pain along L UT    OBJECTIVE    Modality rationale: decrease inflammation and decrease pain to improve the patients ability to lift objects; perform daily chores with dec'd symptoms   Min Type Additional Details       [] Estim: []Att   []Unatt    []TENS instruct                  []IFC  []Premod   []NMES                     []Other:  []w/US   []w/ice   []w/heat  Position:  Location:       []  Traction: [] Cervical       []Lumbar                       [] Prone          []Supine                       []Intermittent   []Continuous Lbs:  [] before manual  [] after manual  []w/heat    []  Ultrasound: []Continuous   [] Pulsed                       at: []1MHz   []3MHz Location:  W/cm2:   At work [x]  Ice     []  Heat  []  Ice massage Position: seated  Location: L shoulder/neck     [x] Skin assessment post-treatment:  [x]intact []redness- no adverse reaction    []redness – adverse reaction:     35 min Therapeutic Exercise:  [x] See flow sheet : progressed per flow sheet   Rationale: increase ROM and increase strength to improve the patient’s ability to exercise, perform daily chores with dec'd symptoms    15 min Manual Therapy:  STM/TPR L UT, LS, SCM  Man L UT stretch    Rationale: decrease pain, increase ROM, increase tissue extensibility and decrease trigger points to improve the patient’s ability to perform daily activities with dec'd symptoms            With   [] TE   [] TA   [] neuro   [] other: Patient Education: [x] Review HEP    [] Progressed/Changed HEP based on:   [] positioning   [] body mechanics   [] transfers   [] heat/ice application    [] other:      Other Objective/Functional Measures: inc'd muscle turgor/banding L SCM, UT  P!, \"Tightness\" with L shoulder flex, abd     Pain Level (0-10 scale) post treatment: \"good\"    ASSESSMENT/Changes in Function:   Roman progression of exercises well today; good from with HEP. continues to demonstrate TTP L SCM,UT.   Patient will continue to benefit from skilled PT services to modify and progress therapeutic interventions, address functional mobility deficits, address ROM deficits, address strength deficits, analyze and address soft tissue restrictions, analyze and cue movement patterns and analyze and modify body mechanics/ergonomics to attain remaining goals.     [x]  See Plan of Care  []  See progress note/recertification  []  See Discharge Summary         Progress towards goals / Updated goals:  NT    PLAN  []  Upgrade activities as tolerated     [x]  Continue plan of care  []  Update interventions per flow sheet       []  Discharge due to:_  []  Other:_      Carly Farrell, PT 1/22/2021

## 2021-01-25 ENCOUNTER — HOSPITAL ENCOUNTER (OUTPATIENT)
Dept: PHYSICAL THERAPY | Age: 41
Discharge: HOME | End: 2021-01-25
Payer: COMMERCIAL

## 2021-01-25 PROCEDURE — 97110 THERAPEUTIC EXERCISES: CPT | Performed by: PHYSICAL THERAPIST

## 2021-01-25 PROCEDURE — 97140 MANUAL THERAPY 1/> REGIONS: CPT | Performed by: PHYSICAL THERAPIST

## 2021-01-25 NOTE — PROGRESS NOTES
PT DAILY TREATMENT NOTE - [facility] 2-15    Patient Name: Sherron Lanier  Date:2021  : 1980  [x]  Patient  Verified  Payor: Derek Dee / Plan: Judi Quale / Product Type: HMO /    In time: 550 P  Out time: 640 P  Total Treatment Time (min): 50  Total Timed Codes (min): 50  1:1 Treatment Time ([facility] only): 40   Visit #:  5    Treatment Area: Neck pain [M54.2]    SUBJECTIVE  Pain Level (0-10 scale): 4  Any medication changes, allergies to medications, adverse drug reactions, diagnosis change, or new procedure performed?: [x] No    [] Yes (see summary sheet for update)  Subjective functional status/changes:   [] No changes reported  She reports inc'd symptoms today-- 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  She also went to reach overhead and had a sharp pain in her L shoulder/neck region    OBJECTIVE    Modality rationale: decrease inflammation and decrease pain to improve the patients ability to lift objects; perform daily chores with dec'd symptoms   Min Type Additional Details       [] Estim: []Att   []Unatt    []TENS instruct                  []IFC  []Premod   []NMES                     []Other:  []w/US   []w/ice   []w/heat  Position:  Location:       []  Traction: [] Cervical       []Lumbar                       [] Prone          []Supine                       []Intermittent   []Continuous Lbs:  [] before manual  [] after manual  []w/heat    []  Ultrasound: []Continuous   [] Pulsed                       at: []1MHz   []3MHz Location:  W/cm2:   To go [x]  Ice     []  Heat  []  Ice massage Position: seated  Location: L shoulder/neck     [x] Skin assessment post-treatment:  [x]intact []redness- no adverse reaction    []redness  adverse reaction:     35 min Therapeutic Exercise:  [x] See flow sheet : progressed per flow sheet   Rationale: increase ROM and increase strength to improve the patients ability to exercise, perform daily chores with dec'd symptoms    15 min Manual Therapy:  STM/TPR L UT, LS, SCM  Man L UT stretch    Rationale: decrease pain, increase ROM, increase tissue extensibility and decrease trigger points to improve the patients ability to perform daily activities with dec'd symptoms            With   [] TE   [] TA   [] neuro   [] other: Patient Education: [x] Review HEP    [] Progressed/Changed HEP based on:   [] positioning   [] body mechanics   [] transfers   [] heat/ice application    [] other:      Other Objective/Functional Measures: inc'd muscle turgor/banding L SCM, UT    Pain Level (0-10 scale) post treatment: not reported    ASSESSMENT/Changes in Function:   Cues for proper scapular positioning during new exercises today. Dec'd symptoms after manual techniques. Patient will continue to benefit from skilled PT services to modify and progress therapeutic interventions, address functional mobility deficits, address ROM deficits, address strength deficits, analyze and address soft tissue restrictions, analyze and cue movement patterns and analyze and modify body mechanics/ergonomics to attain remaining goals.      [x]  See Plan of Care  []  See progress note/recertification  []  See Discharge Summary         Progress towards goals / Updated goals:  NT    PLAN  []  Upgrade activities as tolerated     [x]  Continue plan of care  []  Update interventions per flow sheet       []  Discharge due to:_  []  Other:_      Joan Carrasco, PT 1/25/2021

## 2021-01-27 ENCOUNTER — HOSPITAL ENCOUNTER (OUTPATIENT)
Dept: PHYSICAL THERAPY | Age: 41
Discharge: HOME | End: 2021-01-27
Payer: COMMERCIAL

## 2021-01-27 PROCEDURE — 97110 THERAPEUTIC EXERCISES: CPT | Performed by: PHYSICAL THERAPY ASSISTANT

## 2021-01-27 PROCEDURE — 97140 MANUAL THERAPY 1/> REGIONS: CPT | Performed by: PHYSICAL THERAPY ASSISTANT

## 2021-01-27 NOTE — PROGRESS NOTES
PT DAILY TREATMENT NOTE - [facility] 2-15    Patient Name: Aisha Ra  Date:2021  : 1980  [x]  Patient  Verified  Payor: Landin Guard / Plan: Richard Scotts / Product Type: HMO /    In time: 515 P  Out time: 6:00 P  Total Treatment Time (min): 45  Total Timed Codes (min): 45  1:1 Treatment Time ( only): 45   Visit #:  6    Treatment Area: Neck pain [M54.2]    SUBJECTIVE  Pain Level (0-10 scale): 3  Any medication changes, allergies to medications, adverse drug reactions, diagnosis change, or new procedure performed?: [x] No    [] Yes (see summary sheet for update)  Subjective functional status/changes:   [] No changes reported  Reports doing better today than last visit, she has not worn the lead vest at work but continues to have tightness present L SCM and UT. \"It's feeling much better than it was. \"    OBJECTIVE    Modality rationale: decrease inflammation and decrease pain to improve the patients ability to lift objects; perform daily chores with dec'd symptoms   Min Type Additional Details       [] Estim: []Att   []Unatt    []TENS instruct                  []IFC  []Premod   []NMES                     []Other:  []w/US   []w/ice   []w/heat  Position:  Location:       []  Traction: [] Cervical       []Lumbar                       [] Prone          []Supine                       []Intermittent   []Continuous Lbs:  [] before manual  [] after manual  []w/heat    []  Ultrasound: []Continuous   [] Pulsed                       at: []1MHz   []3MHz Location:  W/cm2:   To go [x]  Ice     []  Heat  []  Ice massage Position: seated  Location: L shoulder/neck     [x] Skin assessment post-treatment:  [x]intact []redness- no adverse reaction    []redness  adverse reaction:     30 min Therapeutic Exercise:  [x] See flow sheet : progressed per flow sheet   Rationale: increase ROM and increase strength to improve the patients ability to exercise, perform daily chores with dec'd symptoms    15 min Manual Therapy: STM/TPR L UT, LS, SCM  Man L UT stretch    Rationale: decrease pain, increase ROM, increase tissue extensibility and decrease trigger points to improve the patients ability to perform daily activities with dec'd symptoms            With   [] TE   [] TA   [] neuro   [] other: Patient Education: [x] Review HEP    [] Progressed/Changed HEP based on:   [] positioning   [] body mechanics   [] transfers   [] heat/ice application    [] other:      Other Objective/Functional Measures: inc'd muscle turgor/banding L SCM, UT    Pain Level (0-10 scale) post treatment: not reported     ASSESSMENT/Changes in Function:   Continued tightness L SCM and UT but improves after manual therapy. Good form/technique during exercises. Patient will continue to benefit from skilled PT services to modify and progress therapeutic interventions, address functional mobility deficits, address ROM deficits, address strength deficits, analyze and address soft tissue restrictions, analyze and cue movement patterns and analyze and modify body mechanics/ergonomics to attain remaining goals.      [x]  See Plan of Care  []  See progress note/recertification  []  See Discharge Summary         Progress towards goals / Updated goals:  NT    PLAN  []  Upgrade activities as tolerated     [x]  Continue plan of care  []  Update interventions per flow sheet       []  Discharge due to:_  []  Other:_      Fiordaliza Lawson, PTA 1/27/2021

## 2021-02-03 ENCOUNTER — HOSPITAL ENCOUNTER (OUTPATIENT)
Dept: PHYSICAL THERAPY | Age: 41
Discharge: HOME | End: 2021-02-03
Payer: COMMERCIAL

## 2021-02-03 PROCEDURE — 97110 THERAPEUTIC EXERCISES: CPT | Performed by: PHYSICAL THERAPIST

## 2021-02-03 PROCEDURE — 97140 MANUAL THERAPY 1/> REGIONS: CPT | Performed by: PHYSICAL THERAPIST

## 2021-02-03 NOTE — PROGRESS NOTES
PT DAILY TREATMENT NOTE - [facility] 2-15    Patient Name: Patty Elam  [id number]  : 1980  [x]  Patient  Verified  Payor: [employer]is [employer]ees / Plan: Skinny Caputo / Product Type: HMO /    In time: 6:00 P  Out time: 6:45 P  Total Treatment Time (min): 45  Total Timed Codes (min): 45  1:1 Treatment Time ( only): 45   Visit #:  7    Treatment Area: Neck pain [M54.2]    SUBJECTIVE  Pain Level (0-10 scale): 2  Any medication changes, allergies to medications, adverse drug reactions, diagnosis change, or new procedure performed?: [x] No    [] Yes (see summary sheet for update)  Subjective functional status/changes:   [] No changes reported  Pt states that most of her pain is in her UT; SCM pain has subsided. She feels like she has a big knot in her UT. Has not been using lead vest at work.  She is wondering if she can use a TENS unit    OBJECTIVE    Modality rationale: decrease inflammation and decrease pain to improve the patients ability to lift objects; perform daily chores with dec'd symptoms   Min Type Additional Details       [] Estim: []Att   []Unatt    []TENS instruct                  []IFC  []Premod   []NMES                     []Other:  []w/US   []w/ice   []w/heat  Position:  Location:       []  Traction: [] Cervical       []Lumbar                       [] Prone          []Supine                       []Intermittent   []Continuous Lbs:  [] before manual  [] after manual  []w/heat    []  Ultrasound: []Continuous   [] Pulsed                       at: []1MHz   []3MHz Location:  W/cm2:   To go [x]  Ice     []  Heat  []  Ice massage Position: seated  Location: L shoulder/neck     [x] Skin assessment post-treatment:  [x]intact []redness- no adverse reaction    []redness  adverse reaction:     30 min Therapeutic Exercise:  [x] See flow sheet : progressed per flow sheet   Rationale: increase ROM and increase strength to improve the patients ability to exercise, perform daily chores with dec'd symptoms    15 min Manual Therapy:  STM/TPR L UT, LS, SCM  Man L UT stretch    Rationale: decrease pain, increase ROM, increase tissue extensibility and decrease trigger points to improve the patients ability to perform daily activities with dec'd symptoms            With   [] TE   [] TA   [] neuro   [] other: Patient Education: [x] Review HEP    [] Progressed/Changed HEP based on:   [] positioning   [] body mechanics   [] transfers   [] heat/ice application    [x] other: advised that use of TENS unit may be helpful in addition to HEP     Other Objective/Functional Measures: inc'd muscle turgor/banding L SCM, UT    Pain Level (0-10 scale) post treatment: \"better, looser\"     ASSESSMENT/Changes in Function:   Inc'd trigger points through L UT. Dec'd symptoms after manual techniques. Patient will continue to benefit from skilled PT services to modify and progress therapeutic interventions, address functional mobility deficits, address ROM deficits, address strength deficits, analyze and address soft tissue restrictions, analyze and cue movement patterns and analyze and modify body mechanics/ergonomics to attain remaining goals.      [x]  See Plan of Care  []  See progress note/recertification  []  See Discharge Summary         Progress towards goals / Updated goals:  NT    PLAN  []  Upgrade activities as tolerated     [x]  Continue plan of care  []  Update interventions per flow sheet       []  Discharge due to:_  []  Other:_      Marva Buerger, PT 2/3/2021

## 2021-02-05 ENCOUNTER — HOSPITAL ENCOUNTER (OUTPATIENT)
Dept: PHYSICAL THERAPY | Age: 41
Discharge: HOME | End: 2021-02-05
Payer: COMMERCIAL

## 2021-02-05 PROCEDURE — 97110 THERAPEUTIC EXERCISES: CPT | Performed by: PHYSICAL THERAPIST

## 2021-02-05 PROCEDURE — 97140 MANUAL THERAPY 1/> REGIONS: CPT | Performed by: PHYSICAL THERAPIST

## 2021-02-05 NOTE — PROGRESS NOTES
PT DAILY TREATMENT NOTE - [facility] 2-15    Patient Name: Meredith Numbers  [id number]  : 1980  [x]  Patient  Verified  Payor: Eli Sessions / Plan: Terry Ang / Product Type: HMO /    In time: 421 A  Out time:  815  Total Treatment Time (min): 45  Total Timed Codes (min): 45  1:1 Treatment Time ([address] only): 40   Visit #:  8    Treatment Area: Neck pain [M54.2]    SUBJECTIVE  Pain Level (0-10 scale): \"ache\"  Any medication changes, allergies to medications, adverse drug reactions, diagnosis change, or new procedure performed?: [x] No    [] Yes (see summary sheet for update)  Subjective functional status/changes:   [] No changes reported  Pt reports that she is still having UT pain.  \"its a nuisance\"; she states that she tries to rub/massage her UT all day at work    OBJECTIVE    Modality rationale: decrease inflammation and decrease pain to improve the patients ability to lift objects; perform daily chores with dec'd symptoms   Min Type Additional Details       [] Estim: []Att   []Unatt    []TENS instruct                  []IFC  []Premod   []NMES                     []Other:  []w/US   []w/ice   []w/heat  Position:  Location:       []  Traction: [] Cervical       []Lumbar                       [] Prone          []Supine                       []Intermittent   []Continuous Lbs:  [] before manual  [] after manual  []w/heat    []  Ultrasound: []Continuous   [] Pulsed                       at: []1MHz   []3MHz Location:  W/cm2:   At work [x]  Ice     []  Heat  []  Ice massage Position: seated  Location: L shoulder/neck     [x] Skin assessment post-treatment:  [x]intact []redness- no adverse reaction    []redness  adverse reaction:     30 min Therapeutic Exercise:  [x] See flow sheet : progressed per flow sheet   Rationale: increase ROM and increase strength to improve the patients ability to exercise, perform daily chores with dec'd symptoms    15 min Manual Therapy:  STM/TPR L UT, LS, SCM  Man L UT stretch Rationale: decrease pain, increase ROM, increase tissue extensibility and decrease trigger points to improve the patients ability to perform daily activities with dec'd symptoms            With   [] TE   [] TA   [] neuro   [] other: Patient Education: [x] Review HEP    [] Progressed/Changed HEP based on:   [] positioning   [] body mechanics   [] transfers   [] heat/ice application    [x] other: advised that use of TENS unit may be helpful in addition to HEP     Other Objective/Functional Measures: inc'd muscle turgor/banding L SCM, UT    Pain Level (0-10 scale) post treatment: \"good\"    ASSESSMENT/Changes in Function:   Advised on use of theracane for self TPR/STM, mihir through L UT. Progressing well. Patient will continue to benefit from skilled PT services to modify and progress therapeutic interventions, address functional mobility deficits, address ROM deficits, address strength deficits, analyze and address soft tissue restrictions, analyze and cue movement patterns and analyze and modify body mechanics/ergonomics to attain remaining goals.      [x]  See Plan of Care  []  See progress note/recertification  []  See Discharge Summary         Progress towards goals / Updated goals:  NT    PLAN  []  Upgrade activities as tolerated     [x]  Continue plan of care  []  Update interventions per flow sheet       []  Discharge due to:_  []  Other:_      Fernando Balderrama, PT 2/5/2021

## 2021-02-09 ENCOUNTER — HOSPITAL ENCOUNTER (OUTPATIENT)
Dept: PHYSICAL THERAPY | Age: 41
Discharge: HOME | End: 2021-02-09
Payer: COMMERCIAL

## 2021-02-09 DIAGNOSIS — M62.830 SPASM OF BACK MUSCLES: Primary | ICD-10-CM

## 2021-02-09 PROCEDURE — 97110 THERAPEUTIC EXERCISES: CPT | Performed by: PHYSICAL THERAPIST

## 2021-02-09 PROCEDURE — 97140 MANUAL THERAPY 1/> REGIONS: CPT | Performed by: PHYSICAL THERAPIST
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
## 2021-02-10 NOTE — TELEPHONE ENCOUNTER
PCP: Paolo Adam MD    Last appt: 4/24/2020  Future Appointments   Date Time Provider Cris Dora   2/12/2021  7:30 AM Renaldo Acosta, PT [facility] RE   2/17/2021  6:00 PM Renaldo Acosta, PT [facility] RE   2/19/2021  7:30 AM Renaldo Acosta, PT [facility] RE   3/24/2021  4:10 PM Ulyses Romberg, MD [facility] BS AMB       Requested Prescriptions     Pending Prescriptions Disp Refills    cyclobenzaprine (FLEXERIL) 10 mg tablet 90 Tab 0       Prior labs and Blood pressures:  BP Readings from Last 3 Encounters:   12/17/20 118/60   04/24/20 117/78   02/26/20 109/77     Lab Results   Component Value Date/Time    Sodium 140 08/31/2020 08:57 AM    Potassium 4.5 08/31/2020 08:57 AM    Chloride 102 08/31/2020 08:57 AM    CO2 25 08/31/2020 08:57 AM    Anion gap 11 01/11/2018 05:25 AM    Glucose 141 (H) 08/31/2020 08:57 AM    BUN 11 08/31/2020 08:57 AM    Creatinine 0.66 08/31/2020 08:57 AM    BUN/Creatinine ratio 17 08/31/2020 08:57 AM    GFR est  08/31/2020 08:57 AM    GFR est non- 08/31/2020 08:57 AM    Calcium 9.2 08/31/2020 08:57 AM     Lab Results   Component Value Date/Time    Hemoglobin A1c 7.6 (H) 08/31/2020 08:57 AM     Lab Results   Component Value Date/Time    Cholesterol, total 197 08/31/2020 08:57 AM    HDL Cholesterol 58 08/31/2020 08:57 AM    LDL, calculated 126 (H) 08/31/2020 08:57 AM    LDL, calculated 115 (H) 02/14/2020 08:14 AM    VLDL, calculated 13 08/31/2020 08:57 AM    VLDL, calculated 17 02/14/2020 08:14 AM    Triglyceride 71 08/31/2020 08:57 AM     Lab Results   Component Value Date/Time    VITAMIN D, 25-HYDROXY 35.5 10/05/2018 11:22 AM       Lab Results   Component Value Date/Time    TSH 1.080 08/31/2020 08:57 AM

## 2021-02-11 RX ORDER — CYCLOBENZAPRINE HCL 10 MG
10 TABLET ORAL
Qty: 60 TAB | Refills: 1 | Status: SHIPPED | OUTPATIENT
Start: 2021-02-11 | End: 2021-08-04 | Stop reason: ALTCHOICE

## 2021-02-12 ENCOUNTER — APPOINTMENT (OUTPATIENT)
Dept: PHYSICAL THERAPY | Age: 41
End: 2021-02-12
Payer: COMMERCIAL

## 2021-02-13 RX ORDER — INSULIN GLARGINE 100 [IU]/ML
10 INJECTION, SOLUTION SUBCUTANEOUS
COMMUNITY
Start: 2021-02-13 | End: 2021-02-15 | Stop reason: SDUPTHER

## 2021-02-13 RX ORDER — METFORMIN HYDROCHLORIDE 1000 MG/1
TABLET ORAL
Qty: 60 TAB | Refills: 11 | Status: SHIPPED | OUTPATIENT
Start: 2021-02-13 | End: 2022-04-27

## 2021-02-15 RX ORDER — INSULIN GLARGINE 100 [IU]/ML
10 INJECTION, SOLUTION SUBCUTANEOUS
Qty: 15 ML | Refills: 3 | Status: SHIPPED | OUTPATIENT
Start: 2021-02-15 | End: 2021-03-24

## 2021-02-15 NOTE — TELEPHONE ENCOUNTER
----- Message from Kishan Noe sent at 2/15/2021 10:02 AM EST -----  Regarding: Dr. Garrett Newton: [phone]  Medication Refill    Caller (if not patient):Pt      Relationship of caller (if not patient):n/a      Best contact number(s):[phone]      Name of medication and dosage if known: \"lantus 3mL\"      Is patient out of this medication (yes/no):yes      Pharmacy name: [facility]    Pharmacy listed in chart? (yes/no):yes  Pharmacy phone number:Phone:  [phone]  Fax:  [phone]       Details to clarify the request:n/a      Kishan Noe

## 2021-02-17 ENCOUNTER — HOSPITAL ENCOUNTER (OUTPATIENT)
Dept: PHYSICAL THERAPY | Age: 41
Discharge: HOME | End: 2021-02-17
Payer: COMMERCIAL

## 2021-02-17 PROCEDURE — 97140 MANUAL THERAPY 1/> REGIONS: CPT | Performed by: PHYSICAL THERAPIST

## 2021-02-17 PROCEDURE — 97110 THERAPEUTIC EXERCISES: CPT | Performed by: PHYSICAL THERAPIST

## 2021-02-17 NOTE — PROGRESS NOTES
PT DAILY TREATMENT NOTE - [facility] 2-15    Patient Name: Meme Kim  Date:2021  : 1980  [x]  Patient  Verified  Payor: Judson Eugene / Plan: Jared Saba / Product Type: HMO /    In time: 6:00 P  Out time:  6:45 P  Total Treatment Time (min): 45  Total Timed Codes (min): 45  1:1 Treatment Time ( only): 40   Visit #:  10    Treatment Area: Neck pain [M54.2]    SUBJECTIVE  Pain Level (0-10 scale): 7  Any medication changes, allergies to medications, adverse drug reactions, diagnosis change, or new procedure performed?: [x] No    [] Yes (see summary sheet for update)  Subjective functional status/changes:   [] No changes reported  Pt reports that she is hurting some today-- she has had a long, stressful day at work  Her R side felt better after last PT visit    OBJECTIVE    Modality rationale: decrease inflammation and decrease pain to improve the patients ability to lift objects; perform daily chores with dec'd symptoms   Min Type Additional Details       [] Estim: []Att   []Unatt    []TENS instruct                  []IFC  []Premod   []NMES                     []Other:  []w/US   []w/ice   []w/heat  Position:  Location:       []  Traction: [] Cervical       []Lumbar                       [] Prone          []Supine                       []Intermittent   []Continuous Lbs:  [] before manual  [] after manual  []w/heat    []  Ultrasound: []Continuous   [] Pulsed                       at: []1MHz   []3MHz Location:  W/cm2:   At work [x]  Ice     []  Heat  []  Ice massage Position: seated  Location: L shoulder/neck     [x] Skin assessment post-treatment:  [x]intact []redness- no adverse reaction    []redness  adverse reaction:     30 min Therapeutic Exercise:  [x] See flow sheet : progressed per flow sheet   Rationale: increase ROM and increase strength to improve the patients ability to exercise, perform daily chores with dec'd symptoms    15 min Manual Therapy:  STM/TPR R and L UT, LS--- pt prone and supine  Thoracic grade II-III PA mobs in prone     Rationale: decrease pain, increase ROM, increase tissue extensibility and decrease trigger points to improve the patients ability to perform daily activities with dec'd symptoms            With   [] TE   [] TA   [] neuro   [] other: Patient Education: [x] Review HEP    [] Progressed/Changed HEP based on:   [] positioning   [] body mechanics   [] transfers   [] heat/ice application    [x] other:      Other Objective/Functional Measures: n/a    Pain Level (0-10 scale) post treatment: \"better\"    ASSESSMENT/Changes in Function:   Higher pain levels today, however pt able to perform exercises without aggravation of symptoms. Continues to have relief after manual technqiues  Patient will continue to benefit from skilled PT services to modify and progress therapeutic interventions, address functional mobility deficits, address ROM deficits, address strength deficits, analyze and address soft tissue restrictions, analyze and cue movement patterns and analyze and modify body mechanics/ergonomics to attain remaining goals.      [x]  See Plan of Care  []  See progress note/recertification  []  See Discharge Summary         Progress towards goals / Updated goals:  NT    PLAN  []  Upgrade activities as tolerated     [x]  Continue plan of care  []  Update interventions per flow sheet       []  Discharge due to:_  []  Other:_      Cedrick Omalley, PT 2/17/2021

## 2021-02-19 ENCOUNTER — APPOINTMENT (OUTPATIENT)
Dept: PHYSICAL THERAPY | Age: 41
End: 2021-02-19
Payer: COMMERCIAL

## 2021-02-24 ENCOUNTER — HOSPITAL ENCOUNTER (OUTPATIENT)
Dept: PHYSICAL THERAPY | Age: 41
Discharge: HOME | End: 2021-02-24
Payer: COMMERCIAL

## 2021-02-24 PROCEDURE — 97110 THERAPEUTIC EXERCISES: CPT | Performed by: PHYSICAL THERAPIST

## 2021-02-24 PROCEDURE — 97140 MANUAL THERAPY 1/> REGIONS: CPT | Performed by: PHYSICAL THERAPIST

## 2021-02-24 NOTE — PROGRESS NOTES
PT DAILY TREATMENT NOTE - [facility] 2-15    Patient Name: Alix Pino  Date:2021  : 1980  [x]  Patient  Verified  Payor: Breezy Christian / Plan: Starr Mcnulty / Product Type: HMO /    In time: 6:00 P  Out time: 6:40 P  Total Treatment Time (min): 40  Total Timed Codes (min): 40  1:1 Treatment Time ([facility] only): 40   Visit #:  11    Treatment Area: Neck pain [M54.2]    SUBJECTIVE  Pain Level (0-10 scale):  2  Any medication changes, allergies to medications, adverse drug reactions, diagnosis change, or new procedure performed?: [x] No    [] Yes (see summary sheet for update)  Subjective functional status/changes:   [] No changes reported  Pt reports that she is feeling good today-- she wore the lead vest at work today and did not have any increase in symptoms  She is going to see PCP tomorrow regarding continued pain/swelling in R index finger    OBJECTIVE    Modality rationale: decrease inflammation and decrease pain to improve the patients ability to lift objects; perform daily chores with dec'd symptoms   Min Type Additional Details       [] Estim: []Att   []Unatt    []TENS instruct                  []IFC  []Premod   []NMES                     []Other:  []w/US   []w/ice   []w/heat  Position:  Location:       []  Traction: [] Cervical       []Lumbar                       [] Prone          []Supine                       []Intermittent   []Continuous Lbs:  [] before manual  [] after manual  []w/heat    []  Ultrasound: []Continuous   [] Pulsed                       at: []1MHz   []3MHz Location:  W/cm2:   At home [x]  Ice     []  Heat  []  Ice massage Position: seated  Location: L shoulder/neck     [x] Skin assessment post-treatment:  [x]intact []redness- no adverse reaction    []redness  adverse reaction:     30 min Therapeutic Exercise:  [x] See flow sheet : progressed per flow sheet   Rationale: increase ROM and increase strength to improve the patients ability to exercise, perform daily chores with dec'd symptoms    10 min Manual Therapy:  STM/TPR R and L UT, LS--- pt prone and supine  Thoracic grade II-III PA mobs in prone     Rationale: decrease pain, increase ROM, increase tissue extensibility and decrease trigger points to improve the patients ability to perform daily activities with dec'd symptoms            With   [] TE   [] TA   [] neuro   [] other: Patient Education: [x] Review HEP    [] Progressed/Changed HEP based on:   [] positioning   [] body mechanics   [] transfers   [] heat/ice application    [x] other:      Other Objective/Functional Measures: n/a    Pain Level (0-10 scale) post treatment: 0    ASSESSMENT/Changes in Function:   Higher pain levels today, however pt able to perform exercises without aggravation of symptoms. Continues to have relief after manual technqiues  Patient will continue to benefit from skilled PT services to modify and progress therapeutic interventions, address functional mobility deficits, address ROM deficits, address strength deficits, analyze and address soft tissue restrictions, analyze and cue movement patterns and analyze and modify body mechanics/ergonomics to attain remaining goals. [x]  See Plan of Care  []  See progress note/recertification  []  See Discharge Summary         Progress towards goals / Updated goals:  Short Term Goals: To be accomplished in 2-3 weeks:  1) Pt will be independent in initial HEP MET  2) Pt will report >/=25% improvement in symptoms nt     Long Term Goals:  To be accomplished in 4-6 weeks: progressing  1) Pt will report being able to wear lead vest at work without inc'd symptoms  2) Pt will report >/=75% improvement in symptoms  3) Pt will demonstrate full L shoulder AROM flex without symptoms in order to reach into overhead cabinets  4) Pt will perform cervical AROM WNL all planes in order to perform daily chores    PLAN  []  Upgrade activities as tolerated     [x]  Continue plan of care  []  Update interventions per flow sheet []  Discharge due to:_  []  Other:_      Rick Pelaez, PT 2/24/2021

## 2021-02-25 ENCOUNTER — OFFICE VISIT (OUTPATIENT)
Dept: FAMILY MEDICINE CLINIC | Age: 41
End: 2021-02-25
Payer: COMMERCIAL

## 2021-02-25 VITALS
HEIGHT: 61 IN | HEART RATE: 98 BPM | OXYGEN SATURATION: 98 % | WEIGHT: 190 LBS | RESPIRATION RATE: 18 BRPM | SYSTOLIC BLOOD PRESSURE: 108 MMHG | BODY MASS INDEX: 35.87 KG/M2 | TEMPERATURE: 98.2 F | DIASTOLIC BLOOD PRESSURE: 78 MMHG

## 2021-02-25 DIAGNOSIS — F41.8 INSOMNIA SECONDARY TO DEPRESSION WITH ANXIETY: ICD-10-CM

## 2021-02-25 DIAGNOSIS — M79.89 SWELLING OF LEFT INDEX FINGER: Primary | ICD-10-CM

## 2021-02-25 DIAGNOSIS — M67.442 GANGLION CYST OF FINGER OF LEFT HAND: ICD-10-CM

## 2021-02-25 DIAGNOSIS — F51.05 INSOMNIA SECONDARY TO DEPRESSION WITH ANXIETY: ICD-10-CM

## 2021-02-25 PROCEDURE — 99213 OFFICE O/P EST LOW 20 MIN: CPT | Performed by: FAMILY MEDICINE

## 2021-02-25 RX ORDER — TRAZODONE HYDROCHLORIDE 50 MG/1
50 TABLET ORAL
Qty: 90 TAB | Refills: 0 | Status: SHIPPED | OUTPATIENT
Start: 2021-02-25 | End: 2021-08-04 | Stop reason: ALTCHOICE

## 2021-02-25 NOTE — PROGRESS NOTES
Identified pt with two pt identifiers(name and ). Reviewed record in preparation for visit and have obtained necessary documentation. Chief Complaint   Patient presents with    Hand Pain     Swollen left index finger    Insomnia        Vitals:    21 0829   Weight: 190 lb (86.2 kg)   Height: 5' 1\" (1.549 m)   PainSc:   5   PainLoc: Hand       Health Maintenance Due   Topic    Hepatitis C Screening     COVID-19 Vaccine (1 of 2)    MICROALBUMIN Q1     Foot Exam Q1     PAP AKA CERVICAL CYTOLOGY        Coordination of Care Questionnaire:  :   1) Have you been to an emergency room, urgent care, or hospitalized since your last visit? If yes, where when, and reason for visit? no       2. Have seen or consulted any other health care provider since your last visit? If yes, where when, and reason for visit? NO      Patient is accompanied by self I have received verbal consent from [address] [place] [address] to discuss any/all medical information while they are present in the room.

## 2021-02-25 NOTE — PROGRESS NOTES
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 Sometimes it seems like it balloons up. She takes Motrin and applies ice to the area which helps it go back down. Massaging it also makes it feel better. Denies stiffness, redness or warmth. Discuss ongoing sleep issues. This has been a chronic problem on and off over the years. She has tried various OTC remedies, most recently taking Melatonin which helps her fall asleep but does not keep her asleep. She wakes up after a few hours then ends up tossing and turning all night. Mind races. She took Trazodone in the past but cant recall if it helped or if she even took it long enough to know. She tolerated it well but wasn't sure if she could take it along w/ her Wellbutrin and Prozac so she stopped it. Would like to try it again. Mood otherwise good and stable at present. REVIEW OF SYMPTOMS   Review of Systems   Constitutional: Negative. Neurological: Negative. PROBLEM LIST/MEDICAL HISTORY     Problem List  Date Reviewed: 2/25/2021          Codes Class Noted    Generalized anxiety disorder with panic attacks ICD-10-CM: F41.1, F41.0  ICD-9-CM: 300.02, 300.01  4/24/2020        Diabetes mellitus type 2, noninsulin dependent ([facility] ICD-10-CM: E11.9  ICD-9-CM: 250.00  1/16/2019    Overview Signed 1/16/2019 10:08 AM by Megan Van MD     Initial consult Dr. Columba Craig; Followed by Maulik Rodriguez Sessions ;  [facility]: [facility] annually             Inappropriate sinus tachycardia ICD-10-CM: R00.0  ICD-9-CM: 427.89  10/5/2018    Overview Signed 10/5/2018 10:57 AM by Megan Van MD     [facility] [phone]: Dr Cathy Conway, Dr Frederick Noriega; self referred for 2nd opinion Dr. Veda Cronin [phone]; managed w/ CCB & BB             Congenital cataract of left eye ICD-10-CM: Q12.0  ICD-9-CM: 743.30  10/5/2018    Overview Signed 10/5/2018 11:11 AM by Huong Jain MD     [phone]             CAP (community acquired pneumonia) ICD-10-CM: J18.9  ICD-9-CM: 703  1/8/2018    Overview Signed 1/22/2018 12:08 PM by Huong Jain MD     [facility] Admission 1/8/11-1/11/18             Bulging of intervertebral disc between L4 and L5 ICD-10-CM: M51.26  ICD-9-CM: 722.10  1/8/2018        UTI (urinary tract infection) ICD-10-CM: N39.0  ICD-9-CM: 599.0  1/8/2018        Acute lymphadenitis of arm, left epitrochlear ICD-10-CM: L04.2  ICD-9-CM: 442  12/13/2017        Depression with anxiety ICD-10-CM: F41.8  ICD-9-CM: 300.4  9/28/2017    Overview Signed 9/28/2017 12:42 PM by Huong Jain MD     10/2014             Lumbar facet arthropathy ICD-10-CM: M47.816  ICD-9-CM: 721.3  8/28/2017    Overview Signed 8/28/2017  9:20 PM by Huong Jain MD     On CT scan             Heart palpitations ICD-10-CM: R00.2  ICD-9-CM: 785.1  8/16/2017    Overview Signed 10/5/2018 10:56 AM by Huong Jain MD     Related to sinus tachycardia and anxiety             Spondylosis of lumbar & thoracic region without myelopathy or radiculopathy ICD-10-CM: M47.816  ICD-9-CM: 721.3  10/26/2016    Overview Signed 10/26/2016  8:50 AM by Huong Jain MD     xrays 2010             Hypercholesterolemia ICD-10-CM: E78.00  ICD-9-CM: 272.0  8/18/2016        History of peptic ulcer disease ICD-10-CM: Z87.11  ICD-9-CM: V12.71  8/18/2016    Overview Signed 8/18/2016 10:16 AM by Huong Jain MD     18 yo             Vitamin D deficiency ICD-10-CM: E55.9  ICD-9-CM: 268.9  Unknown        Obesity, Class I, BMI 30-34.9 ICD-10-CM: E66.9  ICD-9-CM: 278.00  11/18/2015        Post partum depression ICD-10-CM: O99.345, F53.0  ICD-9-CM: 648.44, 311  10/9/2014    Overview Signed 10/9/2014 12:24 PM by Huong Jain MD     10/2013: counseling and prn Valium Chronic low back pain & Muscle Spasms ICD-10-CM: M54.5, G89.29  ICD-9-CM: 724.2, 338.29  10/9/2014    Overview Signed 10/9/2014 12:36 PM by Sunday Suarez MD     Muscular              Bilateral carpal tunnel syndrome ICD-10-CM: G56.03  ICD-9-CM: 354.0  2013    Overview Addendum 2013 10:28 AM by Sunday Suarez MD     10/2013; wrist splints               Goiter ICD-10-CM: E04.9  ICD-9-CM: 240.9  2013        History of sinus tachycardia ICD-10-CM: Z86.79  ICD-9-CM: V12.59  10/5/2012    Overview Signed 10/5/2012  9:13 AM by Sunday Suarez MD     Since her early              GERD (gastroesophageal reflux disease) ICD-10-CM: K21.9  ICD-9-CM: 530.81  2011    Overview Signed 2011  9:20 AM by Sunday Suarez MD                  Dysphagia, workup negative except GERD ICD-10-CM: R13.10  ICD-9-CM: 787.20  2010    Overview Addendum 2011  9:21 AM by Sunday Suarez MD     2010: Thyroid US normal  ENT eval;   Barium Swallow =GERD only             Anemia ICD-10-CM: D64.9  ICD-9-CM: 285.9  2010        Gallstones ICD-10-CM: K80.20  ICD-9-CM: 574.20  Unknown    Overview Signed 3/25/2010 10:31 AM by Sunday Suarez MD     [facility]              (normal spontaneous vaginal delivery) ICD-10-CM: O80  ICD-9-CM: 0  Unknown    Overview Signed 3/25/2010 10:31 AM by Sunday Suarez MD     Son  7yo                       PAST SURGICAL HISTORY     Past Surgical History:   Procedure Laterality Date    HX CARPAL TUNNEL RELEASE Bilateral     HX  SECTION      HX CHOLECYSTECTOMY  10/09    LAP-Gallstones    HX GYN  2015    IUD placed    HX LITHOTRIPSY  2017    HX ORTHOPAEDIC Right 7/10/14    trigger thumb    HX ORTHOPAEDIC Left 7/10/14    wrist         MEDICATIONS     Current Outpatient Medications   Medication Sig    insulin glargine (Lantus Solostar U-100 Insulin) 100 unit/mL (3 mL) inpn 10 Units by SubCUTAneous route nightly.  metFORMIN (GLUCOPHAGE) 1,000 mg tablet Take 1/2 tab at dinner x 4 days, then 1/2 tab with breakfast and dinner x 4 days, then 1 tab with breakfast and dinner--replaces synjardy XR    cyclobenzaprine (FLEXERIL) 10 mg tablet Take 1 Tab by mouth three (3) times daily as needed for Muscle Spasm(s).  semaglutide (Ozempic) 1 mg/dose (2 mg/1.5 mL) sub-q pen 1 mg by SubCUTAneous route every seven (7) days.  Synthroid 88 mcg tablet TAKE 1 TABLET BY MOUTH EVERY DAY BEFORE BREAKFAST    FLUoxetine (PROzac) 20 mg tablet Take 1 Tab by mouth daily (after breakfast).  dexlansoprazole (Dexilant) 60 mg CpDB capsule (delayed release) Take 1 Cap by mouth daily.  buPROPion XL (WELLBUTRIN XL) 150 mg tablet Take 1 Tab by mouth every morning.  busPIRone (BUSPAR) 5 mg tablet Take 1 Tab by mouth two (2) times daily as needed (for anxiety/panic attacks).  CARTIA  mg ER capsule Take 120 mg by mouth daily.  metoprolol succinate (TOPROL-XL) 100 mg tablet Take 1 Tab by mouth two (2) times a day.  ELIDEL 1 % topical cream Apply  to affected area as needed (Eczema).  cholecalciferol, vitamin D3, 2,000 unit tab Take 2,000 Units by mouth daily.  levonorgestrel (MIRENA) 20 mcg/24 hr (5 years) IUD 1 Each by IntraUTERine route once. No current facility-administered medications for this visit. ALLERGIES     Allergies   Allergen Reactions    Jardiance [Empagliflozin] Other (comments)     Recurrent yeast infections    Naprosyn [Naproxen] Nausea and Vomiting     Tolerates Advil, Toradol, etc    Topamax [Topiramate] Other (comments)     Kidney stone    Lexapro [Escitalopram] Other (comments)     Fatigue, wt gain    Zoloft [Sertraline] Other (comments)     Fatigue           SOCIAL HISTORY     Social History     Tobacco Use    Smoking status: Never Smoker    Smokeless tobacco: Never Used   Substance Use Topics    Alcohol use:  Yes     Alcohol/week: 0.0 standard drinks     Comment: not weekly, every couple of months per pt     Social History     Social History Narrative    Got  ~ . Has 1 daughter; Long term relationship and has custody of her stepson; her biological son  at age 7yo in 66. 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 Social History     Substance and Sexual Activity   Sexual Activity Yes    Partners: Male    Birth control/protection: I.U.D.        IMMUNIZATIONS     Immunization History   Administered Date(s) Administered    (RETIRED) Pneumococcal Vaccine (Unspecified Type) 2007    Covid-19, MODERNA, Mrna, Lnp-s, Pf, 100mcg/0.5mL 2021, 2021    DTaP 2013    HPV (Quad) 2010    Hep B Vaccine 2013    Hepatitis B Vaccine 2009, 2009, 2010    Influenza Vaccine 10/09/2013, 10/23/2014, 10/16/2015, 2020    Influenza Vaccine (Quad) PF (>6 Mo Flulaval, Fluarix, and >3 Yrs Afluria, Fluzone [id number]) 10/26/2016, 2017, 10/05/2018    Influenza Vaccine Split 2010, 2011, 10/05/2012    Pneumococcal Vaccine (Unspecified Type) 2013    TB Skin Test (PPD) Intradermal 2013    TD Vaccine 2007         FAMILY HISTORY     Family History   Problem Relation Age of Onset    Diabetes Mother          ESRD 45 yo in     Hypertension Mother    [facility] Stroke Mother         TIA    Heart Disease Mother     Diabetes Sister 29        type 2    No Known Problems Brother     High Cholesterol Paternal Grandmother     Hypertension Maternal Grandfather             Diabetes Maternal Grandmother     Hypertension Maternal Grandmother     High Cholesterol Maternal Grandmother     Cancer Maternal Grandmother         Breast CA     Heart Disease Maternal Grandmother          age 79    Colon Cancer Maternal Uncle          at 62yo         VITALS     Visit Vitals  /78 (BP 1 Location: Right arm, BP Patient Position: Sitting, BP Cuff Size: Adult)   Pulse 98   Temp 98.2 °F (36.8 °C) (Temporal)   Resp 18   Ht 5' 1\" (1.549 m)   Wt 190 lb (86.2 kg)   SpO2 98%   BMI 35.90 kg/m²          PHYSICAL EXAMINATION   Physical Exam  Vitals signs reviewed. Constitutional:       General: She is not in acute distress. Appearance: She is not ill-appearing. Musculoskeletal:      Comments: Radial aspect of base of left index finger at MCP joint: there is a smooth, ~ 1 cm well circumscribed sq cystlike swelling that is tender but not erythematous or hot. ROM at joint is good and normal. No joint effusions.     Psychiatric:         Mood and Affect: Mood and affect normal.         Behavior: Behavior normal.                LABORATORY DATA/ANCILLARY/IMAGING     Results for orders placed or performed in visit on 05/17/20   PALMER BY IFA REFLEX   Result Value Ref Range    Antinuclear Abs, IFA Negative    CBC W/O DIFF   Result Value Ref Range    WBC 9.8 3.4 - 10.8 x10E3/uL    RBC 4.13 3.77 - 5.28 x10E6/uL    HGB 12.9 11.1 - 15.9 g/dL    HCT 37.6 34.0 - 46.6 %    MCV 91 79 - 97 fL    MCH 31.2 26.6 - 33.0 pg    MCHC 34.3 31.5 - 35.7 g/dL    RDW 12.8 11.7 - 15.4 %    PLATELET 658 029 - 808 x10E3/uL   IRON   Result Value Ref Range    Iron 71 27 - 159 ug/dL   FERRITIN   Result Value Ref Range    Ferritin 37 15 - 150 ng/mL   RPR   Result Value Ref Range    RPR Non Reactive Non Reactive       Lab Results   Component Value Date/Time    Hemoglobin A1c 7.6 (H) 08/31/2020 08:57 AM    Hemoglobin A1c 8.0 (H) 02/14/2020 08:14 AM    Hemoglobin A1c 9.9 (H) 07/19/2019 08:12 AM    Glucose 141 (H) 08/31/2020 08:57 AM    Glucose (POC) 155 (H) 01/11/2018 07:04 AM    Microalb/Creat ratio (ug/mg creat.) 9 02/14/2020 08:14 AM    LDL, calculated 126 (H) 08/31/2020 08:57 AM    LDL, calculated 115 (H) 02/14/2020 08:14 AM    Creatinine 0.66 08/31/2020 08:57 AM      Lab Results   Component Value Date/Time    Sodium 140 08/31/2020 08:57 AM    Potassium 4.5 08/31/2020 08:57 AM    Chloride 102 08/31/2020 08:57 AM    CO2 25 08/31/2020 08:57 AM    Anion gap 11 01/11/2018 05:25 AM    Glucose 141 (H) 08/31/2020 08:57 AM    BUN 11 08/31/2020 08:57 AM    Creatinine 0.66 08/31/2020 08:57 AM    BUN/Creatinine ratio 17 08/31/2020 08:57 AM    GFR est  08/31/2020 08:57 AM    GFR est non- 08/31/2020 08:57 AM    Calcium 9.2 08/31/2020 08:57 AM    Bilirubin, total 0.5 08/31/2020 08:57 AM    ALT (SGPT) 14 08/31/2020 08:57 AM    Alk. phosphatase 78 08/31/2020 08:57 AM    Protein, total 7.0 08/31/2020 08:57 AM    Albumin 4.2 08/31/2020 08:57 AM    Globulin 4.1 (H) 01/09/2018 03:32 AM    A-G Ratio 1.5 08/31/2020 08:57 AM          ASSESSMENT & PLAN       ICD-10-CM ICD-9-CM    1. Swelling of left index finger  M79.89 729.81 REFERRAL TO ORTHOPEDICS   2. Ganglion cyst of finger of left hand  M67.442 727.43 REFERRAL TO ORTHOPEDICS   3. Insomnia secondary to depression with anxiety  F51.05 300.4 traZODone (DESYREL) 50 mg tablet    F41.8 327.02      Patient will see Dr. Mel Beauchamp whom she has seen for other orthopedic issues in the past. She will call his office today to get that consultation scheduled and update me.    Will restart Trazodone 50 mg nightly for sleep  Continue other meds the same at this time  Reviewed medications, effects, precautions, and potential side effects in detail  Follow up prn

## 2021-02-26 ENCOUNTER — HOSPITAL ENCOUNTER (OUTPATIENT)
Dept: PHYSICAL THERAPY | Age: 41
Discharge: HOME | End: 2021-02-26
Payer: COMMERCIAL

## 2021-02-26 PROCEDURE — 97140 MANUAL THERAPY 1/> REGIONS: CPT | Performed by: PHYSICAL THERAPIST

## 2021-02-26 PROCEDURE — 97110 THERAPEUTIC EXERCISES: CPT | Performed by: PHYSICAL THERAPIST

## 2021-02-26 NOTE — PROGRESS NOTES
PT DAILY TREATMENT/Progress NOTE - [facility] 2-15    Patient Name: Shamar Nogueira  Date:2021  : 1980  [x]  Patient  Verified  Payor: Taylor Jeter / Plan: Shamar December / Product Type: HMO /    In time: 301 A  Out time: 8:10 A  Total Treatment Time (min): 40  Total Timed Codes (min): 40  1:1 Treatment Time ( only): 40   Visit #:  12    Treatment Area: Neck pain [M54.2]    SUBJECTIVE  Pain Level (0-10 scale): 2  Any medication changes, allergies to medications, adverse drug reactions, diagnosis change, or new procedure performed?: [x] No    [] Yes (see summary sheet for update)  Subjective functional status/changes:   [] No changes reported  Pt states that she wore the lead vest at work yesterday, she felt okay without inc'd symptoms. She has an \"ache\" today-- pt points to L UT and L parascapular region.    She did see Dr. Tee Hyde regarding continued swelling/pain in finger-- referred to Dr Roberto Mccloud rationale: decrease inflammation and decrease pain to improve the patients ability to lift objects; perform daily chores with dec'd symptoms   Min Type Additional Details       [] Estim: []Att   []Unatt    []TENS instruct                  []IFC  []Premod   []NMES                     []Other:  []w/US   []w/ice   []w/heat  Position:  Location:       []  Traction: [] Cervical       []Lumbar                       [] Prone          []Supine                       []Intermittent   []Continuous Lbs:  [] before manual  [] after manual  []w/heat    []  Ultrasound: []Continuous   [] Pulsed                       at: []1MHz   []3MHz Location:  W/cm2:   At home [x]  Ice     []  Heat  []  Ice massage Position: seated  Location: L shoulder/neck     [x] Skin assessment post-treatment:  [x]intact []redness- no adverse reaction    []redness  adverse reaction:     25 min Therapeutic Exercise:  [x] See flow sheet : progressed per flow sheet  Measurements taken for progress note   Rationale: increase ROM and increase strength to improve the patients ability to exercise, perform daily chores with dec'd symptoms    15 min Manual Therapy:  STM/TPR R and L UT, LS--- pt prone and supine  Thoracic grade II-III PA mobs in prone     Rationale: decrease pain, increase ROM, increase tissue extensibility and decrease trigger points to improve the patients ability to perform daily activities with dec'd symptoms            With   [] TE   [] TA   [] neuro   [] other: Patient Education: [x] Review HEP    [] Progressed/Changed HEP based on:   [] positioning   [] body mechanics   [] transfers   [] heat/ice application    [x] other:      Other Objective/Functional Measures:   Cervical AROM WNL all directions  Bilat shoulder flex, scap strength 5/5 bilat without symptoms  bilat shoulder flex, scap AROM WNL  Slight muscle banding/tissue turgor L UT    Pain Level (0-10 scale) post treatment: 0    ASSESSMENT/Changes in Function:   Pt has completed 12 skilled PT visits. She demonstrates improvement in cervical and shoulder AROM, dec'd TTP, and improved UE strength. She continues to demonstrate minimal muscle banding/tissue turgor along L UT. She has returned to full work duties, including wearing lead vest throughout the day without inc'd symptoms. Plan for her to continue HEP independently-- advised her to return to PT in 1 week if necessary, otherwise ready for D/C to HEP. Patient will continue to benefit from skilled PT services to modify and progress therapeutic interventions, address functional mobility deficits, address ROM deficits, address strength deficits, analyze and address soft tissue restrictions, analyze and cue movement patterns and analyze and modify body mechanics/ergonomics to attain remaining goals. [x]  See Plan of Care  []  See progress note/recertification  []  See Discharge Summary         Progress towards goals / Updated goals:  Short Term Goals:  To be accomplished in 2-3 weeks:  1) Pt will be independent in initial HEP MET  2) Pt will report >/=25% improvement in symptoms nt     Long Term Goals: To be accomplished in 4-6 weeks:   1) Pt will report being able to wear lead vest at work without inc'd symptoms MET  2) Pt will report >/=75% improvement in symptoms nt  3) Pt will demonstrate full L shoulder AROM flex without symptoms in order to reach into overhead cabinets MET  4) Pt will perform cervical AROM WNL all planes in order to perform daily chores MET    PLAN  []  Upgrade activities as tolerated     [x]  Continue plan of care  []  Update interventions per flow sheet       []  Discharge due to:_  [x]  Other:_ trial with HEP independently for 1 week. Return to PT only if necessary, otherwise ready for D/C.        Mahogany Olvera, PT 2/26/2021

## 2021-03-05 ENCOUNTER — APPOINTMENT (OUTPATIENT)
Dept: PHYSICAL THERAPY | Age: 41
End: 2021-03-05

## 2021-03-10 DIAGNOSIS — E66.9 OBESITY, CLASS I, BMI 30-34.9: ICD-10-CM

## 2021-03-10 DIAGNOSIS — E11.65 UNCONTROLLED TYPE 2 DIABETES MELLITUS WITH HYPERGLYCEMIA (HCC): ICD-10-CM

## 2021-03-10 DIAGNOSIS — E78.00 HYPERCHOLESTEROLEMIA: ICD-10-CM

## 2021-03-10 DIAGNOSIS — E04.9 GOITER: ICD-10-CM

## 2021-03-10 DIAGNOSIS — E55.9 VITAMIN D DEFICIENCY: ICD-10-CM

## 2021-03-10 DIAGNOSIS — R00.0 TACHYCARDIA: ICD-10-CM

## 2021-03-18 LAB
25(OH)D3+25(OH)D2 SERPL-MCNC: 35.5 NG/ML (ref 30–100)
ALBUMIN SERPL-MCNC: 4.3 G/DL (ref 3.8–4.8)
ALBUMIN/CREAT UR: 6 MG/G CREAT (ref 0–29)
ALBUMIN/GLOB SERPL: 1.5 {RATIO} (ref 1.2–2.2)
ALP SERPL-CCNC: 77 IU/L (ref 39–117)
ALT SERPL-CCNC: 17 IU/L (ref 0–32)
AST SERPL-CCNC: 18 IU/L (ref 0–40)
BILIRUB SERPL-MCNC: 0.4 MG/DL (ref 0–1.2)
BUN SERPL-MCNC: 7 MG/DL (ref 6–20)
BUN/CREAT SERPL: 10 (ref 9–23)
CALCIUM SERPL-MCNC: 9.2 MG/DL (ref 8.7–10.2)
CHLORIDE SERPL-SCNC: 101 MMOL/L (ref 96–106)
CHOLEST SERPL-MCNC: 175 MG/DL (ref 100–199)
CO2 SERPL-SCNC: 24 MMOL/L (ref 20–29)
CREAT SERPL-MCNC: 0.7 MG/DL (ref 0.57–1)
CREAT UR-MCNC: 118.1 MG/DL
EST. AVERAGE GLUCOSE BLD GHB EST-MCNC: 203 MG/DL
GLOBULIN SER CALC-MCNC: 2.8 G/DL (ref 1.5–4.5)
GLUCOSE SERPL-MCNC: 134 MG/DL (ref 65–99)
HBA1C MFR BLD: 8.7 % (ref 4.8–5.6)
HDLC SERPL-MCNC: 55 MG/DL
IMP & REVIEW OF LAB RESULTS: NORMAL
LDLC SERPL CALC-MCNC: 108 MG/DL (ref 0–99)
Lab: NORMAL
MICROALBUMIN UR-MCNC: 6.8 UG/ML
POTASSIUM SERPL-SCNC: 4.4 MMOL/L (ref 3.5–5.2)
PROT SERPL-MCNC: 7.1 G/DL (ref 6–8.5)
SODIUM SERPL-SCNC: 137 MMOL/L (ref 134–144)
TRIGL SERPL-MCNC: 60 MG/DL (ref 0–149)
VLDLC SERPL CALC-MCNC: 12 MG/DL (ref 5–40)

## 2021-03-18 NOTE — ANCILLARY DISCHARGE INSTRUCTIONS
[facility] [address] [place], [address] Phone: [phone]  Fax: [phone] Discharge Summary  2-15 Patient name: Tammi Trinidad  : 1980  Provider#:[id number] Referral source: SheilaAnuelTanner Melvi ASPEN,* Medical/Treatment Diagnosis: Neck pain [M54.2] Prior Hospitalization: see medical history Comorbidities: see evaluation Prior Level of Function:see evaluation Medications: Verified on Patient Summary List 
 
Start of Care: 2021      Onset Date:see evaluation Visits from Start of Care: 12     Missed Visits: see CC Reporting Period : 21 to 21 Summary of care:  
Cervical AROM WNL all directions Bilat shoulder flex, scap strength 5/5 bilat without symptoms 
bilat shoulder flex, scap AROM WNL Slight muscle banding/tissue turgor L UT 
  
Pain Level (0-10 scale) post treatment: 0 Progress towards goals Short Term Goals: T 
1) Pt will be independent in initial HEP MET 2) Pt will report >/=25% improvement in symptoms nt 
  
Long Term Goals: 
1) Pt will report being able to wear lead vest at work without inc'd symptoms MET 
2) Pt will report >/=75% improvement in symptoms nt 3) Pt will demonstrate full L shoulder AROM flex without symptoms in order to reach into overhead cabinets MET 4) Pt will perform cervical AROM WNL all planes in order to perform daily chores MET 
  
ASSESSMENT/Changes in Function:  
Pt has completed 12 skilled PT visits. She demonstrates improvement in cervical and shoulder AROM, dec'd TTP, and improved UE strength. She continues to demonstrate minimal muscle banding/tissue turgor along L UT. She has returned to full work duties, including wearing lead vest throughout the day without inc'd symptoms. Ready for DC to HEP. RECOMMENDATIONS: 
[x]Discontinue therapy: [x]Patient has reached or is progressing toward set goals     []Patient is non-compliant or has abdicated 
    []Due to lack of appreciable progress towards set goals Chester Arenas, PT 3/18/2021 10:35 AM

## 2021-03-24 ENCOUNTER — VIRTUAL VISIT (OUTPATIENT)
Dept: ENDOCRINOLOGY | Age: 41
End: 2021-03-24
Payer: COMMERCIAL

## 2021-03-24 DIAGNOSIS — E55.9 VITAMIN D DEFICIENCY: ICD-10-CM

## 2021-03-24 DIAGNOSIS — E04.9 GOITER: ICD-10-CM

## 2021-03-24 DIAGNOSIS — E78.00 HYPERCHOLESTEROLEMIA: ICD-10-CM

## 2021-03-24 DIAGNOSIS — E11.65 UNCONTROLLED TYPE 2 DIABETES MELLITUS WITH HYPERGLYCEMIA (HCC): Primary | ICD-10-CM

## 2021-03-24 DIAGNOSIS — R00.0 TACHYCARDIA: ICD-10-CM

## 2021-03-24 DIAGNOSIS — E66.9 OBESITY, CLASS I, BMI 30-34.9: ICD-10-CM

## 2021-03-24 PROCEDURE — 99214 OFFICE O/P EST MOD 30 MIN: CPT | Performed by: INTERNAL MEDICINE

## 2021-03-24 PROCEDURE — 3052F HG A1C>EQUAL 8.0%<EQUAL 9.0%: CPT | Performed by: INTERNAL MEDICINE

## 2021-03-24 RX ORDER — INSULIN GLARGINE 100 [IU]/ML
14 INJECTION, SOLUTION SUBCUTANEOUS
Qty: 15 ML | Refills: 3
Start: 2021-03-24 | End: 2021-07-02

## 2021-03-24 NOTE — PATIENT INSTRUCTIONS
1) Stay on lantus 14 units at night for now but if you continue to have readings under 100 in the morning, then try cutting back by 1 unit to 13 units and if still under 100 2 or more times in a week, then cut back again by 1 unit and stay on the lowest dose that keeps your fasting sugar between 100-130.      2) Your LDL (bad cholesterol) has come down and your liver and kidney and urine and vitamin D are all normal.    3) Hold on restarting phendimetrazine but if your weight does not get under 178 by the end of April, then you can try restarting this at 1/2 tab before breakfast and dinner. If this dose is not helping and your tachycardia is not worse, then let me know and you can try 1 whole tab twice daily and I can rewrite the prescription. 4) Let me know if you want your A1c checked in 3 months and I can order this. 5) Please come for a follow up visit on 9/29/21 at 4:10pm in our [facility] office. 6) I put an order directly into the [facility] system to repeat your labs in the 1-2 weeks prior to your next visit so just ask for the order under my name and you will receive a courtesy reminder through [facility] to have these drawn.

## 2021-03-24 NOTE — PROGRESS NOTES
Chief Complaint   Patient presents with    Diabetes     [facility]    Other     pcp and pharmacy confirmed       **THIS IS A VIRTUAL VISIT VIA A VIDEO SYNCHRONOUS DISCUSSION. PATIENT AGREED TO HAVE THEIR CARE DELIVERED OVER A [email or web address] VIDEO VISIT IN PLACE OF THEIR REGULARLY SCHEDULED OFFICE VISIT**    History of Present Illness: Idania Rmairez is a 45 y.o. female here for follow up of diabetes. Contacted me in 2/21 that she couldn't tolerate the phendimetrazine and synjardy so we stopped this and went back to plain metformin and has worked up to 1 tab twice daily and so far has tolerated this. Also restarted the lantus and is currently up to 14 units at night as of 3 days ago and her fasting sugar was 92 today. Was having fasting sugars in the 140-160s while on 10-12 units. This is down from over 200 when we communicated in 2/21 over [place]. Her tachycardia is still controlled on her current regimen of toprol and diltiazem and will be seeing Dr. Bharat Qureshi on 3/30/21. Weight was 190 at PCP's office on 2/25/21 and currently is 182.5 lb on her scales. Has been sleeping better with taking the trazodone at night. Her anxiety has been better and thinks this was the likely cause of some of her heart symptoms in the fall. Current Outpatient Medications   Medication Sig    insulin glargine (Lantus Solostar U-100 Insulin) 100 unit/mL (3 mL) inpn 14 Units by SubCUTAneous route nightly. Dose change 3/24/21--updated med list--did not send prescription to the pharmacy    traZODone (DESYREL) 50 mg tablet Take 1 Tab by mouth nightly.  metFORMIN (GLUCOPHAGE) 1,000 mg tablet Take 1/2 tab at dinner x 4 days, then 1/2 tab with breakfast and dinner x 4 days, then 1 tab with breakfast and dinner--replaces synjardy XR    cyclobenzaprine (FLEXERIL) 10 mg tablet Take 1 Tab by mouth three (3) times daily as needed for Muscle Spasm(s).     semaglutide (Ozempic) 1 mg/dose (2 mg/1.5 mL) sub-q pen 1 mg by SubCUTAneous route every seven (7) days.  Synthroid 88 mcg tablet TAKE 1 TABLET BY MOUTH EVERY DAY BEFORE BREAKFAST    FLUoxetine (PROzac) 20 mg tablet Take 1 Tab by mouth daily (after breakfast).  dexlansoprazole (Dexilant) 60 mg CpDB capsule (delayed release) Take 1 Cap by mouth daily.  buPROPion XL (WELLBUTRIN XL) 150 mg tablet Take 1 Tab by mouth every morning.  busPIRone (BUSPAR) 5 mg tablet Take 1 Tab by mouth two (2) times daily as needed (for anxiety/panic attacks).  CARTIA  mg ER capsule Take 120 mg by mouth daily.  metoprolol succinate (TOPROL-XL) 100 mg tablet Take 1 Tab by mouth two (2) times a day.  ELIDEL 1 % topical cream Apply  to affected area as needed (Eczema).  cholecalciferol, vitamin D3, 2,000 unit tab Take 2,000 Units by mouth daily.  levonorgestrel (MIRENA) 20 mcg/24 hr (5 years) IUD 1 Each by IntraUTERine route once. No current facility-administered medications for this visit.       Allergies   Allergen Reactions    Jardiance [Empagliflozin] Other (comments)     Recurrent yeast infections    Naprosyn [Naproxen] Nausea and Vomiting     Tolerates Advil, Toradol, etc    Topamax [Topiramate] Other (comments)     Kidney stone    Lexapro [Escitalopram] Other (comments)     Fatigue, wt gain    Zoloft [Sertraline] Other (comments)     Fatigue      Review of Systems: PER HPI    Physical Examination:  - GENERAL: NCAT, Appears well nourished   - EYES: EOMI, non-icteric, no proptosis   - Ear/Nose/Throat: NCAT, no visible inflammation or masses   - CARDIOVASCULAR: no cyanosis, no visible JVD   - RESPIRATORY: respiratory effort normal without any distress or labored breathing   - MUSCULOSKELETAL: Normal ROM of neck and upper extremities observed   - SKIN: No rash on face  - NEUROLOGIC:  No facial asymmetry (Cranial nerve 7 motor function), No gaze palsy   - PSYCHIATRIC: Normal affect, Normal insight and judgement       Data Reviewed:   Component      Latest Ref Rng & Units 3/17/2021 Glucose      65 - 99 mg/dL 134 (H)   BUN      6 - 20 mg/dL 7   Creatinine      0.57 - 1.00 mg/dL 0.70   GFR est non-AA      >59 mL/min/1.73 109   GFR est AA      >59 mL/min/1.73 126   BUN/Creatinine ratio      9 - 23 10   Sodium      134 - 144 mmol/L 137   Potassium      3.5 - 5.2 mmol/L 4.4   Chloride      96 - 106 mmol/L 101   CO2      20 - 29 mmol/L 24   Calcium      8.7 - 10.2 mg/dL 9.2   Protein, total      6.0 - 8.5 g/dL 7.1   Albumin      3.8 - 4.8 g/dL 4.3   GLOBULIN, TOTAL      1.5 - 4.5 g/dL 2.8   A-G Ratio      1.2 - 2.2 1.5   Bilirubin, total      0.0 - 1.2 mg/dL 0.4   Alk. phosphatase      39 - 117 IU/L 77   AST      0 - 40 IU/L 18   ALT      0 - 32 IU/L 17   Cholesterol, total      100 - 199 mg/dL 175   Triglyceride      0 - 149 mg/dL 60   HDL Cholesterol      >39 mg/dL 55   VLDL, calculated      5 - 40 mg/dL 12   LDL, calculated      0 - 99 mg/dL 108 (H)   Creatinine, urine      Not Estab. mg/dL 118.1   Microalbumin, urine      Not Estab. ug/mL 6.8   Microalbumin/Creat. Ratio      0 - 29 mg/g creat 6   Hemoglobin A1c, (calculated)      4.8 - 5.6 % 8.7 (H)   Estimated average glucose      mg/dL 203   VITAMIN D, 25-HYDROXY      30.0 - 100.0 ng/mL 35.5       Assessment/Plan:     1. Diabetes mellitus type II: her most recent Hgb A1c was 8.7% in 3/21 up from 7.6% in 8/20 down from 8% in 2/20 down from 9.9% in 7/19 up from 9.2% in 3/19 up from 8.2% in 10/18 down from 8.3% in 6/18 down from 10.4% in 1/18 up from 8.1% in 9/17 up from 7.9% in 4/17 down from 8.1% in 12/16 up from 7% in 7/16 down from 7.7% in 3/16 down from 7.8% in 11/15 down from 8.3% in 7/15 down from 8.8% in 3/15 up from 7.6% in 11/14 up from 7.3% in 7/14 down from 8% in 3/14 up from 6.1% in 11/13 down from 6.7% in 9/13 up from 6.3% in 8/13 up slightly from 6.1% in 7/13 down from 6.3% in 5/13 down from 6.7% in 1/13 up from 6.5% in Oct 2012.   A1c is higher as she came off insulin and synjardy this winter but with restarting metformin and insulin over the past 4 weeks, her sugars are much better so gave her a plan of how to titrate her lantus as below. - cont lantus 14 units at bedtime   - cont ozempic 1 mg weekly  - cont metformin 1g bid  - check bs up to 3 times daily due to fluctuating sugars  - foot exam done 2/20  - microalbumin nl 10/12--was taken off lisinopril 10 mg daily due to trying to conceive and repeat normal ever since, most recently in 3/21  - optho UTD 5/19  - check Hgb A1c and cmp prior to next visit     2. Goiter: Had a TSH of 2.68 in May 2012. Level was 2.4 in 1/13 and I started her on a a low dose of levothyroxine 25 mcg daily to keep her TSH < 2.5 to improve her chance of conception and interestingly she became pregnant shortly after starting this. TSH 2.1 in 5/13 and 1.6 in 7/13 and 1.39 in 9/13. Stopped levothyroxine after delivery in 10/13 and TSH up to 5.5 in 3/14 so restarted low dose of 25 mcg daily to keep her TSH < 2.5 as she has hypothyroid symptoms and high cholesterol. However only 2.2 in 7/14 so increased to 50 mcg daily and TSH 2.4 in 11/14 but had missed a dose on the weekends on occasion so increased to 62.5 mcg daily and TSH 1.87 in 3/15 and 1.46 in 11/15 and 1.04 in 3/16. Up to 2.06 in 7/16 so increased to 75 mcg daily and down to 1.5 in 12/16. Up to 2.11 in 4/17 so increased her dose to 88 mcg daily and down to 0.96 in 9/17 and 1.81 in 1/18 and 1.96 in 10/18 and 1.49 in 3/19 so kept her dose the same. TSH up to 2.64 in 7/19 but had missed some doses prior to lab draw so kept dose the same and 1.49 in 2/20 and 1.08 in 8/20 so kept dose the same. Her thyroid is not to blame for her tachycardia as she has never been over-replaced. - cont levothyroxine 88 mcg daily  - check TSH prior to next visit        3. Hypercholesterolemia: Given DM, Goal LDL < 100, non-HDL < 130, and TG < 150.  in 10/12 off simva 10 due to trying to conceive, down to 109 in 1/13 and 92 in 5/13.   Up to 127 in 11/13 and down to 111 in 3/14. Up to 124 in 7/14. Down to 114 in 11/14. Up to 140 in 3/15 possibly due to [address] Dr as with stopping this it was down to 92 in 7/15. Up to 114 in 11/15 with diet. Was started on lipitor in 1/16 but had more nausea with this so stopped this and with 17 lb wt loss, LDL down to 84 in 3/16 and still 98 in 7/16. Up to 114 in 4/17 and still 119 in 9/17 and 136 in 1/18. Down to 116 in 6/18 and 113 in 10/18 and 110 in 3/19 but up to 138 in 7/19 due to higher A1c and TSH but down to 115 in 2/20 and 126 in 8/20 and 108 in 3/21 so will hold on any other meds for now. - diet control  - check lipids prior to next visit      4. Obesity: I started topamax in 11/15 and Dr. Lulú Santacruz added phentermine in 1/16 and changed her trulicity to [facility] [employer] and her weight had come down 26 lbs by 7/16. Had more nausea and GERD and ended up stopping all her meds in 10/16 and weight up 7 lbs by 12/16. Restarted topamax for 2 weeks and then added back victoza at a lower dose of 1.2 mg daily. Eventually added back phentermine in 3/17. Then developed a kidney stone in 3/17 and stopped the topamax and weight up 6 lbs by 4/17. Will stay off the topamax due to the kidney stone and stop the phentermine as I don't think this is helping her weight. I told her it's fine to restart the advocare spark and protein shakes as these are not as likely to cause the kidney stone as the topamax was. Weight up 7 lbs by 9/17 possibly due to stress and 1 lb by 1/18. Down 3 lbs by 6/18.  Up 6 lbs by 11/18 but was off victoza and started back on prozac. I added back topamax 1 tab daily and restarted victoza but wt down just 1 lb by 3/19 so began wellbutrin instead and still stable by 7/19. Stopped topamax in 6/19 due to concern for new back pain that could be the beginning of another kidney stone. Started seeing Dr. Pete Venegas in 9/19 and was started on ozempic and synardy and wt down 14 lbs by 2/20. Down 4 lbs by 9/20.   Did up up from 178 in 9/20 to 190 in 2/21 but down to 182.5 by 3/21. May consider restarting phendimetrazine if unable to lose more weight. - cont wellbutrin  mg daily   - cont ozempic as above  - stay off topamax 50 mg daily due to risk of kidney stone  - consider restarting phendimetrazine 35 mg 1/2 tab bid if wt not under 178 by end of 4/21      5. Vitamin D deficiency: level was 24 in 12/15 and is currently taking 2000 units of D3 and level up to 32 in 7/16 and 35 in 4/17 and 30 in 9/17 and 35 in 10/18 and in 3/21  - cont vitamin D 2000 units daily  - check Vitamin D 25-OH level in 3/22      6. 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 Rubia Cary and Dr. Bety Alamo. Now under care of Dr. Jose Beck and combo of dilt and toprol normally works   - cont Toprol  mg bid  - cont dilt  mg daily    Patient Instructions   1) Stay on lantus 14 units at night for now but if you continue to have readings under 100 in the morning, then try cutting back by 1 unit to 13 units and if still under 100 2 or more times in a week, then cut back again by 1 unit and stay on the lowest dose that keeps your fasting sugar between 100-130.      2) Your LDL (bad cholesterol) has come down and your liver and kidney and urine and vitamin D are all normal.    3) Hold on restarting phendimetrazine but if your weight does not get under 178 by the end of April, then you can try restarting this at 1/2 tab before breakfast and dinner. If this dose is not helping and your tachycardia is not worse, then let me know and you can try 1 whole tab twice daily and I can rewrite the prescription. 4) Let me know if you want your A1c checked in 3 months and I can order this. 5) Please come for a follow up visit on 9/29/21 at 4:10pm in our [facility] office.     6) I put an order directly into the [employer] system to repeat your labs in the 1-2 weeks prior to your next visit so just ask for the order under my name and you will receive a courtesy reminder through [employer] to have these drawn. Follow-up and Dispositions    · Return 9/29/21 at 4:10pm.               Copy sent to: Frankie Rivera MD as PCP - General  Allyssa Heck MD (Obstetrics & Gynecology)  Isidro Ashford MD (Cardiology)    Lab follow up: 7/2/21  Component      Latest Ref Rng & Units 6/28/2021           7:37 AM   Hemoglobin A1c, (calculated)      4.8 - 5.6 % 9.1 (H)   Estimated average glucose      mg/dL 214     Sent her the following message through [employer]:  Your A1c has risen from 8.7% to 9.1%. Please let me know how much insulin you are currently taking and exactly what your sugars are running and confirm you are still on the same doses of metformin and ozempic and we'll come up with a plan.

## 2021-06-19 DIAGNOSIS — F32.A CHRONIC DEPRESSION: ICD-10-CM

## 2021-06-19 DIAGNOSIS — Z87.11 HISTORY OF PEPTIC ULCER DISEASE: ICD-10-CM

## 2021-06-19 DIAGNOSIS — F41.1 GENERALIZED ANXIETY DISORDER WITH PANIC ATTACKS: ICD-10-CM

## 2021-06-19 DIAGNOSIS — K21.9 GASTROESOPHAGEAL REFLUX DISEASE: ICD-10-CM

## 2021-06-19 DIAGNOSIS — F41.0 GENERALIZED ANXIETY DISORDER WITH PANIC ATTACKS: ICD-10-CM

## 2021-06-20 RX ORDER — FLUOXETINE 20 MG/1
TABLET ORAL
Qty: 90 TABLET | Refills: 2 | Status: SHIPPED | OUTPATIENT
Start: 2021-06-20 | End: 2021-08-04 | Stop reason: ALTCHOICE

## 2021-06-20 RX ORDER — DEXLANSOPRAZOLE 60 MG/1
CAPSULE, DELAYED RELEASE ORAL
Qty: 90 CAPSULE | Refills: 2 | Status: SHIPPED | OUTPATIENT
Start: 2021-06-20 | End: 2021-11-08

## 2021-06-22 DIAGNOSIS — E11.65 UNCONTROLLED TYPE 2 DIABETES MELLITUS WITH HYPERGLYCEMIA (HCC): Primary | ICD-10-CM

## 2021-07-01 LAB
EST. AVERAGE GLUCOSE BLD GHB EST-MCNC: 214 MG/DL
HBA1C MFR BLD: 9.1 % (ref 4.8–5.6)

## 2021-07-02 RX ORDER — INSULIN GLARGINE 100 [IU]/ML
18 INJECTION, SOLUTION SUBCUTANEOUS
Qty: 15 ML | Refills: 3
Start: 2021-07-02 | End: 2021-09-29

## 2021-08-04 ENCOUNTER — OFFICE VISIT (OUTPATIENT)
Dept: FAMILY MEDICINE CLINIC | Age: 41
End: 2021-08-04
Payer: COMMERCIAL

## 2021-08-04 VITALS
OXYGEN SATURATION: 98 % | HEIGHT: 61 IN | SYSTOLIC BLOOD PRESSURE: 116 MMHG | TEMPERATURE: 98.3 F | DIASTOLIC BLOOD PRESSURE: 76 MMHG | WEIGHT: 190.2 LBS | BODY MASS INDEX: 35.91 KG/M2 | RESPIRATION RATE: 16 BRPM | HEART RATE: 95 BPM

## 2021-08-04 DIAGNOSIS — E04.9 GOITER: ICD-10-CM

## 2021-08-04 DIAGNOSIS — Z01.818 PREOP EXAMINATION: Primary | ICD-10-CM

## 2021-08-04 DIAGNOSIS — E11.9 DIABETES MELLITUS TYPE 2, INSULIN DEPENDENT (HCC): ICD-10-CM

## 2021-08-04 DIAGNOSIS — Z79.4 DIABETES MELLITUS TYPE 2, INSULIN DEPENDENT (HCC): ICD-10-CM

## 2021-08-04 DIAGNOSIS — R00.0 INAPPROPRIATE SINUS TACHYCARDIA: ICD-10-CM

## 2021-08-04 DIAGNOSIS — E78.00 MILD HYPERCHOLESTEROLEMIA: ICD-10-CM

## 2021-08-04 DIAGNOSIS — L98.7 EXCESS SKIN OF ABDOMINAL WALL: ICD-10-CM

## 2021-08-04 DIAGNOSIS — E66.9 OBESITY (BMI 30-39.9): ICD-10-CM

## 2021-08-04 PROBLEM — E03.9 HYPOTHYROID: Status: ACTIVE | Noted: 2021-08-04

## 2021-08-04 PROCEDURE — 99214 OFFICE O/P EST MOD 30 MIN: CPT | Performed by: FAMILY MEDICINE

## 2021-08-04 NOTE — PROGRESS NOTES
Chief Complaint   Patient presents with    Pre-op Exam     Abdominoplasty 8/24/21, Dr. Prasanna Lyn   [facility]  PRE-OP H&P    Surgery: Abdominoplasty    Date of Surgery: 8-    Surgeon: Dr. Tamia Cabezas    Anesthesia: General    Latex Allergy: No    Allergy to Adhesive or Tape: No    History of Reactions to Anesthesia: No    History of Heart Disease/CAD/PTCA/CABG: No    ASA: No    Coumadin/Xarelto/Eliquis/Pradaxa: No    Nsaid's: No    Asthma/COPD/Pulmonary Disease: No    Bleeding/Bruising or Clotting Disorder: No    H/O DVT or Pulmonary Embolism: No    Endo:   History of Type 2 IDDM and Hypothyroid followed by Endo Dr. Tanisha Garcia. Admits she self dc'd her insulin back in early part of this year. Her HgBA1c went up to 9.1 so she restarted it 6-28-21. She has been compliant w/ it and her other diabetes meds since then. Checks BS's bid. Fastin AM:   2 hrs after dinner: 160-170, occ 190    Refer to Pre-Op form for other required pertinent information. REVIEW OF SYMPTOMS   Review of Systems   Constitutional: Negative. Negative for chills and fever. HENT: Negative. Eyes: Negative. Respiratory: Negative. Cardiovascular: Negative. Gastrointestinal: Negative. Genitourinary: Negative. Neurological: Negative. Endo/Heme/Allergies: Negative for polydipsia. Does not bruise/bleed easily. Psychiatric/Behavioral: Negative for depression. The patient does not have insomnia. Self dc'd Wellbutrin, Prozac, and Trazodone 6 weeks ago because ever since starting on Ashwaganda supplement she feels that has helped stabilize her mood completely. She feels so much better. More calm. More balanced. Less emotional. Anxiety decreased a lot and she has only needed to take Buspar twice in the past 6 weeks since being on the supplement. Also feels that moving fwd w/ the abdominoplasty will help her body image/self esteem.  So she feels so much better about herself w/ this plan. PROBLEM LIST/MEDICAL HISTORY     Problem List  Date Reviewed: 8/4/2021        Codes Class Noted    Hypothyroid ICD-10-CM: E03.9  ICD-9-CM: 244.9  8/4/2021    Overview Signed 8/4/2021  4:22 PM by Volodymyr Kingston MD     2014, Endo Dr. Messi Jimenez             Generalized anxiety disorder with panic attacks ICD-10-CM: F41.1, F41.0  ICD-9-CM: 300.02, 300.01  4/24/2020        Diabetes mellitus type 2, insulin dependent ([facility] [address] ICD-10-CM: E11.9, Z79.4  ICD-9-CM: 250.00, V58.67  1/16/2019    Overview Addendum 8/4/2021  2:06 PM by Volodymyr Kingston MD     Initial consult Dr. Leodan Manuel; Followed by Endo Dr. Messi Jimenez ;  Eye Exams [facility]: [facility] annually; Suresh Guardado MD, [facility]             Inappropriate sinus tachycardia ICD-10-CM: R00.0  ICD-9-CM: 427.89  10/5/2018    Overview Signed 10/5/2018 10:57 AM by Volodymyr Kingston MD     Cardi [phone]: Dr Vanessa Ma, Dr Jeremías Park; self referred for 2nd opinion Dr. Ortiz Burn [phone]; managed w/ CCB & BB             Congenital cataract of left eye ICD-10-CM: Q12.0  ICD-9-CM: 743.30  10/5/2018    Overview Signed 10/5/2018 11:11 AM by Volodymyr Kingston MD     [phone]             CAP (community acquired pneumonia) ICD-10-CM: J18.9  ICD-9-CM: 795  1/8/2018    Overview Signed 1/22/2018 12:08 PM by Volodymyr Kingston MD     [facility], [facility] [place] Admission 1/8/11-1/11/18             Bulging of intervertebral disc between L4 and L5 ICD-10-CM: M51.26  ICD-9-CM: 722.10  1/8/2018        UTI (urinary tract infection) ICD-10-CM: N39.0  ICD-9-CM: 599.0  1/8/2018        Acute lymphadenitis of arm, left epitrochlear ICD-10-CM: Z59.8  ICD-9-CM: 180  12/13/2017        Depression with anxiety ICD-10-CM: F41.8  ICD-9-CM: 300.4  9/28/2017    Overview Signed 9/28/2017 12:42 PM by Volodymyr Kingston MD     10/2014             Lumbar facet arthropathy ICD-10-CM: M47.816  ICD-9-CM: 721.3  8/28/2017    Overview Signed 8/28/2017  9:20 PM by Volodymyr Kingston MD [facility]     On CT scan             Heart palpitations ICD-10-CM: R00.2  ICD-9-CM: 785.1  8/16/2017    Overview Signed 10/5/2018 10:56 AM by Rocio Cisneros MD     Related to sinus tachycardia and anxiety             Spondylosis of lumbar & thoracic region without myelopathy or radiculopathy ICD-10-CM: M47.816  ICD-9-CM: 721.3  10/26/2016    Overview Signed 10/26/2016  8:50 AM by Rocio Cisneros MD     xrays 2010             Hypercholesterolemia ICD-10-CM: E78.00  ICD-9-CM: 272.0  8/18/2016        History of peptic ulcer disease ICD-10-CM: Z87.11  ICD-9-CM: V12.71  8/18/2016    Overview Signed 8/18/2016 10:16 AM by Rocio Cisneros MD     16 yo             Vitamin D deficiency ICD-10-CM: E55.9  ICD-9-CM: 268.9  Unknown        Obesity, Class I, BMI 30-34.9 ICD-10-CM: E66.9  ICD-9-CM: 278.00  11/18/2015        Post partum depression ICD-10-CM: O99.345, F53.0  ICD-9-CM: 648.44, 311  10/9/2014    Overview Signed 10/9/2014 12:24 PM by Rocio Cisneros MD     10/2013: counseling and prn Valium             Chronic low back pain & Muscle Spasms ICD-10-CM: M54.5, G89.29  ICD-9-CM: 724.2, 338.29  10/9/2014    Overview Signed 10/9/2014 12:36 PM by Rocio Cisneros MD     Muscular              Bilateral carpal tunnel syndrome ICD-10-CM: G56.03  ICD-9-CM: 354.0  12/31/2013    Overview Addendum 12/31/2013 10:28 AM by Rocio Cisneros MD     10/2013; wrist splints               Goiter ICD-10-CM: E04.9  ICD-9-CM: 240.9  1/11/2013    Overview Signed 8/4/2021  4:22 PM by Rocio Cisneros MD     Endo Dr. Yasmani Virgen             History of sinus tachycardia ICD-10-CM: Z86.79  ICD-9-CM: V12.59  10/5/2012    Overview Signed 10/5/2012  9:13 AM by Rocio Cisneros MD     Since her early 20's             GERD (gastroesophageal reflux disease) ICD-10-CM: K21.9  ICD-9-CM: 530.81  6/23/2011    Overview Signed 6/23/2011  9:20 AM by Rocio Cisneros MD     2011             Dysphagia, workup negative except GERD ICD-10-CM: R13.10  ICD-9-CM: 787.20  2010    Overview Addendum 2011  9:21 AM by Michael Rao MD     2010: Thyroid US normal  ENT eval;   Barium Swallow =GERD only             Anemia ICD-10-CM: D64.9  ICD-9-CM: 285.9  2010        Gallstones ICD-10-CM: K80.20  ICD-9-CM: 574.20  Unknown    Overview Signed 3/25/2010 10:31 AM by Michael Rao MD     [facility] ICD-10-CM: O80  ICD-9-CM: 139  Unknown    Overview Signed 3/25/2010 10:31 AM by Michael Rao MD     Son  7yo                       PAST SURGICAL HISTORY     Past Surgical History:   Procedure Laterality Date    HX CARPAL TUNNEL RELEASE Bilateral     HX  SECTION  2013    HX CHOLECYSTECTOMY  10/09    LAP-Gallstones    HX GYN  2015    IUD placed    HX LITHOTRIPSY  2017    HX ORTHOPAEDIC Right 7/10/14    trigger thumb    HX ORTHOPAEDIC Left 7/10/14    wrist         MEDICATIONS     Current Outpatient Medications   Medication Sig    APPLE CIDER VINEGAR PO Take  by mouth three (3) times daily.  ASHWAGANDHA ROOT EXTRACT PO Take 2 Tablets by mouth two (2) times a day.  multivitamin (FRUITY VITAMIN PO) Take 2 Tablets by mouth two (2) times a day.  insulin glargine (Lantus Solostar U-100 Insulin) 100 unit/mL (3 mL) inpn 18 Units by SubCUTAneous route nightly. Dose change 21--updated med list--did not send prescription to the pharmacy    Dexilant 60 mg CpDB capsule (delayed release) TAKE 1 CAPSULE BY MOUTH EVERY DAY    metFORMIN (GLUCOPHAGE) 1,000 mg tablet Take 1/2 tab at dinner x 4 days, then 1/2 tab with breakfast and dinner x 4 days, then 1 tab with breakfast and dinner--replaces synjardy XR (Patient taking differently: Take 1,000 mg by mouth two (2) times daily (with meals). )    semaglutide (Ozempic) 1 mg/dose (2 mg/1.5 mL) sub-q pen 1 mg by SubCUTAneous route every seven (7) days.  Synthroid 88 mcg tablet TAKE 1 TABLET BY MOUTH EVERY DAY BEFORE BREAKFAST    busPIRone (BUSPAR) 5 mg tablet Take 1 Tab by mouth two (2) times daily as needed (for anxiety/panic attacks).  CARTIA  mg ER capsule Take 120 mg by mouth daily.  metoprolol succinate (TOPROL-XL) 100 mg tablet Take 1 Tab by mouth two (2) times a day.  cholecalciferol (VITAMIN D3) 25 mcg (1,000 unit) cap Take 1,000 Units by mouth daily.  levonorgestrel (MIRENA) 20 mcg/24 hr (5 years) IUD 1 Each by IntraUTERine route once. No current facility-administered medications for this visit. ALLERGIES     Allergies   Allergen Reactions    Jardiance [Empagliflozin] Other (comments)     Recurrent yeast infections    Naprosyn [Naproxen] Nausea and Vomiting     Tolerates Advil, Toradol, etc    Topamax [Topiramate] Other (comments)     Kidney stone    Lexapro [Escitalopram] Other (comments)     Fatigue, wt gain    Zoloft [Sertraline] Other (comments)     Fatigue           SOCIAL HISTORY     Social History     Tobacco Use    Smoking status: Never Smoker    Smokeless tobacco: Never Used   Substance Use Topics    Alcohol use: Yes     Alcohol/week: 0.0 standard drinks     Comment: not weekly, every couple of months per pt     Social History     Social History Narrative    Got  ~ . Has 1 daughter and 1 stepson. Her biological son  at age 7yo in 66. 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         Diet/Weight: nothing special right now, just trying to eat sensibly; attended Dr. Octavia Dudley x several months, ended : met goal: wt down from 198 to 170, then regained and now staying ~ 190 range    Exercise: stationary bike x 30 minutes 5 x a week    Caffeine:  1 cup of coffee qam, occ diet soda             Social History     Substance and Sexual Activity   Sexual Activity Yes    Partners: Male   Franck Layer Birth control/protection: I.U.D. IMMUNIZATIONS     Immunization History   Administered Date(s) Administered    (RETIRED) Pneumococcal Vaccine (Unspecified Type) 2007    Covid-19, MODERNA, Mrna, Lnp-s, Pf, 100mcg/0.5mL 2021, 2021    DTaP 2013    HPV (Quad) 2010    Hep B Vaccine 2013    Hepatitis B Vaccine 2009, 2009, 2010    Influenza Vaccine 10/09/2013, 10/23/2014, 10/16/2015, 2020    Influenza Vaccine (Quad) PF (>6 Mo Flulaval, Fluarix, and >3 Yrs Afluria, Fluzone 78319) 10/26/2016, 2017, 10/05/2018    Influenza Vaccine Split 2010, 2011, 10/05/2012    Pneumococcal Vaccine (Unspecified Type) 2013    TB Skin Test (PPD) Intradermal 2013    TD Vaccine 2007         FAMILY HISTORY     Family History   Problem Relation Age of Onset    Diabetes Mother          ESRD 45 yo in     Hypertension Mother    Del Henle Stroke Mother         TIA    Heart Disease Mother     Diabetes Sister 29        type 2    No Known Problems Brother     High Cholesterol Paternal Grandmother     Hypertension Maternal Grandfather             Diabetes Maternal Grandmother     Hypertension Maternal Grandmother     High Cholesterol Maternal Grandmother     Cancer Maternal Grandmother         Breast CA     Heart Disease Maternal Grandmother          age 79    Colon Cancer Maternal Uncle          at 62yo         VITALS     Visit Vitals  /76 (BP 1 Location: Left upper arm, BP Patient Position: Sitting, BP Cuff Size: Large adult)   Pulse 95   Temp 98.3 °F (36.8 °C) (Oral)   Resp 16   Ht 5' 1\" (1.549 m)   Wt 190 lb 3.2 oz (86.3 kg)   SpO2 98%   BMI 35.94 kg/m²          PHYSICAL EXAMINATION   Physical Exam  Vitals reviewed. Constitutional:       General: She is not in acute distress. Appearance: Normal appearance. She is not ill-appearing.    HENT:      Right Ear: Tympanic membrane normal.      Left Ear: Tympanic membrane normal.      Mouth/Throat:      Mouth: Mucous membranes are moist.      Pharynx: Oropharynx is clear. Eyes:      General: No scleral icterus. Conjunctiva/sclera: Conjunctivae normal.   Neck:      Thyroid: No thyroid tenderness. Cardiovascular:      Rate and Rhythm: Regular rhythm. Pulses: Normal pulses. Heart sounds: No murmur heard. No gallop. Pulmonary:      Effort: Pulmonary effort is normal. No respiratory distress. Breath sounds: Normal breath sounds. Abdominal:      General: There is no distension. Palpations: Abdomen is soft. There is no mass. Tenderness: There is no abdominal tenderness. Musculoskeletal:         General: No swelling or tenderness. Normal range of motion. Cervical back: Full passive range of motion without pain and normal range of motion. Right lower leg: No edema. Left lower leg: No edema. Skin:     General: Skin is warm and dry. Neurological:      General: No focal deficit present. Mental Status: She is alert and oriented to person, place, and time.       Deep Tendon Reflexes: Reflexes normal.   Psychiatric:         Mood and Affect: Mood and affect normal.                LABORATORY DATA/ANCILLARY/IMAGING     Results for orders placed or performed in visit on 06/22/21   HEMOGLOBIN A1C WITH EAG   Result Value Ref Range    Hemoglobin A1c 9.1 (H) 4.8 - 5.6 %    Estimated average glucose 214 mg/dL       Lab Results   Component Value Date/Time    WBC 9.8 05/21/2020 08:31 AM    HGB 12.9 05/21/2020 08:31 AM    HCT 37.6 05/21/2020 08:31 AM    PLATELET 491 59/38/7918 08:31 AM    MCV 91 05/21/2020 08:31 AM     Lab Results   Component Value Date/Time    Hemoglobin A1c 9.1 (H) 06/28/2021 07:37 AM    Hemoglobin A1c 8.7 (H) 03/17/2021 10:12 AM    Hemoglobin A1c 7.6 (H) 08/31/2020 08:57 AM    Glucose 134 (H) 03/17/2021 10:12 AM    Glucose (POC) 155 (H) 01/11/2018 07:04 AM    Microalb/Creat ratio (ug/mg creat.) 6 03/17/2021 10:12 AM LDL, calculated 108 (H) 03/17/2021 10:12 AM    LDL, calculated 115 (H) 02/14/2020 08:14 AM    Creatinine 0.70 03/17/2021 10:12 AM      Lab Results   Component Value Date/Time    Cholesterol, total 175 03/17/2021 10:12 AM    HDL Cholesterol 55 03/17/2021 10:12 AM    LDL, calculated 108 (H) 03/17/2021 10:12 AM    LDL, calculated 115 (H) 02/14/2020 08:14 AM    Triglyceride 60 03/17/2021 10:12 AM     Lab Results   Component Value Date/Time    TSH 1.080 08/31/2020 08:57 AM    T4, Free 1.47 02/14/2020 08:14 AM      Lab Results   Component Value Date/Time    Sodium 137 03/17/2021 10:12 AM    Potassium 4.4 03/17/2021 10:12 AM    Chloride 101 03/17/2021 10:12 AM    CO2 24 03/17/2021 10:12 AM    Anion gap 11 01/11/2018 05:25 AM    Glucose 134 (H) 03/17/2021 10:12 AM    BUN 7 03/17/2021 10:12 AM    Creatinine 0.70 03/17/2021 10:12 AM    BUN/Creatinine ratio 10 03/17/2021 10:12 AM    GFR est  03/17/2021 10:12 AM    GFR est non- 03/17/2021 10:12 AM    Calcium 9.2 03/17/2021 10:12 AM    Bilirubin, total 0.4 03/17/2021 10:12 AM    ALT (SGPT) 17 03/17/2021 10:12 AM    Alk. phosphatase 77 03/17/2021 10:12 AM    Protein, total 7.1 03/17/2021 10:12 AM    Albumin 4.3 03/17/2021 10:12 AM    Globulin 4.1 (H) 01/09/2018 03:32 AM    A-G Ratio 1.5 03/17/2021 10:12 AM          ASSESSMENT & PLAN       ICD-10-CM ICD-9-CM    1. Preop H&P for Abdominoplasty scheduled 8-24-21 per Dr. Landaverde Old. Z01.818 V72.84 Medically stable. Awaiting cardiac clearance per her cardiologist tomorrow. Pre-op labs done per Dr. Don Lala yesterday to include CBC, BMP, TSH all normal. Will fax today's office note w/ preop form to [phone]   2. Obesity (BMI 30-39. 9)  E66.9 278.00    3. Excess skin of abdominal wall  L98.7 701.9    4. Diabetes mellitus type 2, insulin dependent ([facility] [address] followed by Endo, recent spike due to insulin non compliance now back on regular regimen E11.9 250.00     Z79.4 V58.67    5.  Mild hypercholesterolemia, diet controlled E78.00 272.0    6. Inappropriate sinus tachycardia, stable on CCB, and BB, followed by Cardiologist. She is scheduled for pre-op cardiac clearance w/ Dr. Becka Muro tomorrow. R00.0 427.89    7.  Goiter without Obstructive symptoms and Mild Hypothyroidism stable, therapeutic control on TRT per Endo E04.9 240.9

## 2021-08-04 NOTE — PROGRESS NOTES
Chief Complaint   Patient presents with    Pre-op Exam     abdominoplasty 8/24/21     1. Have you been to the ER, urgent care clinic since your last visit? Hospitalized since your last visit? No    2. Have you seen or consulted any other health care providers outside of the [address] [place] [address] since your last visit? Include any pap smears or colon screening.  Yes Where: Dr Tyler Rubio

## 2021-09-15 DIAGNOSIS — E55.9 VITAMIN D DEFICIENCY: ICD-10-CM

## 2021-09-15 DIAGNOSIS — R00.0 TACHYCARDIA: ICD-10-CM

## 2021-09-15 DIAGNOSIS — E78.00 HYPERCHOLESTEROLEMIA: ICD-10-CM

## 2021-09-15 DIAGNOSIS — E04.9 GOITER: ICD-10-CM

## 2021-09-15 DIAGNOSIS — E66.9 OBESITY, CLASS I, BMI 30-34.9: ICD-10-CM

## 2021-09-15 DIAGNOSIS — E11.65 UNCONTROLLED TYPE 2 DIABETES MELLITUS WITH HYPERGLYCEMIA (HCC): ICD-10-CM

## 2021-09-15 LAB
ALBUMIN SERPL-MCNC: 4.2 G/DL (ref 3.8–4.8)
ALBUMIN/GLOB SERPL: 1.5 {RATIO} (ref 1.2–2.2)
ALP SERPL-CCNC: 79 IU/L (ref 44–121)
ALT SERPL-CCNC: 23 IU/L (ref 0–32)
AST SERPL-CCNC: 21 IU/L (ref 0–40)
BILIRUB SERPL-MCNC: 0.2 MG/DL (ref 0–1.2)
BUN SERPL-MCNC: 12 MG/DL (ref 6–20)
BUN/CREAT SERPL: 20 (ref 9–23)
CALCIUM SERPL-MCNC: 9.4 MG/DL (ref 8.7–10.2)
CHLORIDE SERPL-SCNC: 103 MMOL/L (ref 96–106)
CHOLEST SERPL-MCNC: 204 MG/DL (ref 100–199)
CO2 SERPL-SCNC: 20 MMOL/L (ref 20–29)
CREAT SERPL-MCNC: 0.59 MG/DL (ref 0.57–1)
EST. AVERAGE GLUCOSE BLD GHB EST-MCNC: 192 MG/DL
GLOBULIN SER CALC-MCNC: 2.8 G/DL (ref 1.5–4.5)
GLUCOSE SERPL-MCNC: 167 MG/DL (ref 65–99)
HBA1C MFR BLD: 8.3 % (ref 4.8–5.6)
HDLC SERPL-MCNC: 52 MG/DL
LDLC SERPL CALC-MCNC: 142 MG/DL (ref 0–99)
POTASSIUM SERPL-SCNC: 4.4 MMOL/L (ref 3.5–5.2)
PROT SERPL-MCNC: 7 G/DL (ref 6–8.5)
SODIUM SERPL-SCNC: 136 MMOL/L (ref 134–144)
TRIGL SERPL-MCNC: 57 MG/DL (ref 0–149)
TSH SERPL DL<=0.005 MIU/L-ACNC: 2 UIU/ML (ref 0.45–4.5)
VLDLC SERPL CALC-MCNC: 10 MG/DL (ref 5–40)

## 2021-09-29 ENCOUNTER — OFFICE VISIT (OUTPATIENT)
Dept: ENDOCRINOLOGY | Age: 41
End: 2021-09-29
Payer: COMMERCIAL

## 2021-09-29 VITALS
WEIGHT: 182 LBS | SYSTOLIC BLOOD PRESSURE: 111 MMHG | HEART RATE: 91 BPM | BODY MASS INDEX: 34.36 KG/M2 | HEIGHT: 61 IN | DIASTOLIC BLOOD PRESSURE: 77 MMHG

## 2021-09-29 DIAGNOSIS — E11.65 UNCONTROLLED TYPE 2 DIABETES MELLITUS WITH HYPERGLYCEMIA (HCC): Primary | ICD-10-CM

## 2021-09-29 DIAGNOSIS — E04.9 GOITER: ICD-10-CM

## 2021-09-29 DIAGNOSIS — E55.9 VITAMIN D DEFICIENCY: ICD-10-CM

## 2021-09-29 DIAGNOSIS — E66.9 OBESITY, CLASS I, BMI 30-34.9: ICD-10-CM

## 2021-09-29 DIAGNOSIS — E78.00 HYPERCHOLESTEROLEMIA: ICD-10-CM

## 2021-09-29 DIAGNOSIS — R00.0 TACHYCARDIA: ICD-10-CM

## 2021-09-29 PROCEDURE — 3052F HG A1C>EQUAL 8.0%<EQUAL 9.0%: CPT | Performed by: INTERNAL MEDICINE

## 2021-09-29 PROCEDURE — 99214 OFFICE O/P EST MOD 30 MIN: CPT | Performed by: INTERNAL MEDICINE

## 2021-09-29 RX ORDER — INSULIN GLARGINE 100 [IU]/ML
20 INJECTION, SOLUTION SUBCUTANEOUS
Qty: 15 ML | Refills: 3
Start: 2021-09-29 | End: 2022-04-27

## 2021-09-29 NOTE — PATIENT INSTRUCTIONS
1) Tonight try 20 units of lantus to see if you can get your fasting sugars under 130 and if not there after 4 days, then try 22 units. 2) Do your best to get both doses of metformin everyday. 3) Your kidney and liver and thyroid are normal.    4) Your LDL (bad cholesterol) was higher. Try to limit the amount of fried foods, fatty foods, butter, gravy, red meat, ice cream, cheese and eggs in your diet, which are all high in cholesterol. 5) Your blood pressure is well controlled. 6) You can try over the counter B12 5000 mcg daily to help with energy. 7) Feel free to send me a message through [address] with your blood sugar readings between next visit if you are not able to stay under 130 in the morning and under 180 2 hours after meals.

## 2021-09-29 NOTE — PROGRESS NOTES
Chief Complaint   Patient presents with    Thyroid Problem     pcp and pharmacy confirmed    Diabetes     consent obtain for eye report     History of Present Illness: Archana Ford is a 45 y.o. female here for follow up of diabetes. Had a tummy tuck with Dr. Adali Meyer on 8/24/21 and this has healed well. Currently taking lantus 18 units at night along with metformin 1g bid and ozempic 1 mg weekly. May miss the morning dose of metformin 2-3 times a week as doesn't always eat breakfast.  Fasting sugars are in the 130-140s up to the 160s with higher carb dinners. Not too many under 130. Has seen a few random sugars in the 190-220 range during the day. Has had more milk and cheese. Tachycardia controlled on her current regimen. Still has fatigue and previously felt better on B12 shots that she received from another weight loss doctor and told her that I don't feel comfortable prescribing these as she doesn't have a known B12 deficiency but told her she can take B12 5000 mcg daily to see if this helps with fatigue. Current Outpatient Medications   Medication Sig    APPLE CIDER VINEGAR PO Take  by mouth three (3) times daily.  ASHWAGANDHA ROOT EXTRACT PO Take 2 Tablets by mouth two (2) times a day.  multivitamin (FRUITY VITAMIN PO) Take 1 Tablet by mouth daily.  insulin glargine (Lantus Solostar U-100 Insulin) 100 unit/mL (3 mL) inpn 18 Units by SubCUTAneous route nightly. Dose change 7/2/21--updated med list--did not send prescription to the pharmacy    Dexilant 60 mg CpDB capsule (delayed release) TAKE 1 CAPSULE BY MOUTH EVERY DAY    metFORMIN (GLUCOPHAGE) 1,000 mg tablet Take 1/2 tab at dinner x 4 days, then 1/2 tab with breakfast and dinner x 4 days, then 1 tab with breakfast and dinner--replaces synjardy XR (Patient taking differently: Take 1,000 mg by mouth two (2) times daily (with meals). )    semaglutide (Ozempic) 1 mg/dose (2 mg/1.5 mL) sub-q pen 1 mg by SubCUTAneous route every seven (7) days.    Synthroid 88 mcg tablet TAKE 1 TABLET BY MOUTH EVERY DAY BEFORE BREAKFAST    busPIRone (BUSPAR) 5 mg tablet Take 1 Tab by mouth two (2) times daily as needed (for anxiety/panic attacks).  CARTIA  mg ER capsule Take 120 mg by mouth daily.  metoprolol succinate (TOPROL-XL) 100 mg tablet Take 1 Tab by mouth two (2) times a day.  cholecalciferol (VITAMIN D3) 25 mcg (1,000 unit) cap Take 1,000 Units by mouth daily.  levonorgestrel (MIRENA) 20 mcg/24 hr (5 years) IUD 1 Each by IntraUTERine route once. No current facility-administered medications for this visit. Allergies   Allergen Reactions    Jardiance [Empagliflozin] Other (comments)     Recurrent yeast infections    Naprosyn [Naproxen] Nausea and Vomiting     Tolerates Advil, Toradol, etc    Topamax [Topiramate] Other (comments)     Kidney stone    Lexapro [Escitalopram] Other (comments)     Fatigue, wt gain    Zoloft [Sertraline] Other (comments)     Fatigue      Review of Systems: PER HPI    Physical Examination:  Blood pressure 111/77, pulse 91, height 5' 1\" (1.549 m), weight 182 lb (82.6 kg).   - General: pleasant, no distress, good eye contact   - Neck: no carotid bruits  - Cardiovascular: regular, normal rate, nl s1 and s2, no m/r/g,   - Respiratory: clear bilaterally  - Integumentary: no edema,   - Psychiatric: normal mood and affect    Diabetic foot exam:     Left Foot:   Visual Exam: normal    Pulse DP: 2+ (normal)   Filament test: normal sensation    Vibratory sensation: normal      Right Foot:   Visual Exam: normal    Pulse DP: 2+ (normal)   Filament test: normal sensation    Vibratory sensation: normal        Data Reviewed:   Component      Latest Ref Rng & Units 9/14/2021 9/14/2021 9/14/2021 9/14/2021           8:20 AM  8:20 AM  8:20 AM  8:20 AM   Glucose      65 - 99 mg/dL  167 (H)     BUN      6 - 20 mg/dL  12     Creatinine      0.57 - 1.00 mg/dL  0.59     GFR est non-AA      >59 mL/min/1.73  116     GFR est AA      >59 mL/min/1.73  134     BUN/Creatinine ratio      9 - 23  20     Sodium      134 - 144 mmol/L  136     Potassium      3.5 - 5.2 mmol/L  4.4     Chloride      96 - 106 mmol/L  103     CO2      20 - 29 mmol/L  20     Calcium      8.7 - 10.2 mg/dL  9.4     Protein, total      6.0 - 8.5 g/dL  7.0     Albumin      3.8 - 4.8 g/dL  4.2     GLOBULIN, TOTAL      1.5 - 4.5 g/dL  2.8     A-G Ratio      1.2 - 2.2  1.5     Bilirubin, total      0.0 - 1.2 mg/dL  0.2     Alk. phosphatase      44 - 121 IU/L  79     AST      0 - 40 IU/L  21     ALT      0 - 32 IU/L  23     Cholesterol, total      100 - 199 mg/dL 204 (H)      Triglyceride      0 - 149 mg/dL 57      HDL Cholesterol      >39 mg/dL 52      VLDL, calculated      5 - 40 mg/dL 10      LDL, calculated      0 - 99 mg/dL 142 (H)      Hemoglobin A1c, (calculated)      4.8 - 5.6 %   8.3 (H)    Estimated average glucose      mg/dL   192    TSH      0.450 - 4.500 uIU/mL    2.000       Assessment/Plan:     1. Diabetes mellitus type II: her most recent Hgb A1c was 8.3% in 9/21 down from 9.1% in 6/21 up from 8.7% in 3/21 up from 7.6% in 8/20 down from 8% in 2/20 down from 9.9% in 7/19 up from 9.2% in 3/19 up from 8.2% in 10/18 down from 8.3% in 6/18 down from 10.4% in 1/18 up from 8.1% in 9/17 up from 7.9% in 4/17 down from 8.1% in 12/16 up from 7% in 7/16 down from 7.7% in 3/16 down from 7.8% in 11/15 down from 8.3% in 7/15 down from 8.8% in 3/15 up from 7.6% in 11/14 up from 7.3% in 7/14 down from 8% in 3/14 up from 6.1% in 11/13 down from 6.7% in 9/13 up from 6.3% in 8/13 up slightly from 6.1% in 7/13 down from 6.3% in 5/13 down from 6.7% in 1/13 up from 6.5% in Oct 2012. A1c is coming down but needs more insulin to get fasting sugars to goal.  Also needs to not miss the metformin.   - increase lantus to 20 units at bedtime   - cont ozempic 1 mg weekly  - cont metformin 1g bid  - check bs up to 3 times daily due to fluctuating sugars  - foot exam done 9/21  - microalbumin nl 10/12--was taken off lisinopril 10 mg daily due to trying to conceive and repeat normal ever since, most recently in 3/21  - optho UTD 2021--will obtain report  - check Hgb A1c and cmp and microalbumin prior to next visit       2. Goiter: Had a TSH of 2.68 in May 2012. Level was 2.4 in 1/13 and I started her on a a low dose of levothyroxine 25 mcg daily to keep her TSH < 2.5 to improve her chance of conception and interestingly she became pregnant shortly after starting this. TSH 2.1 in 5/13 and 1.6 in 7/13 and 1.39 in 9/13. Stopped levothyroxine after delivery in 10/13 and TSH up to 5.5 in 3/14 so restarted low dose of 25 mcg daily to keep her TSH < 2.5 as she has hypothyroid symptoms and high cholesterol. However only 2.2 in 7/14 so increased to 50 mcg daily and TSH 2.4 in 11/14 but had missed a dose on the weekends on occasion so increased to 62.5 mcg daily and TSH 1.87 in 3/15 and 1.46 in 11/15 and 1.04 in 3/16. Up to 2.06 in 7/16 so increased to 75 mcg daily and down to 1.5 in 12/16. Up to 2.11 in 4/17 so increased her dose to 88 mcg daily and down to 0.96 in 9/17 and 1.81 in 1/18 and 1.96 in 10/18 and 1.49 in 3/19 so kept her dose the same. TSH up to 2.64 in 7/19 but had missed some doses prior to lab draw so kept dose the same and 1.49 in 2/20 and 1.08 in 8/20 and 2 in 9/21 so kept dose the same. Her thyroid is not to blame for her tachycardia as she has never been over-replaced. - cont levothyroxine 88 mcg daily  - check TSH in 9/22        3. Hypercholesterolemia: Given DM, Goal LDL < 100, non-HDL < 130, and TG < 150.  in 10/12 off simva 10 due to trying to conceive, down to 109 in 1/13 and 92 in 5/13. Up to 127 in 11/13 and down to 111 in 3/14. Up to 124 in 7/14. Down to 114 in 11/14. Up to 140 in 3/15 possibly due to [address] as with stopping this it was down to 92 in 7/15. Up to 114 in 11/15 with diet.   Was started on lipitor in 1/16 but had more nausea with this so stopped this and with 17 lb wt loss, LDL down to 84 in 3/16 and still 98 in 7/16. Up to 114 in 4/17 and still 119 in 9/17 and 136 in 1/18. Down to 116 in 6/18 and 113 in 10/18 and 110 in 3/19 but up to 138 in 7/19 due to higher A1c and TSH but down to 115 in 2/20 and 126 in 8/20 and 108 in 3/21. Up to 142 in 9/21 so will hold on any other meds for now. - diet control  - check lipids prior to next visit        4. Obesity: I started topamax in 11/15 and Dr. Rosalba Jaimes added phentermine in 1/16 and changed her trulicity to [place] and her weight had come down 26 lbs by 7/16. Had more nausea and GERD and ended up stopping all her meds in 10/16 and weight up 7 lbs by 12/16. Restarted topamax for 2 weeks and then added back victoza at a lower dose of 1.2 mg daily. Eventually added back phentermine in 3/17. Then developed a kidney stone in 3/17 and stopped the topamax and weight up 6 lbs by 4/17. Will stay off the topamax due to the kidney stone and stop the phentermine as I don't think this is helping her weight. I told her it's fine to restart the advocare spark and protein shakes as these are not as likely to cause the kidney stone as the topamax was. Weight up 7 lbs by 9/17 possibly due to stress and 1 lb by 1/18. Down 3 lbs by 6/18.  Up 6 lbs by 11/18 but was off victoza and started back on prozac. I added back topamax 1 tab daily and restarted victoza but wt down just 1 lb by 3/19 so began wellbutrin instead and still stable by 7/19. Stopped topamax in 6/19 due to concern for new back pain that could be the beginning of another kidney stone. Started seeing Dr. Blanca Son in 9/19 and was started on ozempic and synardy and wt down 14 lbs by 2/20. Down 4 lbs by 9/20. Did go up from 178 in 9/20 to 190 in 2/21 but down to 182.5 by 3/21 and stable by 9/21. May consider restarting phendimetrazine if unable to lose more weight.   - cont wellbutrin  mg daily   - cont ozempic as above  - stay off topamax 50 mg daily due to risk of kidney stone  - consider restarting phendimetrazine 35 mg 1/2 tab bid if wt not under 178 by next visit      5. Vitamin D deficiency: level was 24 in 12/15 and is currently taking 2000 units of D3 and level up to 32 in 7/16 and 35 in 4/17 and 30 in 9/17 and 35 in 10/18 and in 3/21  - cont vitamin D 2000 units daily  - check Vitamin D 25-OH level prior to next visit        6. 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 Carlos Brown and Dr. Kenneth Slater. Now under care of Dr. Boris Reardon and combo of dilt and toprol normally works   - cont Toprol  mg bid  - cont dilt  mg daily      Patient Instructions   1) Tonight try 20 units of lantus to see if you can get your fasting sugars under 130 and if not there after 4 days, then try 22 units. 2) Do your best to get both doses of metformin everyday. 3) Your kidney and liver and thyroid are normal.    4) Your LDL (bad cholesterol) was higher. Try to limit the amount of fried foods, fatty foods, butter, gravy, red meat, ice cream, cheese and eggs in your diet, which are all high in cholesterol. 5) Your blood pressure is well controlled. 6) You can try over the counter B12 5000 mcg daily to help with energy. 7) Feel free to send me a message through [phone] with your blood sugar readings between next visit if you are not able to stay under 130 in the morning and under 180 2 hours after meals. Follow-up and Dispositions    · Return 4/27/22 at 4:10pm.               Copy sent to: Abimbola Cornell MD as PCP - General  Rafa Domingo MD (Obstetrics & Gynecology)  Steff Davis MD (Cardiology)    Addendum: 01/15/22    EXAM:  US THYROID/PARATHYROID/SOFT TISS      INDICATION: new globus sensation, evaluate for size of goiter and any suspicious  nodules     COMPARISON: None.     TECHNIQUE: Real-time sonography of the thyroid gland was performed with a high  frequency linear transducer.  Multiple static images were obtained.     FINDINGS:  The right lobe measures 4.2 x 1.5 x 1.4 cm and the left lobe measures 4.4 x 1.4  x 1.3 cm. The isthmus measures 1 mm.     There is an incidental 6 mm solid, hypoechoic nodule noted in the superior left  thyroid lobe. The thyroid gland is otherwise homogeneous without additional  discrete nodules identified.     IMPRESSION  1. Essentially normal thyroid ultrasound. Incidental subcentimeter nodule in the  left thyroid lobe.  -------------------------------------------------------------------------------------------------------------------  Sent her the following message through [employer]:  Your thyroid gland is not enlarged and therefore your current symptoms are not due to your thyroid gland pushing on any of the structures in your neck so if you continue to have symptoms of feeling like something is stuck in your throat, I recommend following up with a GI doctor or ear, nose, and throat (ENT) and will let Dr. Flor Willingham know that this does not appear to be due to your thyroid.

## 2021-09-30 RX ORDER — LEVOTHYROXINE SODIUM 88 UG/1
88 TABLET ORAL
Qty: 90 TABLET | Refills: 3 | Status: SHIPPED | OUTPATIENT
Start: 2021-09-30 | End: 2022-11-01 | Stop reason: SDUPTHER

## 2021-11-08 ENCOUNTER — OFFICE VISIT (OUTPATIENT)
Dept: ORTHOPEDIC SURGERY | Age: 41
End: 2021-11-08
Payer: COMMERCIAL

## 2021-11-08 VITALS
WEIGHT: 180 LBS | HEIGHT: 61 IN | BODY MASS INDEX: 33.99 KG/M2 | SYSTOLIC BLOOD PRESSURE: 118 MMHG | DIASTOLIC BLOOD PRESSURE: 74 MMHG

## 2021-11-08 DIAGNOSIS — S93.491A SPRAIN OF ANTERIOR TALOFIBULAR LIGAMENT OF RIGHT ANKLE, INITIAL ENCOUNTER: ICD-10-CM

## 2021-11-08 DIAGNOSIS — S92.351A DISPLACED FRACTURE OF FIFTH METATARSAL BONE, RIGHT FOOT, INITIAL ENCOUNTER FOR CLOSED FRACTURE: Primary | ICD-10-CM

## 2021-11-08 DIAGNOSIS — M79.671 RIGHT FOOT PAIN: ICD-10-CM

## 2021-11-08 PROCEDURE — 28470 CLTX METATARSAL FX WO MNP EA: CPT | Performed by: ORTHOPAEDIC SURGERY

## 2021-11-08 PROCEDURE — 99203 OFFICE O/P NEW LOW 30 MIN: CPT | Performed by: ORTHOPAEDIC SURGERY

## 2021-11-08 RX ORDER — GLUCOSAMINE SULFATE 1500 MG
POWDER IN PACKET (EA) ORAL
COMMUNITY
End: 2021-11-08

## 2021-11-08 RX ORDER — TRAMADOL HYDROCHLORIDE 50 MG/1
50 TABLET ORAL
Qty: 40 TABLET | Refills: 0 | Status: SHIPPED | OUTPATIENT
Start: 2021-11-08 | End: 2021-12-08

## 2021-11-08 RX ORDER — SEMAGLUTIDE 1.34 MG/ML
INJECTION, SOLUTION SUBCUTANEOUS
COMMUNITY
Start: 2021-09-08 | End: 2021-11-08

## 2021-11-08 RX ORDER — LEVONORGESTREL 52 MG/1
INTRAUTERINE DEVICE INTRAUTERINE
COMMUNITY
End: 2021-11-08

## 2021-11-08 RX ORDER — DEXLANSOPRAZOLE 60 MG/1
CAPSULE, DELAYED RELEASE ORAL
COMMUNITY
End: 2022-07-01

## 2021-11-08 NOTE — PROGRESS NOTES
Yonatan Escobar (: 1980) is a 37 y.o. female, patient, here for evaluation of the following chief complaint(s): Foot Pain (Right foot)  I reviewed the note and agree with their assessment and plan. SUBJECTIVE/OBJECTIVE:  Yonatan Escobar (: 1980) is a 37 y.o. female. HPI   Patient presents today for evaluation of her right ankle and foot. She states that she stepped off of a curb on Saturday evening and had immediate right ankle and foot pain. She has been having difficulty placing any weight on the foot. She had immediate swelling in the right ankle and foot at that time. The swelling has improved some as she has been wrapping it with Coban applying some compression over it. She states that her pain is actually progressing since the injury. She has never had an ankle sprain or right foot injury in the past.  She is taking ibuprofen around-the-clock which does help some. She denies any numbness or tingling. She denies any other complaints today. She works with Dr. Bubba Sweet. Allergies   Allergen Reactions    Jardiance [Empagliflozin] Other (comments)     Recurrent yeast infections    Naprosyn [Naproxen] Nausea and Vomiting     Tolerates Advil, Toradol, etc    Topamax [Topiramate] Other (comments)     Kidney stone    Lexapro [Escitalopram] Other (comments)     Fatigue, wt gain    Zoloft [Sertraline] Other (comments)     Fatigue        Current Outpatient Medications   Medication Sig    traMADoL (ULTRAM) 50 mg tablet Take 1 Tablet by mouth every six (6) hours as needed for Pain for up to 30 days. Max Daily Amount: 200 mg.    dexlansoprazole (Dexilant) 60 mg CpDB capsule (delayed release)     levothyroxine (Synthroid) 88 mcg tablet Take 1 Tablet by mouth Daily (before breakfast).  insulin glargine (Lantus Solostar U-100 Insulin) 100 unit/mL (3 mL) inpn 20 Units by SubCUTAneous route nightly.  Dose change 21--updated med list--did not send prescription to the pharmacy    metFORMIN (GLUCOPHAGE) 1,000 mg tablet Take 1/2 tab at dinner x 4 days, then 1/2 tab with breakfast and dinner x 4 days, then 1 tab with breakfast and dinner--replaces synjardy XR (Patient taking differently: Take 1,000 mg by mouth two (2) times daily (with meals). )    semaglutide (Ozempic) 1 mg/dose (2 mg/1.5 mL) sub-q pen 1 mg by SubCUTAneous route every seven (7) days.  CARTIA  mg ER capsule Take 120 mg by mouth daily.  metoprolol succinate (TOPROL-XL) 100 mg tablet Take 1 Tab by mouth two (2) times a day.  cholecalciferol (VITAMIN D3) 25 mcg (1,000 unit) cap Take 1,000 Units by mouth daily.  levonorgestrel (MIRENA) 20 mcg/24 hr (5 years) IUD 1 Each by IntraUTERine route once. No current facility-administered medications for this visit. Past Medical History:   Diagnosis Date    Acute lymphadenitis of arm, left epitrochlear 12/13/2017    Anxiety and depression 10/9/2014    10/2014; resolved as of 8/24/15 per pt    CAP (community acquired pneumonia) 1/8/2018    [facility] [place] Admission 1/8/11-1/11/18    Chronic low back pain 10/9/2014    Muscular     Depression with anxiety 9/28/2017    10/2014    Diabetes mellitus type 2, insulin dependent ([facility] [address] 1/16/2019    Initial consult Dr. Heather Varghese; Followed by Endo Dr. Selena Mariscal ;  [facility]: [facility] annually    Diabetic eye exam ([facility] [address] 02/23/2016    Jocelyn Lipscomb MD, [facility]    Dysphagia 12/16/2010    Generalized anxiety disorder with panic attacks 4/24/2020    GERD (gastroesophageal reflux disease) 6/23/2011    H. pylori infection 05/2016    Dr Larry Cross    History of peptic ulcer disease 8/18/2016    18 yo    History of sinus tachycardia 10/5/2012    as of 8/24/15 pt states followed by PCP    Hypercholesterolemia 08/18/2016    ~2007    Hypothyroid 8/4/2021 2014, Endo Dr. Vicenta Contreras Inappropriate sinus tachycardia 10/5/2018    Cardi 4-2018: Dr Avni Mark, Dr Dennise Gold; self referred for 2nd opinion  Dru Saavedra ; managed w/ CCB & BB    Kidney stone 2017    Lumbar disc herniation 2018    L4 and L5     (normal spontaneous vaginal delivery) 1998    Son  7yo    Pneumonia 2018    community acquired with sepsis     Post partum depression 10/9/2014    10/2013: counseling and prn Valium    Spondylosis of lumbar & thoracic region without myelopathy or radiculopathy 10/26/2016    xrays 2010    Thyromegaly 2010    US negative    Vitamin D deficiency         Past Surgical History:   Procedure Laterality Date    HX ABDOMINOPLASTY  2021    with Dr. Jayesh Eli Bilateral     HX  SECTION  2013    HX CHOLECYSTECTOMY  10/09    LAP-Gallstones    HX GYN  2015    IUD placed    HX LITHOTRIPSY  2017    HX ORTHOPAEDIC Right 7/10/14    trigger thumb    HX ORTHOPAEDIC Left 7/10/14    wrist       Family History   Problem Relation Age of Onset    Diabetes Mother          ESRD 43 yo in     Hypertension Mother    [facility] Me Stroke Mother         TIA    Heart Disease Mother     Diabetes Sister 29        type 2    No Known Problems Brother     High Cholesterol Paternal Grandmother     Hypertension Maternal Grandfather             Diabetes Maternal Grandmother     Hypertension Maternal Grandmother     High Cholesterol Maternal Grandmother     Cancer Maternal Grandmother         Breast CA     Heart Disease Maternal Grandmother          age 79    Colon Cancer Maternal Uncle          at 64yo        Social History     Socioeconomic History    Marital status:      Spouse name: Not on file    Number of children: 1    Years of education: Not on file    Highest education level: Not on file   Occupational History    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 Other Topics Concern     Service Not Asked    Blood Transfusions Not Asked    Caffeine Concern No     Comment: 1 cup of coffee qam, occ diet soda    Occupational Exposure Not Asked    Hobby Hazards Not Asked    Sleep Concern Not Asked    Stress Concern Not Asked    Weight Concern Yes     Comment: Attended Dr. Akosua Mitchell x several months, ended : met goal: wt down from 198 to 170    Special Diet Yes     Comment: adhering to more portion control    Back Care Yes     Comment: completed PT 2 x a week x several months     Exercise No     Comment: nothing regular but just tries to stay active and is very busy at work   [employer] Not Asked    [address] Not Asked    Self-Exams Not Asked   Social History Narrative    Got  ~ . Has 1 daughter and 1 stepson. Her biological son  at age 9yo in 65. 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 Diet/Weight: nothing special right now, just trying to eat sensibly; attended Dr. Akosua Mitchell x several months, ended : met goal: wt down from 198 to 170, then regained and now staying ~ 190 range    Exercise: stationary bike x 30 minutes 5 x a week    Caffeine:  1 cup of coffee qam, occ diet soda             Social Determinants of Health     Financial Resource Strain:     Difficulty of Paying Living Expenses: Not on file   Food Insecurity:     Worried About Running Out of Food in the Last Year: Not on file    Zuleima of Food in the Last Year: Not on file   Transportation Needs:     Lack of Transportation (Medical): Not on file    Lack of Transportation (Non-Medical):  Not on file Physical Activity:     Days of Exercise per Week: Not on file    Minutes of Exercise per Session: Not on file   Stress:     Feeling of Stress : Not on file   Social Connections:     Frequency of Communication with Friends and Family: Not on file    Frequency of Social Gatherings with Friends and Family: Not on file    Attends Cheondoism Services: Not on file    Active Member of [address] [place] [address] or Organizations: Not on file    Attends Club or Organization Meetings: Not on file    Marital Status: Not on file   Intimate Partner Violence:     Fear of Current or Ex-Partner: Not on file    Emotionally Abused: Not on file    Physically Abused: Not on file    Sexually Abused: Not on file   Housing Stability:     Unable to Pay for Housing in the Last Year: Not on file    Number of [place] in the Last Year: Not on file    Unstable Housing in the Last Year: Not on file       Review of Systems    No flowsheet data found. Vitals:  /74   Ht 5' 1\" (1.549 m)   Wt 180 lb (81.6 kg)   BMI 34.01 kg/m²    Body mass index is 34.01 kg/m². Physical Exam  General: Well-dressed well-nourished female no acute distress, alert and oriented x3. Normal mood and affect  Right ankle: Mild swelling over the area of the distal fibula. No significant tenderness to palpation over ATFL, CFL, or PI CFL. No tenderness to palpation over the peroneal tendons as they run posterior to the distal fibula. No tenderness over the medial ankle overlying the deltoid or the medial malleolus. Mild pain with inversion and resisted inversion. No pain with eversion. 5/5 strength with inversion, 4/5 strength with eversion. Right foot: Tenderness to palpation most significant over the proximal fifth metatarsal but also over the fourth metatarsal.  No significant swelling noted over the lateral aspect of the foot. No tenderness to palpation over the medial aspect of the foot.   Sensations intact to light touch in the foot and the saphenous, sural, superficial peroneal, deep peroneal, and tibial nerve distributions. Brisk cap refill in all toes. Imaging:  3 views of the right foot were obtained and demonstrate a fracture of the base of the fifth metatarsal that is just proximal to the fourth and fifth metatarsal articulation. The fracture is nondisplaced. No other fractures or dislocations are seen. No bony lesions seen. ASSESSMENT/PLAN:  Below is the assessment and plan developed based on review of pertinent history, physical exam, labs, studies, and medications. 1. Displaced fracture of fifth metatarsal bone, right foot, initial encounter for closed fracture  -     AMB SUPPLY ORDER  -     traMADoL (ULTRAM) 50 mg tablet; Take 1 Tablet by mouth every six (6) hours as needed for Pain for up to 30 days. Max Daily Amount: 200 mg., Normal, Disp-40 Tablet, R-0  -     MT CLOSED TX TARSAL FX,EACH  2. Sprain of anterior talofibular ligament of right ankle, initial encounter  3. Right foot pain  -     XR FOOT RT MIN 3 V; Future      Return in about 4 weeks (around 12/6/2021) for Rt foot x-rays. We discussed with the patient that she does have a pseudo-Stark fracture of the right foot. We discussed that we will need to place her in a boot and keep her nonweightbearing over the next 6 to 8 weeks. We did not have a boot in the office and encouraged her to pick one up at [facility]. She was given a script for this. We will see her back in 4 weeks time for reevaluation. She already has crutches today we just adjusted the height. This Esmer Horner dictating for Dr. Claudette Salle    An electronic signature was used to authenticate this note.   -- [place] Number, DO

## 2021-11-11 ENCOUNTER — PATIENT MESSAGE (OUTPATIENT)
Dept: ORTHOPEDIC SURGERY | Age: 41
End: 2021-11-11

## 2021-11-11 NOTE — TELEPHONE ENCOUNTER
From: Moe Escobar  To: Deysi Johnson MD  Sent: 11/11/2021 12:52 PM EST  Subject: Few questions about broken foot    Hi Dr. Juliane Rivera I know youre transitioning systems so I hope this message reaches you. I had difficulty calling into the office or rather getting through to someone so Im hoping this way works better. Just have a few questions. .  1. Should I be wearing the boot or some form of compression to the foot all day? When I have it elevated and iced Im not wearing any type of brace or compression. Only when I must ambulate. Should I be? 2. I took the entire week off of work and Im not too sure if I am going to be able to return next week. Swelling and pain still pretty significant, but I wanted to know if from your standpoint do you feel I could realistically walk in the boot in order to work next week? How long do you recommend I stay out? 3. And lastly, At what point is it considered safe to drive? Thank you and I look forward to hearing from you.

## 2021-11-28 NOTE — PROGRESS NOTES
I reviewed the note and agree with their assessment and plan. Yonatan Escobar (: 1980) is a 37 y.o. female, patient, here for evaluation of the following chief complaint(s): Foot Pain (Right foot)  I reviewed the note and agree with their assessment and plan. SUBJECTIVE/OBJECTIVE:  Yonatan Escobar (: 1980) is a 37 y.o. female. HPI   Her pain is improving and she has been tolerating the boot well. She states that she does not really having any pain at this point. She would like to get back to being able to drive. She also has a wedding in [place] next week and would like to wear flats and not the boot. She denies any other complaints. Allergies   Allergen Reactions    Jardiance [Empagliflozin] Other (comments)     Recurrent yeast infections    Naprosyn [Naproxen] Nausea and Vomiting     Tolerates Advil, Toradol, etc    Topamax [Topiramate] Other (comments)     Kidney stone    Lexapro [Escitalopram] Other (comments)     Fatigue, wt gain    Zoloft [Sertraline] Other (comments)     Fatigue        Current Outpatient Medications   Medication Sig    traMADoL (ULTRAM) 50 mg tablet Take 1 Tablet by mouth every six (6) hours as needed for Pain for up to 30 days. Max Daily Amount: 200 mg.    dexlansoprazole (Dexilant) 60 mg CpDB capsule (delayed release)     levothyroxine (Synthroid) 88 mcg tablet Take 1 Tablet by mouth Daily (before breakfast).  insulin glargine (Lantus Solostar U-100 Insulin) 100 unit/mL (3 mL) inpn 20 Units by SubCUTAneous route nightly. Dose change 21--updated med list--did not send prescription to the pharmacy    metFORMIN (GLUCOPHAGE) 1,000 mg tablet Take 1/2 tab at dinner x 4 days, then 1/2 tab with breakfast and dinner x 4 days, then 1 tab with breakfast and dinner--replaces synjardy XR (Patient taking differently: Take 1,000 mg by mouth two (2) times daily (with meals). )    semaglutide (Ozempic) 1 mg/dose (2 mg/1.5 mL) sub-q pen 1 mg by SubCUTAneous route every seven (7) days.  CARTIA  mg ER capsule Take 120 mg by mouth daily.  metoprolol succinate (TOPROL-XL) 100 mg tablet Take 1 Tab by mouth two (2) times a day.  cholecalciferol (VITAMIN D3) 25 mcg (1,000 unit) cap Take 1,000 Units by mouth daily.  levonorgestrel (MIRENA) 20 mcg/24 hr (5 years) IUD 1 Each by IntraUTERine route once. No current facility-administered medications for this visit. Past Medical History:   Diagnosis Date    Acute lymphadenitis of arm, left epitrochlear 2017    Anxiety and depression 10/9/2014    10/2014; resolved as of 8/24/15 per pt    CAP (community acquired pneumonia) 2018    [facility] 11-18    Chronic low back pain 10/9/2014    Muscular     Depression with anxiety 2017    10/2014    Diabetes mellitus type 2, insulin dependent ([facility]) 2019    Initial consult Dr. Becky Goodman; Followed by Endo Dr. Azeb Ayala ;  [facility] Ex[facility]: [facility] annually    Diabetic eye exam ([facility]) 2016    Mira Wiseman MD, [facility]    Dysphagia 2010    Generalized anxiety disorder with panic attacks 2020    GERD (gastroesophageal reflux disease) 2011    H. pylori infection 2016    Dr Deandre Maxwell    History of peptic ulcer disease 2016    16 yo    History of sinus tachycardia 10/5/2012    as of 8/24/15 pt states followed by PCP    Hypercholesterolemia 2016    ~2007    Hypothyroid 2021, Endo Dr. Dhillon Ready Inappropriate sinus tachycardia 10/5/2018    Cardi -: Dr Von Grullon, Dr Aristides Luevano; self referred for 2nd opinion Dr. Julianna De Guzman ; managed w/ CCB & BB    Kidney stone 2017    Lumbar disc herniation 2018    L4 and L5     (normal spontaneous vaginal delivery) 1998    Son  9yo    Pneumonia 2018    community acquired with sepsis     Post partum depression 10/9/2014    10/2013: counseling and prn Valium    Spondylosis of lumbar & thoracic region without myelopathy or radiculopathy 10/26/2016    xrays 2010    Thyromegaly 2010    US negative    Vitamin D deficiency         Past Surgical History:   Procedure Laterality Date    HX ABDOMINOPLASTY  2021    with Dr. Wilma Handy Bilateral     HX  SECTION  2013    HX CHOLECYSTECTOMY  10/09    LAP-Gallstones    HX GYN  2015    IUD placed    HX LITHOTRIPSY  2017    HX ORTHOPAEDIC Right 7/10/14    trigger thumb    HX ORTHOPAEDIC Left 7/10/14    wrist       Family History   Problem Relation Age of Onset    Diabetes Mother          ESRD 45 yo in     Hypertension Mother    [facility] Stroke Mother         TIA    Heart Disease Mother     Diabetes Sister 29        type 2    No Known Problems Brother     High Cholesterol Paternal Grandmother     Hypertension Maternal Grandfather             Diabetes Maternal Grandmother     Hypertension Maternal Grandmother     High Cholesterol Maternal Grandmother     Cancer Maternal Grandmother         Breast CA     Heart Disease Maternal Grandmother          age 79    Colon Cancer Maternal Uncle          at 62yo        Social History     Socioeconomic History    Marital status:      Spouse name: Not on file    Number of children: 1    Years of education: Not on file    Highest education level: Not on file   Occupational History    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    Other Topics Concern     Service Not Asked    Blood Transfusions Not Asked    Caffeine Concern No     Comment: 1 cup of coffee qam, occ diet soda    Occupational Exposure Not Asked    Hobby Hazards Not Asked    Sleep Concern Not Asked    Stress Concern Not Asked    Weight Concern Yes     Comment: Attended Dr. Zac Young x several months, ended : met goal: wt down from 198 to 170    Special Diet Yes     Comment: adhering to more portion control    Back Care Yes     Comment: completed PT 2 x a week x several months 2017    Exercise No     Comment: nothing regular but just tries to stay active and is very busy at work   [employer] Not Asked    [address] Not Asked    Self-Exams Not Asked   Social History Narrative    Got  ~ . Has 1 daughter and 1 stepson. Her biological son  at age 7yo in 65. 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 Diet/Weight: nothing special right now, just trying to eat sensibly; attended Dr. Zac Young x several months, ended : met goal: wt down from 198 to 170, then regained and now staying ~ 190 range    Exercise: stationary bike x 30 minutes 5 x a week    Caffeine:  1 cup of coffee qam, occ diet soda             Social Determinants of Health     Financial Resource Strain:     Difficulty of Paying Living Expenses: Not on file   Food Insecurity:     Worried About Running Out of Food in the Last Year: Not on file    Zuleima of Food in the Last Year: Not on file   Transportation Needs:     Lack of Transportation (Medical): Not on file    Lack of Transportation (Non-Medical):  Not on file   Physical Activity:     Days of Exercise per Week: Not on file    Minutes of Exercise per Session: Not on file   Stress:     Feeling of Stress : Not on file   Social Connections:     Frequency of Communication with Friends and Family: Not on file    Frequency of Social Gatherings with Friends and Family: Not on file    Attends Mandaen Services: Not on file    Active Member of Clubs or Organizations: Not on file    Attends Club or Organization Meetings: Not on file    Marital Status: Not on file   Intimate Partner Violence:     Fear of Current or Ex-Partner: Not on file    Emotionally Abused: Not on file    Physically Abused: Not on file    Sexually Abused: Not on file   Housing Stability:     Unable to Pay for Housing in the Last Year: Not on file    Number of [place] in the Last Year: Not on file    Unstable Housing in the Last Year: Not on file       Review of Systems    No flowsheet data found. Vitals:  /74   Ht 5' 1\" (1.549 m)   Wt 180 lb (81.6 kg)   BMI 34.01 kg/m²    Body mass index is 34.01 kg/m². Physical Exam  General: Well-dressed well-nourished female no acute distress, alert and oriented x3. Normal mood and affect  Right foot: Tenderness to palpation most significant over the proximal fifth metatarsal but also over the fourth metatarsal.  No significant swelling noted over the lateral aspect of the foot. No tenderness to palpation over the medial aspect of the foot. Sensations intact to light touch in the foot and the saphenous, sural, superficial peroneal, deep peroneal, and tibial nerve distributions. Brisk cap refill in all toes. Imaging:  3 views of the right foot were obtained and demonstrate a fracture of the base of the fifth metatarsal that is just proximal to the fourth and fifth metatarsal articulation. The fracture remains nondisplaced. ASSESSMENT/PLAN:  Below is the assessment and plan developed based on review of pertinent history, physical exam, labs, studies, and medications. 1. Displaced fracture of fifth metatarsal bone, right foot, initial encounter for closed fracture  -     AMB SUPPLY ORDER  -     traMADoL (ULTRAM) 50 mg tablet; Take 1 Tablet by mouth every six (6) hours as needed for Pain for up to 30 days.  Max Daily Amount: 200 mg., Normal, Disp-40 Tablet, R-0  - TN CLOSED TX TARSAL FX,EACH  2. Sprain of anterior talofibular ligament of right ankle, initial encounter  3. Right foot pain  -     XR FOOT RT MIN 3 V; Future      Return in about 4 weeks (around 12/6/2021) for Rt foot x-rays. We discussed the patient that the fracture looks good at this point and is still nondisplaced. We would like for her to continue wearing the boot when she is up and walking. She can weight-bear as tolerated in the boot. She would like to get back to driving and we think is reasonable at this point come out of the boot placed shoes on to drive him and placed the boot back on when she gets out of the car. Encouraged her to go to a parking lot and start working on hitting the brake and making sure that she is comfortable with this prior to driving on the road. She does have a wedding next week in [place] and would like to wear flats and encouraged her to limit the amount of time that she is walking without the boot on but she can come out for the wedding in part of the reception. We will see her back in 4 weeks time for reevaluation and x-rays. Return in about 4 weeks (around 12/27/2021) for f/u re-eval R foot.

## 2021-11-29 ENCOUNTER — OFFICE VISIT (OUTPATIENT)
Dept: ORTHOPEDIC SURGERY | Age: 41
End: 2021-11-29
Payer: COMMERCIAL

## 2021-11-29 VITALS — WEIGHT: 180 LBS | BODY MASS INDEX: 33.99 KG/M2 | HEIGHT: 61 IN

## 2021-11-29 DIAGNOSIS — S62.336S CLOSED DISPLACED FRACTURE OF NECK OF FIFTH METACARPAL BONE OF RIGHT HAND, SEQUELA: Primary | ICD-10-CM

## 2021-11-29 PROCEDURE — 99024 POSTOP FOLLOW-UP VISIT: CPT | Performed by: ORTHOPAEDIC SURGERY

## 2021-11-29 RX ORDER — CLOTRIMAZOLE AND BETAMETHASONE DIPROPIONATE 10; .64 MG/G; MG/G
CREAM TOPICAL
COMMUNITY
Start: 2021-11-04

## 2021-12-06 RX ORDER — SEMAGLUTIDE 1.34 MG/ML
1 INJECTION, SOLUTION SUBCUTANEOUS
Qty: 9 EACH | Refills: 3 | Status: SHIPPED | OUTPATIENT
Start: 2021-12-06 | End: 2022-02-23 | Stop reason: SDUPTHER

## 2021-12-27 PROBLEM — S92.351A DISPLACED FRACTURE OF FIFTH METATARSAL BONE, RIGHT FOOT, INITIAL ENCOUNTER FOR CLOSED FRACTURE: Status: ACTIVE | Noted: 2021-12-27

## 2021-12-27 PROBLEM — S62.336A CLOSED DISPLACED FRACTURE OF NECK OF RIGHT FIFTH METACARPAL BONE: Status: RESOLVED | Noted: 2021-12-27 | Resolved: 2021-12-27

## 2021-12-27 PROBLEM — S62.336A CLOSED DISPLACED FRACTURE OF NECK OF RIGHT FIFTH METACARPAL BONE: Status: ACTIVE | Noted: 2021-12-27

## 2021-12-27 NOTE — PROGRESS NOTES
ASSESSMENT/PLAN:  Below is the assessment and plan developed based on review of pertinent history, physical exam, labs, studies, and medications. 1. Displaced fracture of fifth metatarsal bone, right foot, initial encounter for closed fracture      No follow-ups on file. Given the fact that she is improving clinically and radiographically we will transition her to a regular shoe. We will proceed with a final x-ray in 1 month's time. SUBJECTIVE/OBJECTIVE:  Yonatan Escobar (: 1980) is a 37 y.o. female, patient,here for evaluation of the No chief complaint on file. .    The patient returns today for follow-up of her right foot. She has been weightbearing as tolerated in the boot. She reports she is little sore at the end of the day. She is not taking any pain medication. She reports she does feel tight in her calf. She is anxious to transition to regular shoe. Physical Exam    Examination the right foot reveals he is mildly tender to palpation along the fifth metatarsal. She has good range of motion of her ankle. She is tight with dorsiflexion. She is neurovascular intact distally. Imaging:    3 views of the right foot demonstrate demonstrate that the fracture lines are still visible and the fracture gap is less than 1 cm. Allergies   Allergen Reactions    Jardiance [Empagliflozin] Other (comments)     Recurrent yeast infections    Naprosyn [Naproxen] Nausea and Vomiting     Tolerates Advil, Toradol, etc    Topamax [Topiramate] Other (comments)     Kidney stone    Lexapro [Escitalopram] Other (comments)     Fatigue, wt gain    Zoloft [Sertraline] Other (comments)     Fatigue        Current Outpatient Medications   Medication Sig    semaglutide (Ozempic) 1 mg/dose (4 mg/3 mL) pnij 1 mg by SubCUTAneous route every seven (7) days.  Dispense 3 pens = 9 ml for a 90 day supply    clotrimazole-betamethasone (LOTRISONE) topical cream     dexlansoprazole (Dexilant) 60 mg CpDB capsule (delayed release)     levothyroxine (Synthroid) 88 mcg tablet Take 1 Tablet by mouth Daily (before breakfast).  insulin glargine (Lantus Solostar U-100 Insulin) 100 unit/mL (3 mL) inpn 20 Units by SubCUTAneous route nightly. Dose change 9/29/21--updated med list--did not send prescription to the pharmacy    metFORMIN (GLUCOPHAGE) 1,000 mg tablet Take 1/2 tab at dinner x 4 days, then 1/2 tab with breakfast and dinner x 4 days, then 1 tab with breakfast and dinner--replaces synjardy XR (Patient taking differently: Take 1,000 mg by mouth two (2) times daily (with meals). )    CARTIA  mg ER capsule Take 120 mg by mouth daily.  metoprolol succinate (TOPROL-XL) 100 mg tablet Take 1 Tab by mouth two (2) times a day.  cholecalciferol (VITAMIN D3) 25 mcg (1,000 unit) cap Take 1,000 Units by mouth daily.  levonorgestrel (MIRENA) 20 mcg/24 hr (5 years) IUD 1 Each by IntraUTERine route once. No current facility-administered medications for this visit. Past Medical History:   Diagnosis Date    Acute lymphadenitis of arm, left epitrochlear 12/13/2017    Anxiety and depression 10/9/2014    10/2014; resolved as of 8/24/15 per pt    CAP (community acquired pneumonia) 1/8/2018    [facility] Admission 1/8/11-1/11/18    Chronic low back pain 10/9/2014    Muscular     Depression with anxiety 9/28/2017    10/2014    Diabetes mellitus type 2, insulin dependent ([facility] [address] 1/16/2019    Initial consult Dr. Dany Tomlinson; Followed by Endo Dr. Peri Loyd ;  Eye Exams [facility]: [facility] annually    Diabetic eye exam ([facility] [address] 02/23/2016    Sandra Sullivan MD, [facility]    Dysphagia 12/16/2010    Generalized anxiety disorder with panic attacks 4/24/2020    GERD (gastroesophageal reflux disease) 6/23/2011    H. pylori infection 05/2016    Dr Valencia Holbrook    History of peptic ulcer disease 8/18/2016    18 yo    History of sinus tachycardia 10/5/2012    as of 8/24/15 pt states followed by PCP    Hypercholesterolemia 2016    ~    Hypothyroid 2021, Endo Dr. Carter Scott Inappropriate sinus tachycardia 10/5/2018    Cardi -: Dr Dania Blackwell, Dr Deisy Baca; self referred for 2nd opinion Dr. Ena Krishna ; managed w/ CCB & BB    Kidney stone 2017    Lumbar disc herniation 2018    L4 and L5     (normal spontaneous vaginal delivery) 1998    Son  9yo    Pneumonia 2018    community acquired with sepsis     Post partum depression 10/9/2014    10/2013: counseling and prn Valium    Spondylosis of lumbar & thoracic region without myelopathy or radiculopathy 10/26/2016    xrays     Thyromegaly 2010    US negative    Vitamin D deficiency        Past Surgical History:   Procedure Laterality Date    HX ABDOMINOPLASTY  2021    with Dr. Mira Rosas Bilateral     HX  SECTION  2013    HX CHOLECYSTECTOMY  10/09    LAP-Gallstones    HX GYN  2015    IUD placed    HX LITHOTRIPSY  2017    HX ORTHOPAEDIC Right 7/10/14    trigger thumb    HX ORTHOPAEDIC Left 7/10/14    wrist       Family History   Problem Relation Age of Onset    Diabetes Mother          ESRD 43 yo in     Hypertension Mother    [place] Self Stroke Mother         TIA    Heart Disease Mother     Diabetes Sister 29        type 2    No Known Problems Brother     High Cholesterol Paternal Grandmother     Hypertension Maternal Grandfather             Diabetes Maternal Grandmother     Hypertension Maternal Grandmother     High Cholesterol Maternal Grandmother     Cancer Maternal Grandmother         Breast CA     Heart Disease Maternal Grandmother          age 79    Colon Cancer Maternal Uncle          at 62yo       Social History     Socioeconomic History    Marital status:      Spouse name: Not on file    Number of children: 1    Years of education: Not on file    Highest education level: Not on file   Occupational History    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 Other Topics Concern     Service Not Asked    Blood Transfusions Not Asked    Caffeine Concern No     Comment: 1 cup of coffee qam, occ diet soda    Occupational Exposure Not Asked    Hobby Hazards Not Asked    Sleep Concern Not Asked    Stress Concern Not Asked    Weight Concern Yes     Comment: Attended Dr. Jennifer Alcantar x several months, ended : met goal: wt down from 198 to 170    Special Diet Yes     Comment: adhering to more portion control    Back Care Yes     Comment: completed PT 2 x a week x several months 2017    Exercise No     Comment: nothing regular but just tries to stay active and is very busy at work   [employer] Not Asked    [facility] Not Asked    Self-Exams Not Asked   Social History Narrative    Got  ~ . Has 1 daughter and 1 stepson. Her biological son  at age 7yo in 66. 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         Diet/Weight: nothing special right now, just trying to eat sensibly; attended Dr. Jennifer Alcantar x several months, ended : met goal: wt down from 198 to 170, then regained and now staying ~ 190 range    Exercise: stationary bike x 30 minutes 5 x a week    Caffeine:  1 cup of coffee qam, occ diet soda             Social Determinants of Health     Financial Resource Strain:     Difficulty of Paying Living Expenses: Not on file   Food Insecurity:     Worried About [facility] in the Last Year: Not on file    Ran Out of Food in the Last Year: Not on file   Transportation Needs:     Lack of Transportation (Medical): Not on file    Lack of Transportation (Non-Medical): Not on file   Physical Activity:     Days of Exercise per Week: Not on file    Minutes of Exercise per Session: Not on file   Stress:     Feeling of Stress : Not on file   Social Connections:     Frequency of Communication with Friends and Family: Not on file    Frequency of Social Gatherings with Friends and Family: Not on file    Attends [employer] Services: Not on file    Active Member of [employer] or Organizations: Not on file    Attends Club or Organization Meetings: Not on file    Marital Status: Not on file   Intimate Partner Violence:     Fear of Current or Ex-Partner: Not on file    Emotionally Abused: Not on file    Physically Abused: Not on file    Sexually Abused: Not on file   Housing Stability:     Unable to Pay for Housing in the Last Year: Not on file    Number of [place] in the Last Year: Not on file    Unstable Housing in the Last Year: Not on file       Review of Systems    No flowsheet data found. Vitals: There were no vitals taken for this visit. There is no height or weight on file to calculate BMI. An electronic signature was used to authenticate this note.   -- Marty Valadez MD

## 2021-12-28 ENCOUNTER — OFFICE VISIT (OUTPATIENT)
Dept: ORTHOPEDIC SURGERY | Age: 41
End: 2021-12-28
Payer: COMMERCIAL

## 2021-12-28 VITALS — WEIGHT: 180 LBS | BODY MASS INDEX: 33.99 KG/M2 | HEIGHT: 61 IN

## 2021-12-28 DIAGNOSIS — S92.351A DISPLACED FRACTURE OF FIFTH METATARSAL BONE, RIGHT FOOT, INITIAL ENCOUNTER FOR CLOSED FRACTURE: Primary | ICD-10-CM

## 2021-12-28 PROCEDURE — 99024 POSTOP FOLLOW-UP VISIT: CPT | Performed by: ORTHOPAEDIC SURGERY

## 2021-12-29 ENCOUNTER — TELEPHONE (OUTPATIENT)
Dept: ORTHOPEDIC SURGERY | Age: 41
End: 2021-12-29

## 2021-12-29 NOTE — TELEPHONE ENCOUNTER
Spoke with Williams Beaulieu from [employer]. She is requesting the last office note to be completed and a copy of the order so she can get insurance authorization. Dr. Patricia Smith completed the note and I have faxed and received confirmation.      Mohsen Mehta, III

## 2022-01-05 ENCOUNTER — TELEPHONE (OUTPATIENT)
Dept: FAMILY MEDICINE CLINIC | Age: 42
End: 2022-01-05

## 2022-01-05 NOTE — TELEPHONE ENCOUNTER
----- Message from Margarito Booth LPN sent at 2/0/4698  9:32 AM EST -----  Regarding: FW: Does symptoms require a visit  Is a VV ok for today?  ----- Message -----  From: Eric Fairbanks  Sent: 1/4/2022   9:57 AM EST  To: [facility] Nurse [place]  Subject: Does symptoms require a visit                    I have had a feeling of something being lodged in my throat for the past 5 days. There is no pain associated with it. I am swallowing ok. I am constantly having the urge to take a deep breath or catch my breath but no SOB,  if that makes sense. I have been taking my Dexilant as well as tums trying to rule out GI issues but no relief. I have also taken Flexeril to rule  out esophageal spasm and my Buspar to rule out anxiety but nothing is helping. I occasionally get a tingling sensation across the top of my chest but its short-lived and has only done it twice over the past 5 days. Please let me know if you think I need to come in to see you. I am worried cardiac issues as I have tried everything that I can think of.

## 2022-01-05 NOTE — TELEPHONE ENCOUNTER
This does not sound cardiac but would need in office visit for evaluation or ER for increased chest pain. The throat and chest tingling may not be related. For the throat issue sounds either GI or thyroid related. I would recommend she see her GI to get set up for an upper endoscopy or swallowing study. If that is unremarkable, she should have her goiter re-evaluated by her Endo. She has had these symptoms at various times in the past and her workup (including GI, ENT, Swallow Study, Cardio) all ended up being negative except the [place] and the goiter.

## 2022-01-05 NOTE — TELEPHONE ENCOUNTER
Let pt know of Dr Boris Clifton rec. I gave her the # for Jose SCOTT. She didn't want to go back to [facility].  I scheduled her for OV with Dr Boris Clifton on 1/20.

## 2022-01-06 DIAGNOSIS — E04.9 GOITER: Primary | ICD-10-CM

## 2022-01-07 ENCOUNTER — TELEPHONE (OUTPATIENT)
Dept: ENDOCRINOLOGY | Age: 42
End: 2022-01-07

## 2022-01-07 NOTE — TELEPHONE ENCOUNTER
1/7/2022  11:24 AM    Patient called on vm at 3.23 on 01/06/21- needs a call back from nurse. Mentioned that she did send message through my chart as well. Did not mention why she needed a call back sorry.  Thanks

## 2022-01-12 ENCOUNTER — HOSPITAL ENCOUNTER (OUTPATIENT)
Dept: ULTRASOUND IMAGING | Age: 42
Discharge: HOME | End: 2022-01-12
Attending: INTERNAL MEDICINE

## 2022-01-12 DIAGNOSIS — E04.9 GOITER: ICD-10-CM

## 2022-01-20 ENCOUNTER — OFFICE VISIT (OUTPATIENT)
Dept: FAMILY MEDICINE CLINIC | Age: 42
End: 2022-01-20
Payer: COMMERCIAL

## 2022-01-20 VITALS
SYSTOLIC BLOOD PRESSURE: 118 MMHG | RESPIRATION RATE: 16 BRPM | WEIGHT: 186.2 LBS | DIASTOLIC BLOOD PRESSURE: 78 MMHG | HEART RATE: 98 BPM | TEMPERATURE: 98.2 F | BODY MASS INDEX: 35.16 KG/M2 | OXYGEN SATURATION: 98 % | HEIGHT: 61 IN

## 2022-01-20 DIAGNOSIS — R13.14 PHARYNGOESOPHAGEAL DYSPHAGIA: Primary | ICD-10-CM

## 2022-01-20 DIAGNOSIS — K21.9 GASTROESOPHAGEAL REFLUX DISEASE, UNSPECIFIED WHETHER ESOPHAGITIS PRESENT: ICD-10-CM

## 2022-01-20 PROCEDURE — 99213 OFFICE O/P EST LOW 20 MIN: CPT | Performed by: FAMILY MEDICINE

## 2022-01-20 RX ORDER — TRAZODONE HYDROCHLORIDE 50 MG/1
50 TABLET ORAL
COMMUNITY
End: 2022-03-01

## 2022-01-20 RX ORDER — BUSPIRONE HYDROCHLORIDE 10 MG/1
10 TABLET ORAL
COMMUNITY
End: 2022-03-01

## 2022-01-20 NOTE — PROGRESS NOTES
1. \"Have you been to the ER, urgent care clinic since your last visit? Hospitalized since your last visit? \" No    2. \"Have you seen or consulted any other health care providers outside of the [address] [place] [address] since your last visit? \"  Dr Shruti Juarez for her foot    3. For patients over 39: Has the patient had a colonoscopy? no    If the patient is female:    4. For patients over 40: Has the patient had a mammogram? [id number] [facility] [address]varado in Jan    5. For patients over 21: Has the patient had a pap smear?  [id number] Allyson Ruiz in Nov

## 2022-01-20 NOTE — PROGRESS NOTES
Chief Complaint   Patient presents with    Dysphagia     x 3 weeks feels like something in throat and things getting stuck whenever she eats or drinks        HISTORY OF PRESENT ILLNESS   HPI  Patient w/ history of GERD, PUD, Goiter and S/P Abdominoplasty 8-2021 presents w/ 3 week h/o dysphagia. Feels like sensation of something being stuck in the back of her throat that she cannot get rid of. Food and pills feel like they get stuck. Even liquids feel like have hard time going down. No sore throat, cough, choking, odynophagia, hoarseness, chest pain, sob, abdominal pain, nausea, vomiting or change in bowel habits. Feels like she has more upper abdominal bloating and fullness since her abdominoplasty in 8-2021. She is scheduled to see her surgeon Dr. Tico Solorzano for follow up tomorrow. She consulted w/ her Endo Dr. Emelyn Cross and he ordered a thyroid US on 1-12-22 which did not show any thyroid enlargement. A few weeks ago when she called about this I had referred her to GI. She called to try to get an appointment but couldn't get in w/ anyone until March so she didn't schedule anything and decided to wait until today's appointment. She had dysphagia workup for similar symptoms back in 2010 which included thyroid us, barium swallow and ent evaluation. That workup was negative except gerd. She takes Dexilant daily. She denies feelings of heartburn or acid reflux but does have some indigestion and belching from time to time. There is no h/o tobacco use/abuse. Seldom consumes etoh. Caffeine limited to one serving per day. REVIEW OF SYMPTOMS   Review of Systems   Constitutional: Negative. HENT: Negative for sore throat. Respiratory: Negative. Negative for cough and shortness of breath. Cardiovascular: Negative. Negative for chest pain and palpitations. Gastrointestinal: Negative for abdominal pain, constipation, diarrhea, nausea and vomiting.            PROBLEM LIST/MEDICAL HISTORY     Problem List  Date Reviewed: 1/20/2022          Codes Class Noted    Displaced fracture of fifth metatarsal bone, right foot, initial encounter for closed fracture ICD-10-CM: S92.351A  ICD-9-CM: 825.25  12/27/2021        Hypothyroid ICD-10-CM: E03.9  ICD-9-CM: 244.9  8/4/2021    Overview Signed 8/4/2021  4:22 PM by Lázaro Billings MD     2014, Endo Dr. Alex Campa             Generalized anxiety disorder with panic attacks ICD-10-CM: F41.1, F41.0  ICD-9-CM: 300.02, 300.01  4/24/2020        Diabetes mellitus type 2, insulin dependent ([facility] ICD-10-CM: E11.9, Z79.4  ICD-9-CM: 250.00, V58.67  1/16/2019    Overview Addendum 8/4/2021  2:06 PM by Lázaro Billings MD     Initial consult Dr. Page De La Paz; Followed by Endo Dr. Alex Campa ;  [facility]: [facility] annually; Vickey Valles MD, [facility]             Inappropriate sinus tachycardia ICD-10-CM: R00.0  ICD-9-CM: 427.89  10/5/2018    Overview Signed 10/5/2018 10:57 AM by Lázaro Billings MD     Cardi [id number]: Dr Dhiraj Bain, Dr Gin Hernandez; self referred for 2nd opinion Dr. Pat Thornewith [id number]; managed w/ CCB & BB             Congenital cataract of left eye ICD-10-CM: Q12.0  ICD-9-CM: 743.30  10/5/2018    Overview Signed 10/5/2018 11:11 AM by Lázaro Billings MD     [id number]             CAP (community acquired pneumonia) ICD-10-CM: J18.9  ICD-9-CM: 081  1/8/2018    Overview Signed 1/22/2018 12:08 PM by Lázaro Billings MD     [facility] Admission 1/8/11-1/11/18             Bulging of intervertebral disc between L4 and L5 ICD-10-CM: M51.26  ICD-9-CM: 722.10  1/8/2018        UTI (urinary tract infection) ICD-10-CM: N39.0  ICD-9-CM: 599.0  1/8/2018        Acute lymphadenitis of arm, left epitrochlear ICD-10-CM: B61.7  ICD-9-CM: 183  12/13/2017        Depression with anxiety ICD-10-CM: F41.8  ICD-9-CM: 300.4  9/28/2017    Overview Signed 9/28/2017 12:42 PM by Lázaro Billings MD     10/2014             Lumbar facet arthropathy ICD-10-CM: M47.816  ICD-9-CM: 721.3  8/28/2017    Overview Signed 8/28/2017  9:20 PM by Arslan Olson MD     On CT scan             Heart palpitations ICD-10-CM: R00.2  ICD-9-CM: 785.1  8/16/2017    Overview Signed 10/5/2018 10:56 AM by Arslan Olson MD     Related to sinus tachycardia and anxiety             Spondylosis of lumbar & thoracic region without myelopathy or radiculopathy ICD-10-CM: M47.816  ICD-9-CM: 721.3  10/26/2016    Overview Signed 10/26/2016  8:50 AM by Arslan Olson MD     xrays 2010             Hypercholesterolemia ICD-10-CM: E78.00  ICD-9-CM: 272.0  8/18/2016        History of peptic ulcer disease ICD-10-CM: Z87.11  ICD-9-CM: V12.71  8/18/2016    Overview Signed 8/18/2016 10:16 AM by Arslan Olson MD     18 yo             Vitamin D deficiency ICD-10-CM: E55.9  ICD-9-CM: 268.9  Unknown        Obesity, Class I, BMI 30-34.9 ICD-10-CM: E66.9  ICD-9-CM: 278.00  11/18/2015        Post partum depression ICD-10-CM: O99.345, F53.0  ICD-9-CM: 648.44, 311  10/9/2014    Overview Signed 10/9/2014 12:24 PM by Arslan Olson MD     10/2013: counseling and prn Valium             Chronic low back pain & Muscle Spasms ICD-10-CM: M54.50, G89.29  ICD-9-CM: 724.2, 338.29  10/9/2014    Overview Signed 10/9/2014 12:36 PM by Arslan Olson MD     Muscular              Bilateral carpal tunnel syndrome ICD-10-CM: G56.03  ICD-9-CM: 354.0  12/31/2013    Overview Addendum 12/31/2013 10:28 AM by Arslan Olson MD     10/2013; wrist splints               Goiter ICD-10-CM: E04.9  ICD-9-CM: 240.9  1/11/2013    Overview Signed 8/4/2021  4:22 PM by Arslan Olson MD     Endo Dr. Vicki Will             History of sinus tachycardia ICD-10-CM: Z86.79  ICD-9-CM: V12.59  10/5/2012    Overview Signed 10/5/2012  9:13 AM by Arslan Olson MD     Since her early 19's             GERD (gastroesophageal reflux disease) ICD-10-CM: K21.9  ICD-9-CM: 530.81  6/23/2011 Overview Signed 2011  9:20 AM by Rashel Cruz MD                  Dysphagia, workup negative except GERD ICD-10-CM: R13.10  ICD-9-CM: 787.20  2010    Overview Addendum 2011  9:21 AM by Rashel Cruz MD     2010: Thyroid US normal  ENT eval;   Barium Swallow =GERD only             Anemia ICD-10-CM: D64.9  ICD-9-CM: 285.9  2010        Gallstones ICD-10-CM: K80.20  ICD-9-CM: 574.20  Unknown    Overview Signed 3/25/2010 10:31 AM by Rashel Cruz MD     Lap Cholecystectomy-Dr. Darron Tpoete [facility] (normal spontaneous vaginal delivery) ICD-10-CM: O80  ICD-9-CM: 0  Unknown    Overview Signed 3/25/2010 10:31 AM by Rashel Cruz MD     Son  9yo                       PAST SURGICAL HISTORY     Past Surgical History:   Procedure Laterality Date    HX ABDOMINOPLASTY  2021    with Dr. Ruben Summers Bilateral     HX  SECTION  2013    HX GYN  2015    IUD placed    HX LAP CHOLECYSTECTOMY  10/2009    Gallstones    HX LITHOTRIPSY  2017    HX ORTHOPAEDIC Right 7/10/14    trigger thumb    HX ORTHOPAEDIC Left 7/10/14    wrist         MEDICATIONS     Current Outpatient Medications   Medication Sig    traZODone (DESYREL) 50 mg tablet Take 50 mg by mouth nightly.  busPIRone (BUSPAR) 10 mg tablet Take 10 mg by mouth nightly as needed.  semaglutide (Ozempic) 1 mg/dose (4 mg/3 mL) pnij 1 mg by SubCUTAneous route every seven (7) days. Dispense 3 pens = 9 ml for a 90 day supply    clotrimazole-betamethasone (LOTRISONE) topical cream     dexlansoprazole (Dexilant) 60 mg CpDB capsule (delayed release)     levothyroxine (Synthroid) 88 mcg tablet Take 1 Tablet by mouth Daily (before breakfast).  insulin glargine (Lantus Solostar U-100 Insulin) 100 unit/mL (3 mL) inpn 20 Units by SubCUTAneous route nightly.  Dose change 21--updated med list--did not send prescription to the pharmacy    metFORMIN (GLUCOPHAGE) 1,000 mg tablet Take 1/2 tab at dinner x 4 days, then 1/2 tab with breakfast and dinner x 4 days, then 1 tab with breakfast and dinner--replaces synjardy XR (Patient taking differently: Take 1,000 mg by mouth two (2) times daily (with meals). )    CARTIA  mg ER capsule Take 120 mg by mouth daily.  metoprolol succinate (TOPROL-XL) 100 mg tablet Take 1 Tab by mouth two (2) times a day.  cholecalciferol (VITAMIN D3) 25 mcg (1,000 unit) cap Take 1,000 Units by mouth daily.  levonorgestrel (MIRENA) 20 mcg/24 hr (5 years) IUD 1 Each by IntraUTERine route once. No current facility-administered medications for this visit. ALLERGIES     Allergies   Allergen Reactions    Jardiance [Empagliflozin] Other (comments)     Recurrent yeast infections    Naprosyn [Naproxen] Nausea and Vomiting     Tolerates Advil, Toradol, etc    Topamax [Topiramate] Other (comments)     Kidney stone    Lexapro [Escitalopram] Other (comments)     Fatigue, wt gain    Zoloft [Sertraline] Other (comments)     Fatigue           SOCIAL HISTORY     Social History     Tobacco Use    Smoking status: Never Smoker    Smokeless tobacco: Never Used   Substance Use Topics    Alcohol use: Yes     Alcohol/week: 0.0 standard drinks     Comment: not weekly, every couple of months per pt     Social History     Social History Narrative    Got  ~ . Has 1 daughter and 1 stepson. Her biological son  at age 9yo in [birth year]. 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         Diet/Weight: nothing special right now, just trying to eat sensibly; attended Dr. Norma Dorman x several months, ended : met goal: wt down from 198 to 170, then regained and now staying ~ 190 range    Exercise: stationary bike x 30 minutes 5 x a week    Caffeine:  1 cup of coffee qam, occ diet soda             Social History     Substance and Sexual Activity Sexual Activity Yes    Partners: Male    Birth control/protection: I.U.D.        IMMUNIZATIONS     Immunization History   Administered Date(s) Administered    (RETIRED) Pneumococcal Vaccine (Unspecified Type) 2007    NARGISIDAnuel19Deion Ser, Primary or Immunocompromised Series, MRNA, PF, 100mcg/0.5mL 2021, 2021, 2021    DTaP 2013    HPV (Quad) 2010    Hep B Vaccine 2013    Hep B Vaccine (Adult) 2004, 2004, 02/10/2006    Hepatitis B Vaccine 2009, 2009, 2010    Influenza Vaccine 10/09/2013, 10/23/2014, 10/16/2015, 2020, 2021    Influenza Vaccine (Quad) Mdck Pf (>2 Yrs Flucelvax QUAD 46440) 10/02/2020    Influenza Vaccine (Quad) PF (>6 Mo Flulaval, Fluarix, and >3 Yrs Afluria, Fluzone [id number]) 10/16/2015, 10/26/2016, 2017, 10/05/2018, 2021    Influenza Vaccine PF 10/14/2014    Influenza Vaccine Split 2010, 2011, 10/05/2012    Novel Influenza-H1N1-09, Injectable 2009    Pneumococcal Polysaccharide (PPSV-23) 2008    Pneumococcal Vaccine (Unspecified Type) 2013    TB Skin Test (PPD) Intradermal 2013    TD Vaccine 2007    Tdap 2008, 2013, 2021         FAMILY HISTORY     Family History   Problem Relation Age of Onset    Diabetes Mother          ESRD 45 yo in     Hypertension Mother    Naik Stroke Mother         TIA    Heart Disease Mother     Diabetes Sister 29        type 2    No Known Problems Brother     High Cholesterol Paternal Grandmother     Hypertension Maternal Grandfather             Diabetes Maternal Grandmother     Hypertension Maternal Grandmother     High Cholesterol Maternal Grandmother     Cancer Maternal Grandmother         Breast CA     Heart Disease Maternal Grandmother          age 79    Colon Cancer Maternal Uncle          at 64yo         VITALS     Visit Vitals  /78 (BP 1 Location: Left upper arm, BP Patient Position: Sitting, BP Cuff Size: Large adult)   Pulse 98   Temp 98.2 °F (36.8 °C) (Oral)   Resp 16   Ht 5' 1\" (1.549 m)   Wt 186 lb 3.2 oz (84.5 kg)   SpO2 98%   BMI 35.18 kg/m²          PHYSICAL EXAMINATION   Physical Exam  Vitals reviewed. Constitutional:       General: She is not in acute distress. Appearance: Normal appearance. Neck:      Thyroid: No thyroid mass, thyromegaly or thyroid tenderness. Cardiovascular:      Rate and Rhythm: Regular rhythm. Heart sounds: Normal heart sounds. Pulmonary:      Effort: Pulmonary effort is normal.      Breath sounds: Normal breath sounds. Abdominal:      General: Abdomen is flat. Bowel sounds are normal. There is no distension. Palpations: Abdomen is soft. There is no mass. Tenderness: There is no abdominal tenderness. Musculoskeletal:      Cervical back: Full passive range of motion without pain and neck supple. No tenderness. Lymphadenopathy:      Cervical: No cervical adenopathy. Neurological:      Mental Status: She is alert. LABORATORY DATA/ANCILLARY/IMAGING     Results for orders placed or performed in visit on 09/15/21   TSH 3RD GENERATION   Result Value Ref Range    TSH 2.000 0.450 - 4.500 uIU/mL   HEMOGLOBIN A1C WITH EAG   Result Value Ref Range    Hemoglobin A1c 8.3 (H) 4.8 - 5.6 %    Estimated average glucose 555 mg/dL   METABOLIC PANEL, COMPREHENSIVE   Result Value Ref Range    Glucose 167 (H) 65 - 99 mg/dL    BUN 12 6 - 20 mg/dL    Creatinine 0.59 0.57 - 1.00 mg/dL    GFR est non- >59 mL/min/1.73    GFR est  >59 mL/min/1.73    BUN/Creatinine ratio 20 9 - 23    Sodium 136 134 - 144 mmol/L    Potassium 4.4 3.5 - 5.2 mmol/L    Chloride 103 96 - 106 mmol/L    CO2 20 20 - 29 mmol/L    Calcium 9.4 8.7 - 10.2 mg/dL    Protein, total 7.0 6.0 - 8.5 g/dL    Albumin 4.2 3.8 - 4.8 g/dL    GLOBULIN, TOTAL 2.8 1.5 - 4.5 g/dL    A-G Ratio 1.5 1.2 - 2.2    Bilirubin, total 0.2 0.0 - 1.2 mg/dL    Alk. phosphatase 79 44 - 121 IU/L    AST (SGOT) 21 0 - 40 IU/L    ALT (SGPT) 23 0 - 32 IU/L   LIPID PANEL   Result Value Ref Range    Cholesterol, total 204 (H) 100 - 199 mg/dL    Triglyceride 57 0 - 149 mg/dL    HDL Cholesterol 52 >39 mg/dL    VLDL, calculated 10 5 - 40 mg/dL    LDL, calculated 142 (H) 0 - 99 mg/dL             ASSESSMENT & PLAN   Diagnoses and all orders for this visit:    1. Pharyngoesophageal dysphagia  -     REFERRAL TO GASTROENTEROLOGY    2. Gastroesophageal reflux disease, unspecified whether esophagitis present  -     REFERRAL TO GASTROENTEROLOGY    Patient w/ history of GERD, PUD, Goiter and S/P Abdominoplasty 8-2021 presents w/ 3 week h/o dysphagia. She had dysphagia workup for similar symptoms back in 2010 which included thyroid us, barium swallow and ent evaluation. That workup was negative except gerd. She takes Dexilant daily. She recently consulted w/ her Endo Dr. Thalia Boykin and he ordered a thyroid US on 1-12-22 which did not show any thyroid enlargement. She is scheduled to see her surgeon Dr. Mark Amaral for follow up tomorrow. I referred her to GI a few weeks ago but she has not scheduled that appointment yet. Information given today and she will call to schedule. She needs to have an upper endoscopy. Differential reviewed w/ pt including possible hiatal hernia, esophageal stricture, achalasia, or other obstruction. Patient education materials given to her today including dysphagia and home care instructions.    Follow up after GI evaluation, sooner prn

## 2022-01-20 NOTE — PATIENT INSTRUCTIONS
Learning About Swallowing Problems  What are swallowing problems? Certain health problems that affect the nervous system can cause trouble swallowing. These conditions include stroke, ALS (also known as Rema Gehrig's disease), Parkinson's disease, and multiple sclerosis. The muscles and nerves that help move food through the throat and esophagus may not work right. Growths, such as cancer, and other problems with your esophagus can also make it hard to swallow. The esophagus is the tube that leads from your throat to your stomach. How are swallowing problems diagnosed? A doctor or speech therapist will examine you to check for swallowing problems. You may get swallowing tests to check how well your throat muscles work. For these tests, you swallow a special liquid that helps the doctor see your throat and esophagus on an X-ray or video screen. Other tests use a thin, flexible tube called a scope to check for problems with your esophagus. The doctor puts the scope in your mouth and down your throat to look at your esophagus. What are the symptoms? Symptoms of swallowing problems may include:  · Trouble getting food or liquids to go down on the first try. · Gagging, choking, or coughing when you swallow. · Having food or liquids come back up through your throat, mouth, or nose after you swallow. · Feeling like foods or liquids are stuck in some part of your throat or chest.  · Pain when you swallow. How are swallowing problems treated? How swallowing problems are treated depends on the cause. The main goals of treatment will be to help you eat and swallow safely and get good nutrition. This is important for your health and quality of life. You may learn exercises to train your throat muscles to work together so you're able to swallow better. Learning certain ways to put food in your mouth or to position your head while eating may also help.   Your doctor or a speech therapist may recommend changes to your diet to help make it easier to swallow. You may need to avoid certain foods or liquids. You also may need to change the thickness of foods or liquids in your diet. To eat and swallow safely, follow any instructions you get from your doctor or therapist. These ideas may help:  · Sit upright when eating, drinking, and taking pills. · Take small bites of food. Chew completely and swallow before taking another bite. · Take small sips of liquids. · If eating makes you tired, eat smaller but more frequent meals. · Tip your chin down when there is food in your mouth. Where can you learn more? Go to http://www.gray.com/  Enter D018 in the search box to learn more about \"Learning About Swallowing Problems. \"  Current as of: July 1, 2021               Content Version: 13.0  © 2006-2021 [employer]. Care instructions adapted under license by [employer] (which disclaims liability or warranty for this information). If you have questions about a medical condition or this instruction, always ask your healthcare professional. [place] [address] any warranty or liability for your use of this information. Gastroesophageal Reflux Disease (GERD): Care Instructions  Overview     Gastroesophageal reflux disease (GERD) is the backward flow of stomach acid into the esophagus. The esophagus is the tube that leads from your throat to your stomach. A one-way valve prevents the stomach acid from backing up into this tube. But when you have GERD, this valve does not close tightly enough. This can also cause pain and swelling in your esophagus. (This is called esophagitis.)  If you have mild GERD symptoms including heartburn, you may be able to control the problem with antacids or over-the-counter medicine. You can also make lifestyle changes to help reduce your symptoms.  These include changing your diet and eating habits, such as not eating late at night and losing weight. Follow-up care is a key part of your treatment and safety. Be sure to make and go to all appointments, and call your doctor if you are having problems. It's also a good idea to know your test results and keep a list of the medicines you take. How can you care for yourself at home? · Take your medicines exactly as prescribed. Call your doctor if you think you are having a problem with your medicine. · Your doctor may recommend over-the-counter medicine. For mild or occasional indigestion, antacids, such as Tums, Gaviscon, Mylanta, or Maalox, may help. Your doctor also may recommend over-the-counter acid reducers, such as famotidine (Pepcid AC), cimetidine (Tagamet HB), or omeprazole (Prilosec). Read and follow all instructions on the label. If you use these medicines often, talk with your doctor. · Change your eating habits. ? It's best to eat several small meals instead of two or three large meals. ? After you eat, wait 2 to 3 hours before you lie down. ? Chocolate, mint, and alcohol can make GERD worse. ? Spicy foods, foods that have a lot of acid (like tomatoes and oranges), and coffee can make GERD symptoms worse in some people. If your symptoms are worse after you eat a certain food, you may want to stop eating that food to see if your symptoms get better. · Do not smoke or chew tobacco. Smoking can make GERD worse. If you need help quitting, talk to your doctor about stop-smoking programs and medicines. These can increase your chances of quitting for good. · If you have GERD symptoms at night, raise the head of your bed 6 to 8 inches by putting the frame on blocks or placing a foam wedge under the head of your mattress. (Adding extra pillows does not work.)  · Do not wear tight clothing around your middle. · Lose weight if you need to. Losing just 5 to 10 pounds can help. When should you call for help?    Call your doctor now or seek immediate medical care if:    · You have new or different belly pain.     · Your stools are black and tarlike or have streaks of blood. Watch closely for changes in your health, and be sure to contact your doctor if:    · Your symptoms have not improved after 2 days.     · Food seems to catch in your throat or chest.   Where can you learn more? Go to http://www.gray.com/  Enter U386 in the search box to learn more about \"Gastroesophageal Reflux Disease (GERD): Care Instructions. \"  Current as of: February 10, 2021               Content Version: 13.0  © 3189-2185 [employer]. Care instructions adapted under license by [employer] (which disclaims liability or warranty for this information). If you have questions about a medical condition or this instruction, always ask your healthcare professional. [place] [address] any warranty or liability for your use of this information. Learning About Esophageal Stricture  What is an esophageal stricture? A stricture is a narrowing in one area of the esophagus, the tube that carries food and liquid to your stomach. It most often happens close to where the esophagus meets the stomach. A stricture can make it hard to swallow. You may feel like food gets stuck in your esophagus. If you have gastroesophageal reflux disease (GERD), stomach acid can flow backward into the esophagus. This can damage the lining of your esophagus and cause a stricture. Other things that can cause a stricture include:  · Surgery, radiation, or other treatments on the esophagus. · Some diseases and infections. · Reactions to some chemicals or medicines. How are strictures diagnosed? Your doctor may check your esophagus if you are having trouble swallowing or if you feel like food is getting stuck. The doctor will use a tool called an endoscope, or scope. It's a thin, flexible, lighted viewing tool. It goes into the mouth and down the throat.  Your doctor can use it to check for any problems. The scope can also be used to take a sample of tissue to test (biopsy). You might need an X-ray. For the X-ray, you may need to swallow a substance, such as barium, that makes it easier to see what happens in your esophagus. How are strictures treated? Strictures are usually treated with a procedure called esophageal dilation. Dilation can open up narrow areas of the esophagus. Before the procedure, you will get medicines through a needle in your vein (IV) in your arm or hand. These medicines reduce pain and will make you feel relaxed and drowsy. Your throat will also be numbed. You may not remember much about the treatment. The doctor will guide a balloon or a plastic tool for widening (dilator) down your throat and into your esophagus. The dilator is used to widen any narrow area. To guide the dilator, the doctor may use a scope. Or he or she may use a thin wire as a guide. After the procedure, you will be observed for 1 to 2 hours until the medicines wear off. If your throat was numbed before the test, you should not eat or drink until your throat is no longer numb. When you are fully recovered, you can go home. You will not be able to drive or operate machinery for 12 hours after the test. Your doctor will tell you when you can go back to your usual diet and activities. Do not drink alcohol for 12 to 24 hours after the test.  You may still need to treat some symptoms of GERD. Your doctor may give you information about that. Follow-up care is a key part of your treatment and safety. Be sure to make and go to all appointments, and call your doctor if you are having problems. It's also a good idea to know your test results and keep a list of the medicines you take. Where can you learn more? Go to http://www.gray.com/  Enter P226 in the search box to learn more about \"Learning About Esophageal Stricture. \"  Current as of: February 10, 2021               Content Version: 13.0  © 9902-2897 [employer]. Care instructions adapted under license by [employer] (which disclaims liability or warranty for this information). If you have questions about a medical condition or this instruction, always ask your healthcare professional. [address] any warranty or liability for your use of this information.

## 2022-01-23 NOTE — PROGRESS NOTES
ASSESSMENT/PLAN:  Below is the assessment and plan developed based on review of pertinent history, physical exam, labs, studies, and medications. 1. Displaced fracture of fifth metatarsal bone, right foot, initial encounter for closed fracture      No follow-ups on file. Given the fact that she is improving clinically and radiographically, we will get her started in physical therapy to work on motion and strengthening. Her gait has likely affected where she is noticing pain at this time over the 1st MTP joint. This will likely resolve with her walking more normal. Follow up on an as needed basis for continued pain. SUBJECTIVE/OBJECTIVE:  Yonatan Escobar (: 1980) is a 37 y.o. female, patient,here for evaluation of the No chief complaint on file. .    The patient returns today for follow-up of her right foot. She has been weightbearing as tolerated in the boot. She reports she is little sore at the end of the day. She is not taking any pain medication. She does complain of some pain over the 1st MTP joint at times which K tape has helped. She is wearing regular shoes at work. Physical Exam    Examination the right foot reveals he is mildly tender to palpation along the fifth metatarsal. She has good range of motion of her ankle. She is tight with dorsiflexion. She is neurovascular intact distally.     Imaging:    3 views of the right foot demonstrates bridging callus across the fracture site of the base of the 5th metatarsal.     Allergies   Allergen Reactions    Jardiance [Empagliflozin] Other (comments)     Recurrent yeast infections    Naprosyn [Naproxen] Nausea and Vomiting     Tolerates Advil, Toradol, etc    Topamax [Topiramate] Other (comments)     Kidney stone    Lexapro [Escitalopram] Other (comments)     Fatigue, wt gain    Zoloft [Sertraline] Other (comments)     Fatigue        Current Outpatient Medications   Medication Sig    semaglutide (Ozempic) 1 mg/dose (4 mg/3 mL) pnij 1 mg by SubCUTAneous route every seven (7) days. Dispense 3 pens = 9 ml for a 90 day supply    clotrimazole-betamethasone (LOTRISONE) topical cream     dexlansoprazole (Dexilant) 60 mg CpDB capsule (delayed release)     levothyroxine (Synthroid) 88 mcg tablet Take 1 Tablet by mouth Daily (before breakfast).  insulin glargine (Lantus Solostar U-100 Insulin) 100 unit/mL (3 mL) inpn 20 Units by SubCUTAneous route nightly. Dose change 9/29/21--updated med list--did not send prescription to the pharmacy    metFORMIN (GLUCOPHAGE) 1,000 mg tablet Take 1/2 tab at dinner x 4 days, then 1/2 tab with breakfast and dinner x 4 days, then 1 tab with breakfast and dinner--replaces synjardy XR (Patient taking differently: Take 1,000 mg by mouth two (2) times daily (with meals). )    CARTIA  mg ER capsule Take 120 mg by mouth daily.  metoprolol succinate (TOPROL-XL) 100 mg tablet Take 1 Tab by mouth two (2) times a day.  cholecalciferol (VITAMIN D3) 25 mcg (1,000 unit) cap Take 1,000 Units by mouth daily.  levonorgestrel (MIRENA) 20 mcg/24 hr (5 years) IUD 1 Each by IntraUTERine route once. No current facility-administered medications for this visit. Past Medical History:   Diagnosis Date    Acute lymphadenitis of arm, left epitrochlear 12/13/2017    Anxiety and depression 10/9/2014    10/2014; resolved as of 8/24/15 per pt    CAP (community acquired pneumonia) 1/8/2018    [facility] [place] Admission 1/8/11-1/11/18    Chronic low back pain 10/9/2014    Muscular     Depression with anxiety 9/28/2017    10/2014    Diabetes mellitus type 2, insulin dependent ([facility] [address] 1/16/2019    Initial consult Dr. Anselmo Maguire; Followed by Endo Dr. Marina Call ;  Eye Exams [facility]: Rosie annually    Diabetic eye exam ([facility] [address] 02/23/2016    Edilia Morillo MD, [facility]    Dysphagia 12/16/2010    Generalized anxiety disorder with panic attacks 4/24/2020    GERD (gastroesophageal reflux disease) 6/23/2011    H.  pylori infection 2016    Dr Andee Goodell History of peptic ulcer disease 2016    16 yo    History of sinus tachycardia 10/5/2012    as of 8/24/15 pt states followed by PCP    Hypercholesterolemia 2016    ~    Hypothyroid 2021, Endo Dr. Elizabeth Mendez Inappropriate sinus tachycardia 10/5/2018    Cardi -2018: Dr Geetha Mondragon, Dr Federico Zaragoza; self referred for 2nd opinion Dr. Perla Arcos ; managed w/ CCB & BB    Kidney stone 2017    Lumbar disc herniation 2018    L4 and L5     (normal spontaneous vaginal delivery) 1998    Son  7yo    Pneumonia 2018    community acquired with sepsis     Post partum depression 10/9/2014    10/2013: counseling and prn Valium    Spondylosis of lumbar & thoracic region without myelopathy or radiculopathy 10/26/2016    xrays     Thyromegaly 2010    US negative    Vitamin D deficiency        Past Surgical History:   Procedure Laterality Date    HX ABDOMINOPLASTY  2021    with Dr. Jamar Moctezuma Bilateral     HX  SECTION  2013    HX CHOLECYSTECTOMY  10/09    LAP-Gallstones    HX GYN  2015    IUD placed    HX LITHOTRIPSY  2017    HX ORTHOPAEDIC Right 7/10/14    trigger thumb    HX ORTHOPAEDIC Left 7/10/14    wrist       Family History   Problem Relation Age of Onset    Diabetes Mother          ESRD 43 yo in     Hypertension Mother    Jeane Logan Stroke Mother         TIA    Heart Disease Mother     Diabetes Sister 29        type 2    No Known Problems Brother     High Cholesterol Paternal Grandmother     Hypertension Maternal Grandfather             Diabetes Maternal Grandmother     Hypertension Maternal Grandmother     High Cholesterol Maternal Grandmother     Cancer Maternal Grandmother         Breast CA     Heart Disease Maternal Grandmother          age 79    Colon Cancer Maternal Uncle          at 64yo       Social History     Socioeconomic History  Marital status:      Spouse name: Not on file    Number of children: 1    Years of education: Not on file    Highest education level: Not on file   Occupational History    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 Other Topics Concern     Service Not Asked    Blood Transfusions Not Asked    Caffeine Concern No     Comment: 1 cup of coffee qam, occ diet soda    Occupational Exposure Not Asked    Hobby Hazards Not Asked    Sleep Concern Not Asked    Stress Concern Not Asked    Weight Concern Yes     Comment: Attended Dr. Tod Hernandez x several months, ended : met goal: wt down from 198 to 170    Special Diet Yes     Comment: adhering to more portion control    Back Care Yes     Comment: completed PT 2 x a week x several months 2017    Exercise No     Comment: nothing regular but just tries to stay active and is very busy at work   [employer] Not Asked    [facility] Not Asked    Self-Exams Not Asked   Social History Narrative    Got  ~ 2015. Has 1 daughter and 1 stepson. Her biological son  at age 9yo in 65. 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         Diet/Weight: nothing special right now, just trying to eat sensibly; attended Dr. Tod Hernandez x several months, ended : met goal: wt down from 198 to 170, then regained and now staying ~ 190 range    Exercise: stationary bike x 30 minutes 5 x a week    Caffeine:  1 cup of coffee qam, occ diet soda Social Determinants of Health     Financial Resource Strain:     Difficulty of Paying Living Expenses: Not on file   Food Insecurity:     Worried About Running Out of Food in the Last Year: Not on file    Zuleima of Food in the Last Year: Not on file   Transportation Needs:     Lack of Transportation (Medical): Not on file    Lack of Transportation (Non-Medical): Not on file   Physical Activity:     Days of Exercise per Week: Not on file    Minutes of Exercise per Session: Not on file   Stress:     Feeling of Stress : Not on file   Social Connections:     Frequency of Communication with Friends and Family: Not on file    Frequency of Social Gatherings with Friends and Family: Not on file    Attends [employer] Services: Not on file    Active Member of [address] [place] [address] or Organizations: Not on file    Attends Club or Organization Meetings: Not on file    Marital Status: Not on file   Intimate Partner Violence:     Fear of Current or Ex-Partner: Not on file    Emotionally Abused: Not on file    Physically Abused: Not on file    Sexually Abused: Not on file   Housing Stability:     Unable to Pay for Housing in the Last Year: Not on file    Number of [place] in the Last Year: Not on file    Unstable Housing in the Last Year: Not on file       Review of Systems    No flowsheet data found. Vitals: There were no vitals taken for this visit. There is no height or weight on file to calculate BMI. An electronic signature was used to authenticate this note.   -- Jacoby Rodriguez MD

## 2022-01-24 ENCOUNTER — OFFICE VISIT (OUTPATIENT)
Dept: ORTHOPEDIC SURGERY | Age: 42
End: 2022-01-24
Payer: COMMERCIAL

## 2022-01-24 DIAGNOSIS — E66.01 SEVERE OBESITY (BMI 35.0-35.9 WITH COMORBIDITY) (HCC): ICD-10-CM

## 2022-01-24 DIAGNOSIS — S92.351A DISPLACED FRACTURE OF FIFTH METATARSAL BONE, RIGHT FOOT, INITIAL ENCOUNTER FOR CLOSED FRACTURE: Primary | ICD-10-CM

## 2022-01-24 PROCEDURE — 99024 POSTOP FOLLOW-UP VISIT: CPT | Performed by: ORTHOPAEDIC SURGERY

## 2022-02-07 ENCOUNTER — HOSPITAL ENCOUNTER (OUTPATIENT)
Dept: PHYSICAL THERAPY | Age: 42
Discharge: HOME | End: 2022-02-07
Payer: COMMERCIAL

## 2022-02-07 PROCEDURE — 97016 VASOPNEUMATIC DEVICE THERAPY: CPT | Performed by: PHYSICAL THERAPIST

## 2022-02-07 PROCEDURE — 97161 PT EVAL LOW COMPLEX 20 MIN: CPT | Performed by: PHYSICAL THERAPIST

## 2022-02-07 PROCEDURE — 97110 THERAPEUTIC EXERCISES: CPT | Performed by: PHYSICAL THERAPIST

## 2022-02-07 NOTE — PROGRESS NOTES
Physical Therapy at [facility],   a part of [address] [facility]  [address] [facility]  [place], [address]  Phone: [phone]  Fax: [phone]    Plan of Care/Statement of Necessity for Physical Therapy Services  2-15    Patient name: Arabella Webb  : 1980  Provider#: [id number]  Referral source: Jacobo Herrera MD      Medical/Treatment Diagnosis: Pain in right ankle and joints of right foot [M25.571]     Prior Hospitalization: see medical history     Comorbidities: see evaluation  Prior Level of Function: see evaluation  Medications: Verified on Patient Summary List    Start of Care: 22      Onset Date: DOI 21       The Plan of Care and following information is based on the information from the initial evaluation. Assessment/ key information: Pt is a 37year old female s/p R 5th metatarsal fracture on 21. She demonstrates dec'd ankle ROM, strength, proprioception, antalgic gait pattern, and continued pain/swelling.  She will benefit from PT    Evaluation Complexity History MEDIUM  Complexity : 1-2 comorbidities / personal factors will impact the outcome/ POC ; Examination LOW Complexity : 1-2 Standardized tests and measures addressing body structure, function, activity limitation and / or participation in recreation  ;Presentation LOW Complexity : Stable, uncomplicated  ;Clinical Decision Making MEDIUM Complexity : FOTO score of 26-74  Overall Complexity Rating: LOW     Problem List: pain affecting function, decrease ROM, decrease strength, edema affecting function, impaired gait/ balance, decrease ADL/ functional abilitiies, decrease activity tolerance and decrease flexibility/ joint mobility   Treatment Plan may include any combination of the following: Therapeutic exercise, Therapeutic activities, Neuromuscular re-education, Physical agent/modality, Gait/balance training, Manual therapy, Patient education, Self Care training, Functional mobility training, Home safety training and Stair training  Patient / Family readiness to learn indicated by: asking questions, trying to perform skills and interest  Persons(s) to be included in education: patient (P)  Barriers to Learning/Limitations: None  Patient Goal (s): see evaluation  Patient Self Reported Health Status: fair  Rehabilitation Potential: good    Short Term Goals: To be accomplished in 3-4 weeks:  1) Pt will be independent in initial HEP  2) Pt will report >/=25% improvement in symptoms  3) Pt will perform R SLS for >/=10 seconds in order to demonstrate improvement in proprioception  4) Pt will ambulate >/=100 ft in clinic with proper gait mechanics    Long Term Goals: To be accomplished in 10-12 weeks:  1) Pt will ascend/descend flight of stairs with reciprocal gait pattern  2) Pt will demonstrate >/= +5 deg AROM R ankle DF in order to be symmetrical with contralateral ankle and assist in gait cycle  3) Pt will demonstrate 5/5 R ankle strength all planes in order to assist in walking, exercise  4) Pt will be independent in final HEP    Frequency / Duration: Patient to be seen 1-2 times per week for 10-12 weeks. Patient/ Caregiver education and instruction: self care, activity modification and exercises    [x]  Plan of care has been reviewed with YENNI Domingo, PT 2/7/2022   ________________________________________________________________________    I certify that the above Therapy Services are being furnished while the patient is under my care. I agree with the treatment plan and certify that this therapy is necessary.     Physician's Signature:____________________  Date:____________Time: _________      Kirsten Nick MD

## 2022-02-07 NOTE — PROGRESS NOTES
[employer] [facility]  [address], [place], [address]  Phone: [phone]  Fax: [phone]      PT INITIAL EVALUATION NOTE - [facility] 2-15    Patient Name: Suzanna Ramirez  [id number]  : 1980  [x]  Patient  Verified  Payor: Ligia Jaimes / Plan: Maria A Junior [id number] PPO / Product Type: PPO /    In time: 2:30 P  Out time: 3:30 P  Total Treatment Time (min): 60  Total Timed Codes (min): 15  1:1 Treatment Time ( only): 50   Visit #: 1     Treatment Area: Pain in right ankle and joints of right foot [M25.571]     Subjective: Any medication changes, allergies to medications, adverse drug reactions, diagnosis change, or new procedure performed?: [] No    [x] Yes (see summary sheet for update)    Date of onset/injury and chief c/o:     Pt presents with R foot/ankle pain. 22 slipped/fell off curb and fractured R 5th metatarsal. Was in a boot for approx 8 weeks. Last time she had MD visit x-rays looked good. Since being out of the boot she has had inc'd pain with standing, walking, stairs. \"apprehensive of the pain\". She has difficulty walking barefoot. Does not put as much weight through her R leg while just standing still. Her foot swells after work. Pain in R ankle as well as R forefoot. 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    Prior level of function: full ROM of R ankle; no pain with standing, walking, stairs prior to fracture  Patient goal(s): \"no pain\"      Objective: Observation/posture: pt stands with dec'd WBing through R vs L LE; weight on R LE shifted backward onto heel  Visible swelling R>L ankle    Gait: Antalgic gait pattern--- demonstrates inc'd toe out R>L; early heel off and dec'd pushoff on R     Stairs: ascends with reciprocal gait pattern--- dec'd pushoff on R  Descends laterally with step to pattern, leading with L LE          ROM/Strength        AROM    Right Left   Dorsiflexion -3 +5   Plantarflexion 55 60   Inversion 18 30   Eversion 14 15      Palpation:  Mod TTP base of R 5th metatarsal  Min TTP R gastroc/soleus complex    Special Tests:     Single Leg Balance   (R): ~1-2 sec  (L): >10 sec    Outcome Measure: Patient presents with an initial FOTO intake summary score of NT. Today's Treatment: :    15 min Therapeutic Exercise:  [x] See flow sheet : instructed in HEP   Rationale: increase ROM and increase strength to improve the patients ability to walk, stand with dec'd symptoms    Gameready: R foot. 10 min.           With   [x] TE   [] TA   [] neuro   [] other: Patient Education: [x] Review HEP    [] Progressed/Changed HEP based on:   [x] positioning   [] body mechanics   [] transfers   [x] heat/ice application    [x] other: mikhail/phys; PT POC: importance of performing HEP in order to maximize benefit of PT; use of ice at lunch break, in the evening; encouraged proper weight shifting/equal weight while standing     Pain Level (0-10 scale) post treatment: n/a    Assessment:   [x] See POC    Plan:   [x] See POC  [] Other:     Shaylee Davis, PT, DPT  2/7/2022

## 2022-02-10 ENCOUNTER — HOSPITAL ENCOUNTER (OUTPATIENT)
Dept: PHYSICAL THERAPY | Age: 42
Discharge: HOME | End: 2022-02-10
Payer: COMMERCIAL

## 2022-02-10 PROCEDURE — 97140 MANUAL THERAPY 1/> REGIONS: CPT | Performed by: PHYSICAL THERAPIST

## 2022-02-10 PROCEDURE — 97110 THERAPEUTIC EXERCISES: CPT | Performed by: PHYSICAL THERAPIST

## 2022-02-10 PROCEDURE — 97016 VASOPNEUMATIC DEVICE THERAPY: CPT | Performed by: PHYSICAL THERAPIST

## 2022-02-10 NOTE — PROGRESS NOTES
PT DAILY TREATMENT NOTE - [facility] 2-15    Patient Name: Bharat Sandra  Date:2/10/2022  : 1980  [x]  Patient  Verified  Payor: BLUE CROSS / Plan: Maria A Junior [id number] PPO / Product Type: PPO /    In time: 7:00 A  Out time:  8:00  Total Treatment Time (min): 60  Total Timed Codes (min): 50  1:1 Treatment Time ( only): 45   Visit #:  2    Treatment Area: Pain in right ankle and joints of right foot [M25.571]    SUBJECTIVE  Pain Level (0-10 scale): 4  Any medication changes, allergies to medications, adverse drug reactions, diagnosis change, or new procedure performed?: [x] No    [] Yes (see summary sheet for update)  Subjective functional status/changes:   [] No changes reported  Pt reports that she is already feeling better, \"I needed to know where to start\" in regards to stretching, ankle/foot mobility. She still has pain around R forefoot.  She feels like she is walking better    OBJECTIVE    Modality rationale: decrease inflammation and decrease pain to improve the patients ability to perform daily chores, activities, walk   Min Type Additional Details       [] Estim: []Att   []Unatt    []TENS instruct                  []IFC  []Premod   []NMES                     []Other:  []w/US   []w/ice   []w/heat  Position:  Location:    []  Ultrasound: []Continuous   [] Pulsed                       at: []1MHz   []3MHz Location:  W/cm2:    []  Ice     []  Heat  []  Ice massage Position:  Location:   10 [x]  Vasopneumatic Device  +compression thus reducing edema Pressure:       [] lo [x] med [] hi   Temperature: 34     [x] Skin assessment post-treatment:  [x]intact []redness- no adverse reaction    []redness  adverse reaction:     35 min Therapeutic Exercise:  [x] See flow sheet : progressed per flow sheet   Rationale: increase ROM, increase strength, improve coordination, improve balance and increase proprioception to improve the patients ability to walk, exercise with dec'd symptoms      min Neuromuscular Re-education:  []  See flow sheet :   Rationale:     15 min Manual Therapy:  IASTM with boomerang tool over R gastroc/soleus complex, achilles  Post talar mobs for improved DF ROM    Rationale: decrease pain, increase ROM and increase tissue extensibility to improve the patients ability to exercise, walk with dec'd symptoms     min Gait Training:     Rationale: With   [] TE   [] TA   [] Neuro   [] SC   [] other: Patient Education: [x] Review HEP    [] Progressed/Changed HEP based on:   [] positioning   [] body mechanics   [] transfers   [] heat/ice application    [] other:      Other Objective/Functional Measures: n/a    Pain Level (0-10 scale) post treatment: \"good\"    ASSESSMENT/Changes in Function:   Use of mirror for visual feedback during weight shifting/SLS. Cues for equal weight through entire foot vs posterior/lateral aspect of R foot. Overall tolerated therapy session well; good tolerance to manual techniques and exercise progression. Patient will continue to benefit from skilled PT services to modify and progress therapeutic interventions, address functional mobility deficits, address ROM deficits, address strength deficits, analyze and address soft tissue restrictions, analyze and cue movement patterns and analyze and modify body mechanics/ergonomics to attain remaining goals.      [x]  See Plan of Care  []  See progress note/recertification  []  See Discharge Summary         Progress towards goals / Updated goals:  NT    PLAN  []  Upgrade activities as tolerated     [x]  Continue plan of care  []  Update interventions per flow sheet       []  Discharge due to:_  [x]  Other: gait training next visit/step drill      Eyad Karimi, PT 2/10/2022

## 2022-02-15 ENCOUNTER — HOSPITAL ENCOUNTER (OUTPATIENT)
Dept: PHYSICAL THERAPY | Age: 42
Discharge: HOME | End: 2022-02-15
Payer: COMMERCIAL

## 2022-02-15 PROCEDURE — 97110 THERAPEUTIC EXERCISES: CPT | Performed by: PHYSICAL THERAPIST

## 2022-02-15 PROCEDURE — 97140 MANUAL THERAPY 1/> REGIONS: CPT | Performed by: PHYSICAL THERAPIST

## 2022-02-15 NOTE — PROGRESS NOTES
PT DAILY TREATMENT NOTE - [facility] 2-15    Patient Name: Beata Carvalho  MJWE:  : 1980  [x]  Patient  Verified  Payor: BLUE MARY / Plan: Maria A Junior [id number] PPO / Product Type: PPO /    In time: 8:00 A  Out time:  8:50  Total Treatment Time (min): 50  Total Timed Codes (min): 50  1:1 Treatment Time ( only): 40   Visit #:  3    Treatment Area: Pain in right ankle and joints of right foot [M25.571]    SUBJECTIVE  Pain Level (0-10 scale): 4  Any medication changes, allergies to medications, adverse drug reactions, diagnosis change, or new procedure performed?: [x] No    [] Yes (see summary sheet for update)  Subjective functional status/changes:   [] No changes reported  Pt states that her foot is less swollen. She worked out with her  over the weekend-- did some jogging, jumping jacks without pain. Reports it really bothered her to do a plank so she stopped. Feels her gait is improving.  Still painful with trying to go down stairs    OBJECTIVE    Modality rationale: decrease inflammation and decrease pain to improve the patients ability to perform daily chores, activities, walk   Min Type Additional Details       [] Estim: []Att   []Unatt    []TENS instruct                  []IFC  []Premod   []NMES                     []Other:  []w/US   []w/ice   []w/heat  Position:  Location:    []  Ultrasound: []Continuous   [] Pulsed                       at: []1MHz   []3MHz Location:  W/cm2:    []  Ice     []  Heat  []  Ice massage Position:  Location:   decl [x]  Vasopneumatic Device  +compression thus reducing edema Pressure:       [] lo [x] med [] hi   Temperature: 34     [x] Skin assessment post-treatment:  [x]intact []redness- no adverse reaction    []redness  adverse reaction:     35 min Therapeutic Exercise:  [x] See flow sheet : progressed per flow sheet   Rationale: increase ROM, increase strength, improve coordination, improve balance and increase proprioception to improve the patients Statement Selected ability to walk, exercise with dec'd symptoms      min Neuromuscular Re-education:  []  See flow sheet :   Rationale:     15 min Manual Therapy:  IASTM with boomerang tool over R gastroc/soleus complex, achilles  Post talar mobs for improved DF ROM    Rationale: decrease pain, increase ROM and increase tissue extensibility to improve the patients ability to exercise, walk with dec'd symptoms     min Gait Training:     Rationale: With   [] TE   [] TA   [] Neuro   [] SC   [] other: Patient Education: [x] Review HEP    [] Progressed/Changed HEP based on:   [] positioning   [] body mechanics   [] transfers   [] heat/ice application    [] other:      Other Objective/Functional Measures: n/a    Pain Level (0-10 scale) post treatment: 2    ASSESSMENT/Changes in Function:   Improved gait mechanics without need for cues today. Tolerated progression of exercises well   Patient will continue to benefit from skilled PT services to modify and progress therapeutic interventions, address functional mobility deficits, address ROM deficits, address strength deficits, analyze and address soft tissue restrictions, analyze and cue movement patterns and analyze and modify body mechanics/ergonomics to attain remaining goals.      [x]  See Plan of Care  []  See progress note/recertification  []  See Discharge Summary         Progress towards goals / Updated goals:  NT    PLAN  []  Upgrade activities as tolerated     [x]  Continue plan of care  []  Update interventions per flow sheet       []  Discharge due to:_  [x]  Other:      Darryl Lynn, PT 2/15/2022

## 2022-02-17 ENCOUNTER — HOSPITAL ENCOUNTER (OUTPATIENT)
Dept: PHYSICAL THERAPY | Age: 42
Discharge: HOME | End: 2022-02-17
Payer: COMMERCIAL

## 2022-02-17 PROCEDURE — 97110 THERAPEUTIC EXERCISES: CPT | Performed by: PHYSICAL THERAPIST

## 2022-02-17 PROCEDURE — 97016 VASOPNEUMATIC DEVICE THERAPY: CPT | Performed by: PHYSICAL THERAPIST

## 2022-02-17 PROCEDURE — 97140 MANUAL THERAPY 1/> REGIONS: CPT | Performed by: PHYSICAL THERAPIST

## 2022-02-17 NOTE — PROGRESS NOTES
PT DAILY TREATMENT NOTE - [facility] 2-15    Patient Name: Darvin Vargas  RBAV:[id number]  : 1980  [x]  Patient  Verified  Payor: BLUE MARY / Plan: Maria A Junior [id number] PPO / Product Type: PPO /    In time: 7:00 A  Out time:  8:00  Total Treatment Time (min): 60  Total Timed Codes (min): 50  1:1 Treatment Time ( only): 30   Visit #:  4    Treatment Area: Pain in right ankle and joints of right foot [M25.571]    SUBJECTIVE  Pain Level (0-10 scale): 4  Any medication changes, allergies to medications, adverse drug reactions, diagnosis change, or new procedure performed?: [x] No    [] Yes (see summary sheet for update)  Subjective functional status/changes:   [] No changes reported  Pt reports that she is continuing to feel better.  She tried to wear a different pair of shoes to work yesterday and she could tell it made her foot sore/a little swollen    OBJECTIVE    Modality rationale: decrease inflammation and decrease pain to improve the patients ability to perform daily chores, activities, walk   Min Type Additional Details       [] Estim: []Att   []Unatt    []TENS instruct                  []IFC  []Premod   []NMES                     []Other:  []w/US   []w/ice   []w/heat  Position:  Location:    []  Ultrasound: []Continuous   [] Pulsed                       at: []1MHz   []3MHz Location:  W/cm2:    []  Ice     []  Heat  []  Ice massage Position:  Location:   10 [x]  Vasopneumatic Device  +compression thus reducing edema Pressure:       [] lo [x] med [] hi   Temperature: 34     [x] Skin assessment post-treatment:  [x]intact []redness- no adverse reaction    []redness  adverse reaction:     35 min Therapeutic Exercise:  [x] See flow sheet : progressed per flow sheet   Rationale: increase ROM, increase strength, improve coordination, improve balance and increase proprioception to improve the patients ability to walk, exercise with dec'd symptoms      min Neuromuscular Re-education:  []  See flow sheet : Rationale:     15 min Manual Therapy:  IASTM with bofraciscoang tool over R gastroc/soleus complex, achilles  Post talar mobs for improved DF ROM    Rationale: decrease pain, increase ROM and increase tissue extensibility to improve the patients ability to exercise, walk with dec'd symptoms     min Gait Training:     Rationale: With   [] TE   [] TA   [] Neuro   [] SC   [] other: Patient Education: [x] Review HEP    [] Progressed/Changed HEP based on:   [] positioning   [] body mechanics   [] transfers   [] heat/ice application    [] other:      Other Objective/Functional Measures: n/a    Pain Level (0-10 scale) post treatment: 2    ASSESSMENT/Changes in Function:   Good tolerance to progression of proprioceptive exercises today. Gait improving. Patient will continue to benefit from skilled PT services to modify and progress therapeutic interventions, address functional mobility deficits, address ROM deficits, address strength deficits, analyze and address soft tissue restrictions, analyze and cue movement patterns and analyze and modify body mechanics/ergonomics to attain remaining goals.      [x]  See Plan of Care  []  See progress note/recertification  []  See Discharge Summary         Progress towards goals / Updated goals:  NT    PLAN  []  Upgrade activities as tolerated     [x]  Continue plan of care  []  Update interventions per flow sheet       []  Discharge due to:_  [x]  Other:      Eyad Karimi, PT 2/17/2022

## 2022-02-22 ENCOUNTER — APPOINTMENT (OUTPATIENT)
Dept: PHYSICAL THERAPY | Age: 42
End: 2022-02-22
Payer: COMMERCIAL

## 2022-02-23 RX ORDER — SEMAGLUTIDE 1.34 MG/ML
1 INJECTION, SOLUTION SUBCUTANEOUS
Qty: 9 EACH | Refills: 3 | Status: SHIPPED | OUTPATIENT
Start: 2022-02-23

## 2022-02-24 ENCOUNTER — APPOINTMENT (OUTPATIENT)
Dept: PHYSICAL THERAPY | Age: 42
End: 2022-02-24
Payer: COMMERCIAL

## 2022-03-01 ENCOUNTER — OFFICE VISIT (OUTPATIENT)
Dept: ORTHOPEDIC SURGERY | Age: 42
End: 2022-03-01
Payer: COMMERCIAL

## 2022-03-01 ENCOUNTER — APPOINTMENT (OUTPATIENT)
Dept: PHYSICAL THERAPY | Age: 42
End: 2022-03-01

## 2022-03-01 VITALS — HEIGHT: 61 IN | BODY MASS INDEX: 35.12 KG/M2 | WEIGHT: 186 LBS

## 2022-03-01 DIAGNOSIS — S92.351A DISPLACED FRACTURE OF FIFTH METATARSAL BONE, RIGHT FOOT, INITIAL ENCOUNTER FOR CLOSED FRACTURE: Primary | ICD-10-CM

## 2022-03-01 PROCEDURE — 99213 OFFICE O/P EST LOW 20 MIN: CPT | Performed by: ORTHOPAEDIC SURGERY

## 2022-03-01 RX ORDER — BUSPIRONE HYDROCHLORIDE 10 MG/1
TABLET ORAL
COMMUNITY

## 2022-03-01 RX ORDER — TRAZODONE HYDROCHLORIDE 50 MG/1
TABLET ORAL
COMMUNITY
End: 2022-04-27

## 2022-03-01 NOTE — PROGRESS NOTES
ASSESSMENT/PLAN:  Below is the assessment and plan developed based on review of pertinent history, physical exam, labs, studies, and medications. 1. Displaced fracture of fifth metatarsal bone, right foot, initial encounter for closed fracture      She has continued to improve. Given the fact she was able to do jumping jacks and jump rope this morning with minimal pain we will continue to progress her. We will focus primarily on physical therapy. Given the fact she is having some pain over her bunion I would refer to see Dr. Mita Dennis for that if she continued to be symptomatic. We will see her on as-needed basis. SUBJECTIVE/OBJECTIVE:  Yonatan Escobar (: 1980) is a 37 y.o. female, patient,here for evaluation of the No chief complaint on file. .    The patient returns today for follow-up of her right foot. Treatment of her closed fifth metatarsal fracture started on 2021. She has been weightbearing as tolerated in the boot. She reports she is little sore at the end of the day. She is not taking any pain medication. She does complain of some pain over the 1st MTP joint at times which K tape has helped. She is wearing regular shoes at work. Physical Exam    Examination the right foot reveals he is mildly tender to palpation along the fifth metatarsal. She has good range of motion of her ankle. She is tight with dorsiflexion. She is neurovascular intact distally. Imaging:    3 views of the right foot demonstrate interval healing. She does have a little evidence of continued healing from last visit.     Allergies   Allergen Reactions    Jardiance [Empagliflozin] Other (comments)     Recurrent yeast infections    Naprosyn [Naproxen] Nausea and Vomiting     Tolerates Advil, Toradol, etc    Topamax [Topiramate] Other (comments)     Kidney stone    Lexapro [Escitalopram] Other (comments)     Fatigue, wt gain    Zoloft [Sertraline] Other (comments)     Fatigue        Current Outpatient Medications   Medication Sig    semaglutide (Ozempic) 1 mg/dose (4 mg/3 mL) pnij 1 mg by SubCUTAneous route every seven (7) days. Dispense 3 pens = 9 ml for a 90 day supply    clotrimazole-betamethasone (LOTRISONE) topical cream     dexlansoprazole (Dexilant) 60 mg CpDB capsule (delayed release)     levothyroxine (Synthroid) 88 mcg tablet Take 1 Tablet by mouth Daily (before breakfast).  insulin glargine (Lantus Solostar U-100 Insulin) 100 unit/mL (3 mL) inpn 20 Units by SubCUTAneous route nightly. Dose change 9/29/21--updated med list--did not send prescription to the pharmacy    metFORMIN (GLUCOPHAGE) 1,000 mg tablet Take 1/2 tab at dinner x 4 days, then 1/2 tab with breakfast and dinner x 4 days, then 1 tab with breakfast and dinner--replaces synjardy XR (Patient taking differently: Take 1,000 mg by mouth two (2) times daily (with meals). )    CARTIA  mg ER capsule Take 120 mg by mouth daily.  metoprolol succinate (TOPROL-XL) 100 mg tablet Take 1 Tab by mouth two (2) times a day.  cholecalciferol (VITAMIN D3) 25 mcg (1,000 unit) cap Take 1,000 Units by mouth daily.  levonorgestrel (MIRENA) 20 mcg/24 hr (5 years) IUD 1 Each by IntraUTERine route once. No current facility-administered medications for this visit. Past Medical History:   Diagnosis Date    Acute lymphadenitis of arm, left epitrochlear 12/13/2017    Anxiety and depression 10/9/2014    10/2014; resolved as of 8/24/15 per pt    CAP (community acquired pneumonia) 1/8/2018    [facility], [facility] [place] Admission 1/8/11-1/11/18    Chronic low back pain 10/9/2014    Muscular     Depression with anxiety 9/28/2017    10/2014    Diabetes mellitus type 2, insulin dependent ([facility] [address] 1/16/2019    Initial consult Dr. Max Lopez; Followed by Endo Dr. Yaakov Salas ;  Eye Exams [facility]: Rosie annually    Diabetic eye exam ([facility] [address] 02/23/2016    Livan Sullivan MD, [facility]    Dysphagia 12/16/2010    Generalized anxiety disorder with panic attacks 2020    GERD (gastroesophageal reflux disease) 2011    H. pylori infection 2016    Dr Pinedo Files    History of peptic ulcer disease 2016    18 yo    History of sinus tachycardia 10/5/2012    as of 8/24/15 pt states followed by PCP    Hypercholesterolemia 2016    ~    Hypothyroid 2021, Endo Dr. Chika Marmolejo Inappropriate sinus tachycardia 10/5/2018    Cardi -2018: Dr Jonna Winters, Dr Jillian Leach; self referred for 2nd opinion Dr. Naif Luis ; managed w/ CCB & BB    Kidney stone 2017    Lumbar disc herniation 2018    L4 and L5     (normal spontaneous vaginal delivery) 1998    Son  7yo    Pneumonia 2018    community acquired with sepsis     Post partum depression 10/9/2014    10/2013: counseling and prn Valium    Spondylosis of lumbar & thoracic region without myelopathy or radiculopathy 10/26/2016    xrays 2010    Thyromegaly 2010    US negative    Vitamin D deficiency        Past Surgical History:   Procedure Laterality Date    HX ABDOMINOPLASTY  2021    with Dr. Leland Sánchez Bilateral     HX  SECTION  2013    HX CHOLECYSTECTOMY  10/09    LAP-Gallstones    HX GYN  2015    IUD placed    HX LITHOTRIPSY  2017    HX ORTHOPAEDIC Right 7/10/14    trigger thumb    HX ORTHOPAEDIC Left 7/10/14    wrist       Family History   Problem Relation Age of Onset    Diabetes Mother          ESRD 45 yo in     Hypertension Mother    Aung Lasso Stroke Mother         TIA    Heart Disease Mother     Diabetes Sister 29        type 2    No Known Problems Brother     High Cholesterol Paternal Grandmother     Hypertension Maternal Grandfather             Diabetes Maternal Grandmother     Hypertension Maternal Grandmother     High Cholesterol Maternal Grandmother     Cancer Maternal Grandmother         Breast CA     Heart Disease Maternal Grandmother          age 79    Colon Cancer Maternal Uncle          at 62yo       Social History     Socioeconomic History    Marital status:      Spouse name: Not on file    Number of children: 1    Years of education: Not on file    Highest education level: Not on file   Occupational History    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 Other Topics Concern     Service Not Asked    Blood Transfusions Not Asked    Caffeine Concern No     Comment: 1 cup of coffee qam, occ diet soda    Occupational Exposure Not Asked    Hobby Hazards Not Asked    Sleep Concern Not Asked    Stress Concern Not Asked    Weight Concern Yes     Comment: Attended Dr. Juan Nicholson x several months, ended : met goal: wt down from 198 to 170    Special Diet Yes     Comment: adhering to more portion control    Back Care Yes     Comment: completed PT 2 x a week x several months     Exercise No     Comment: nothing regular but just tries to stay active and is very busy at work   [employer] Not Asked   [place]ren Not Asked    Self-Exams Not Asked   Social History Narrative    Got  ~ 2015. Has 1 daughter and 1 stepson. Her biological son  at age 9yo in 65. 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         Diet/Weight: nothing special right now, just trying to eat sensibly; attended Dr. Juan Nicholson x several months, ended : met goal: wt down from 198 to 170, then regained and now staying ~ 190 range    Exercise: stationary bike x 30 minutes 5 x a week    Caffeine:  1 cup of coffee qam, occ diet soda             Social Determinants of Health     Financial Resource Strain:     Difficulty of Paying Living Expenses: Not on file   Food Insecurity:     Worried About Running Out of Food in the Last Year: Not on file    Zuleima of Food in the Last Year: Not on file   Transportation Needs:     Lack of Transportation (Medical): Not on file    Lack of Transportation (Non-Medical): Not on file   Physical Activity:     Days of Exercise per Week: Not on file    Minutes of Exercise per Session: Not on file   Stress:     Feeling of Stress : Not on file   Social Connections:     Frequency of Communication with Friends and Family: Not on file    Frequency of Social Gatherings with Friends and Family: Not on file    Attends [employer] Services: Not on file    Active Member of [address] [place] [address] or Organizations: Not on file    Attends Club or Organization Meetings: Not on file    Marital Status: Not on file   Intimate Partner Violence:     Fear of Current or Ex-Partner: Not on file    Emotionally Abused: Not on file    Physically Abused: Not on file    Sexually Abused: Not on file   Housing Stability:     Unable to Pay for Housing in the Last Year: Not on file    Number of [place] in the Last Year: Not on file    Unstable Housing in the Last Year: Not on file       Review of Systems    No flowsheet data found. Vitals: There were no vitals taken for this visit. There is no height or weight on file to calculate BMI. An electronic signature was used to authenticate this note.   -- Cathi Roy MD

## 2022-03-02 DIAGNOSIS — Z76.89 ENCOUNTER FOR WEIGHT MANAGEMENT: Primary | ICD-10-CM

## 2022-03-02 DIAGNOSIS — E66.01 MORBID OBESITY (HCC): ICD-10-CM

## 2022-03-03 ENCOUNTER — APPOINTMENT (OUTPATIENT)
Dept: PHYSICAL THERAPY | Age: 42
End: 2022-03-03

## 2022-03-03 DIAGNOSIS — E66.9 OBESITY, CLASS I, BMI 30-34.9: Primary | ICD-10-CM

## 2022-03-03 RX ORDER — PHENTERMINE HYDROCHLORIDE 37.5 MG/1
TABLET ORAL
Qty: 45 TABLET | Refills: 1 | Status: SHIPPED | OUTPATIENT
Start: 2022-03-03 | End: 2022-04-27

## 2022-03-18 PROBLEM — F41.0 GENERALIZED ANXIETY DISORDER WITH PANIC ATTACKS: Status: ACTIVE | Noted: 2020-04-24

## 2022-03-18 PROBLEM — E11.9 DIABETES MELLITUS TYPE 2, INSULIN DEPENDENT (HCC): Status: ACTIVE | Noted: 2019-01-16

## 2022-03-18 PROBLEM — L04.2: Status: ACTIVE | Noted: 2017-12-13

## 2022-03-18 PROBLEM — E03.9 HYPOTHYROID: Status: ACTIVE | Noted: 2021-08-04

## 2022-03-18 PROBLEM — F41.1 GENERALIZED ANXIETY DISORDER WITH PANIC ATTACKS: Status: ACTIVE | Noted: 2020-04-24

## 2022-03-18 PROBLEM — Z79.4 DIABETES MELLITUS TYPE 2, INSULIN DEPENDENT (HCC): Status: ACTIVE | Noted: 2019-01-16

## 2022-03-19 PROBLEM — J18.9 CAP (COMMUNITY ACQUIRED PNEUMONIA): Status: ACTIVE | Noted: 2018-01-08

## 2022-03-19 PROBLEM — Q12.0 CONGENITAL CATARACT OF LEFT EYE: Status: ACTIVE | Noted: 2018-10-05

## 2022-03-19 PROBLEM — F41.8 DEPRESSION WITH ANXIETY: Status: ACTIVE | Noted: 2017-09-28

## 2022-03-19 PROBLEM — N39.0 UTI (URINARY TRACT INFECTION): Status: ACTIVE | Noted: 2018-01-08

## 2022-03-19 PROBLEM — I47.11 INAPPROPRIATE SINUS TACHYCARDIA: Status: ACTIVE | Noted: 2018-10-05

## 2022-03-19 PROBLEM — S92.351A DISPLACED FRACTURE OF FIFTH METATARSAL BONE, RIGHT FOOT, INITIAL ENCOUNTER FOR CLOSED FRACTURE: Status: ACTIVE | Noted: 2021-12-27

## 2022-03-19 PROBLEM — M47.816 LUMBAR FACET ARTHROPATHY: Status: ACTIVE | Noted: 2017-08-28

## 2022-03-19 PROBLEM — R00.0 INAPPROPRIATE SINUS TACHYCARDIA: Status: ACTIVE | Noted: 2018-10-05

## 2022-03-20 PROBLEM — M51.369 BULGING OF INTERVERTEBRAL DISC BETWEEN L4 AND L5: Status: ACTIVE | Noted: 2018-01-08

## 2022-03-20 PROBLEM — M51.36 BULGING OF INTERVERTEBRAL DISC BETWEEN L4 AND L5: Status: ACTIVE | Noted: 2018-01-08

## 2022-03-20 PROBLEM — R00.2 HEART PALPITATIONS: Status: ACTIVE | Noted: 2017-08-16

## 2022-04-13 ENCOUNTER — TRANSCRIBE ORDER (OUTPATIENT)
Dept: SCHEDULING | Age: 42
End: 2022-04-13

## 2022-04-13 DIAGNOSIS — E55.9 VITAMIN D DEFICIENCY: ICD-10-CM

## 2022-04-13 DIAGNOSIS — E11.65 UNCONTROLLED TYPE 2 DIABETES MELLITUS WITH HYPERGLYCEMIA (HCC): ICD-10-CM

## 2022-04-13 DIAGNOSIS — R22.1 NECK MASS: Primary | ICD-10-CM

## 2022-04-13 DIAGNOSIS — R00.0 TACHYCARDIA: ICD-10-CM

## 2022-04-13 DIAGNOSIS — E04.9 GOITER: ICD-10-CM

## 2022-04-13 DIAGNOSIS — E66.9 OBESITY, CLASS I, BMI 30-34.9: ICD-10-CM

## 2022-04-13 DIAGNOSIS — E78.00 HYPERCHOLESTEROLEMIA: ICD-10-CM

## 2022-04-23 LAB
25(OH)D3+25(OH)D2 SERPL-MCNC: 23.8 NG/ML (ref 30–100)
ALBUMIN SERPL-MCNC: 4.4 G/DL (ref 3.8–4.8)
ALBUMIN/CREAT UR: 6 MG/G CREAT (ref 0–29)
ALBUMIN/GLOB SERPL: 1.7 {RATIO} (ref 1.2–2.2)
ALP SERPL-CCNC: 83 IU/L (ref 44–121)
ALT SERPL-CCNC: 33 IU/L (ref 0–32)
AST SERPL-CCNC: 38 IU/L (ref 0–40)
BILIRUB SERPL-MCNC: <0.2 MG/DL (ref 0–1.2)
BUN SERPL-MCNC: 10 MG/DL (ref 6–24)
BUN/CREAT SERPL: 16 (ref 9–23)
CALCIUM SERPL-MCNC: 9.2 MG/DL (ref 8.7–10.2)
CHLORIDE SERPL-SCNC: 105 MMOL/L (ref 96–106)
CHOLEST SERPL-MCNC: 173 MG/DL (ref 100–199)
CO2 SERPL-SCNC: 23 MMOL/L (ref 20–29)
CREAT SERPL-MCNC: 0.62 MG/DL (ref 0.57–1)
CREAT UR-MCNC: 179.2 MG/DL
EGFR: 115 ML/MIN/1.73
EST. AVERAGE GLUCOSE BLD GHB EST-MCNC: 194 MG/DL
GLOBULIN SER CALC-MCNC: 2.6 G/DL (ref 1.5–4.5)
GLUCOSE SERPL-MCNC: 139 MG/DL (ref 65–99)
HBA1C MFR BLD: 8.4 % (ref 4.8–5.6)
HDLC SERPL-MCNC: 51 MG/DL
LDLC SERPL CALC-MCNC: 107 MG/DL (ref 0–99)
MICROALBUMIN UR-MCNC: 11.4 UG/ML
POTASSIUM SERPL-SCNC: 4.4 MMOL/L (ref 3.5–5.2)
PROT SERPL-MCNC: 7 G/DL (ref 6–8.5)
SODIUM SERPL-SCNC: 143 MMOL/L (ref 134–144)
TRIGL SERPL-MCNC: 82 MG/DL (ref 0–149)
VLDLC SERPL CALC-MCNC: 15 MG/DL (ref 5–40)

## 2022-04-27 ENCOUNTER — OFFICE VISIT (OUTPATIENT)
Dept: ENDOCRINOLOGY | Age: 42
End: 2022-04-27
Payer: COMMERCIAL

## 2022-04-27 VITALS
HEIGHT: 61 IN | DIASTOLIC BLOOD PRESSURE: 62 MMHG | SYSTOLIC BLOOD PRESSURE: 80 MMHG | HEART RATE: 103 BPM | BODY MASS INDEX: 34.95 KG/M2 | WEIGHT: 185.1 LBS

## 2022-04-27 DIAGNOSIS — E04.9 GOITER: ICD-10-CM

## 2022-04-27 DIAGNOSIS — E11.65 UNCONTROLLED TYPE 2 DIABETES MELLITUS WITH HYPERGLYCEMIA (HCC): Primary | ICD-10-CM

## 2022-04-27 DIAGNOSIS — E78.00 HYPERCHOLESTEROLEMIA: ICD-10-CM

## 2022-04-27 DIAGNOSIS — E66.9 OBESITY, CLASS I, BMI 30-34.9: ICD-10-CM

## 2022-04-27 DIAGNOSIS — E55.9 VITAMIN D DEFICIENCY: ICD-10-CM

## 2022-04-27 DIAGNOSIS — R00.0 TACHYCARDIA: ICD-10-CM

## 2022-04-27 PROCEDURE — 99214 OFFICE O/P EST MOD 30 MIN: CPT | Performed by: INTERNAL MEDICINE

## 2022-04-27 PROCEDURE — 3052F HG A1C>EQUAL 8.0%<EQUAL 9.0%: CPT | Performed by: INTERNAL MEDICINE

## 2022-04-27 RX ORDER — PHENTERMINE HYDROCHLORIDE 37.5 MG/1
TABLET ORAL
Qty: 90 TABLET | Refills: 1 | Status: SHIPPED | OUTPATIENT
Start: 2022-04-27

## 2022-04-27 RX ORDER — METFORMIN HYDROCHLORIDE 500 MG/1
TABLET, EXTENDED RELEASE ORAL
Qty: 360 TABLET | Refills: 3 | Status: SHIPPED | OUTPATIENT
Start: 2022-04-27 | End: 2022-06-26 | Stop reason: ALTCHOICE

## 2022-04-27 RX ORDER — INSULIN GLARGINE 100 [IU]/ML
24 INJECTION, SOLUTION SUBCUTANEOUS
Qty: 15 ML | Refills: 3
Start: 2022-04-27 | End: 2022-04-27

## 2022-04-27 RX ORDER — METFORMIN HYDROCHLORIDE 1000 MG/1
1000 TABLET ORAL DAILY
Qty: 60 TABLET | Refills: 11
Start: 2022-04-27 | End: 2022-04-27 | Stop reason: ALTCHOICE

## 2022-04-27 RX ORDER — INSULIN GLARGINE 100 [IU]/ML
22 INJECTION, SOLUTION SUBCUTANEOUS
Qty: 15 ML | Refills: 3
Start: 2022-04-27 | End: 2022-06-10 | Stop reason: SDUPTHER

## 2022-04-27 NOTE — PATIENT INSTRUCTIONS
1) Stop the immediate release metformin. I sent extended release 500 mg tabs to the pharmacy to take 3 tabs together at night = 1500 mg. Do this for 7-10 days and as long as you don't have any increase in nausea or diarrhea or belly pain, then try 4 tabs together at night = 2000 mg.    2) Decrease the lantus to 22 units at night. As long as your fasting sugars are 110-130, then stay on this dose but if you have 2 or more readings under 110 in a week, then decrease your insulin by 2 units every week to get to the lowest dose that keeps you 110-130. If you get all the way down to 8 units at night, then try stopping the lantus. 3) Your vitamin D level is 23 which is slightly low. Goal is over 30. Please be sure to take 2000 units of D3 daily. 4) Your BUN and creatinine are markers of kidney function. Your values are normal.    5) ALT and AST are markers of liver function. Your ALT is just barely abnormal.    6) Microalbumin/creatinine ratio is a marker of the amount of protein in your urine. Goal is less than 30. Your value is normal. This indicates that your kidneys are not being affected by your uncontrolled diabetes and/or blood pressure. 7) Your cholesterol is better. 8) If you want to try taking 1/2 tab of phentermine in the morning and 1/2 tab about 3-4pm to help with night time cravings, you can do this. I sent a prescription to [facility] and texted you a good rx coupon.

## 2022-04-27 NOTE — PROGRESS NOTES
Chief Complaint   Patient presents with    Thyroid Problem     pcp and pharmacy confirmed    Diabetes     History of Present Illness: Caren Bhatia is a 37 y.o. female here for follow up of diabetes. Weight up 3 lbs since last visit in 9/21. Has been taking the phentermine 1/2 tab daily in the morning since beginning of March and this does help with her appetite but finds she gets hungry in the late afternoon and is willing to try taking another dose at that time. Has only been able to tolerated 1000 mg of IR metformin. Hasn't been on ER metformin since 2010 and willing to try this again. With taking lantus 24 units at bedtime is able to keep her fasting sugars in the 120-140s with a few lower and some higher based on what she ate the night before. Is still having a globus sensation and her ultrasound was unremarkable this winter and saw Dr. 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 Has not been taking vitamin D regularly. Has been more careful with her diet. Current Outpatient Medications   Medication Sig    insulin glargine (Lantus Solostar U-100 Insulin) 100 unit/mL (3 mL) inpn 24 Units by SubCUTAneous route nightly. Dose change 04/27/22--updated med list--did not send prescription to the pharmacy    metFORMIN (GLUCOPHAGE) 1,000 mg tablet Take 1 Tablet by mouth daily. Dose change 04/27/22--updated med list--did not send prescription to the pharmacy    phentermine (ADIPEX-P) 37.5 mg tablet Take 1/2 tablet before breakfast--patient will pay cash and use goodrx. com coupon    busPIRone (BUSPAR) 10 mg tablet nightly as needed.  semaglutide (Ozempic) 1 mg/dose (4 mg/3 mL) pnij 1 mg by SubCUTAneous route every seven (7) days.  Dispense 3 pens = 9 ml for a 90 day supply    clotrimazole-betamethasone (LOTRISONE) topical cream     dexlansoprazole (Dexilant) 60 mg CpDB capsule (delayed release)     levothyroxine (Synthroid) 88 mcg tablet Take 1 Tablet by mouth Daily (before breakfast).  CARTIA  mg ER capsule Take 120 mg by mouth daily.  metoprolol succinate (TOPROL-XL) 100 mg tablet Take 1 Tab by mouth two (2) times a day.  cholecalciferol (VITAMIN D3) 25 mcg (1,000 unit) cap Take 1,000 Units by mouth daily.  levonorgestrel (MIRENA) 20 mcg/24 hr (5 years) IUD 1 Each by IntraUTERine route once. No current facility-administered medications for this visit. Allergies   Allergen Reactions    Jardiance [Empagliflozin] Other (comments)     Recurrent yeast infections    Naprosyn [Naproxen] Nausea and Vomiting     Tolerates Advil, Toradol, etc    Topamax [Topiramate] Other (comments)     Kidney stone    Lexapro [Escitalopram] Other (comments)     Fatigue, wt gain    Zoloft [Sertraline] Other (comments)     Fatigue      Review of Systems: PER HPI    Physical Examination:  Blood pressure (!) 80/62, pulse (!) 103, height 5' 1\" (1.549 m), weight 185 lb 1.6 oz (84 kg). - General: pleasant, no distress, good eye contact   - Neck: no carotid bruits  - Cardiovascular: regular, borderline tachy, nl s1 and s2, no m/r/g,   - Respiratory: clear bilaterally  - Integumentary: no edema,   - Psychiatric: normal mood and affect    Data Reviewed:   Component      Latest Ref Rng & Units 4/22/2022   Glucose      65 - 99 mg/dL 139 (H)   BUN      6 - 24 mg/dL 10   Creatinine      0.57 - 1.00 mg/dL 0.62   eGFR      >59 mL/min/1.73 115   BUN/Creatinine ratio      9 - 23 16   Sodium      134 - 144 mmol/L 143   Potassium      3.5 - 5.2 mmol/L 4.4   Chloride      96 - 106 mmol/L 105   CO2      20 - 29 mmol/L 23   Calcium      8.7 - 10.2 mg/dL 9.2   Protein, total      6.0 - 8.5 g/dL 7.0   Albumin      3.8 - 4.8 g/dL 4.4   GLOBULIN, TOTAL      1.5 - 4.5 g/dL 2.6   A-G Ratio      1.2 - 2.2 1.7   Bilirubin, total      0.0 - 1.2 mg/dL <0.2   Alk.  phosphatase      44 - 121 IU/L 83   AST      0 - 40 IU/L 38   ALT      0 - 32 IU/L 33 (H)   Cholesterol, total      100 - 199 mg/dL 173   Triglyceride      0 - 149 mg/dL 82   HDL Cholesterol      >39 mg/dL 51   VLDL, calculated      5 - 40 mg/dL 15   LDL, calculated      0 - 99 mg/dL 107 (H)   Creatinine, urine      Not Estab. mg/dL 179.2   Microalbumin, urine      Not Estab. ug/mL 11.4   Microalbumin/Creat. Ratio      0 - 29 mg/g creat 6   Hemoglobin A1c, (calculated)      4.8 - 5.6 % 8.4 (H)   Estimated average glucose      mg/dL 194   VITAMIN D, 25-HYDROXY      30.0 - 100.0 ng/mL 23.8 (L)       Assessment/Plan:     1. Diabetes mellitus type II: her most recent Hgb A1c was 8.4% in 4/22 up from 8.3% in 9/21 down from 9.1% in 6/21 up from 8.7% in 3/21 up from 7.6% in 8/20 down from 8% in 2/20 down from 9.9% in 7/19 up from 9.2% in 3/19 up from 8.2% in 10/18 down from 8.3% in 6/18 down from 10.4% in 1/18 up from 8.1% in 9/17 up from 7.9% in 4/17 down from 8.1% in 12/16 up from 7% in 7/16 down from 7.7% in 3/16 down from 7.8% in 11/15 down from 8.3% in 7/15 down from 8.8% in 3/15 up from 7.6% in 11/14 up from 7.3% in 7/14 down from 8% in 3/14 up from 6.1% in 11/13 down from 6.7% in 9/13 up from 6.3% in 8/13 up slightly from 6.1% in 7/13 down from 6.3% in 5/13 down from 6.7% in 1/13 up from 6.5% in Oct 2012. A1c is slightly higher so changed to ER metformin to help with insulin resistance and will slightly decrease lantus to help limit wt gain. - decrease lantus to 22 units at bedtime   - cont ozempic 1 mg weekly  - change to ER metformin  mg 3 tabs at night for 7-10 days and then go to 4 tabs at night  - check bs up to 3 times daily due to fluctuating sugars  - foot exam done 9/21  - microalbumin nl 10/12--was taken off lisinopril 10 mg daily due to trying to conceive and repeat normal ever since, most recently in 4/22  - optho UTD 1/21  - check Hgb A1c and cmp prior to next visit       2. Goiter: Had a TSH of 2.68 in May 2012.   Level was 2.4 in 1/13 and I started her on a a low dose of levothyroxine 25 mcg daily to keep her TSH < 2.5 to improve her chance of conception and interestingly she became pregnant shortly after starting this. TSH 2.1 in 5/13 and 1.6 in 7/13 and 1.39 in 9/13. Stopped levothyroxine after delivery in 10/13 and TSH up to 5.5 in 3/14 so restarted low dose of 25 mcg daily to keep her TSH < 2.5 as she has hypothyroid symptoms and high cholesterol. However only 2.2 in 7/14 so increased to 50 mcg daily and TSH 2.4 in 11/14 but had missed a dose on the weekends on occasion so increased to 62.5 mcg daily and TSH 1.87 in 3/15 and 1.46 in 11/15 and 1.04 in 3/16. Up to 2.06 in 7/16 so increased to 75 mcg daily and down to 1.5 in 12/16. Up to 2.11 in 4/17 so increased her dose to 88 mcg daily and down to 0.96 in 9/17 and 1.81 in 1/18 and 1.96 in 10/18 and 1.49 in 3/19 so kept her dose the same. TSH up to 2.64 in 7/19 but had missed some doses prior to lab draw so kept dose the same and 1.49 in 2/20 and 1.08 in 8/20 and 2 in 9/21 so kept dose the same. Her thyroid is not to blame for her tachycardia as she has never been over-replaced. - cont levothyroxine 88 mcg daily  - check TSH prior to next visit          3. Hypercholesterolemia: Given DM, Goal LDL < 100, non-HDL < 130, and TG < 150.  in 10/12 off simva 10 due to trying to conceive, down to 109 in 1/13 and 92 in 5/13. Up to 127 in 11/13 and down to 111 in 3/14. Up to 124 in 7/14. Down to 114 in 11/14. Up to 140 in 3/15 possibly due to [address] as with stopping this it was down to 92 in 7/15. Up to 114 in 11/15 with diet. Was started on lipitor in 1/16 but had more nausea with this so stopped this and with 17 lb wt loss, LDL down to 84 in 3/16 and still 98 in 7/16. Up to 114 in 4/17 and still 119 in 9/17 and 136 in 1/18. Down to 116 in 6/18 and 113 in 10/18 and 110 in 3/19 but up to 138 in 7/19 due to higher A1c and TSH but down to 115 in 2/20 and 126 in 8/20 and 108 in 3/21.   Up to 142 in 9/21 but down to 107 in 4/22  - diet control  - check lipids prior to next visit        4. Obesity: I started topamax in 11/15 and Dr. Vero Rivera added phentermine in 1/16 and changed her trulicity to [facility] and her weight had come down 26 lbs by 7/16. Had more nausea and GERD and ended up stopping all her meds in 10/16 and weight up 7 lbs by 12/16. Restarted topamax for 2 weeks and then added back victoza at a lower dose of 1.2 mg daily. Eventually added back phentermine in 3/17. Then developed a kidney stone in 3/17 and stopped the topamax and weight up 6 lbs by 4/17. Will stay off the topamax due to the kidney stone and stop the phentermine as I don't think this is helping her weight. I told her it's fine to restart the advocare spark and protein shakes as these are not as likely to cause the kidney stone as the topamax was. Weight up 7 lbs by 9/17 possibly due to stress and 1 lb by 1/18. Down 3 lbs by 6/18.  Up 6 lbs by 11/18 but was off victoza and started back on prozac. I added back topamax 1 tab daily and restarted victoza but wt down just 1 lb by 3/19 so began wellbutrin instead and still stable by 7/19. Stopped topamax in 6/19 due to concern for new back pain that could be the beginning of another kidney stone. Started seeing Dr. Cheikh Luna in 9/19 and was started on ozempic and synardy and wt down 14 lbs by 2/20. Down 4 lbs by 9/20. Did go up from 178 in 9/20 to 190 in 2/21 but down to 182.5 by 3/21 and stable by 9/21. Restarted phentermine 1/2 tab in am in 3/22 but wt up 185 by 4/22 so increased dose to 1/2 tab bid. - cont wellbutrin  mg daily   - cont ozempic as above  - stay off topamax 50 mg daily due to risk of kidney stone  - increase phentermine 37.5 mg to 1/2 tab bid      5. Vitamin D deficiency: level was 24 in 12/15 and is currently taking 2000 units of D3 and level up to 32 in 7/16 and 35 in 4/17 and 30 in 9/17 and 35 in 10/18 and in 3/21.   With being off this, down to 23.8 in 4/22 so restarted. - cont vitamin D 2000 units daily  - check Vitamin D 25-OH level prior to next visit        6. 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 Macey Espinoza and Dr. Columba Trejo. Now under care of Dr. Zamora List and combo of dilt and toprol normally works   - cont Toprol  mg bid  - cont dilt  mg daily      Patient Instructions   1) Stop the immediate release metformin. I sent extended release 500 mg tabs to the pharmacy to take 3 tabs together at night = 1500 mg. Do this for 7-10 days and as long as you don't have any increase in nausea or diarrhea or belly pain, then try 4 tabs together at night = 2000 mg.    2) Decrease the lantus to 22 units at night. As long as your fasting sugars are 110-130, then stay on this dose but if you have 2 or more readings under 110 in a week, then decrease your insulin by 2 units every week to get to the lowest dose that keeps you 110-130. If you get all the way down to 8 units at night, then try stopping the lantus. 3) Your vitamin D level is 23 which is slightly low. Goal is over 30. Please be sure to take 2000 units of D3 daily. 4) Your BUN and creatinine are markers of kidney function. Your values are normal.    5) ALT and AST are markers of liver function. Your ALT is just barely abnormal.    6) Microalbumin/creatinine ratio is a marker of the amount of protein in your urine. Goal is less than 30. Your value is normal. This indicates that your kidneys are not being affected by your uncontrolled diabetes and/or blood pressure. 7) Your cholesterol is better. 8) If you want to try taking 1/2 tab of phentermine in the morning and 1/2 tab about 3-4pm to help with night time cravings, you can do this. I sent a prescription to [facility] and texted you a good rx coupon. Follow-up and Dispositions    · Return 11/9/22 at 3:50pm.               Copy sent to:   Ortega Hurst MD as PCP - General  Mode George MD (Obstetrics & Gynecology)  Amy William MD (Cardiology)  Dr. Rishi Ojeda

## 2022-04-28 ENCOUNTER — TRANSCRIBE ORDER (OUTPATIENT)
Dept: SCHEDULING | Age: 42
End: 2022-04-28

## 2022-04-28 DIAGNOSIS — R22.1 NECK MASS: Primary | ICD-10-CM

## 2022-05-02 ENCOUNTER — TELEPHONE (OUTPATIENT)
Dept: FAMILY MEDICINE CLINIC | Age: 42
End: 2022-05-02

## 2022-05-02 NOTE — TELEPHONE ENCOUNTER
Called pt and she states that she called a nurse thru her insurance and had a VV. She states that Thurs she had a scratchy throat and Fri she had fever ST, fatigue and h/a. Sat temp was 101 and she was in bed all day Sat and Sun. She has turned a corner just fro this morning and temp has broken. She is taking tylenol, mucinex and NP gave her a prescription for tessalon perles. Griselda Starks

## 2022-05-02 NOTE — TELEPHONE ENCOUNTER
Patient called and stated that she tested positive for Covid on 4/30/22 and has a fever that is not breaking. Patient wants to know what she should do?     Requesting a call back    Best call back# [phone]

## 2022-05-14 ENCOUNTER — HOSPITAL ENCOUNTER (OUTPATIENT)
Dept: CT IMAGING | Age: 42
Discharge: HOME | End: 2022-05-14
Attending: OTOLARYNGOLOGY
Payer: COMMERCIAL

## 2022-05-14 DIAGNOSIS — R22.1 NECK MASS: ICD-10-CM

## 2022-05-14 PROCEDURE — 74011000636 HC RX REV CODE- 636: Performed by: RADIOLOGY

## 2022-05-14 PROCEDURE — 70491 CT SOFT TISSUE NECK W/DYE: CPT

## 2022-05-14 RX ADMIN — IOPAMIDOL 80 ML: 755 INJECTION, SOLUTION INTRAVENOUS at 12:50

## 2022-06-11 RX ORDER — INSULIN GLARGINE 100 [IU]/ML
22 INJECTION, SOLUTION SUBCUTANEOUS
Qty: 15 ML | Refills: 3 | Status: SHIPPED | OUTPATIENT
Start: 2022-06-11

## 2022-06-26 RX ORDER — METFORMIN HYDROCHLORIDE 500 MG/1
TABLET ORAL
Qty: 120 TABLET | Refills: 11 | Status: SHIPPED | OUTPATIENT
Start: 2022-06-26

## 2022-07-19 ENCOUNTER — TELEPHONE (OUTPATIENT)
Dept: FAMILY MEDICINE CLINIC | Age: 42
End: 2022-07-19

## 2022-07-19 NOTE — TELEPHONE ENCOUNTER
Received fax from [address] has been denied . We denied your request b/c we did not see what we need to approve the drug you asked for. We may be able to approve the dexilant in certain situations (inadequeat response or intolerance to two preferred PPI's. The preferred PPIs include:  omeprazole (genric all strengths)  pantoprazole (generic all strengths)  Lansoprazole (generic all strengths)  rabeprazole (generic all strengths). We do not see that this applies to you. We based this decision on your health paln's prior authorization clinical criteria names PPI's.

## 2022-07-20 NOTE — TELEPHONE ENCOUNTER
Lauren Koroma @ [phone] spoke to [facility] that pt had tried and failed omeprazole, protonix, prevacid, and zantac. The dexilant has been approved ref # [id number] 7/20/22 thru 7/20/23  Faxed approval to [facility] and notified pt.

## 2022-07-20 NOTE — TELEPHONE ENCOUNTER
It looks like they are saying she has to try and fail at least one of the ones they listed. I am not sure what time frame they require this to fall within (ie/ trial w/in the past 30 days, 6 months, 1 year or any prior attempts in the past). I dont see that we have prescribed anything aside from the [address] [place] [address] for years, so that is why they are probably denying it. So she will have to check w/ them about time frame and give some history as to what she has tried/failed in the past. I dont know if they will take her verbal report or if we have to provide documentation. I dont see other meds but she can let us know based on what they tell her.

## 2022-10-11 ENCOUNTER — TELEPHONE (OUTPATIENT)
Dept: FAMILY MEDICINE CLINIC | Age: 42
End: 2022-10-11

## 2022-10-11 DIAGNOSIS — Z87.11 PERSONAL HISTORY OF PEPTIC ULCER DISEASE: ICD-10-CM

## 2022-10-11 DIAGNOSIS — K21.9 GASTRO-ESOPHAGEAL REFLUX DISEASE WITHOUT ESOPHAGITIS: Primary | ICD-10-CM

## 2022-10-11 DIAGNOSIS — H81.12 BPPV (BENIGN PAROXYSMAL POSITIONAL VERTIGO), LEFT: ICD-10-CM

## 2022-10-11 PROBLEM — Z87.01 HISTORY OF COMMUNITY ACQUIRED PNEUMONIA: Status: ACTIVE | Noted: 2022-10-11

## 2022-10-11 PROBLEM — F32.A ANXIETY AND DEPRESSION: Status: ACTIVE | Noted: 2017-09-28

## 2022-10-11 PROBLEM — F41.9 ANXIETY AND DEPRESSION: Status: ACTIVE | Noted: 2017-09-28

## 2022-10-11 RX ORDER — MECLIZINE HYDROCHLORIDE 25 MG/1
25 TABLET ORAL
Qty: 30 TABLET | Refills: 0 | Status: SHIPPED | OUTPATIENT
Start: 2022-10-11

## 2022-10-11 RX ORDER — DEXLANSOPRAZOLE 60 MG/1
CAPSULE, DELAYED RELEASE ORAL
Qty: 90 CAPSULE | Refills: 3 | Status: SHIPPED | OUTPATIENT
Start: 2022-10-11

## 2022-10-11 NOTE — TELEPHONE ENCOUNTER
----- Message from [facility] ERASMO Escobar sent at 10/11/2022  7:31 AM EDT -----  Regarding: Vertigo  Dr. Liane Mortensen, I have been experiencing a recent onset of  vertigo. I havent had this happen in years and it started about a week ago. I initially contributed it to  sinus issues as I have a runny nose constant. Ive started taking Zyrtec to try to see if itll get better and has somewhat. Its not as constant. However, last night was really bad and included the nausea. I was wondering if I could get a prescription for meclizine sent into the pharmacy to help me with this or do I need to come in to see you?   Thank you

## 2022-10-11 NOTE — TELEPHONE ENCOUNTER
LVM for pt advising that Dr Ronald Mariscal recommends OV for eval it any worsening sx's or if they don't improve.

## 2022-10-19 DIAGNOSIS — E78.00 HYPERCHOLESTEROLEMIA: ICD-10-CM

## 2022-10-19 DIAGNOSIS — E66.9 OBESITY, CLASS I, BMI 30-34.9: ICD-10-CM

## 2022-10-19 DIAGNOSIS — E55.9 VITAMIN D DEFICIENCY: ICD-10-CM

## 2022-10-19 DIAGNOSIS — R00.0 TACHYCARDIA: ICD-10-CM

## 2022-10-19 DIAGNOSIS — E11.65 UNCONTROLLED TYPE 2 DIABETES MELLITUS WITH HYPERGLYCEMIA (HCC): ICD-10-CM

## 2022-10-19 DIAGNOSIS — E04.9 GOITER: ICD-10-CM

## 2022-11-01 RX ORDER — LEVOTHYROXINE SODIUM 88 UG/1
TABLET ORAL
Qty: 90 TABLET | Refills: 3 | Status: SHIPPED | OUTPATIENT
Start: 2022-11-01

## 2022-11-09 ENCOUNTER — OFFICE VISIT (OUTPATIENT)
Dept: ENDOCRINOLOGY | Age: 42
End: 2022-11-09
Payer: COMMERCIAL

## 2022-11-09 VITALS
BODY MASS INDEX: 32.62 KG/M2 | SYSTOLIC BLOOD PRESSURE: 109 MMHG | HEIGHT: 61 IN | DIASTOLIC BLOOD PRESSURE: 79 MMHG | WEIGHT: 172.8 LBS | HEART RATE: 112 BPM

## 2022-11-09 DIAGNOSIS — E78.00 HYPERCHOLESTEROLEMIA: ICD-10-CM

## 2022-11-09 DIAGNOSIS — E11.65 UNCONTROLLED TYPE 2 DIABETES MELLITUS WITH HYPERGLYCEMIA (HCC): Primary | ICD-10-CM

## 2022-11-09 DIAGNOSIS — R00.0 TACHYCARDIA: ICD-10-CM

## 2022-11-09 DIAGNOSIS — E04.9 GOITER: ICD-10-CM

## 2022-11-09 DIAGNOSIS — E66.9 OBESITY, CLASS I, BMI 30-34.9: ICD-10-CM

## 2022-11-09 DIAGNOSIS — E55.9 VITAMIN D DEFICIENCY: ICD-10-CM

## 2022-11-09 PROCEDURE — 99214 OFFICE O/P EST MOD 30 MIN: CPT | Performed by: INTERNAL MEDICINE

## 2022-11-09 PROCEDURE — 3046F HEMOGLOBIN A1C LEVEL >9.0%: CPT | Performed by: INTERNAL MEDICINE

## 2022-11-09 NOTE — PROGRESS NOTES
Chief Complaint   Patient presents with    Diabetes     PCP and pharmacy     Thyroid Problem     History of Present Illness: Sage Stark is a 40 y.o. female here for follow up of diabetes. Weight down 13 lbs since last visit in 4/22. She could not tolerate either the IR or ER metformin so stopped them both. Has remained on ozempic 1 mg weekly. Has not been consistently taking lantus and often will take just 2-3 times a week at most at 22 units and did take the night before her lab draw and fasting sugar was down to 145 the next morning but is not staying consistently under 130 with not taking the insulin regularly and often is over 150. Still taking vitamin D and phentermine and this does help her appetite. She is not currently dealing with any vaginal yeast but previously was having this when her sugars were higher but willing to try farxiga one more time. Current Outpatient Medications   Medication Sig    levothyroxine (SYNTHROID) 88 mcg tablet TAKE ONE TABLET BY MOUTH EVERY MORNING BEFORE BREAKFAST    meclizine (ANTIVERT) 25 mg tablet Take 1 Tablet by mouth three (3) times daily as needed for Dizziness or Nausea. dexlansoprazole (DEXILANT) 60 mg CpDB capsule (delayed release) TAKE 1 CAPSULE BY MOUTH EVERY DAY    insulin glargine (Lantus Solostar U-100 Insulin) 100 unit/mL (3 mL) inpn 22 Units by SubCUTAneous route nightly. phentermine (ADIPEX-P) 37.5 mg tablet Take 1/2 tablet before breakfast and 3-4-pm--patient will pay cash and use goodrx. com coupon    busPIRone (BUSPAR) 10 mg tablet nightly as needed. semaglutide (Ozempic) 1 mg/dose (4 mg/3 mL) pnij 1 mg by SubCUTAneous route every seven (7) days. Dispense 3 pens = 9 ml for a 90 day supply    CARTIA  mg ER capsule Take 120 mg by mouth daily. metoprolol succinate (TOPROL-XL) 100 mg tablet Take 1 Tab by mouth two (2) times a day. cholecalciferol (VITAMIN D3) 25 mcg (1,000 unit) cap Take 2,000 Units by mouth daily. levonorgestreL (MIRENA) 20 mcg/24 hours (8 yrs) 52 mg IUD 1 Each by IntraUTERine route once. No current facility-administered medications for this visit. Allergies   Allergen Reactions    Jardiance [Empagliflozin] Other (comments)     Recurrent yeast infections    Naprosyn [Naproxen] Nausea and Vomiting     Tolerates Advil, Toradol, etc    Topamax [Topiramate] Other (comments)     Kidney stone    Metformin Other (comments)     GI side effects with both IR and ER     Lexapro [Escitalopram] Other (comments)     Fatigue, wt gain    Zoloft [Sertraline] Other (comments)     Fatigue      Review of Systems: PER HPI    Physical Examination:  Blood pressure 109/79, pulse (!) 112, height 5' 1\" (1.549 m), weight 172 lb 12.8 oz (78.4 kg). General: pleasant, no distress, good eye contact   Neck: no carotid bruits  Cardiovascular: regular, (+) tachy, nl s1 and s2, no m/r/g,   Respiratory: clear bilaterally  Integumentary: no edema,   Psychiatric: normal mood and affect    Diabetic foot exam:     Left Foot:   Visual Exam: callous - mild   Pulse DP: 2+ (normal)   Filament test: normal sensation    Vibratory sensation: normal      Right Foot:   Visual Exam: callous - mild   Pulse DP: 2+ (normal)   Filament test: normal sensation    Vibratory sensation: normal      Data Reviewed:   Component      Latest Ref Rng & Units 11/7/2022   Glucose      70 - 99 mg/dL 145 (H)   BUN      6 - 24 mg/dL 9   Creatinine      0.57 - 1.00 mg/dL 0.64   eGFR      >59 mL/min/1.73 114   BUN/Creatinine ratio      9 - 23 14   Sodium      134 - 144 mmol/L 144   Potassium      3.5 - 5.2 mmol/L 4.9   Chloride      96 - 106 mmol/L 106   CO2      20 - 29 mmol/L 21   Calcium      8.7 - 10.2 mg/dL 9.4   Protein, total      6.0 - 8.5 g/dL 6.7   Albumin      3.8 - 4.8 g/dL 4.2   GLOBULIN, TOTAL      1.5 - 4.5 g/dL 2.5   A-G Ratio      1.2 - 2.2 1.7   Bilirubin, total      0.0 - 1.2 mg/dL 0.5   Alk.  phosphatase      44 - 121 IU/L 87   AST      0 - 40 IU/L 16 ALT      0 - 32 IU/L 15   Cholesterol, total      100 - 199 mg/dL 193   Triglyceride      0 - 149 mg/dL 59   HDL Cholesterol      >39 mg/dL 61   VLDL, calculated      5 - 40 mg/dL 11   LDL, calculated      0 - 99 mg/dL 121 (H)   Hemoglobin A1c, (calculated)      4.8 - 5.6 % 10.8 (H)   Estimated average glucose      mg/dL 263   TSH      0.450 - 4.500 uIU/mL 0.940   VITAMIN D, 25-HYDROXY      30.0 - 100.0 ng/mL 42.5       Assessment/Plan:     1. Diabetes mellitus type II: her most recent Hgb A1c was 10.8% in 11/22 up from 8.4% in 4/22 up from 8.3% in 9/21 down from 9.1% in 6/21 up from 8.7% in 3/21 up from 7.6% in 8/20 down from 8% in 2/20 down from 9.9% in 7/19 up from 9.2% in 3/19 up from 8.2% in 10/18 down from 8.3% in 6/18 down from 10.4% in 1/18 up from 8.1% in 9/17 up from 7.9% in 4/17 down from 8.1% in 12/16 up from 7% in 7/16 down from 7.7% in 3/16 down from 7.8% in 11/15 down from 8.3% in 7/15 down from 8.8% in 3/15 up from 7.6% in 11/14 up from 7.3% in 7/14 down from 8% in 3/14 up from 6.1% in 11/13 down from 6.7% in 9/13 up from 6.3% in 8/13 up slightly from 6.1% in 7/13 down from 6.3% in 5/13 down from 6.7% in 1/13 up from 6.5% in Oct 2012. A1c is worse with not consistently taking insulin so stopped the lantus and tried farxiga instead. May need to use NPH instead to help with aquiles phenomenon.  - begin farxiga 10 mg 1/2 tab daily x 2 weeks and if tolerating, change to 5 mg daily  - stop lantus to 22 units at bedtime   - cont ozempic 1 mg weekly  - check bs up to 3 times daily due to fluctuating sugars  - foot exam done 11/22  - microalbumin nl 10/12--was taken off lisinopril 10 mg daily due to trying to conceive and repeat normal ever since, most recently in 4/22  - optho UTD 1/21  - check Hgb A1c and cmp prior to next visit       2. Goiter: Had a TSH of 2.68 in May 2012.   Level was 2.4 in 1/13 and I started her on a a low dose of levothyroxine 25 mcg daily to keep her TSH < 2.5 to improve her chance of conception and interestingly she became pregnant shortly after starting this. TSH 2.1 in 5/13 and 1.6 in 7/13 and 1.39 in 9/13. Stopped levothyroxine after delivery in 10/13 and TSH up to 5.5 in 3/14 so restarted low dose of 25 mcg daily to keep her TSH < 2.5 as she has hypothyroid symptoms and high cholesterol. However only 2.2 in 7/14 so increased to 50 mcg daily and TSH 2.4 in 11/14 but had missed a dose on the weekends on occasion so increased to 62.5 mcg daily and TSH 1.87 in 3/15 and 1.46 in 11/15 and 1.04 in 3/16. Up to 2.06 in 7/16 so increased to 75 mcg daily and down to 1.5 in 12/16. Up to 2.11 in 4/17 so increased her dose to 88 mcg daily and down to 0.96 in 9/17 and 1.81 in 1/18 and 1.96 in 10/18 and 1.49 in 3/19 so kept her dose the same. TSH up to 2.64 in 7/19 but had missed some doses prior to lab draw so kept dose the same and 1.49 in 2/20 and 1.08 in 8/20 and 2 in 9/21 and 0.94 in 11/22 so kept dose the same. Her thyroid is not to blame for her tachycardia as she has never been over-replaced. - cont levothyroxine 88 mcg daily  - check TSH in 11/23          3. Hypercholesterolemia: Given DM, Goal LDL < 100, non-HDL < 130, and TG < 150.  in 10/12 off simva 10 due to trying to conceive, down to 109 in 1/13 and 92 in 5/13. Up to 127 in 11/13 and down to 111 in 3/14. Up to 124 in 7/14. Down to 114 in 11/14. Up to 140 in 3/15 possibly due to [address] as with stopping this it was down to 92 in 7/15. Up to 114 in 11/15 with diet. Was started on lipitor in 1/16 but had more nausea with this so stopped this and with 17 lb wt loss, LDL down to 84 in 3/16 and still 98 in 7/16. Up to 114 in 4/17 and still 119 in 9/17 and 136 in 1/18. Down to 116 in 6/18 and 113 in 10/18 and 110 in 3/19 but up to 138 in 7/19 due to higher A1c and TSH but down to 115 in 2/20 and 126 in 8/20 and 108 in 3/21.   Up to 142 in 9/21 but down to 107 in 4/22, up to 121 in 11/22  - diet control  - check lipids prior to next visit        4. Obesity: I started topamax in 11/15 and Dr. Mel Mon added phentermine in 1/16 and changed her trulicity to [facility] and her weight had come down 26 lbs by 7/16. Had more nausea and GERD and ended up stopping all her meds in 10/16 and weight up 7 lbs by 12/16. Restarted topamax for 2 weeks and then added back victoza at a lower dose of 1.2 mg daily. Eventually added back phentermine in 3/17. Then developed a kidney stone in 3/17 and stopped the topamax and weight up 6 lbs by 4/17. Will stay off the topamax due to the kidney stone and stop the phentermine as I don't think this is helping her weight. I told her it's fine to restart the advocare spark and protein shakes as these are not as likely to cause the kidney stone as the topamax was. Weight up 7 lbs by 9/17 possibly due to stress and 1 lb by 1/18. Down 3 lbs by 6/18.  Up 6 lbs by 11/18 but was off victoza and started back on prozac. I added back topamax 1 tab daily and restarted victoza but wt down just 1 lb by 3/19 so began wellbutrin instead and still stable by 7/19. Stopped topamax in 6/19 due to concern for new back pain that could be the beginning of another kidney stone. Started seeing Dr. Wei Cleaning in 9/19 and was started on ozempic and synardy and wt down 14 lbs by 2/20. Down 4 lbs by 9/20. Did go up from 178 in 9/20 to 190 in 2/21 but down to 182.5 by 3/21 and stable by 9/21. Restarted phentermine 1/2 tab in am in 3/22 but wt up 185 by 4/22 so increased dose to 1/2 tab bid and down to 172 in 11/22.  - cont wellbutrin  mg daily   - cont ozempic as above  - stay off topamax 50 mg daily due to risk of kidney stone  - cont phentermine 37.5 mg to 1/2 tab bid      5. Vitamin D deficiency: level was 24 in 12/15 and is currently taking 2000 units of D3 and level up to 32 in 7/16 and 35 in 4/17 and 30 in 9/17 and 35 in 10/18 and in 3/21. With being off this, down to 23.8 in 4/22 so restarted and up to 42 in 11/22.   - cont vitamin D 2000 units daily  - check Vitamin D 25-OH level in 11/23        6. 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 Jade Harris and Dr. Yo Veras. Now under care of Dr. Lilibeth Wasserman and combo of dilt and toprol normally works   - cont Toprol  mg bid  - cont dilt  mg daily    Patient Instructions   1) Stop the lantus. 2) Try taking 1/2 of the 10 mg farxiga in the morning. Give this 2 weeks and if your fasting sugar is staying under 130 then the ozempic and 5 mg of farxiga is all you need and let me know and I'll get you a prescription. If you're staying over 130 but tolerating the farxiga, I recommend we still use this dose to help with your weight and A1c but we'll plan on adding a low dose of NPH 10 units at bedtime as this only lasts 12 hours and hopefully will keep your sugar under 130 without making you hungry all day. 3) Your liver and kidney and urine and thyroid and vitamin D are normal and blood pressure is controlled. 4) Your LDL (bad cholesterol) was a little higher due to diet and higher A1c but should come back down. Follow-up and Dispositions    Return 5/10/23 at 3:50pm.               Copy sent to:   Kenzie Newby MD as PCP - General  Courtney Oleary MD (Obstetrics & Gynecology)  Mike Grullon MD (Cardiology)  Dr. Rose Marie Grijalva

## 2022-11-09 NOTE — PATIENT INSTRUCTIONS
1) Stop the lantus. 2) Try taking 1/2 of the 10 mg farxiga in the morning. Give this 2 weeks and if your fasting sugar is staying under 130 then the ozempic and 5 mg of farxiga is all you need and let me know and I'll get you a prescription. If you're staying over 130 but tolerating the farxiga, I recommend we still use this dose to help with your weight and A1c but we'll plan on adding a low dose of NPH 10 units at bedtime as this only lasts 12 hours and hopefully will keep your sugar under 130 without making you hungry all day. 3) Your liver and kidney and urine and thyroid and vitamin D are normal and blood pressure is controlled. 4) Your LDL (bad cholesterol) was a little higher due to diet and higher A1c but should come back down.

## 2022-11-21 RX ORDER — INSULIN HUMAN 100 [IU]/ML
INJECTION, SUSPENSION SUBCUTANEOUS
Qty: 15 ML | Refills: 11 | Status: SHIPPED | OUTPATIENT
Start: 2022-11-21

## 2022-11-21 RX ORDER — PEN NEEDLE, DIABETIC 31 GX3/16"
NEEDLE, DISPOSABLE MISCELLANEOUS
Qty: 100 PEN NEEDLE | Refills: 3 | Status: SHIPPED | OUTPATIENT
Start: 2022-11-21

## 2022-12-09 DIAGNOSIS — E66.9 OBESITY, CLASS I, BMI 30-34.9: ICD-10-CM

## 2022-12-09 RX ORDER — PHENTERMINE HYDROCHLORIDE 37.5 MG/1
TABLET ORAL
Qty: 90 TABLET | Refills: 1 | Status: SHIPPED | OUTPATIENT
Start: 2022-12-09

## 2023-01-28 RX ORDER — SEMAGLUTIDE 1.34 MG/ML
INJECTION, SOLUTION SUBCUTANEOUS
Qty: 9 EACH | Refills: 3 | Status: SHIPPED | OUTPATIENT
Start: 2023-01-28

## 2023-01-30 DIAGNOSIS — U07.1 UPPER RESPIRATORY TRACT INFECTION DUE TO COVID-19 VIRUS: Primary | ICD-10-CM

## 2023-01-30 DIAGNOSIS — Z79.4 DIABETES MELLITUS TYPE 2, INSULIN DEPENDENT (HCC): ICD-10-CM

## 2023-01-30 DIAGNOSIS — E11.9 DIABETES MELLITUS TYPE 2, INSULIN DEPENDENT (HCC): ICD-10-CM

## 2023-01-30 DIAGNOSIS — J06.9 UPPER RESPIRATORY TRACT INFECTION DUE TO COVID-19 VIRUS: Primary | ICD-10-CM

## 2023-04-25 DIAGNOSIS — R00.0 TACHYCARDIA: ICD-10-CM

## 2023-04-25 DIAGNOSIS — E78.00 HYPERCHOLESTEROLEMIA: ICD-10-CM

## 2023-04-25 DIAGNOSIS — E66.9 OBESITY, CLASS I, BMI 30-34.9: ICD-10-CM

## 2023-04-25 DIAGNOSIS — E11.65 UNCONTROLLED TYPE 2 DIABETES MELLITUS WITH HYPERGLYCEMIA (HCC): ICD-10-CM

## 2023-04-25 DIAGNOSIS — E55.9 VITAMIN D DEFICIENCY: ICD-10-CM

## 2023-04-25 DIAGNOSIS — E04.9 GOITER: ICD-10-CM

## 2023-05-04 LAB
ALBUMIN SERPL-MCNC: 4.4 G/DL (ref 3.8–4.8)
ALBUMIN/GLOB SERPL: 1.6 {RATIO} (ref 1.2–2.2)
ALP SERPL-CCNC: 95 IU/L (ref 44–121)
ALT SERPL-CCNC: 22 IU/L (ref 0–32)
AST SERPL-CCNC: 15 IU/L (ref 0–40)
BILIRUB SERPL-MCNC: 0.3 MG/DL (ref 0–1.2)
BUN SERPL-MCNC: 12 MG/DL (ref 6–24)
BUN/CREAT SERPL: 18 (ref 9–23)
CALCIUM SERPL-MCNC: 9.2 MG/DL (ref 8.7–10.2)
CHLORIDE SERPL-SCNC: 97 MMOL/L (ref 96–106)
CHOLEST SERPL-MCNC: 229 MG/DL (ref 100–199)
CO2 SERPL-SCNC: 24 MMOL/L (ref 20–29)
CREAT SERPL-MCNC: 0.67 MG/DL (ref 0.57–1)
EGFRCR SERPLBLD CKD-EPI 2021: 113 ML/MIN/1.73
EST. AVERAGE GLUCOSE BLD GHB EST-MCNC: 338 MG/DL
GLOBULIN SER CALC-MCNC: 2.7 G/DL (ref 1.5–4.5)
GLUCOSE SERPL-MCNC: 205 MG/DL (ref 70–99)
HBA1C MFR BLD: 13.4 % (ref 4.8–5.6)
HDLC SERPL-MCNC: 71 MG/DL
LDLC SERPL CALC-MCNC: 143 MG/DL (ref 0–99)
POTASSIUM SERPL-SCNC: 4.1 MMOL/L (ref 3.5–5.2)
PROT SERPL-MCNC: 7.1 G/DL (ref 6–8.5)
SODIUM SERPL-SCNC: 135 MMOL/L (ref 134–144)
TRIGL SERPL-MCNC: 84 MG/DL (ref 0–149)
VLDLC SERPL CALC-MCNC: 15 MG/DL (ref 5–40)

## 2023-05-24 ENCOUNTER — OFFICE VISIT (OUTPATIENT)
Age: 43
End: 2023-05-24
Payer: COMMERCIAL

## 2023-05-24 VITALS
BODY MASS INDEX: 30.58 KG/M2 | DIASTOLIC BLOOD PRESSURE: 80 MMHG | HEART RATE: 104 BPM | WEIGHT: 162 LBS | SYSTOLIC BLOOD PRESSURE: 113 MMHG | HEIGHT: 61 IN

## 2023-05-24 DIAGNOSIS — Z79.4 TYPE 2 DIABETES MELLITUS WITH HYPERGLYCEMIA, WITH LONG-TERM CURRENT USE OF INSULIN (HCC): Primary | ICD-10-CM

## 2023-05-24 DIAGNOSIS — E55.9 VITAMIN D DEFICIENCY, UNSPECIFIED: ICD-10-CM

## 2023-05-24 DIAGNOSIS — E04.9 NONTOXIC GOITER, UNSPECIFIED: ICD-10-CM

## 2023-05-24 DIAGNOSIS — E78.00 PURE HYPERCHOLESTEROLEMIA, UNSPECIFIED: ICD-10-CM

## 2023-05-24 DIAGNOSIS — E66.9 CLASS 1 OBESITY: ICD-10-CM

## 2023-05-24 DIAGNOSIS — E11.65 TYPE 2 DIABETES MELLITUS WITH HYPERGLYCEMIA, WITH LONG-TERM CURRENT USE OF INSULIN (HCC): Primary | ICD-10-CM

## 2023-05-24 DIAGNOSIS — R00.0 TACHYCARDIA, UNSPECIFIED: ICD-10-CM

## 2023-05-24 PROCEDURE — 3046F HEMOGLOBIN A1C LEVEL >9.0%: CPT | Performed by: INTERNAL MEDICINE

## 2023-05-24 PROCEDURE — 99214 OFFICE O/P EST MOD 30 MIN: CPT | Performed by: INTERNAL MEDICINE

## 2023-05-24 RX ORDER — LANCETS 30 GAUGE
EACH MISCELLANEOUS
Qty: 100 EACH | Refills: 3 | Status: SHIPPED | OUTPATIENT
Start: 2023-05-24

## 2023-05-24 RX ORDER — LORATADINE 10 MG/1
10 TABLET ORAL DAILY
COMMUNITY

## 2023-05-24 RX ORDER — MULTIVITAMIN
TABLET ORAL
COMMUNITY

## 2023-05-24 RX ORDER — OMEPRAZOLE 20 MG/1
20 CAPSULE, DELAYED RELEASE ORAL DAILY
COMMUNITY

## 2023-05-24 RX ORDER — GLUCOSAMINE HCL/CHONDROITIN SU 500-400 MG
CAPSULE ORAL
Qty: 100 STRIP | Refills: 3 | Status: SHIPPED | OUTPATIENT
Start: 2023-05-24

## 2023-05-24 NOTE — PROGRESS NOTES
Chief Complaint   Patient presents with    Diabetes     PCP and pharmacy confirmed      History of Present Illness: Ramya Rivera is a 40 y.o. female here for follow up of diabetes. Weight down 10 lbs since last visit in 11/22. Took the Venia Magyar after last visit but thinks she stopped it in December possibly from vaginal yeast or if her sugars just weren't coming down and got frustrated so she stopped this. Also was taking NPH at bedtime and worked up from 10 to 16 units but doesn't think she ever took more than this and since her sugars were still high, just stopped this also and has only been taking ozempic 1 mg once a week on Sundays. No GI side effects on this dose. Has been taking 1 tab of phentermine in the morning. Fasting sugars are usually around 200 or just under. Has been under more stress at home and had COVID this winter. Has been taking EFRAIN vitamins about an hour after her levothyroxine to help with energy after working out and advised her to move the levothyroxine to bedtime. Current Outpatient Medications   Medication Sig    omeprazole (PRILOSEC) 20 MG delayed release capsule Take 1 capsule by mouth daily    loratadine (CLARITIN) 10 MG tablet Take 1 tablet by mouth daily    Multiple Vitamin (MULTIVITAMIN) TABS Take by mouth EFRAIN brand    busPIRone (BUSPAR) 10 MG tablet nightly as needed.     vitamin D 25 MCG (1000 UT) CAPS Take 2 capsules by mouth daily    dilTIAZem (CARDIZEM CD) 120 MG extended release capsule Take 1 capsule by mouth daily    levonorgestrel (MIRENA) IUD 52 mg 1 each by IntraUTERine route once    levothyroxine (SYNTHROID) 88 MCG tablet TAKE ONE TABLET BY MOUTH EVERY MORNING BEFORE BREAKFAST    meclizine (ANTIVERT) 25 MG tablet Take 1 tablet by mouth 3 times daily as needed    metoprolol succinate (TOPROL XL) 100 MG extended release tablet Take 1 tablet by mouth 2 times daily    phentermine (ADIPEX-P) 37.5 MG tablet 1 tab in the AM    Semaglutide, 1 MG/DOSE, (OZEMPIC, 1

## 2023-05-24 NOTE — PATIENT INSTRUCTIONS
1) This Sunday Take 2 of the 1 mg ozempic doses back to back and then do this again the following week and provided you are tolerating this dose, then let me know after your 2nd week of doing this and then I can send the 2 mg pens to the pharmacy but feel free to use up the 1 mg pens you have. 2) Restart NPH at 16 units at bedtime and give this 4 days and if your fasting sugar is still over 130 after 4 days, then go up by 4 units every 4 days until you reach the dose that keeps you under 130 and then stay at this dose. If the higher dose of ozempic is working better and you are having sugars under 110 in the morning, then decrease your NPH by 2 units. 3) Give me an update in 2 weeks over mychart with your sugars and what dose of insulin you are on. 4) BUN and creatinine are markers of kidney function. Your values are normal.    5) ALT and AST are markers of liver function. Your values are normal.    6) Your cholesterol is higher with an A1c of 13.4% and should come down with better diabetes control. 7) Move your levothyroxine to bedtime 2-3 hours after dinner and keep your vitamins in the morning to not interfere with absorption.

## 2023-05-26 DIAGNOSIS — F51.05 HYPOSOMNIA, INSOMNIA OR SLEEPLESSNESS ASSOCIATED WITH ANXIETY: ICD-10-CM

## 2023-05-26 DIAGNOSIS — F41.0 GENERALIZED ANXIETY DISORDER WITH PANIC ATTACKS: Primary | ICD-10-CM

## 2023-05-26 DIAGNOSIS — F41.1 GENERALIZED ANXIETY DISORDER WITH PANIC ATTACKS: Primary | ICD-10-CM

## 2023-05-26 DIAGNOSIS — F41.9 HYPOSOMNIA, INSOMNIA OR SLEEPLESSNESS ASSOCIATED WITH ANXIETY: ICD-10-CM

## 2023-05-26 NOTE — TELEPHONE ENCOUNTER
Called pt to let find out dose of medication and how she is taking it. Confirmed [facility] pharmacy on file. Appt changed to VV.      ----- Message from Susan Lam MD sent at 2023  1:21 PM EDT -----  Regarding: RE: Buspar/Trazodone  Contact: [phone]  I am fine with renewing those for her. You can explain to her that the problem is w/ the new computer/system upgrade, it archived a lot of old and prior records, especially certain medications that may have . So now I dont have a way to see the dose and sig that I may have prescribed previously. So I need clarification on exact dosing, frequency, etc.   Once confirmed w/ pt, please enter her updated pharmacy information and I can try to get that sent over sometime this weekend being its Friday afternoon now and I am logging off. And yes, that is fine for her to do her appt on  as a VV during that time. Thanks.  ----- Message -----  From: Comfort Ellis LPN  Sent: 9/15/7170   9:17 AM EDT  To: Susan Lam MD  Subject: Caridad Sellers: [employer]                                       Let me know what you would like to do.  ----- Message -----  From: Toan Rojas  Sent: 2023   8:59 AM EDT  To: Yaima Singh  Clinical Staff  Subject: Buspar                                           Thank you for the response MJ. I still have a few of them both. But yes she prescribed them both. I have always taken them very infrequently but due to an increase in stress and some anxiety over the past four months Ive been taking them more often. Shes tried a few different meds with me to help with my anxiety and these two work well with no side effects.  Is she able to do a virtual appointment on the  at 10:40am?

## 2023-05-27 PROBLEM — F51.05 HYPOSOMNIA, INSOMNIA OR SLEEPLESSNESS ASSOCIATED WITH ANXIETY: Status: ACTIVE | Noted: 2023-05-27

## 2023-05-27 PROBLEM — F41.9 HYPOSOMNIA, INSOMNIA OR SLEEPLESSNESS ASSOCIATED WITH ANXIETY: Status: ACTIVE | Noted: 2023-05-27

## 2023-05-27 RX ORDER — BUSPIRONE HYDROCHLORIDE 10 MG/1
10 TABLET ORAL 2 TIMES DAILY PRN
Qty: 60 TABLET | Refills: 0 | Status: SHIPPED | OUTPATIENT
Start: 2023-05-27 | End: 2023-06-26

## 2023-05-27 RX ORDER — TRAZODONE HYDROCHLORIDE 50 MG/1
25 TABLET ORAL NIGHTLY PRN
Qty: 30 TABLET | Refills: 0 | Status: SHIPPED | OUTPATIENT
Start: 2023-05-27

## 2023-06-05 RX ORDER — SEMAGLUTIDE 2.68 MG/ML
2 INJECTION, SOLUTION SUBCUTANEOUS WEEKLY
Qty: 9 ML | Refills: 3 | Status: SHIPPED | OUTPATIENT
Start: 2023-06-05

## 2023-06-22 ENCOUNTER — OFFICE VISIT (OUTPATIENT)
Age: 43
End: 2023-06-22
Payer: COMMERCIAL

## 2023-06-22 VITALS
BODY MASS INDEX: 31.95 KG/M2 | SYSTOLIC BLOOD PRESSURE: 115 MMHG | TEMPERATURE: 97.4 F | WEIGHT: 169.2 LBS | DIASTOLIC BLOOD PRESSURE: 76 MMHG | HEIGHT: 61 IN | HEART RATE: 99 BPM | RESPIRATION RATE: 16 BRPM | OXYGEN SATURATION: 99 %

## 2023-06-22 DIAGNOSIS — F51.05 HYPOSOMNIA, INSOMNIA OR SLEEPLESSNESS ASSOCIATED WITH ANXIETY: ICD-10-CM

## 2023-06-22 DIAGNOSIS — F43.23 SITUATIONAL MIXED ANXIETY AND DEPRESSIVE DISORDER: ICD-10-CM

## 2023-06-22 DIAGNOSIS — Z63.0 STRESS DUE TO MARITAL PROBLEMS: ICD-10-CM

## 2023-06-22 DIAGNOSIS — F41.0 GENERALIZED ANXIETY DISORDER WITH PANIC ATTACKS: Primary | ICD-10-CM

## 2023-06-22 DIAGNOSIS — F41.1 GENERALIZED ANXIETY DISORDER WITH PANIC ATTACKS: Primary | ICD-10-CM

## 2023-06-22 DIAGNOSIS — F41.9 HYPOSOMNIA, INSOMNIA OR SLEEPLESSNESS ASSOCIATED WITH ANXIETY: ICD-10-CM

## 2023-06-22 PROBLEM — R00.0 INAPPROPRIATE SINUS TACHYCARDIA: Status: ACTIVE | Noted: 2018-10-05

## 2023-06-22 PROBLEM — I47.11 INAPPROPRIATE SINUS TACHYCARDIA: Status: ACTIVE | Noted: 2018-10-05

## 2023-06-22 PROCEDURE — 99214 OFFICE O/P EST MOD 30 MIN: CPT | Performed by: FAMILY MEDICINE

## 2023-06-22 RX ORDER — TRAZODONE HYDROCHLORIDE 50 MG/1
25 TABLET ORAL NIGHTLY PRN
Qty: 90 TABLET | Refills: 1 | Status: SHIPPED | OUTPATIENT
Start: 2023-06-22

## 2023-06-22 RX ORDER — BUSPIRONE HYDROCHLORIDE 10 MG/1
10 TABLET ORAL 2 TIMES DAILY PRN
Qty: 90 TABLET | Refills: 1 | Status: SHIPPED | OUTPATIENT
Start: 2023-06-22

## 2023-06-22 SDOH — ECONOMIC STABILITY: FOOD INSECURITY: WITHIN THE PAST 12 MONTHS, THE FOOD YOU BOUGHT JUST DIDN'T LAST AND YOU DIDN'T HAVE MONEY TO GET MORE.: NEVER TRUE

## 2023-06-22 SDOH — ECONOMIC STABILITY: FOOD INSECURITY: WITHIN THE PAST 12 MONTHS, YOU WORRIED THAT YOUR FOOD WOULD RUN OUT BEFORE YOU GOT MONEY TO BUY MORE.: NEVER TRUE

## 2023-06-22 SDOH — ECONOMIC STABILITY: HOUSING INSECURITY
IN THE LAST 12 MONTHS, WAS THERE A TIME WHEN YOU DID NOT HAVE A STEADY PLACE TO SLEEP OR SLEPT IN A SHELTER (INCLUDING NOW)?: NO

## 2023-06-22 SDOH — SOCIAL STABILITY - SOCIAL INSECURITY: PROBLEMS IN RELATIONSHIP WITH SPOUSE OR PARTNER: Z63.0

## 2023-06-22 SDOH — ECONOMIC STABILITY: INCOME INSECURITY: HOW HARD IS IT FOR YOU TO PAY FOR THE VERY BASICS LIKE FOOD, HOUSING, MEDICAL CARE, AND HEATING?: NOT HARD AT ALL

## 2023-06-22 ASSESSMENT — PATIENT HEALTH QUESTIONNAIRE - PHQ9
5. POOR APPETITE OR OVEREATING: 3
6. FEELING BAD ABOUT YOURSELF - OR THAT YOU ARE A FAILURE OR HAVE LET YOURSELF OR YOUR FAMILY DOWN: 1
SUM OF ALL RESPONSES TO PHQ QUESTIONS 1-9: 17
4. FEELING TIRED OR HAVING LITTLE ENERGY: 3
SUM OF ALL RESPONSES TO PHQ QUESTIONS 1-9: 17
7. TROUBLE CONCENTRATING ON THINGS, SUCH AS READING THE NEWSPAPER OR WATCHING TELEVISION: 3
10. IF YOU CHECKED OFF ANY PROBLEMS, HOW DIFFICULT HAVE THESE PROBLEMS MADE IT FOR YOU TO DO YOUR WORK, TAKE CARE OF THINGS AT HOME, OR GET ALONG WITH OTHER PEOPLE: 1
1. LITTLE INTEREST OR PLEASURE IN DOING THINGS: 2
SUM OF ALL RESPONSES TO PHQ QUESTIONS 1-9: 17
2. FEELING DOWN, DEPRESSED OR HOPELESS: 2
SUM OF ALL RESPONSES TO PHQ QUESTIONS 1-9: 17
3. TROUBLE FALLING OR STAYING ASLEEP: 3
SUM OF ALL RESPONSES TO PHQ9 QUESTIONS 1 & 2: 4
8. MOVING OR SPEAKING SO SLOWLY THAT OTHER PEOPLE COULD HAVE NOTICED. OR THE OPPOSITE, BEING SO FIGETY OR RESTLESS THAT YOU HAVE BEEN MOVING AROUND A LOT MORE THAN USUAL: 0
9. THOUGHTS THAT YOU WOULD BE BETTER OFF DEAD, OR OF HURTING YOURSELF: 0

## 2023-06-22 ASSESSMENT — ENCOUNTER SYMPTOMS
GASTROINTESTINAL NEGATIVE: 1
RESPIRATORY NEGATIVE: 1

## 2023-06-22 NOTE — PROGRESS NOTES
Chief Complaint   Patient presents with    Anxiety    Medication Check     HISTORY OF PRESENT ILLNESS   HPI  Patient with history of anxiety and depression presents today for follow up medication check. She had been off her regimen of Buspar and Trazodone for close to 2 years bc things were going so well, her stressors were down and her mood had been so stable for so long. However she and her   in 1-2023 and that triggered a lot of anxiety for her. So she restarted taking her previous pills of Trazodone 50 mg 1/2 tablet nightly and Buspar 10 mg but bid now. Since doing so she has really felt like the Buspar has helped to calm her anxiety, relax her and makes her less reactive & less agitated in stressful situations. Even those around her can tell a positive difference in her demeanor. The Buspar really helps to stabilize her mood during the day. She still has some mild depressive type symptoms but feels they are strictly situational surrounding her marital issues. She and her  do want to work it out and they are looking into couples counseling. She is still having some difficulty sleeping at night. The Trazodone is at least helping her to relax her mind at night so that she can fall asleep. However many nights she is unable to stay asleep and has frequent awakenings and restlessness. This leaves her feeling tired many days during the week which in turn impacts her ability to focus at work. Feels like she gets scattered, forgetful and disorganized from night getting a restful night's sleep. She is not taking or tried anything else to help w/ sleep for now aside from the Trazodone. She has not increased it to a full tablet bc when she did that in the past, she felt really groggy and sluggish the next AM.      REVIEW OF SYMPTOMS   Review of Systems   Respiratory: Negative. Cardiovascular: Negative. Gastrointestinal: Negative. Neurological: Negative.             PROBLEM LIST/MEDICAL

## 2023-06-22 NOTE — PROGRESS NOTES
Chief Complaint   Patient presents with    Medication Check       1. \"Have you been to the ER, urgent care clinic since your last visit? Hospitalized since your last visit? \" no  2. \"Have you seen or consulted any other health care providers outside of the [address] [place] [address] since your last visit? \" no    3. For patients aged 39-70: Has the patient had a colonoscopy / FIT/ Cologuard? No      If the patient is female:    4. For patients aged 41-77: Has the patient had a mammogram within the past 2 years? No      5. For patients aged 21-65: Has the patient had a pap smear? No    No flowsheet data found. PHQ-9  6/22/2023   Little interest or pleasure in doing things 2   Little interest or pleasure in doing things -   Feeling down, depressed, or hopeless 2   Trouble falling or staying asleep, or sleeping too much 3   Feeling tired or having little energy 3   Poor appetite or overeating 3   Feeling bad about yourself - or that you are a failure or have let yourself or your family down 1   Trouble concentrating on things, such as reading the newspaper or watching television 3   Moving or speaking so slowly that other people could have noticed. Or the opposite - being so fidgety or restless that you have been moving around a lot more than usual 0   Thoughts that you would be better off dead, or of hurting yourself in some way 0   PHQ-2 Score 4   Total Score PHQ 2 -   PHQ-9 Total Score 17   If you checked off any problems, how difficult have these problems made it for you to do your work, take care of things at home, or get along with other people?  1        PHQ-9  6/22/2023   Little interest or pleasure in doing things 2   Little interest or pleasure in doing things -   Feeling down, depressed, or hopeless 2   Trouble falling or staying asleep, or sleeping too much 3   Feeling tired or having little energy 3   Poor appetite or overeating 3   Feeling bad about yourself - or that you are a failure or have let

## 2023-06-26 DIAGNOSIS — E66.9 CLASS 1 OBESITY: Primary | ICD-10-CM

## 2023-06-26 RX ORDER — PHENTERMINE HYDROCHLORIDE 37.5 MG/1
TABLET ORAL
Qty: 90 TABLET | Refills: 1 | Status: SHIPPED | OUTPATIENT
Start: 2023-06-26 | End: 2023-12-26

## 2023-08-01 RX ORDER — INSULIN HUMAN 100 [IU]/ML
INJECTION, SUSPENSION SUBCUTANEOUS
Qty: 30 ML | Refills: 3 | Status: SHIPPED | OUTPATIENT
Start: 2023-08-01

## 2023-08-16 DIAGNOSIS — Z79.4 TYPE 2 DIABETES MELLITUS WITH HYPERGLYCEMIA, WITH LONG-TERM CURRENT USE OF INSULIN (HCC): ICD-10-CM

## 2023-08-16 DIAGNOSIS — E78.00 PURE HYPERCHOLESTEROLEMIA, UNSPECIFIED: ICD-10-CM

## 2023-08-16 DIAGNOSIS — E04.9 NONTOXIC GOITER, UNSPECIFIED: ICD-10-CM

## 2023-08-16 DIAGNOSIS — E11.65 TYPE 2 DIABETES MELLITUS WITH HYPERGLYCEMIA, WITH LONG-TERM CURRENT USE OF INSULIN (HCC): ICD-10-CM

## 2023-08-23 LAB
ALBUMIN SERPL-MCNC: 3.8 G/DL (ref 3.5–5)
ALBUMIN/GLOB SERPL: 1.1 (ref 1.1–2.2)
ALP SERPL-CCNC: 82 U/L (ref 45–117)
ALT SERPL-CCNC: 26 U/L (ref 12–78)
ANION GAP SERPL CALC-SCNC: 3 MMOL/L (ref 5–15)
AST SERPL-CCNC: 16 U/L (ref 15–37)
BILIRUB SERPL-MCNC: 0.2 MG/DL (ref 0.2–1)
BUN SERPL-MCNC: 12 MG/DL (ref 6–20)
BUN/CREAT SERPL: 17 (ref 12–20)
CALCIUM SERPL-MCNC: 9.1 MG/DL (ref 8.5–10.1)
CHLORIDE SERPL-SCNC: 105 MMOL/L (ref 97–108)
CHOLEST SERPL-MCNC: 206 MG/DL
CO2 SERPL-SCNC: 29 MMOL/L (ref 21–32)
CREAT SERPL-MCNC: 0.71 MG/DL (ref 0.55–1.02)
CREAT UR-MCNC: 218 MG/DL
EST. AVERAGE GLUCOSE BLD GHB EST-MCNC: 229 MG/DL
GLOBULIN SER CALC-MCNC: 3.6 G/DL (ref 2–4)
GLUCOSE SERPL-MCNC: 83 MG/DL (ref 65–100)
HBA1C MFR BLD: 9.6 % (ref 4–5.6)
HDLC SERPL-MCNC: 66 MG/DL
HDLC SERPL: 3.1 (ref 0–5)
LDLC SERPL CALC-MCNC: 127.8 MG/DL (ref 0–100)
MICROALBUMIN UR-MCNC: 1.92 MG/DL
MICROALBUMIN/CREAT UR-RTO: 9 MG/G (ref 0–30)
POTASSIUM SERPL-SCNC: 3.6 MMOL/L (ref 3.5–5.1)
PROT SERPL-MCNC: 7.4 G/DL (ref 6.4–8.2)
SODIUM SERPL-SCNC: 137 MMOL/L (ref 136–145)
T4 FREE SERPL-MCNC: 1.1 NG/DL (ref 0.8–1.5)
TRIGL SERPL-MCNC: 61 MG/DL
TSH SERPL DL<=0.05 MIU/L-ACNC: 1.46 UIU/ML (ref 0.36–3.74)
VLDLC SERPL CALC-MCNC: 12.2 MG/DL

## 2023-08-30 ENCOUNTER — OFFICE VISIT (OUTPATIENT)
Age: 43
End: 2023-08-30
Payer: COMMERCIAL

## 2023-08-30 VITALS
HEIGHT: 61 IN | SYSTOLIC BLOOD PRESSURE: 121 MMHG | WEIGHT: 176 LBS | BODY MASS INDEX: 33.23 KG/M2 | DIASTOLIC BLOOD PRESSURE: 76 MMHG | HEART RATE: 113 BPM

## 2023-08-30 DIAGNOSIS — Z79.4 TYPE 2 DIABETES MELLITUS WITH HYPERGLYCEMIA, WITH LONG-TERM CURRENT USE OF INSULIN (HCC): Primary | ICD-10-CM

## 2023-08-30 DIAGNOSIS — E11.65 TYPE 2 DIABETES MELLITUS WITH HYPERGLYCEMIA, WITH LONG-TERM CURRENT USE OF INSULIN (HCC): Primary | ICD-10-CM

## 2023-08-30 DIAGNOSIS — E04.9 GOITER: ICD-10-CM

## 2023-08-30 DIAGNOSIS — E55.9 VITAMIN D DEFICIENCY: ICD-10-CM

## 2023-08-30 DIAGNOSIS — E66.9 CLASS 1 OBESITY: ICD-10-CM

## 2023-08-30 DIAGNOSIS — E78.00 PURE HYPERCHOLESTEROLEMIA, UNSPECIFIED: ICD-10-CM

## 2023-08-30 DIAGNOSIS — R00.0 INAPPROPRIATE SINUS TACHYCARDIA: ICD-10-CM

## 2023-08-30 PROCEDURE — 99214 OFFICE O/P EST MOD 30 MIN: CPT | Performed by: INTERNAL MEDICINE

## 2023-08-30 PROCEDURE — 3046F HEMOGLOBIN A1C LEVEL >9.0%: CPT | Performed by: INTERNAL MEDICINE

## 2023-08-30 RX ORDER — INSULIN HUMAN 100 [IU]/ML
INJECTION, SUSPENSION SUBCUTANEOUS
Qty: 30 ML | Refills: 3
Start: 2023-08-30

## 2023-08-30 RX ORDER — ACETAMINOPHEN 160 MG
TABLET,DISINTEGRATING ORAL DAILY
COMMUNITY

## 2023-08-30 NOTE — PROGRESS NOTES
by 9/21. Restarted phentermine 1/2 tab in am in 3/22 but wt up 185 by 4/22 so increased dose to 1/2 tab bid and down to 172 in 11/22 and 162 by 5/23 with uncontrolled diabetes and up to 176 in 8/23 with lower A1c  - cont wellbutrin  mg daily   - cont ozempic as above  - stay off topamax 50 mg daily due to risk of kidney stone  - cont phentermine 37.5 mg to 1 tab daily      5. Vitamin D deficiency: level was 24 in 12/15 and is currently taking 2000 units of D3 and level up to 32 in 7/16 and 35 in 4/17 and 30 in 9/17 and 35 in 10/18 and in 3/21. With being off this, down to 23.8 in 4/22 so restarted and up to 42 in 11/22.  - cont vitamin D 2000 units daily  - check Vitamin D 25-OH level prior to next visit        6. 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 Roseanne Diaz and Dr. Veronica Isaacs. Now under care of Dr. Rosendo Melendez and combo of dilt and toprol normally works   - cont Toprol  mg bid  - cont dilt  mg daily    Patient Instructions   1) Your Hemoglobin A1c (3 month test of blood sugar) is down from 13.4% to 9.6% and this reflects not always taking the NPH at bedtime so I expect the next one to be even better. Try taking NPH 28 units at bedtime every night. 2) Your blood pressure is controlled. 3) Your TSH (thyroid test) is perfect so I will keep your dose the same of the levothyroxine. 4) Your LDL (bad cholesterol) is coming down and should continue to improve with lower A1c.    5) BUN and creatinine are markers of kidney function. Your values are normal.    6) ALT and AST are markers of liver function. Your values are normal.    7) Microalbumin/creatinine ratio is a marker of the amount of protein in your urine. Goal is less than 30. Your value is normal. This indicates that your kidneys are not being affected by your uncontrolled diabetes and/or blood pressure. Return 2/28/24 at 4:10pm.        Copy sent to:   Jose R Martin MD as PCP - [address] [place] [address],[address]

## 2023-08-30 NOTE — PATIENT INSTRUCTIONS
1) Your Hemoglobin A1c (3 month test of blood sugar) is down from 13.4% to 9.6% and this reflects not always taking the NPH at bedtime so I expect the next one to be even better. Try taking NPH 28 units at bedtime every night. 2) Your blood pressure is controlled. 3) Your TSH (thyroid test) is perfect so I will keep your dose the same of the levothyroxine. 4) Your LDL (bad cholesterol) is coming down and should continue to improve with lower A1c.    5) BUN and creatinine are markers of kidney function. Your values are normal.    6) ALT and AST are markers of liver function. Your values are normal.    7) Microalbumin/creatinine ratio is a marker of the amount of protein in your urine. Goal is less than 30. Your value is normal. This indicates that your kidneys are not being affected by your uncontrolled diabetes and/or blood pressure.

## 2023-10-20 RX ORDER — SEMAGLUTIDE 2.68 MG/ML
INJECTION, SOLUTION SUBCUTANEOUS
Qty: 3 ML | Refills: 0 | Status: SHIPPED | COMMUNITY
Start: 2023-10-20

## 2024-01-21 DIAGNOSIS — E66.9 CLASS 1 OBESITY: ICD-10-CM

## 2024-01-21 RX ORDER — PHENTERMINE HYDROCHLORIDE 37.5 MG/1
TABLET ORAL
Qty: 90 TABLET | Refills: 1 | Status: SHIPPED | OUTPATIENT
Start: 2024-01-21 | End: 2024-07-21

## 2024-02-04 NOTE — TELEPHONE ENCOUNTER
Please call pt to let her know she has an unread message in [employer]:    I just received a refill request for ozempic for the 1 mg pens but I thought you were on the 2mg dose. Do you know if this was a mistake on [facility] part as on my end it appears you have refills of the 2 mg pens through June 2024. Let me know when you have a chance.

## 2024-02-06 RX ORDER — SEMAGLUTIDE 1.34 MG/ML
INJECTION, SOLUTION SUBCUTANEOUS
Qty: 9 ML | Refills: 3 | OUTPATIENT
Start: 2024-02-06

## 2024-02-06 NOTE — TELEPHONE ENCOUNTER
Informed pt Dr. White has sent a [employer] message that you have not yet read. Pt states she did not request Ozempic 1 mg. Pt states she is on the 2 mg dose and has been getting it filled at [facility].

## 2024-02-14 DIAGNOSIS — E55.9 VITAMIN D DEFICIENCY: ICD-10-CM

## 2024-02-14 DIAGNOSIS — E78.00 PURE HYPERCHOLESTEROLEMIA, UNSPECIFIED: ICD-10-CM

## 2024-02-14 DIAGNOSIS — Z79.4 TYPE 2 DIABETES MELLITUS WITH HYPERGLYCEMIA, WITH LONG-TERM CURRENT USE OF INSULIN (HCC): ICD-10-CM

## 2024-02-14 DIAGNOSIS — E04.9 GOITER: ICD-10-CM

## 2024-02-14 DIAGNOSIS — E11.65 TYPE 2 DIABETES MELLITUS WITH HYPERGLYCEMIA, WITH LONG-TERM CURRENT USE OF INSULIN (HCC): ICD-10-CM

## 2024-02-21 LAB
25(OH)D3+25(OH)D2 SERPL-MCNC: 38.4 NG/ML (ref 30–100)
ALBUMIN SERPL-MCNC: 4.6 G/DL (ref 3.9–4.9)
ALBUMIN/GLOB SERPL: 1.8 {RATIO} (ref 1.2–2.2)
ALP SERPL-CCNC: 98 IU/L (ref 44–121)
ALT SERPL-CCNC: 23 IU/L (ref 0–32)
AST SERPL-CCNC: 16 IU/L (ref 0–40)
BILIRUB SERPL-MCNC: 0.3 MG/DL (ref 0–1.2)
BUN SERPL-MCNC: 10 MG/DL (ref 6–24)
BUN/CREAT SERPL: 16 (ref 9–23)
CALCIUM SERPL-MCNC: 9.5 MG/DL (ref 8.7–10.2)
CHLORIDE SERPL-SCNC: 99 MMOL/L (ref 96–106)
CHOLEST SERPL-MCNC: 203 MG/DL (ref 100–199)
CO2 SERPL-SCNC: 21 MMOL/L (ref 20–29)
CREAT SERPL-MCNC: 0.61 MG/DL (ref 0.57–1)
EGFRCR SERPLBLD CKD-EPI 2021: 114 ML/MIN/1.73
GLOBULIN SER CALC-MCNC: 2.5 G/DL (ref 1.5–4.5)
GLUCOSE SERPL-MCNC: 200 MG/DL (ref 70–99)
HBA1C MFR BLD: 11.5 % (ref 4.8–5.6)
HDLC SERPL-MCNC: 61 MG/DL
LDLC SERPL CALC-MCNC: 127 MG/DL (ref 0–99)
POTASSIUM SERPL-SCNC: 4 MMOL/L (ref 3.5–5.2)
PROT SERPL-MCNC: 7.1 G/DL (ref 6–8.5)
SODIUM SERPL-SCNC: 136 MMOL/L (ref 134–144)
TRIGL SERPL-MCNC: 86 MG/DL (ref 0–149)
TSH SERPL DL<=0.005 MIU/L-ACNC: 0.81 UIU/ML (ref 0.45–4.5)
VLDLC SERPL CALC-MCNC: 15 MG/DL (ref 5–40)

## 2024-02-28 ENCOUNTER — OFFICE VISIT (OUTPATIENT)
Age: 44
End: 2024-02-28
Payer: COMMERCIAL

## 2024-02-28 VITALS
HEIGHT: 61 IN | BODY MASS INDEX: 32.74 KG/M2 | HEART RATE: 102 BPM | SYSTOLIC BLOOD PRESSURE: 116 MMHG | DIASTOLIC BLOOD PRESSURE: 84 MMHG | WEIGHT: 173.4 LBS

## 2024-02-28 DIAGNOSIS — E55.9 VITAMIN D DEFICIENCY: ICD-10-CM

## 2024-02-28 DIAGNOSIS — E11.65 TYPE 2 DIABETES MELLITUS WITH HYPERGLYCEMIA, WITH LONG-TERM CURRENT USE OF INSULIN (HCC): Primary | ICD-10-CM

## 2024-02-28 DIAGNOSIS — E78.00 PURE HYPERCHOLESTEROLEMIA, UNSPECIFIED: ICD-10-CM

## 2024-02-28 DIAGNOSIS — I47.11 INAPPROPRIATE SINUS TACHYCARDIA: ICD-10-CM

## 2024-02-28 DIAGNOSIS — E66.9 CLASS 1 OBESITY: ICD-10-CM

## 2024-02-28 DIAGNOSIS — E04.9 GOITER: ICD-10-CM

## 2024-02-28 DIAGNOSIS — Z79.4 TYPE 2 DIABETES MELLITUS WITH HYPERGLYCEMIA, WITH LONG-TERM CURRENT USE OF INSULIN (HCC): Primary | ICD-10-CM

## 2024-02-28 PROCEDURE — 99215 OFFICE O/P EST HI 40 MIN: CPT | Performed by: INTERNAL MEDICINE

## 2024-02-28 PROCEDURE — 3046F HEMOGLOBIN A1C LEVEL >9.0%: CPT | Performed by: INTERNAL MEDICINE

## 2024-02-28 NOTE — PATIENT INSTRUCTIONS
1) Try taking jardiance 10 mg 1/2 tab 3x/week on Mon, Wed, and Fri mornings and see how your blood sugars run on this by checking in the mid-afternoon a few times a week to see if they are close to 150 or less as I wonder if some of your higher A1c is from having a stressful day at work.    2) If your fasting sugar is over 200, then take a one time dose of 4 units in the morning.      3) BUN and creatinine are markers of kidney function.  Your values are normal.    4) ALT and AST are markers of liver function.  Your values are normal.    5) Your TSH (thyroid test) is perfect so I will keep your dose the same.    6) Your vitamin D is normal.    7) Your cholesterol is stable and blood pressure is controlled.    8) Plan on checking your A1c and kidney test in 3 months to see the effect of the jardiance.

## 2024-02-28 NOTE — PROGRESS NOTES
3) BUN and creatinine are markers of kidney function.  Your values are normal.    4) ALT and AST are markers of liver function.  Your values are normal.    5) Your TSH (thyroid test) is perfect so I will keep your dose the same.    6) Your vitamin D is normal.    7) Your cholesterol is stable and blood pressure is controlled.    8) Plan on checking your A1c and kidney test in 3 months to see the effect of the jardiance.        Return 9/11/24 at 11:50am.    I spent a total of 41 minutes in both face-to-face and in non-face-to-face activities for the visit on the date of this encounter.      Copy sent to:  Deisi Mays MD Laura Pendlebury, MD (Obstetrics & Gynecology)  Jl Norman MD (Cardiology)

## 2024-03-06 RX ORDER — INSULIN HUMAN 100 [IU]/ML
INJECTION, SUSPENSION SUBCUTANEOUS
Qty: 30 ML | Refills: 3 | Status: SHIPPED | OUTPATIENT
Start: 2024-03-06

## 2024-03-24 ENCOUNTER — TELEPHONE (OUTPATIENT)
Age: 44
End: 2024-03-24

## 2024-03-25 NOTE — TELEPHONE ENCOUNTER
Please call [facility] department at [phone] and ask where we are supposed to fax the letter that was requested for additional clinical information for her jardiance approval.  Once obtained, please fax and confirm receipt.  Thanks.

## 2024-03-25 NOTE — TELEPHONE ENCOUNTER
Corrine approved 03/22/2024 - 3/22/2025 PA #: [id number]. Pt notified via [employer]

## 2024-05-28 DIAGNOSIS — Z79.4 TYPE 2 DIABETES MELLITUS WITH HYPERGLYCEMIA, WITH LONG-TERM CURRENT USE OF INSULIN (HCC): ICD-10-CM

## 2024-05-28 DIAGNOSIS — I47.11 INAPPROPRIATE SINUS TACHYCARDIA (HCC): ICD-10-CM

## 2024-05-28 DIAGNOSIS — E55.9 VITAMIN D DEFICIENCY: ICD-10-CM

## 2024-05-28 DIAGNOSIS — E04.9 GOITER: ICD-10-CM

## 2024-05-28 DIAGNOSIS — E78.00 PURE HYPERCHOLESTEROLEMIA, UNSPECIFIED: ICD-10-CM

## 2024-05-28 DIAGNOSIS — E11.65 TYPE 2 DIABETES MELLITUS WITH HYPERGLYCEMIA, WITH LONG-TERM CURRENT USE OF INSULIN (HCC): ICD-10-CM

## 2024-05-28 DIAGNOSIS — E66.9 CLASS 1 OBESITY: ICD-10-CM

## 2024-06-01 LAB
BUN SERPL-MCNC: 11 MG/DL (ref 6–24)
BUN/CREAT SERPL: 15 (ref 9–23)
CALCIUM SERPL-MCNC: 9.1 MG/DL (ref 8.7–10.2)
CHLORIDE SERPL-SCNC: 99 MMOL/L (ref 96–106)
CO2 SERPL-SCNC: 23 MMOL/L (ref 20–29)
CREAT SERPL-MCNC: 0.72 MG/DL (ref 0.57–1)
EGFRCR SERPLBLD CKD-EPI 2021: 107 ML/MIN/1.73
GLUCOSE SERPL-MCNC: 260 MG/DL (ref 70–99)
HBA1C MFR BLD: 9.4 % (ref 4.8–5.6)
POTASSIUM SERPL-SCNC: 4.4 MMOL/L (ref 3.5–5.2)
SODIUM SERPL-SCNC: 138 MMOL/L (ref 134–144)

## 2024-06-01 RX ORDER — INSULIN HUMAN 100 [IU]/ML
INJECTION, SUSPENSION SUBCUTANEOUS
Qty: 30 ML | Refills: 3
Start: 2024-06-01

## 2024-06-23 RX ORDER — SEMAGLUTIDE 2.68 MG/ML
INJECTION, SOLUTION SUBCUTANEOUS
Qty: 9 ML | Refills: 3 | Status: SHIPPED | OUTPATIENT
Start: 2024-06-23

## 2024-06-23 RX ORDER — SEMAGLUTIDE 2.68 MG/ML
2 INJECTION, SOLUTION SUBCUTANEOUS WEEKLY
Qty: 9 ML | Refills: 3 | Status: CANCELLED | OUTPATIENT
Start: 2024-06-23

## 2024-07-21 DIAGNOSIS — F43.23 SITUATIONAL MIXED ANXIETY AND DEPRESSIVE DISORDER: ICD-10-CM

## 2024-07-21 DIAGNOSIS — F41.0 GENERALIZED ANXIETY DISORDER WITH PANIC ATTACKS: ICD-10-CM

## 2024-07-21 DIAGNOSIS — F41.1 GENERALIZED ANXIETY DISORDER WITH PANIC ATTACKS: ICD-10-CM

## 2024-07-22 RX ORDER — BUSPIRONE HYDROCHLORIDE 10 MG/1
10 TABLET ORAL 2 TIMES DAILY PRN
Qty: 30 TABLET | Refills: 0 | Status: SHIPPED | OUTPATIENT
Start: 2024-07-22

## 2024-07-25 ENCOUNTER — OFFICE VISIT (OUTPATIENT)
Age: 44
End: 2024-07-25
Payer: COMMERCIAL

## 2024-07-25 VITALS
OXYGEN SATURATION: 94 % | TEMPERATURE: 97.1 F | DIASTOLIC BLOOD PRESSURE: 70 MMHG | HEART RATE: 92 BPM | HEIGHT: 61 IN | RESPIRATION RATE: 18 BRPM | SYSTOLIC BLOOD PRESSURE: 110 MMHG | WEIGHT: 167.2 LBS | BODY MASS INDEX: 31.57 KG/M2

## 2024-07-25 DIAGNOSIS — F41.0 GENERALIZED ANXIETY DISORDER WITH PANIC ATTACKS: Primary | ICD-10-CM

## 2024-07-25 DIAGNOSIS — F43.89 ADJUSTMENT REACTION TO CHRONIC STRESS: ICD-10-CM

## 2024-07-25 DIAGNOSIS — F51.05 HYPOSOMNIA, INSOMNIA OR SLEEPLESSNESS ASSOCIATED WITH ANXIETY: ICD-10-CM

## 2024-07-25 DIAGNOSIS — F41.9 HYPOSOMNIA, INSOMNIA OR SLEEPLESSNESS ASSOCIATED WITH ANXIETY: ICD-10-CM

## 2024-07-25 DIAGNOSIS — F41.1 GENERALIZED ANXIETY DISORDER WITH PANIC ATTACKS: Primary | ICD-10-CM

## 2024-07-25 PROCEDURE — 99213 OFFICE O/P EST LOW 20 MIN: CPT | Performed by: FAMILY MEDICINE

## 2024-07-25 RX ORDER — FLECAINIDE ACETATE 100 MG/1
100 TABLET ORAL EVERY 12 HOURS
COMMUNITY

## 2024-07-25 RX ORDER — PHENTERMINE HYDROCHLORIDE 37.5 MG/1
37.5 CAPSULE ORAL EVERY MORNING
COMMUNITY

## 2024-07-25 ASSESSMENT — ENCOUNTER SYMPTOMS
RESPIRATORY NEGATIVE: 1
GASTROINTESTINAL NEGATIVE: 1

## 2024-07-25 NOTE — PROGRESS NOTES
Chief Complaint   Patient presents with    Discuss Medications     \"Have you been to the ER, urgent care clinic since your last visit?  Hospitalized since your last visit?\"    NO    “Have you seen or consulted any other health care providers outside of [facility] since your last visit?”    NO        “Have you had a pap smear?”    YES - Where: [facility] 4/24 Nurse/CMA to request most recent records if not in the chart    No cervical cancer screening on file                 7/25/2024     2:15 PM   PHQ-9    Little interest or pleasure in doing things 0   Feeling down, depressed, or hopeless 0   Trouble falling or staying asleep, or sleeping too much 3   Feeling tired or having little energy 3   Poor appetite or overeating 0   Feeling bad about yourself - or that you are a failure or have let yourself or your family down 0   Trouble concentrating on things, such as reading the newspaper or watching television 3   Moving or speaking so slowly that other people could have noticed. Or the opposite - being so fidgety or restless that you have been moving around a lot more than usual 0   Thoughts that you would be better off dead, or of hurting yourself in some way 0   PHQ-2 Score 0   PHQ-9 Total Score 9   If you checked off any problems, how difficult have these problems made it for you to do your work, take care of things at home, or get along with other people? 1           Financial Resource Strain: Low Risk  (7/25/2024)    Overall Financial Resource Strain ([facility])     Difficulty of Paying Living Expenses: Not hard at all      Food Insecurity: No Food Insecurity (7/25/2024)    Hunger Vital Sign     Worried About Running Out of Food in the Last Year: Never true     Ran Out of Food in the Last Year: Never true          Health Maintenance Due   Topic Date Due    Varicella vaccine (1 of 2 - 2-dose childhood series) Never done    HIV screen  Never done    Hepatitis C screen  Never done    HPV vaccine (2 - 3-dose SCDM 
at baseline.   Psychiatric:         Mood and Affect: Mood and affect normal.              ASSESSMENT & PLAN     1. Generalized anxiety disorder with panic attacks  2. Hyposomnia, insomnia or sleeplessness associated with anxiety  3. Adjustment reaction to chronic stress    Overall patient feels that her current regimen of the Buspar 10 mg bid prn works effectively at stabilizing her mood and controlling her anxiety. So she wishes to stay the course with that for now. She has been intolerant of some SSRI's in the past and prefers to continue to avoid taking any of them due to the fatigue and weight gain they caused her in the past. She is finally happy w/ where her weight has stabilized w/ being on the regimen of Phentermine and Ozempic per her Endocrinologist.     For sleep she will try dosing the Trazodone 50 mg later in the evening and adding Magnesium 250-400 mg qhs along w/ it. We also discussed possibly having her take either a half or full tablet of her Buspar at night to help control her racing thoughts/increased anxiety at bedtime. I had also given her some references for counseling last year but she hadnt pursued that. She asked for another list of recommendations which was given to her today.    She will update on all the above over the next several weeks and we will decide on next steps and follow up after that time.    Reviewed diet, nutrition, exercise, weight management/goals, risk reduction, cardiovascular goals for age and associated health risks, medications, effects, risks, benefits, potential interactions and possible side effects.   Age/risk based screening recommendations, health maintenance & prevention counseling.   Cancer screening USPTFS guidelines reviewed w/ pt today.   Discussed benefits/positive/negative outcomes of screening based on age/risk stratification. Informed consent for/against screening based on pt's personal hx/risk factors.   Updated in history above, health maintenance

## 2024-08-05 DIAGNOSIS — F41.0 GENERALIZED ANXIETY DISORDER WITH PANIC ATTACKS: ICD-10-CM

## 2024-08-05 DIAGNOSIS — F41.1 GENERALIZED ANXIETY DISORDER WITH PANIC ATTACKS: ICD-10-CM

## 2024-08-05 DIAGNOSIS — F43.23 SITUATIONAL MIXED ANXIETY AND DEPRESSIVE DISORDER: ICD-10-CM

## 2024-08-05 NOTE — TELEPHONE ENCOUNTER
PCP: Deisi Mays MD    Last appt: 7/25/2024     Future Appointments   Date Time Provider Department Center   9/11/2024 11:50 AM Reinaldo White MD RDE [facility] AMB       Requested Prescriptions     Pending Prescriptions Disp Refills    busPIRone (BUSPAR) 10 MG tablet [Pharmacy Med Name: BUSPIRONE HCL 10 MG TABLET] 60 tablet 0     Sig: TAKE 1 TABLET BY MOUTH 2 TIMES DAILY AS NEEDED (FOR ANXIETY)         Prior labs and Blood pressures:  BP Readings from Last 3 Encounters:   07/25/24 110/70   02/28/24 116/84   08/30/23 121/76     Lab Results   Component Value Date/Time     05/31/2024 08:48 AM    K 4.4 05/31/2024 08:48 AM    CL 99 05/31/2024 08:48 AM    CO2 23 05/31/2024 08:48 AM    BUN 11 05/31/2024 08:48 AM    GFRAA 134 09/14/2021 08:20 AM     No results found for: \"HBA1C\", \"[id number]\"  Lab Results   Component Value Date/Time    CHOL 203 02/20/2024 11:41 AM    CHOL 206 08/23/2023 08:10 AM    HDL 61 02/20/2024 11:41 AM     02/20/2024 11:41 AM    VLDL 15 02/20/2024 11:41 AM    VLDL 15 05/03/2023 07:51 AM     No results found for: \"VITD3\"    Lab Results   Component Value Date/Time    TSH 0.807 02/20/2024 11:41 AM    TSH 1.46 08/23/2023 08:10 AM

## 2024-08-06 RX ORDER — BUSPIRONE HYDROCHLORIDE 10 MG/1
10 TABLET ORAL 2 TIMES DAILY PRN
Qty: 60 TABLET | Refills: 0 | OUTPATIENT
Start: 2024-08-06

## 2024-09-01 DIAGNOSIS — E04.9 GOITER: ICD-10-CM

## 2024-09-01 DIAGNOSIS — I47.11 INAPPROPRIATE SINUS TACHYCARDIA (HCC): ICD-10-CM

## 2024-09-01 DIAGNOSIS — Z79.4 TYPE 2 DIABETES MELLITUS WITH HYPERGLYCEMIA, WITH LONG-TERM CURRENT USE OF INSULIN (HCC): ICD-10-CM

## 2024-09-01 DIAGNOSIS — E78.00 PURE HYPERCHOLESTEROLEMIA, UNSPECIFIED: ICD-10-CM

## 2024-09-01 DIAGNOSIS — E11.65 TYPE 2 DIABETES MELLITUS WITH HYPERGLYCEMIA, WITH LONG-TERM CURRENT USE OF INSULIN (HCC): ICD-10-CM

## 2024-09-01 DIAGNOSIS — E66.9 CLASS 1 OBESITY: ICD-10-CM

## 2024-09-01 DIAGNOSIS — E55.9 VITAMIN D DEFICIENCY: ICD-10-CM

## 2024-09-04 LAB
ALBUMIN SERPL-MCNC: 4.2 G/DL (ref 3.9–4.9)
ALBUMIN/CREAT UR: 5 MG/G CREAT (ref 0–29)
ALP SERPL-CCNC: 77 IU/L (ref 44–121)
ALT SERPL-CCNC: 18 IU/L (ref 0–32)
AST SERPL-CCNC: 20 IU/L (ref 0–40)
BILIRUB SERPL-MCNC: <0.2 MG/DL (ref 0–1.2)
BUN SERPL-MCNC: 11 MG/DL (ref 6–24)
BUN/CREAT SERPL: 19 (ref 9–23)
CALCIUM SERPL-MCNC: 9.2 MG/DL (ref 8.7–10.2)
CHLORIDE SERPL-SCNC: 103 MMOL/L (ref 96–106)
CHOLEST SERPL-MCNC: 200 MG/DL (ref 100–199)
CO2 SERPL-SCNC: 22 MMOL/L (ref 20–29)
CREAT SERPL-MCNC: 0.59 MG/DL (ref 0.57–1)
CREAT UR-MCNC: 225 MG/DL
EGFRCR SERPLBLD CKD-EPI 2021: 115 ML/MIN/1.73
GLOBULIN SER CALC-MCNC: 2.5 G/DL (ref 1.5–4.5)
GLUCOSE SERPL-MCNC: 126 MG/DL (ref 70–99)
HBA1C MFR BLD: 8.9 % (ref 4.8–5.6)
HDLC SERPL-MCNC: 64 MG/DL
LDLC SERPL CALC-MCNC: 118 MG/DL (ref 0–99)
MICROALBUMIN UR-MCNC: 10.9 UG/ML
POTASSIUM SERPL-SCNC: 4.2 MMOL/L (ref 3.5–5.2)
PROT SERPL-MCNC: 6.7 G/DL (ref 6–8.5)
SODIUM SERPL-SCNC: 138 MMOL/L (ref 134–144)
TRIGL SERPL-MCNC: 99 MG/DL (ref 0–149)
VLDLC SERPL CALC-MCNC: 18 MG/DL (ref 5–40)

## 2024-09-11 ENCOUNTER — TELEMEDICINE (OUTPATIENT)
Age: 44
End: 2024-09-11
Payer: COMMERCIAL

## 2024-09-11 DIAGNOSIS — E66.9 CLASS 1 OBESITY: ICD-10-CM

## 2024-09-11 DIAGNOSIS — I47.11 INAPPROPRIATE SINUS TACHYCARDIA (HCC): ICD-10-CM

## 2024-09-11 DIAGNOSIS — Z79.4 TYPE 2 DIABETES MELLITUS WITH HYPERGLYCEMIA, WITH LONG-TERM CURRENT USE OF INSULIN (HCC): Primary | ICD-10-CM

## 2024-09-11 DIAGNOSIS — E04.9 GOITER: ICD-10-CM

## 2024-09-11 DIAGNOSIS — E78.00 PURE HYPERCHOLESTEROLEMIA, UNSPECIFIED: ICD-10-CM

## 2024-09-11 DIAGNOSIS — E11.65 TYPE 2 DIABETES MELLITUS WITH HYPERGLYCEMIA, WITH LONG-TERM CURRENT USE OF INSULIN (HCC): Primary | ICD-10-CM

## 2024-09-11 DIAGNOSIS — E55.9 VITAMIN D DEFICIENCY: ICD-10-CM

## 2024-09-11 PROCEDURE — 99214 OFFICE O/P EST MOD 30 MIN: CPT | Performed by: INTERNAL MEDICINE

## 2024-09-11 PROCEDURE — 3052F HG A1C>EQUAL 8.0%<EQUAL 9.0%: CPT | Performed by: INTERNAL MEDICINE

## 2024-10-15 RX ORDER — INSULIN HUMAN 100 [IU]/ML
INJECTION, SUSPENSION SUBCUTANEOUS
Qty: 45 ML | Refills: 3 | Status: SHIPPED | OUTPATIENT
Start: 2024-10-15

## 2025-01-04 RX ORDER — LEVOTHYROXINE SODIUM 88 UG/1
TABLET ORAL
Qty: 90 TABLET | Refills: 3 | Status: SHIPPED | OUTPATIENT
Start: 2025-01-04

## 2025-03-12 DIAGNOSIS — E78.00 PURE HYPERCHOLESTEROLEMIA, UNSPECIFIED: ICD-10-CM

## 2025-03-12 DIAGNOSIS — Z79.4 TYPE 2 DIABETES MELLITUS WITH HYPERGLYCEMIA, WITH LONG-TERM CURRENT USE OF INSULIN (HCC): ICD-10-CM

## 2025-03-12 DIAGNOSIS — E66.811 CLASS 1 OBESITY: ICD-10-CM

## 2025-03-12 DIAGNOSIS — E04.9 GOITER: ICD-10-CM

## 2025-03-12 DIAGNOSIS — E11.65 TYPE 2 DIABETES MELLITUS WITH HYPERGLYCEMIA, WITH LONG-TERM CURRENT USE OF INSULIN (HCC): ICD-10-CM

## 2025-03-12 DIAGNOSIS — E55.9 VITAMIN D DEFICIENCY: ICD-10-CM

## 2025-03-12 DIAGNOSIS — I47.11 INAPPROPRIATE SINUS TACHYCARDIA: ICD-10-CM

## 2025-03-18 LAB
25(OH)D3+25(OH)D2 SERPL-MCNC: 48.6 NG/ML (ref 30–100)
BUN SERPL-MCNC: 9 MG/DL (ref 6–24)
BUN/CREAT SERPL: 16 (ref 9–23)
CALCIUM SERPL-MCNC: 9.1 MG/DL (ref 8.7–10.2)
CHLORIDE SERPL-SCNC: 102 MMOL/L (ref 96–106)
CHOLEST SERPL-MCNC: 198 MG/DL (ref 100–199)
CO2 SERPL-SCNC: 20 MMOL/L (ref 20–29)
CREAT SERPL-MCNC: 0.56 MG/DL (ref 0.57–1)
EGFRCR SERPLBLD CKD-EPI 2021: 116 ML/MIN/1.73
GLUCOSE SERPL-MCNC: 83 MG/DL (ref 70–99)
HBA1C MFR BLD: 9.2 % (ref 4.8–5.6)
HDLC SERPL-MCNC: 65 MG/DL
LDLC SERPL CALC-MCNC: 120 MG/DL (ref 0–99)
POTASSIUM SERPL-SCNC: 3.4 MMOL/L (ref 3.5–5.2)
SODIUM SERPL-SCNC: 138 MMOL/L (ref 134–144)
TRIGL SERPL-MCNC: 72 MG/DL (ref 0–149)
TSH SERPL DL<=0.005 MIU/L-ACNC: 1.11 UIU/ML (ref 0.45–4.5)
VLDLC SERPL CALC-MCNC: 13 MG/DL (ref 5–40)

## 2025-03-26 ENCOUNTER — RESULTS FOLLOW-UP (OUTPATIENT)
Age: 45
End: 2025-03-26

## 2025-03-26 ENCOUNTER — OFFICE VISIT (OUTPATIENT)
Age: 45
End: 2025-03-26
Payer: COMMERCIAL

## 2025-03-26 VITALS
SYSTOLIC BLOOD PRESSURE: 95 MMHG | HEIGHT: 61 IN | HEART RATE: 94 BPM | WEIGHT: 166.8 LBS | DIASTOLIC BLOOD PRESSURE: 64 MMHG | BODY MASS INDEX: 31.49 KG/M2

## 2025-03-26 DIAGNOSIS — E11.65 TYPE 2 DIABETES MELLITUS WITH HYPERGLYCEMIA, WITH LONG-TERM CURRENT USE OF INSULIN (HCC): Primary | ICD-10-CM

## 2025-03-26 DIAGNOSIS — E66.811 CLASS 1 OBESITY: ICD-10-CM

## 2025-03-26 DIAGNOSIS — E78.00 PURE HYPERCHOLESTEROLEMIA, UNSPECIFIED: ICD-10-CM

## 2025-03-26 DIAGNOSIS — E55.9 VITAMIN D DEFICIENCY: ICD-10-CM

## 2025-03-26 DIAGNOSIS — I47.11 INAPPROPRIATE SINUS TACHYCARDIA: ICD-10-CM

## 2025-03-26 DIAGNOSIS — Z79.4 TYPE 2 DIABETES MELLITUS WITH HYPERGLYCEMIA, WITH LONG-TERM CURRENT USE OF INSULIN (HCC): Primary | ICD-10-CM

## 2025-03-26 DIAGNOSIS — E04.9 GOITER: ICD-10-CM

## 2025-03-26 PROCEDURE — 3046F HEMOGLOBIN A1C LEVEL >9.0%: CPT | Performed by: INTERNAL MEDICINE

## 2025-03-26 PROCEDURE — 99214 OFFICE O/P EST MOD 30 MIN: CPT | Performed by: INTERNAL MEDICINE

## 2025-03-26 RX ORDER — EMPAGLIFLOZIN 10 MG/1
10 TABLET, FILM COATED ORAL DAILY
Qty: 30 TABLET | Refills: 11 | Status: SHIPPED | OUTPATIENT
Start: 2025-03-26

## 2025-03-26 NOTE — PATIENT INSTRUCTIONS
1) I don't think the insulin is the likely cause of the itching and would recommend stopping the diclofenac to see if this helps and let me know so I can add this to your allergy list for the future.    2) Your Hemoglobin A1c (3 month test of blood sugar) was 9.2% up slightly from 8.9% but you have been under a lot of stress so I won't make any changes.      3) BUN and creatinine are markers of kidney function.  Your values are normal.  Although your creatinine is low, I'm not concerned about this as I only worry if your value is high.    4) Your TSH (thyroid test) is perfect so I will keep your dose the same of levothyroxine.    5) Your blood pressure and cholesterol are stable.      6) Your vitamin D is normal.

## 2025-03-26 NOTE — PROGRESS NOTES
Chief Complaint   Patient presents with    Diabetes     PCP and pharmacy confirmed  Pt consented to use of GALLITO     History of Present Illness: Jose L Gregorio is a 44 y.o. female here for follow up of diabetes.  Weight up 4 lbs since last visit in 9/24.      History of Present Illness  The patient is a 44-year-old female here for follow-up of diabetes.    She has been experiencing pruritus for the past 2 months, which she suspects may be related to her insulin use. No changes in her medication regimen have been made, but a rash has developed on her arms and lower legs, which she attributes to her nightly insulin injections. The rash is localized to her elbows and calves, extending to her ankles, and is not present elsewhere on her body. She has observed that the itching subsides when a dose of insulin is skipped. Over the past 2 weeks, only 2 doses of insulin have been missed, and a correlation between the missed doses and the absence of itching has been noted. Flecainide is taken twice daily, and diclofenac has been recently started daily for back pain. A similar episode of itching last summer was initially attributed to sun exposure but is now suspected to be related to diclofenac use so it's possible her itching is actually from this med and not the insulin so she will try holding this for the next week to see if her itching goes away or not. Significant stress following the loss of her brother on 02/28/2025 after [place] has led to irregular eating habits and inconsistent medication use over the past month and a half. No new foods, soaps, or detergents that could potentially cause an allergic reaction have been introduced.    No new numbness or tingling in her feet is reported. An eye exam was conducted last year, and another one is due. A return of her 3:00 PM cravings has been noticed, leading to questions about the continued efficacy of Ozempic. She admits to consuming 3 margaritas the night

## 2025-04-01 ENCOUNTER — OFFICE VISIT (OUTPATIENT)
Age: 45
End: 2025-04-01
Payer: COMMERCIAL

## 2025-04-01 VITALS
DIASTOLIC BLOOD PRESSURE: 74 MMHG | HEART RATE: 72 BPM | SYSTOLIC BLOOD PRESSURE: 122 MMHG | HEIGHT: 61 IN | RESPIRATION RATE: 16 BRPM | WEIGHT: 166.4 LBS | TEMPERATURE: 98.9 F | BODY MASS INDEX: 31.42 KG/M2 | OXYGEN SATURATION: 98 %

## 2025-04-01 DIAGNOSIS — F41.9 HYPOSOMNIA, INSOMNIA OR SLEEPLESSNESS ASSOCIATED WITH ANXIETY: ICD-10-CM

## 2025-04-01 DIAGNOSIS — F43.21 GRIEVING: ICD-10-CM

## 2025-04-01 DIAGNOSIS — F41.0 GENERALIZED ANXIETY DISORDER WITH PANIC ATTACKS: ICD-10-CM

## 2025-04-01 DIAGNOSIS — F51.05 HYPOSOMNIA, INSOMNIA OR SLEEPLESSNESS ASSOCIATED WITH ANXIETY: ICD-10-CM

## 2025-04-01 DIAGNOSIS — F41.1 GENERALIZED ANXIETY DISORDER WITH PANIC ATTACKS: ICD-10-CM

## 2025-04-01 DIAGNOSIS — F43.23 SITUATIONAL MIXED ANXIETY AND DEPRESSIVE DISORDER: Primary | ICD-10-CM

## 2025-04-01 PROCEDURE — 99214 OFFICE O/P EST MOD 30 MIN: CPT | Performed by: FAMILY MEDICINE

## 2025-04-01 RX ORDER — FLUOXETINE 20 MG/1
20 TABLET, FILM COATED ORAL DAILY
Qty: 90 TABLET | Refills: 0 | Status: SHIPPED | OUTPATIENT
Start: 2025-04-01

## 2025-04-01 RX ORDER — LEVOCETIRIZINE DIHYDROCHLORIDE 5 MG/1
5 TABLET, FILM COATED ORAL NIGHTLY
COMMUNITY

## 2025-04-01 SDOH — ECONOMIC STABILITY: FOOD INSECURITY: WITHIN THE PAST 12 MONTHS, THE FOOD YOU BOUGHT JUST DIDN'T LAST AND YOU DIDN'T HAVE MONEY TO GET MORE.: NEVER TRUE

## 2025-04-01 SDOH — ECONOMIC STABILITY: FOOD INSECURITY: WITHIN THE PAST 12 MONTHS, YOU WORRIED THAT YOUR FOOD WOULD RUN OUT BEFORE YOU GOT MONEY TO BUY MORE.: NEVER TRUE

## 2025-04-01 ASSESSMENT — PATIENT HEALTH QUESTIONNAIRE - PHQ9
4. FEELING TIRED OR HAVING LITTLE ENERGY: NEARLY EVERY DAY
SUM OF ALL RESPONSES TO PHQ QUESTIONS 1-9: 16
SUM OF ALL RESPONSES TO PHQ QUESTIONS 1-9: 16
6. FEELING BAD ABOUT YOURSELF - OR THAT YOU ARE A FAILURE OR HAVE LET YOURSELF OR YOUR FAMILY DOWN: NOT AT ALL
10. IF YOU CHECKED OFF ANY PROBLEMS, HOW DIFFICULT HAVE THESE PROBLEMS MADE IT FOR YOU TO DO YOUR WORK, TAKE CARE OF THINGS AT HOME, OR GET ALONG WITH OTHER PEOPLE: SOMEWHAT DIFFICULT
8. MOVING OR SPEAKING SO SLOWLY THAT OTHER PEOPLE COULD HAVE NOTICED. OR THE OPPOSITE, BEING SO FIGETY OR RESTLESS THAT YOU HAVE BEEN MOVING AROUND A LOT MORE THAN USUAL: NOT AT ALL
9. THOUGHTS THAT YOU WOULD BE BETTER OFF DEAD, OR OF HURTING YOURSELF: NOT AT ALL
SUM OF ALL RESPONSES TO PHQ QUESTIONS 1-9: 16
2. FEELING DOWN, DEPRESSED OR HOPELESS: MORE THAN HALF THE DAYS
1. LITTLE INTEREST OR PLEASURE IN DOING THINGS: MORE THAN HALF THE DAYS
3. TROUBLE FALLING OR STAYING ASLEEP: NEARLY EVERY DAY
5. POOR APPETITE OR OVEREATING: NEARLY EVERY DAY
SUM OF ALL RESPONSES TO PHQ QUESTIONS 1-9: 16
7. TROUBLE CONCENTRATING ON THINGS, SUCH AS READING THE NEWSPAPER OR WATCHING TELEVISION: NEARLY EVERY DAY

## 2025-04-01 ASSESSMENT — ENCOUNTER SYMPTOMS: RESPIRATORY NEGATIVE: 1

## 2025-04-01 NOTE — PROGRESS NOTES
Chief Complaint   Patient presents with    Anxiety    Depression     1. \"Have you been to the ER, urgent care clinic since your last visit?  Hospitalized since your last visit?\" No    2. \"Have you seen or consulted any other health care providers outside of the [facility] since your last visit?\" Endo Dr White    3. For patients aged 45-75: Has the patient had a colonoscopy / FIT/ Cologuard? NA - based on age      If the patient is female:    4. For patients aged 40-74: Has the patient had a mammogram within the past 2 years? Yes - no Care Gap present 8/2024      5. For patients aged 21-65: Has the patient had a pap smear? Yes - no Care Gap present Dr Quinteros 4/2024

## 2025-04-01 NOTE — PROGRESS NOTES
Chief Complaint   Patient presents with    Anxiety    Depression     HISTORY OF PRESENT ILLNESS   HPI  Patient with history of Anxiety Disorder, Panic Attacks, Depression, Insomnia presents with increased anxiety, sadness, depression, sleep trouble the past 6 weeks after the tragic death of her brother on 2/20/25. She had been getting along pretty well on regimen of prn Buspar which she seldom needed and Trazodone at bedtime prn. But the past few weeks she has been needing to take the Buspar 1 full tablet every morning in order to keep her anxiety under control. That has worked effectively at keeping her pretty calm throughout the day but the sadness, crying spells, and depressed feeling are becoming overwhelming.  There are days where she has difficulty functioning and concentrating at work.  Her sleep is also more disrupted.  She takes half of the trazodone which helps her fall asleep but then wakes up around 3 to 4 hours later and cannot get back to sleep.  Mind ends up racing.  When she is tried taking a full tablet it just makes her feel groggy the next day.  She has continued to refrain from retrying SSRIs because she is very concerned about the potential for weight gain.  In the past Zoloft and Lexapro made her feel extremely tired and caused unpleasant weight gain.  She had not recalled trying Wellbutrin in the past but when I looked back in her old records from 2020 she did actually take Wellbutrin for a while but she cannot recall the effects.  It also appears that she took Prozac 20 mg tablets back in April 2020 and into 2021 and does not recall having any adverse reactions or side effects.  She used to see a therapist for counseling on an as needed basis but has not done so for the past 2 to 3 years.  She has been given several resources over the past year and has at times considered pursuing counseling again.         4/1/2025     2:01 PM   PHQ-9    Little interest or pleasure in doing things 2

## 2025-05-27 RX ORDER — SEMAGLUTIDE 2.68 MG/ML
INJECTION, SOLUTION SUBCUTANEOUS
Qty: 9 ML | Refills: 3 | Status: SHIPPED | OUTPATIENT
Start: 2025-05-27

## 2025-07-15 DIAGNOSIS — F43.23 SITUATIONAL MIXED ANXIETY AND DEPRESSIVE DISORDER: ICD-10-CM

## 2025-07-15 DIAGNOSIS — F41.1 GENERALIZED ANXIETY DISORDER WITH PANIC ATTACKS: ICD-10-CM

## 2025-07-15 DIAGNOSIS — F41.0 GENERALIZED ANXIETY DISORDER WITH PANIC ATTACKS: ICD-10-CM

## 2025-07-16 NOTE — TELEPHONE ENCOUNTER
PCP: Deisi Mays MD    Last appt: 4/1/2025     Future Appointments   Date Time Provider Department Center   10/8/2025  4:10 PM Reinaldo White MD RDE [facility] AMB       Requested Prescriptions     Pending Prescriptions Disp Refills    FLUoxetine (PROZAC) 20 MG tablet [Pharmacy Med Name: FLUOXETINE 20 MG TAB] 90 tablet 0     Sig: TAKE ONE TABLET BY MOUTH ONE TIME DAILY       Prior labs and Blood pressures:  BP Readings from Last 3 Encounters:   04/01/25 122/74   03/26/25 95/64   07/25/24 110/70     Lab Results   Component Value Date/Time     03/17/2025 08:53 AM    K 3.4 03/17/2025 08:53 AM     03/17/2025 08:53 AM    CO2 20 03/17/2025 08:53 AM    BUN 9 03/17/2025 08:53 AM    GFRAA 134 09/14/2021 08:20 AM     No results found for: \"HBA1C\", \"[id number]\"  Lab Results   Component Value Date/Time    CHOL 198 03/17/2025 08:53 AM    HDL 65 03/17/2025 08:53 AM     03/17/2025 08:53 AM     02/20/2024 11:41 AM    VLDL 13 03/17/2025 08:53 AM    VLDL 15 05/03/2023 07:51 AM     No results found for: \"VITD3\"    Lab Results   Component Value Date/Time    TSH 1.110 03/17/2025 08:53 AM    TSH 1.46 08/23/2023 08:10 AM

## 2025-07-16 NOTE — TELEPHONE ENCOUNTER
Called, left vm for pt to return call to office. Also sent [employer] message with scheduling link. Pt due 4-6 wk f/u in May 2025.

## 2025-07-17 RX ORDER — FLUOXETINE 20 MG/1
20 TABLET, FILM COATED ORAL DAILY
Qty: 30 TABLET | Refills: 0 | Status: SHIPPED | OUTPATIENT
Start: 2025-07-17

## 2025-08-20 ENCOUNTER — OFFICE VISIT (OUTPATIENT)
Age: 45
End: 2025-08-20
Payer: COMMERCIAL

## 2025-08-20 VITALS
HEIGHT: 61 IN | RESPIRATION RATE: 12 BRPM | TEMPERATURE: 97.5 F | OXYGEN SATURATION: 97 % | HEART RATE: 85 BPM | WEIGHT: 155.4 LBS | BODY MASS INDEX: 29.34 KG/M2 | SYSTOLIC BLOOD PRESSURE: 112 MMHG | DIASTOLIC BLOOD PRESSURE: 80 MMHG

## 2025-08-20 DIAGNOSIS — F51.05 HYPOSOMNIA, INSOMNIA OR SLEEPLESSNESS ASSOCIATED WITH ANXIETY: ICD-10-CM

## 2025-08-20 DIAGNOSIS — F41.0 GENERALIZED ANXIETY DISORDER WITH PANIC ATTACKS: ICD-10-CM

## 2025-08-20 DIAGNOSIS — F43.23 SITUATIONAL MIXED ANXIETY AND DEPRESSIVE DISORDER: Primary | ICD-10-CM

## 2025-08-20 DIAGNOSIS — F41.9 HYPOSOMNIA, INSOMNIA OR SLEEPLESSNESS ASSOCIATED WITH ANXIETY: ICD-10-CM

## 2025-08-20 DIAGNOSIS — F41.1 GENERALIZED ANXIETY DISORDER WITH PANIC ATTACKS: ICD-10-CM

## 2025-08-20 DIAGNOSIS — Z12.31 SCREENING MAMMOGRAM FOR BREAST CANCER: ICD-10-CM

## 2025-08-20 PROCEDURE — 99213 OFFICE O/P EST LOW 20 MIN: CPT | Performed by: FAMILY MEDICINE

## 2025-08-20 RX ORDER — FLUOXETINE 20 MG/1
20 TABLET, FILM COATED ORAL DAILY
Qty: 90 TABLET | Refills: 1 | Status: SHIPPED | OUTPATIENT
Start: 2025-08-20

## 2025-08-20 SDOH — ECONOMIC STABILITY: INCOME INSECURITY: IN THE LAST 12 MONTHS, WAS THERE A TIME WHEN YOU WERE NOT ABLE TO PAY THE MORTGAGE OR RENT ON TIME?: PATIENT DECLINED

## 2025-08-20 SDOH — ECONOMIC STABILITY: FOOD INSECURITY: WITHIN THE PAST 12 MONTHS, YOU WORRIED THAT YOUR FOOD WOULD RUN OUT BEFORE YOU GOT MONEY TO BUY MORE.: PATIENT DECLINED

## 2025-08-20 SDOH — ECONOMIC STABILITY: TRANSPORTATION INSECURITY
IN THE PAST 12 MONTHS, HAS THE LACK OF TRANSPORTATION KEPT YOU FROM MEDICAL APPOINTMENTS OR FROM GETTING MEDICATIONS?: PATIENT DECLINED

## 2025-08-20 SDOH — ECONOMIC STABILITY: TRANSPORTATION INSECURITY
IN THE PAST 12 MONTHS, HAS LACK OF TRANSPORTATION KEPT YOU FROM MEETINGS, WORK, OR FROM GETTING THINGS NEEDED FOR DAILY LIVING?: PATIENT DECLINED

## 2025-08-20 SDOH — ECONOMIC STABILITY: FOOD INSECURITY: WITHIN THE PAST 12 MONTHS, THE FOOD YOU BOUGHT JUST DIDN'T LAST AND YOU DIDN'T HAVE MONEY TO GET MORE.: PATIENT DECLINED

## 2025-08-20 ASSESSMENT — PATIENT HEALTH QUESTIONNAIRE - PHQ9
7. TROUBLE CONCENTRATING ON THINGS, SUCH AS READING THE NEWSPAPER OR WATCHING TELEVISION: SEVERAL DAYS
2. FEELING DOWN, DEPRESSED OR HOPELESS: SEVERAL DAYS
6. FEELING BAD ABOUT YOURSELF - OR THAT YOU ARE A FAILURE OR HAVE LET YOURSELF OR YOUR FAMILY DOWN: NOT AT ALL
SUM OF ALL RESPONSES TO PHQ QUESTIONS 1-9: 6
1. LITTLE INTEREST OR PLEASURE IN DOING THINGS: SEVERAL DAYS
8. MOVING OR SPEAKING SO SLOWLY THAT OTHER PEOPLE COULD HAVE NOTICED. OR THE OPPOSITE, BEING SO FIGETY OR RESTLESS THAT YOU HAVE BEEN MOVING AROUND A LOT MORE THAN USUAL: NOT AT ALL
3. TROUBLE FALLING OR STAYING ASLEEP: SEVERAL DAYS
4. FEELING TIRED OR HAVING LITTLE ENERGY: SEVERAL DAYS
10. IF YOU CHECKED OFF ANY PROBLEMS, HOW DIFFICULT HAVE THESE PROBLEMS MADE IT FOR YOU TO DO YOUR WORK, TAKE CARE OF THINGS AT HOME, OR GET ALONG WITH OTHER PEOPLE: SOMEWHAT DIFFICULT
SUM OF ALL RESPONSES TO PHQ QUESTIONS 1-9: 6
9. THOUGHTS THAT YOU WOULD BE BETTER OFF DEAD, OR OF HURTING YOURSELF: NOT AT ALL
5. POOR APPETITE OR OVEREATING: SEVERAL DAYS

## 2025-08-20 ASSESSMENT — ENCOUNTER SYMPTOMS
GASTROINTESTINAL NEGATIVE: 1
RESPIRATORY NEGATIVE: 1